# Patient Record
Sex: MALE | Race: WHITE | NOT HISPANIC OR LATINO | Employment: OTHER | URBAN - METROPOLITAN AREA
[De-identification: names, ages, dates, MRNs, and addresses within clinical notes are randomized per-mention and may not be internally consistent; named-entity substitution may affect disease eponyms.]

---

## 2017-02-10 ENCOUNTER — PATIENT OUTREACH (OUTPATIENT)
Dept: HEALTH INFORMATION MANAGEMENT | Facility: OTHER | Age: 82
End: 2017-02-10

## 2017-02-10 NOTE — PROGRESS NOTES
Outcome: Spoke to Baltazar, caregiver listed in patient's emergency contacts, who said that patient is currently living at [facility] for skilled nursing. Baltazar declined to verify pt's . Does not qualify at this time for Care Coordination Enrollment.    Attempt # 3rd -- Previous encounter was closed

## 2017-02-20 ENCOUNTER — APPOINTMENT (OUTPATIENT)
Dept: RADIOLOGY | Facility: MEDICAL CENTER | Age: 82
DRG: 871 | End: 2017-02-20
Attending: EMERGENCY MEDICINE
Payer: MEDICARE

## 2017-02-20 ENCOUNTER — HOSPITAL ENCOUNTER (INPATIENT)
Facility: MEDICAL CENTER | Age: 82
LOS: 32 days | DRG: 871 | End: 2017-03-24
Attending: EMERGENCY MEDICINE | Admitting: INTERNAL MEDICINE
Payer: MEDICARE

## 2017-02-20 ENCOUNTER — RESOLUTE PROFESSIONAL BILLING HOSPITAL PROF FEE (OUTPATIENT)
Dept: HOSPITALIST | Facility: MEDICAL CENTER | Age: 82
End: 2017-02-20
Payer: MEDICARE

## 2017-02-20 DIAGNOSIS — J44.9 CHRONIC OBSTRUCTIVE PULMONARY DISEASE, UNSPECIFIED COPD TYPE (HCC): ICD-10-CM

## 2017-02-20 DIAGNOSIS — J96.11 CHRONIC RESPIRATORY FAILURE WITH HYPOXIA (HCC): ICD-10-CM

## 2017-02-20 DIAGNOSIS — J18.9 HCAP (HEALTHCARE-ASSOCIATED PNEUMONIA): ICD-10-CM

## 2017-02-20 DIAGNOSIS — R29.6 MULTIPLE FALLS: ICD-10-CM

## 2017-02-20 DIAGNOSIS — R53.81 DEBILITY: ICD-10-CM

## 2017-02-20 DIAGNOSIS — E11.8 DM TYPE 2, CONTROLLED, WITH COMPLICATION (HCC): ICD-10-CM

## 2017-02-20 DIAGNOSIS — G40.909 SEIZURE DISORDER (HCC): ICD-10-CM

## 2017-02-20 DIAGNOSIS — I69.391 DYSPHAGIA STATUS POST CEREBROVASCULAR ACCIDENT: ICD-10-CM

## 2017-02-20 DIAGNOSIS — N30.00 ACUTE CYSTITIS WITHOUT HEMATURIA: ICD-10-CM

## 2017-02-20 DIAGNOSIS — G81.94 LEFT HEMIPARESIS (HCC): ICD-10-CM

## 2017-02-20 PROBLEM — A41.9 SEPSIS (HCC): Status: ACTIVE | Noted: 2017-02-20

## 2017-02-20 LAB
ALBUMIN SERPL BCP-MCNC: 3.3 G/DL (ref 3.2–4.9)
ALBUMIN/GLOB SERPL: 1.1 G/DL
ALP SERPL-CCNC: 77 U/L (ref 30–99)
ALT SERPL-CCNC: 19 U/L (ref 2–50)
ANION GAP SERPL CALC-SCNC: 4 MMOL/L (ref 0–11.9)
APPEARANCE UR: ABNORMAL
AST SERPL-CCNC: 24 U/L (ref 12–45)
BACTERIA #/AREA URNS HPF: ABNORMAL /HPF
BASE EXCESS BLDA CALC-SCNC: 8 MMOL/L (ref -4–3)
BASOPHILS # BLD AUTO: 0.3 % (ref 0–1.8)
BASOPHILS # BLD: 0.05 K/UL (ref 0–0.12)
BILIRUB SERPL-MCNC: 0.9 MG/DL (ref 0.1–1.5)
BILIRUB UR QL STRIP.AUTO: NEGATIVE
BNP SERPL-MCNC: 394 PG/ML (ref 0–100)
BODY TEMPERATURE: 36.9 CENTIGRADE
BUN SERPL-MCNC: 19 MG/DL (ref 8–22)
CALCIUM SERPL-MCNC: 8.9 MG/DL (ref 8.5–10.5)
CHLORIDE SERPL-SCNC: 99 MMOL/L (ref 96–112)
CO2 SERPL-SCNC: 38 MMOL/L (ref 20–33)
COLOR UR: YELLOW
CREAT SERPL-MCNC: 0.82 MG/DL (ref 0.5–1.4)
EOSINOPHIL # BLD AUTO: 0 K/UL (ref 0–0.51)
EOSINOPHIL NFR BLD: 0 % (ref 0–6.9)
EPI CELLS #/AREA URNS HPF: ABNORMAL /HPF
ERYTHROCYTE [DISTWIDTH] IN BLOOD BY AUTOMATED COUNT: 53 FL (ref 35.9–50)
GFR SERPL CREATININE-BSD FRML MDRD: >60 ML/MIN/1.73 M 2
GLOBULIN SER CALC-MCNC: 3.1 G/DL (ref 1.9–3.5)
GLUCOSE SERPL-MCNC: 134 MG/DL (ref 65–99)
GLUCOSE UR STRIP.AUTO-MCNC: NEGATIVE MG/DL
HCO3 BLDA-SCNC: 35 MMOL/L (ref 17–25)
HCT VFR BLD AUTO: 39.7 % (ref 42–52)
HGB BLD-MCNC: 12.2 G/DL (ref 14–18)
IMM GRANULOCYTES # BLD AUTO: 0.07 K/UL (ref 0–0.11)
IMM GRANULOCYTES NFR BLD AUTO: 0.4 % (ref 0–0.9)
INHALED O2 FLOW RATE: 6 L/MIN (ref 2–10)
KETONES UR STRIP.AUTO-MCNC: ABNORMAL MG/DL
LACTATE BLD-SCNC: 0.7 MMOL/L (ref 0.5–2)
LACTATE BLD-SCNC: 1 MMOL/L (ref 0.5–2)
LEUKOCYTE ESTERASE UR QL STRIP.AUTO: ABNORMAL
LYMPHOCYTES # BLD AUTO: 1.87 K/UL (ref 1–4.8)
LYMPHOCYTES NFR BLD: 10.4 % (ref 22–41)
MCH RBC QN AUTO: 31.8 PG (ref 27–33)
MCHC RBC AUTO-ENTMCNC: 30.7 G/DL (ref 33.7–35.3)
MCV RBC AUTO: 103.4 FL (ref 81.4–97.8)
MICRO URNS: ABNORMAL
MONOCYTES # BLD AUTO: 1.31 K/UL (ref 0–0.85)
MONOCYTES NFR BLD AUTO: 7.3 % (ref 0–13.4)
MUCOUS THREADS #/AREA URNS HPF: ABNORMAL /HPF
NEUTROPHILS # BLD AUTO: 14.61 K/UL (ref 1.82–7.42)
NEUTROPHILS NFR BLD: 81.6 % (ref 44–72)
NITRITE UR QL STRIP.AUTO: POSITIVE
NRBC # BLD AUTO: 0 K/UL
NRBC BLD AUTO-RTO: 0 /100 WBC
PCO2 BLDA: 63.6 MMHG (ref 26–37)
PCO2 TEMP ADJ BLDA: 63.3 MMHG (ref 26–37)
PH BLDA: 7.36 [PH] (ref 7.4–7.5)
PH TEMP ADJ BLDA: 7.36 [PH] (ref 7.4–7.5)
PH UR STRIP.AUTO: 5.5 [PH]
PLATELET # BLD AUTO: 149 K/UL (ref 164–446)
PMV BLD AUTO: 10.5 FL (ref 9–12.9)
PO2 BLDA: 94.9 MMHG (ref 64–87)
PO2 TEMP ADJ BLDA: 94.3 MMHG (ref 64–87)
POTASSIUM SERPL-SCNC: 3.8 MMOL/L (ref 3.6–5.5)
PROT SERPL-MCNC: 6.4 G/DL (ref 6–8.2)
PROT UR QL STRIP: 30 MG/DL
RBC # BLD AUTO: 3.84 M/UL (ref 4.7–6.1)
RBC # URNS HPF: ABNORMAL /HPF
RBC UR QL AUTO: ABNORMAL
SAO2 % BLDA: 96.3 % (ref 93–99)
SODIUM SERPL-SCNC: 141 MMOL/L (ref 135–145)
SP GR UR STRIP.AUTO: 1.02
TROPONIN I SERPL-MCNC: 0.06 NG/ML (ref 0–0.04)
WBC # BLD AUTO: 17.9 K/UL (ref 4.8–10.8)
WBC #/AREA URNS HPF: >150 /HPF

## 2017-02-20 PROCEDURE — 83880 ASSAY OF NATRIURETIC PEPTIDE: CPT

## 2017-02-20 PROCEDURE — A9270 NON-COVERED ITEM OR SERVICE: HCPCS | Performed by: INTERNAL MEDICINE

## 2017-02-20 PROCEDURE — 84484 ASSAY OF TROPONIN QUANT: CPT

## 2017-02-20 PROCEDURE — 87205 SMEAR GRAM STAIN: CPT

## 2017-02-20 PROCEDURE — 71010 DX-CHEST-PORTABLE (1 VIEW): CPT

## 2017-02-20 PROCEDURE — 96367 TX/PROPH/DG ADDL SEQ IV INF: CPT

## 2017-02-20 PROCEDURE — 99285 EMERGENCY DEPT VISIT HI MDM: CPT

## 2017-02-20 PROCEDURE — 700111 HCHG RX REV CODE 636 W/ 250 OVERRIDE (IP): Performed by: PHARMACIST

## 2017-02-20 PROCEDURE — 700101 HCHG RX REV CODE 250: Performed by: INTERNAL MEDICINE

## 2017-02-20 PROCEDURE — 96365 THER/PROPH/DIAG IV INF INIT: CPT

## 2017-02-20 PROCEDURE — 770020 HCHG ROOM/CARE - TELE (206)

## 2017-02-20 PROCEDURE — 36415 COLL VENOUS BLD VENIPUNCTURE: CPT

## 2017-02-20 PROCEDURE — 99223 1ST HOSP IP/OBS HIGH 75: CPT | Mod: AI | Performed by: INTERNAL MEDICINE

## 2017-02-20 PROCEDURE — 87040 BLOOD CULTURE FOR BACTERIA: CPT | Mod: 91

## 2017-02-20 PROCEDURE — 700111 HCHG RX REV CODE 636 W/ 250 OVERRIDE (IP): Performed by: INTERNAL MEDICINE

## 2017-02-20 PROCEDURE — 700102 HCHG RX REV CODE 250 W/ 637 OVERRIDE(OP): Performed by: INTERNAL MEDICINE

## 2017-02-20 PROCEDURE — 87070 CULTURE OTHR SPECIMN AEROBIC: CPT

## 2017-02-20 PROCEDURE — 80053 COMPREHEN METABOLIC PANEL: CPT

## 2017-02-20 PROCEDURE — 83605 ASSAY OF LACTIC ACID: CPT

## 2017-02-20 PROCEDURE — 87186 SC STD MICRODIL/AGAR DIL: CPT | Mod: 91

## 2017-02-20 PROCEDURE — 700105 HCHG RX REV CODE 258: Performed by: PHARMACIST

## 2017-02-20 PROCEDURE — 304561 HCHG STAT O2

## 2017-02-20 PROCEDURE — 87077 CULTURE AEROBIC IDENTIFY: CPT | Mod: 91

## 2017-02-20 PROCEDURE — 700105 HCHG RX REV CODE 258: Performed by: INTERNAL MEDICINE

## 2017-02-20 PROCEDURE — 700105 HCHG RX REV CODE 258: Performed by: EMERGENCY MEDICINE

## 2017-02-20 PROCEDURE — 87086 URINE CULTURE/COLONY COUNT: CPT

## 2017-02-20 PROCEDURE — 81001 URINALYSIS AUTO W/SCOPE: CPT

## 2017-02-20 PROCEDURE — 94640 AIRWAY INHALATION TREATMENT: CPT

## 2017-02-20 PROCEDURE — 94760 N-INVAS EAR/PLS OXIMETRY 1: CPT

## 2017-02-20 PROCEDURE — 82803 BLOOD GASES ANY COMBINATION: CPT

## 2017-02-20 PROCEDURE — 85025 COMPLETE CBC W/AUTO DIFF WBC: CPT

## 2017-02-20 PROCEDURE — 700111 HCHG RX REV CODE 636 W/ 250 OVERRIDE (IP): Performed by: EMERGENCY MEDICINE

## 2017-02-20 RX ORDER — SODIUM CHLORIDE 9 MG/ML
30 INJECTION, SOLUTION INTRAVENOUS
Status: DISCONTINUED | OUTPATIENT
Start: 2017-02-20 | End: 2017-03-03

## 2017-02-20 RX ORDER — TRAZODONE HYDROCHLORIDE 50 MG/1
50 TABLET ORAL
Status: ON HOLD | COMMUNITY
End: 2017-03-24

## 2017-02-20 RX ORDER — TAMSULOSIN HYDROCHLORIDE 0.4 MG/1
0.4 CAPSULE ORAL
Status: DISCONTINUED | OUTPATIENT
Start: 2017-02-21 | End: 2017-03-22

## 2017-02-20 RX ORDER — SODIUM CHLORIDE 9 MG/ML
INJECTION, SOLUTION INTRAVENOUS CONTINUOUS
Status: DISCONTINUED | OUTPATIENT
Start: 2017-02-20 | End: 2017-02-20

## 2017-02-20 RX ORDER — POTASSIUM CHLORIDE 750 MG/1
20 TABLET, EXTENDED RELEASE ORAL DAILY
Status: ON HOLD | COMMUNITY
End: 2017-03-24

## 2017-02-20 RX ORDER — AMOXICILLIN 250 MG
1 CAPSULE ORAL
Status: DISCONTINUED | OUTPATIENT
Start: 2017-02-20 | End: 2017-03-22

## 2017-02-20 RX ORDER — DOCUSATE SODIUM 100 MG/1
100 CAPSULE, LIQUID FILLED ORAL 2 TIMES DAILY
Status: DISCONTINUED | OUTPATIENT
Start: 2017-02-20 | End: 2017-02-28

## 2017-02-20 RX ORDER — FAMOTIDINE 20 MG/1
20 TABLET, FILM COATED ORAL 2 TIMES DAILY
Status: DISCONTINUED | OUTPATIENT
Start: 2017-02-20 | End: 2017-03-22

## 2017-02-20 RX ORDER — LEVETIRACETAM 500 MG/1
1500 TABLET ORAL 2 TIMES DAILY
Status: DISCONTINUED | OUTPATIENT
Start: 2017-02-20 | End: 2017-02-20

## 2017-02-20 RX ORDER — BISACODYL 10 MG
10 SUPPOSITORY, RECTAL RECTAL
Status: DISCONTINUED | OUTPATIENT
Start: 2017-02-20 | End: 2017-03-22

## 2017-02-20 RX ORDER — ENEMA 19; 7 G/133ML; G/133ML
1 ENEMA RECTAL
Status: DISCONTINUED | OUTPATIENT
Start: 2017-02-20 | End: 2017-03-22

## 2017-02-20 RX ORDER — TRAZODONE HYDROCHLORIDE 50 MG/1
50 TABLET ORAL
Status: DISCONTINUED | OUTPATIENT
Start: 2017-02-20 | End: 2017-03-22

## 2017-02-20 RX ORDER — AMOXICILLIN 250 MG
1 CAPSULE ORAL NIGHTLY
Status: DISCONTINUED | OUTPATIENT
Start: 2017-02-20 | End: 2017-03-22

## 2017-02-20 RX ORDER — SODIUM CHLORIDE 9 MG/ML
1000 INJECTION, SOLUTION INTRAVENOUS ONCE
Status: COMPLETED | OUTPATIENT
Start: 2017-02-20 | End: 2017-02-20

## 2017-02-20 RX ORDER — DOCUSATE SODIUM 100 MG/1
100 CAPSULE, LIQUID FILLED ORAL DAILY
COMMUNITY

## 2017-02-20 RX ORDER — SERTRALINE HYDROCHLORIDE 100 MG/1
100 TABLET, FILM COATED ORAL DAILY
Status: DISCONTINUED | OUTPATIENT
Start: 2017-02-21 | End: 2017-03-22

## 2017-02-20 RX ORDER — ATORVASTATIN CALCIUM 40 MG/1
80 TABLET, FILM COATED ORAL EVERY EVENING
Status: DISCONTINUED | OUTPATIENT
Start: 2017-02-20 | End: 2017-03-22

## 2017-02-20 RX ORDER — M-VIT,TX,IRON,MINS/CALC/FOLIC 27MG-0.4MG
1 TABLET ORAL DAILY
Status: ON HOLD | COMMUNITY
End: 2017-03-24

## 2017-02-20 RX ORDER — GABAPENTIN 300 MG/1
300 CAPSULE ORAL DAILY
Status: DISCONTINUED | OUTPATIENT
Start: 2017-02-21 | End: 2017-03-22

## 2017-02-20 RX ORDER — FINASTERIDE 5 MG/1
5 TABLET, FILM COATED ORAL DAILY
Status: DISCONTINUED | OUTPATIENT
Start: 2017-02-21 | End: 2017-03-22

## 2017-02-20 RX ORDER — LACTULOSE 20 G/30ML
30 SOLUTION ORAL
Status: DISCONTINUED | OUTPATIENT
Start: 2017-02-20 | End: 2017-03-22

## 2017-02-20 RX ORDER — IPRATROPIUM BROMIDE AND ALBUTEROL SULFATE 2.5; .5 MG/3ML; MG/3ML
3 SOLUTION RESPIRATORY (INHALATION) 4 TIMES DAILY
Status: DISCONTINUED | OUTPATIENT
Start: 2017-02-20 | End: 2017-02-21

## 2017-02-20 RX ORDER — BACLOFEN 10 MG/1
10 TABLET ORAL 2 TIMES DAILY
COMMUNITY

## 2017-02-20 RX ORDER — FERROUS SULFATE 325(65) MG
325 TABLET ORAL DAILY
Status: ON HOLD | COMMUNITY
End: 2017-03-24

## 2017-02-20 RX ORDER — BUDESONIDE AND FORMOTEROL FUMARATE DIHYDRATE 160; 4.5 UG/1; UG/1
2 AEROSOL RESPIRATORY (INHALATION) EVERY 12 HOURS
Status: DISCONTINUED | OUTPATIENT
Start: 2017-02-20 | End: 2017-02-22

## 2017-02-20 RX ORDER — TIOTROPIUM BROMIDE 18 UG/1
1 CAPSULE ORAL; RESPIRATORY (INHALATION) DAILY
Status: DISCONTINUED | OUTPATIENT
Start: 2017-02-21 | End: 2017-03-22

## 2017-02-20 RX ORDER — FERROUS SULFATE 325(65) MG
325 TABLET ORAL DAILY
Status: DISCONTINUED | OUTPATIENT
Start: 2017-02-21 | End: 2017-03-22

## 2017-02-20 RX ORDER — CHOLECALCIFEROL (VITAMIN D3) 125 MCG
500 CAPSULE ORAL DAILY
Status: ON HOLD | COMMUNITY
End: 2017-03-24

## 2017-02-20 RX ORDER — SODIUM CHLORIDE 9 MG/ML
500 INJECTION, SOLUTION INTRAVENOUS
Status: DISCONTINUED | OUTPATIENT
Start: 2017-02-20 | End: 2017-03-03

## 2017-02-20 RX ORDER — OXYBUTYNIN CHLORIDE 5 MG/1
5 TABLET, EXTENDED RELEASE ORAL DAILY
Status: DISCONTINUED | OUTPATIENT
Start: 2017-02-21 | End: 2017-03-22

## 2017-02-20 RX ORDER — HEPARIN SODIUM 5000 [USP'U]/ML
5000 INJECTION, SOLUTION INTRAVENOUS; SUBCUTANEOUS EVERY 8 HOURS
Status: DISCONTINUED | OUTPATIENT
Start: 2017-02-20 | End: 2017-03-22

## 2017-02-20 RX ADMIN — HEPARIN SODIUM 5000 UNITS: 5000 INJECTION, SOLUTION INTRAVENOUS; SUBCUTANEOUS at 23:02

## 2017-02-20 RX ADMIN — DEXTROSE MONOHYDRATE 1500 MG: 50 INJECTION, SOLUTION INTRAVENOUS at 22:43

## 2017-02-20 RX ADMIN — SODIUM CHLORIDE 1000 ML: 9 INJECTION, SOLUTION INTRAVENOUS at 15:17

## 2017-02-20 RX ADMIN — BUDESONIDE AND FORMOTEROL FUMARATE DIHYDRATE 2 PUFF: 160; 4.5 AEROSOL RESPIRATORY (INHALATION) at 22:58

## 2017-02-20 RX ADMIN — IPRATROPIUM BROMIDE AND ALBUTEROL SULFATE 3 ML: .5; 3 SOLUTION RESPIRATORY (INHALATION) at 17:00

## 2017-02-20 RX ADMIN — PIPERACILLIN AND TAZOBACTAM 4.5 G: 4; .5 INJECTION, POWDER, LYOPHILIZED, FOR SOLUTION INTRAVENOUS; PARENTERAL at 15:08

## 2017-02-20 RX ADMIN — VANCOMYCIN HYDROCHLORIDE 1600 MG: 10 INJECTION, POWDER, LYOPHILIZED, FOR SOLUTION INTRAVENOUS at 16:47

## 2017-02-20 RX ADMIN — SODIUM CHLORIDE: 9 INJECTION, SOLUTION INTRAVENOUS at 17:47

## 2017-02-20 RX ADMIN — PIPERACILLIN AND TAZOBACTAM 4.5 G: 4; .5 INJECTION, POWDER, LYOPHILIZED, FOR SOLUTION INTRAVENOUS; PARENTERAL at 23:23

## 2017-02-20 ASSESSMENT — COPD QUESTIONNAIRES
DURING THE PAST 4 WEEKS HOW MUCH DID YOU FEEL SHORT OF BREATH: MOST  OR ALL OF THE TIME
DO YOU EVER COUGH UP ANY MUCUS OR PHLEGM?: YES, A FEW DAYS A WEEK OR MONTH
HAVE YOU SMOKED AT LEAST 100 CIGARETTES IN YOUR ENTIRE LIFE: YES
COPD SCREENING SCORE: 8

## 2017-02-20 ASSESSMENT — LIFESTYLE VARIABLES: EVER_SMOKED: UNABLE TO EVALUATE AT THIS TIME - NEEDS ASSESSMENT PRIOR TO DISCHARGE

## 2017-02-20 ASSESSMENT — PAIN SCALES - GENERAL
PAINLEVEL_OUTOF10: 0
PAINLEVEL_OUTOF10: 0

## 2017-02-20 NOTE — ED NOTES
Med rec updated and complete  Allergies reviewed.  Called and dicussed current medications  With caregiver Clyde No medications  Given today except for inhalers.  Pt was not discharged home on antibiotics.

## 2017-02-20 NOTE — IP AVS SNAPSHOT
Appknox Access Code: [email or web address]  Expires: 4/19/2017 12:21 PM    Appknox  A secure, online tool to manage your health information     [employer]’s Appknox® is a secure, online tool that connects you to your personalized health information from the privacy of your home -- day or night - making it very easy for you to manage your healthcare. Once the activation process is completed, you can even access your medical information using the Appknox reyes, which is available for free in the Apple Reyes store or Google Play store.     Appknox provides the following levels of access (as shown below):   My Chart Features   [facility] Primary Care Doctor [facility]  Specialists [facility]  Urgent  Care Non-[facility]  Primary Care  Doctor   Email your healthcare team securely and privately 24/7 X X X X   Manage appointments: schedule your next appointment; view details of past/upcoming appointments X      Request prescription refills. X      View recent personal medical records, including lab and immunizations X X X X   View health record, including health history, allergies, medications X X X X   Read reports about your outpatient visits, procedures, consult and ER notes X X X X   See your discharge summary, which is a recap of your hospital and/or ER visit that includes your diagnosis, lab results, and care plan. X X       How to register for Appknox:  1. Go to  [email or web address].  2. Click on the Sign Up Now box, which takes you to the New Member Sign Up page. You will need to provide the following information:  a. Enter your Appknox Access Code exactly as it appears at the top of this page. (You will not need to use this code after you’ve completed the sign-up process. If you do not sign up before the expiration date, you must request a new code.)   b. Enter your date of birth.   c. Enter your home email address.   d. Click Submit, and follow the next screen’s instructions.  3. Create a Appknox ID. This will be your Appknox  login ID and cannot be changed, so think of one that is secure and easy to remember.  4. Create a [employer] password. You can change your password at any time.  5. Enter your Password Reset Question and Answer. This can be used at a later time if you forget your password.   6. Enter your e-mail address. This allows you to receive e-mail notifications when new information is available in [employer].  7. Click Sign Up. You can now view your health information.    For assistance activating your [employer] account, call [phone]

## 2017-02-20 NOTE — ED NOTES
BIBA with report of increased difficulty breathing for past 4 days.  Pt recently d/c'd from SNF.  Per EMS report, caregivers were given advise to provide thickened diet to pt and did not.  Per report, pt SAT was in mid 80's on 2lpm O@ at home, given NPPB treatment X 3 during transport here.  Pt SAT 86 to 88% on arrival, placed on oxymask at 7 lpm

## 2017-02-20 NOTE — IP AVS SNAPSHOT
" Home Care Instructions                                                                                                                  Name:Alverto Mancini  Medical Record Number:[id number]  CSN: [id number]    YOB: 1935   Age: 81 y.o.  Sex: male  HT:1.727 m (5' 8\") WT: 59.1 kg (130 lb 4.7 oz)          Admit Date: 2/20/2017     Discharge Date:   Today's Date: 3/24/2017  Attending Doctor:  Watson Renteria M.D.                  Allergies:  Review of patient's allergies indicates no known allergies.            Discharge Instructions       Discharge Instructions    Discharged to home by [facility] with escort. Discharged via wheelchair, hospital escort: Yes.  Special equipment needed: Oxygen    Be sure to schedule a follow-up appointment with your primary care doctor or any specialists as instructed.     Discharge Plan:   Influenza Vaccine Indication: Not indicated: Previously immunized this influenza season and > 8 years of age    I understand that a diet low in cholesterol, fat, and sodium is recommended for good health. Unless I have been given specific instructions below for another diet, I accept this instruction as my diet prescription.   Other diet: Nectar thick    Special Instructions: Sepsis, Adult  Sepsis is a serious infection of your blood or tissues that affects your whole body. The infection that causes sepsis may be bacterial, viral, fungal, or parasitic. Sepsis may be life threatening. Sepsis can cause your blood pressure to drop. This may result in shock. Shock causes your central nervous system and your organs to stop working correctly.   RISK FACTORS  Sepsis can happen in anyone, but it is more likely to happen in people who have weakened immune systems.  SIGNS AND SYMPTOMS   Symptoms of sepsis can include:  · Fever or low body temperature (hypothermia).  · Rapid breathing (hyperventilation).  · Chills.  · Rapid heartbeat (tachycardia).  · Confusion or light-headedness.  · Trouble " breathing.  · Urinating much less than usual.  · Cool, clammy skin or red, flushed skin.  · Other problems with the heart, kidneys, or brain.  DIAGNOSIS   Your health care provider will likely do tests to look for an infection, to see if the infection has spread to your blood, and to see how serious your condition is. Tests can include:  · Blood tests, including cultures of your blood.  · Cultures of other fluids from your body, such as:  · Urine.  · Pus from wounds.  · Mucus coughed up from your lungs.  · Urine tests other than cultures.  · X-ray exams or other imaging tests.  TREATMENT   Treatment will begin with elimination of the source of infection. If your sepsis is likely caused by a bacterial or fungal infection, you will be given antibiotic or antifungal medicines.  You may also receive:  · Oxygen.  · Fluids through an IV tube.  · Medicines to increase your blood pressure.  · A machine to clean your blood (dialysis) if your kidneys fail.  · A machine to help you breathe if your lungs fail.  SEEK IMMEDIATE MEDICAL CARE IF:  You get an infection or develop any of the signs and symptoms of sepsis after surgery or a hospitalization.     This information is not intended to replace advice given to you by your health care provider. Make sure you discuss any questions you have with your health care provider.     Document Released: 09/15/2004 Document Revised: 05/03/2016 Document Reviewed: 08/25/2014  [employer] Interactive Patient Education ©2016 [employer]      · Is patient discharged on Warfarin / Coumadin?   No     · Is patient Post Blood Transfusion?  No    Depression / Suicide Risk    As you are discharged from this [facility] facility, it is important to learn how to keep safe from harming yourself.    Recognize the warning signs:  · Abrupt changes in personality, positive or negative- including increase in energy   · Giving away possessions  · Change in eating patterns- significant weight changes-   positive or negative  · Change in sleeping patterns- unable to sleep or sleeping all the time   · Unwillingness or inability to communicate  · Depression  · Unusual sadness, discouragement and loneliness  · Talk of wanting to die  · Neglect of personal appearance   · Rebelliousness- reckless behavior  · Withdrawal from people/activities they love  · Confusion- inability to concentrate     If you or a loved one observes any of these behaviors or has concerns about self-harm, here's what you can do:  · Talk about it- your feelings and reasons for harming yourself  · Remove any means that you might use to hurt yourself (examples: pills, rope, extension cords, firearm)  · Get professional help from the community (Mental Health, Substance Abuse, psychological counseling)  · Do not be alone:Call your Safe Contact- someone whom you trust who will be there for you.  · Call your local CRISIS HOTLINE 987-9211 or 176-966-0632  · Call your local Children's Mobile Crisis Response Team [place] (801) 450-7438 or www.Flipswap  · Call the toll free National Suicide Prevention Hotlines   · National Suicide Prevention Lifeline 436-168-RNPM (7045)  · National Hope Line Network 800-SUICIDE (908-3662)    Thickening Liquids for Dysphagia Diet  If you are on the dysphagia diet, you may need to thicken drinks, soups, foods that melt at room temperature, and other liquids before you drink or eat them. Thickening liquids makes them easier to swallow. It also reduces the risk of liquid traveling to your lungs.  To make a thickened liquid you will need to add a commercial thickening product or a soft food to the liquid until it reaches the consistency it needs to be. Your health care provider or dietitian will explain to you the consistency you need to aim for. Liquid consistencies include:  · Thin. Thin liquids include most drinks (such as water, milk, tea, soda, juice, carbonated drinks), as well as ice cream, sherbet, sorbet,  ice pops, and broth-based soups.  · Nectar-like. Nectar-like liquids include maple syrup and creamy soup.  · Honey-like. Honey-like liquids are made to be runny but are thick like honey. They cannot be sipped through a straw.  · Spoon-thick. Spoon-thick liquids are thick, like pudding.  MY PLAN  I should thicken my liquids to a nectar consistency.  DIET GUIDELINES  · Thicken liquids to the consistency your health care provider recommends.  · Follow your dietitian's or health care provider's recommendation on how to thicken your liquids.  · See your dietitian or health care provider regularly for help with your dietary changes.  HOW CAN I THICKEN MY LIQUIDS?  Liquids can be thickened with a commercial food and beverage thickener or with a soft food.  Commercial Food and Beverage Thickeners  A food and beverage thickener is a powder or gel that makes a food or beverage thicker. Thickeners are sold at pharmacies, medical supply stores, some grocery stores, and online. They can be added to both hot and cold liquids and do not change the taste of the liquid. Ask your health care provider or dietitian for a complete list of commercial thickeners.  Each thickening product is different. Some need to be blended into a liquid with a  while others can be stirred into a liquid with a fork or spoon. Follow the instructions on the product label.  Soft Foods  Some foods such as soups, casseroles, and gravies can be thickened with soft foods. Soft foods include:  · Baby cereal.  · Gravy powder.  · Mashed potato.  · Pureed baby food.  · Instant potato flakes.  · Powdered sauce mixes (such as cheese mixes).  · Flour.  To use one of these soft food items, stir or mix them into the thin liquid until it reaches the desired thickness. Start with a small amount and adjust soft food and liquid as necessary.  Note: Flour works best with warm liquids, such as broth. To thicken a liquid with flour, make a paste out of flour and water.  Cook or warm your liquid and add the paste to it. Stir until the mixture thickens.  WHAT ARE SOME TIPS TO MAKE THICKENING LIQUIDS EASIER?  · Take thickeners with you when eating out or traveling.  · If a liquid gets too thick, add more of the thinner liquid until the desired consistency is reached.  · Consider purchasing pre-made thickened drinks.  · Consider using a thickening product to make your own frozen desserts.     This information is not intended to replace advice given to you by your health care provider. Make sure you discuss any questions you have with your health care provider.     Document Released: 06/18/2013 Document Revised: 12/23/2014 Document Reviewed: 12/01/2014  [employer] Interactive Patient Education ©2016 [employer].      Hospice  Hospice is a service that is designed to provide people who are terminally ill and their families with medical, spiritual, and psychological support. Its aim is to improve your quality of life by keeping you as alert and comfortable as possible. Hospice is performed by a team of health care professionals and volunteers who:  · Help keep you comfortable. Hospice can be provided in your home or in a homelike setting. The hospice staff works with your family and friends to help meet your needs. You will enjoy the support of loved ones by receiving much of your basic care from family and friends.  · Provide pain relief and manage your symptoms. The staff supply all necessary medicines and equipment.  · Provide companionship when you are alone.  · Allow you and your family to rest. They may do light housekeeping, prepare meals, and run errands.  · Provide counseling. They will make sure your emotional, spiritual, and social needs and those of your family are being met.  · Provide spiritual care. Spiritual care is individualized to meet your needs and your family's needs. It may involve helping you look at what death means to you, say goodbye, or perform a specific  Tenriism ceremony or ritual.  Hospice teams often include:  · A nurse.  · A doctor.  · Social workers.  · Amish leaders (such as a ).  · Trained volunteers.  WHEN SHOULD HOSPICE CARE BEGIN?  Most people who use hospice are believed to have fewer than 6 months to live. Your family and health care providers can help you decide when hospice services should begin. If your condition improves, you may discontinue the program.  WHAT SHOULD I CONSIDER BEFORE SELECTING A PROGRAM?  Most hospice programs are run by nonprofit, independent organizations. Some are affiliated with hospitals, nursing homes, or home health care agencies. Hospice programs can take place in the home or at a hospice center, hospital, or skilled nursing facility. When choosing a hospice program, ask the following questions:  · What services are available to me?  · What services are offered to my loved ones?  · How involved are my loved ones?  · How involved is my health care provider?  · Who makes up the hospice care team? How are they trained or screened?  · How will my pain and symptoms be managed?  · If my circumstances change, can the services be provided in a different setting, such as my home or in the hospital?  · Is the program reviewed and licensed by the state or certified in some other way?  WHERE CAN I LEARN MORE ABOUT HOSPICE?  You can learn about existing hospice programs in your area from your health care providers. You can also read more about hospice online. The websites of the following organizations contain helpful information:  · The National Hospice and Palliative Care Organization (NHPCO).  · The Hospice Association of [place] (HAA).  · The Hospice Education [place].  · The American Cancer Society (ACS).  · Hospice Net.     This information is not intended to replace advice given to you by your health care provider. Make sure you discuss any questions you have with your health care provider.     Document Released:  04/05/2005 Document Revised: 12/23/2014 Document Reviewed: 10/28/2014  [employer] Interactive Patient Education ©2016 [employer]    Pneumonia, Adult  Pneumonia is an infection of the lungs.   CAUSES  Pneumonia may be caused by bacteria or a virus. Usually, the infection is caused by breathing in droplets from an infected person's cough or sneeze.   SYMPTOMS   Symptoms of pneumonia include:  · Cough.  · Fever.  · Chest pain.  · Rapid breathing.  · Shortness of breath.  · Shaking chills.  · Mucus production.  DIAGNOSIS   If you have the common symptoms of pneumonia, often your health care provider will confirm the diagnosis with a chest X-ray. The X-ray will show an abnormality in the lung if you have pneumonia. Other tests may be done on your blood, urine, or mucus (sputum) to find the specific cause of your pneumonia. A blood gas test or pulse oximetry test may be needed to check how well your lungs are working.  TREATMENT   Your treatment will depend on whether your pneumonia is caused by bacteria or a virus.   · Bacterial pneumonia is treated with antibiotic medicine.  · Pneumonia that is caused by the influenza virus may be treated with an antiviral medicine.  · Pneumonia that is caused by a virus other than influenza will not respond to antibiotic medicine. This type of pneumonia will have to run its course.   HOME CARE INSTRUCTIONS   · Cough suppressants may be used if you are losing too much rest from coughing at night. However, you should try to avoid taking cough suppresants. This is because coughing helps to remove mucus from your lungs.  · Sleep in a semi-upright position at night. Try sleeping in a reclining chair, or place a few pillows under your head.  · Try using a cold steam vaporizer or humidifier in your home or bedroom. This may help loosen your mucus.  · If you were prescribed an antibiotic medicine, finish all of it even if you start to feel better.  · If you were prescribed an expectorant,  take it as directed by your health care provider. This medicine loosens the mucus so you can cough it up.  · Take medicines only as directed by your health care provider.  · Do not smoke. If you are a smoker and continue to smoke, your cough may last several weeks after your pneumonia has cleared.  · Get rest when you feel tired, or as needed.  PREVENTION  A pneumococcal shot (vaccine) is available to prevent a common bacterial cause of pneumonia. This is usually suggested for:  · People over 65 years old.  · People on chemotherapy.  · People with chronic lung problems, such as bronchitis or emphysema.  · People with immune system problems.  If you are over 65 years old or have a high risk condition, you may receive the pneumococcal vaccine if you have not received it before. In some countries, a routine influenza vaccine is also recommended. This vaccine can help prevent some cases of pneumonia. You may be offered the influenza vaccine as part of your care.  If you are a smoker, it is time to quit in order to prevent pneumonia in the future. You may receive instructions on how to stop smoking. Your health care provider can provide medicines and counseling to help you quit.  SEEK MEDICAL CARE IF:  · You have a fever.  · You cannot control your cough with suppressants at night, and you keep losing sleep.  SEEK IMMEDIATE MEDICAL CARE IF:   · You have worsening shortness of breath.  · You have increased chest pain.  · Your sickness becomes worse, especially if you are an older adult or have a weakened immune system.  · You cough up blood.  · You have pain that is getting worse or is not controlled with medicines.  · Your symptoms are getting worse rather than better.     This information is not intended to replace advice given to you by your health care provider. Make sure you discuss any questions you have with your health care provider.     Document Released: 12/18/2006 Document Revised: 01/08/2016 Document Reviewed:  04/13/2016  [employer] Interactive Patient Education ©2016 [employer]      Follow-up Information     1. Follow up with Hina Herrera M.D.. Schedule an appointment as soon as possible for a visit in 2 weeks.    Specialty:  Pediatrics    Why:  Follow Up    Contact information    [address] [place] [address] Dr NADIA LEE [id number] [phone]           Discharge Medication Instructions:    Below are the medications your physician expects you to take upon discharge:    Review all your home medications and newly ordered medications with your doctor and/or pharmacist. Follow medication instructions as directed by your doctor and/or pharmacist.    Please keep your medication list with you and share with your physician.               Medication List      CONTINUE taking these medications        Instructions    baclofen 10 MG Tabs   Commonly known as:  LIORESAL    Take 10 mg by mouth 2 times a day.   Dose:  10 mg       docusate sodium 100 MG Caps   Last time this was given:  100 mg on 3/24/2017  9:02 AM   Commonly known as:  COLACE    Take 100 mg by mouth every day.   Dose:  100 mg       finasteride 5 MG Tabs   Last time this was given:  5 mg on 3/22/2017  8:42 AM   Commonly known as:  PROSCAR    Take 1 Tab by mouth every day.   Dose:  5 mg       fluticasone-salmeterol 250-50 MCG/DOSE Aepb   Commonly known as:  ADVAIR    Inhale 1 Puff by mouth every 12 hours.   Dose:  1 Puff       gabapentin 300 MG Caps   Last time this was given:  300 mg on 3/22/2017  8:43 AM   Commonly known as:  NEURONTIN    Take 1 Cap by mouth every day.   Dose:  300 mg       ipratropium-albuterol 0.5-2.5 (3) MG/3ML nebulizer solution   Last time this was given:  3 mL on 2/25/2017 11:47 PM   Commonly known as:  DUONEB    3 mL by Nebulization route 4 times a day.   Dose:  3 mL       levetiracetam 750 MG tablet   Last time this was given:  1,500 mg on 3/22/2017  8:43 AM   Commonly known as:  KEPPRA    Take 2 Tabs by mouth 2 times a day.   Dose:  1500 mg          tamsulosin 0.4 MG capsule   Last time this was given:  0.4 mg on 3/22/2017  8:41 AM   Commonly known as:  FLOMAX    Take 1 Cap by mouth ONE-HALF HOUR AFTER BREAKFAST.   Dose:  0.4 mg       tiotropium 18 MCG Caps   Last time this was given:  1 Cap on 3/22/2017  8:46 AM   Commonly known as:  SPIRIVA HANDIHALER    Inhale 1 Cap by mouth every day.   Dose:  18 mcg         STOP taking these medications     aspirin EC 81 MG Tbec   Commonly known as:  ECOTRIN       atorvastatin 80 MG tablet   Commonly known as:  LIPITOR       calcium carbonate 500 MG Tabs   Commonly known as:  OS-YOANA 500       cyanocobalamin 500 MCG Tabs   Commonly known as:  VITAMIN B-12       famotidine 20 MG Tabs   Commonly known as:  PEPCID       ferrous sulfate 325 (65 FE) MG tablet       furosemide 40 MG Tabs   Commonly known as:  LASIX       metoprolol 25 MG Tabs   Commonly known as:  LOPRESSOR       oxybutynin SR 5 MG Tb24   Commonly known as:  DITROPAN-XL       potassium chloride SA 10 MEQ Tbcr   Commonly known as:  K-DUR       sertraline 100 MG Tabs   Commonly known as:  ZOLOFT       spironolactone 25 MG Tabs   Commonly known as:  ALDACTONE       therapeutic multivitamin-minerals Tabs       trazodone 50 MG Tabs   Commonly known as:  DESYREL       vitamin D 1000 UNIT Tabs   Commonly known as:  cholecalciferol               Instructions           Diet / Nutrition:    Follow any diet instructions given to you by your doctor or the dietician, including how much salt (sodium) you are allowed each day.    If you are overweight, talk to your doctor about a weight reduction plan.    Activity:    Remain physically active following your doctor's instructions about exercise and activity.    Rest often.     Any time you become even a little tired or short of breath, SIT DOWN and rest.    Worsening Symptoms:    Report any of the following signs and symptoms to the doctor's office immediately:    *Pain of jaw, arm, or neck  *Chest pain not relieved by  medication                               *Dizziness or loss of consciousness  *Difficulty breathing even when at rest   *More tired than usual                                       *Bleeding drainage or swelling of surgical site  *Swelling of feet, ankles, legs or stomach                 *Fever (>100ºF)  *Pink or blood tinged sputum  *Weight gain (3lbs/day or 5lbs /week)           *Shock from internal defibrillator (if applicable)  *Palpitations or irregular heartbeats                *Cool and/or numb extremities    Stroke Awareness    Common Risk Factors for Stroke include:    Age  Atrial Fibrillation  Carotid Artery Stenosis  Diabetes Mellitus  Excessive alcohol consumption  High blood pressure  Overweight   Physical inactivity  Smoking    Warning signs and symptoms of a stroke include:    *Sudden numbness or weakness of the face, arm or leg (especially on one side of the body).  *Sudden confusion, trouble speaking or understanding.  *Sudden trouble seeing in one or both eyes.  *Sudden trouble walking, dizziness, loss of balance or coordination.Sudden severe headache with no known cause.    It is very important to get treatment quickly when a stroke occurs. If you experience any of the above warning signs, call 911 immediately.                   Disclaimer         Quit Smoking / Tobacco Use:    I understand the use of any tobacco products increases my chance of suffering from future heart disease or stroke and could cause other illnesses which may shorten my life. Quitting the use of tobacco products is the single most important thing I can do to improve my health. For further information on smoking / tobacco cessation call a Toll Free Quit Line at 1-257.844.8321 (*National Cancer [place]) or 1-386.693.9593 (American Lung Association) or you can access the web based program at www.lungusa.org.    [place] Tobacco Users Help Line:  (727) 957-9055       Toll Free: 1-886.415.9358  Quit Tobacco Program [facility]  Management Services (409)335-0628    Crisis Hotline:    [place] Crisis Hotline:  4-856-ITZUKQZ or 1-424.767.9894    [place] Crisis Hotline:    1-842.665.7780 or 093-016-1651    Discharge Survey:   Thank you for choosing [facility]. We hope we did everything we could to make your hospital stay a pleasant one. You may be receiving a phone survey and we would appreciate your time and participation in answering the questions. Your input is very valuable to us in our efforts to improve our service to our patients and their families.        My signature on this form indicates that:    1. I have reviewed and understand the above information.  2. My questions regarding this information have been answered to my satisfaction.  3. I have formulated a plan with my discharge nurse to obtain my prescribed medications for home.                  Disclaimer         __________________________________                     __________       ________                       Patient Signature                                                 Date                    Time

## 2017-02-20 NOTE — IP AVS SNAPSHOT
3/24/2017          Alverto Mancini  [address]  [place] [address]    Dear Alverto:    [facility] wants to ensure your discharge home is safe and you or your loved ones have had all your questions answered regarding your care after you leave the hospital.    You may receive a telephone call within two days of your discharge.  This call is to make certain you understand your discharge instructions as well as ensure we provided you with the best care possible during your stay with us.     The call will only last approximately 3-5 minutes and will be done by a nurse.    Once again, we want to ensure your discharge home is safe and that you have a clear understanding of any next steps in your care.  If you have any questions or concerns, please do not hesitate to contact us, we are here for you.  Thank you for choosing [facility] for your healthcare needs.    Sincerely,    Dereje Jeff    [facility]

## 2017-02-20 NOTE — IP AVS SNAPSHOT
" <p align=\"LEFT\"><IMG SRC=\"//[email or web address]$/[email or web address]\" alt=\"Image\" WIDTH=\"50%\" HEIGHT=\"200\" BORDER=\"\"></p>                   Name:Alverto Mancini  Medical Record Number:[id number]  CSN: [id number]    YOB: 1935   Age: 81 y.o.  Sex: male  HT:1.727 m (5' 8\") WT: 59.1 kg (130 lb 4.7 oz)          Admit Date: 2/20/2017     Discharge Date:   Today's Date: 3/24/2017  Attending Doctor:  Watson Renteria M.D.                  Allergies:  Review of patient's allergies indicates no known allergies.          Follow-up Information     1. Follow up with Hina Herrera M.D.. Schedule an appointment as soon as possible for a visit in 2 weeks.    Specialty:  Pediatrics    Why:  Follow Up    Contact information    [address] [place] [address] [address] [place] [address] [phone]           Medication List      Take these Medications        Instructions    baclofen 10 MG Tabs   Commonly known as:  LIORESAL    Take 10 mg by mouth 2 times a day.   Dose:  10 mg       docusate sodium 100 MG Caps   Commonly known as:  COLACE    Take 100 mg by mouth every day.   Dose:  100 mg       finasteride 5 MG Tabs   Commonly known as:  PROSCAR    Take 1 Tab by mouth every day.   Dose:  5 mg       fluticasone-salmeterol 250-50 MCG/DOSE Aepb   Commonly known as:  ADVAIR    Inhale 1 Puff by mouth every 12 hours.   Dose:  1 Puff       gabapentin 300 MG Caps   Commonly known as:  NEURONTIN    Take 1 Cap by mouth every day.   Dose:  300 mg       ipratropium-albuterol 0.5-2.5 (3) MG/3ML nebulizer solution   Commonly known as:  DUONEB    3 mL by Nebulization route 4 times a day.   Dose:  3 mL       levetiracetam 750 MG tablet   Commonly known as:  KEPPRA    Take 2 Tabs by mouth 2 times a day.   Dose:  1500 mg       tamsulosin 0.4 MG capsule   Commonly known as:  FLOMAX    Take 1 Cap by mouth ONE-HALF HOUR AFTER BREAKFAST.   Dose:  0.4 mg       tiotropium 18 MCG Caps   Commonly known as:  SPIRIVA HANDIHALER    Inhale 1 Cap by mouth every day.   Dose:  " 18 mcg

## 2017-02-20 NOTE — ED PROVIDER NOTES
ED Provider Note    CHIEF COMPLAINT  Chief Complaint   Patient presents with   • Shortness of Breath     SAT's in 80's at home, given NPPB Tx X 3 with improvement.       HPI  Alverto Mancini is a 81 y.o. male who presents for evaluation of worsening cough shortness of breath congestion. The patient has extensive past medical history as listed below recent pneumonia as well. He was at a skilled nursing only after pelvic fracture and was able to go home for 3 days. Family reports increasing shortness of breath fever and cough. He denies chest pain hemoptysis or leg swelling no associated rash. No vomiting or diarrhea. Paramedics arrived noted that he was hypoxic on his baseline 2 L at 80% he was wheezy he was given 3 albuterol treatments and transported here    REVIEW OF SYSTEMS  See HPI for further details. Positive for cough shortness of breath All other systems are negative.     PAST MEDICAL HISTORY  Past Medical History   Diagnosis Date   • Left-sided muscle weakness 9/7/2011   • Dysphagia status post cerebrovascular accident 9/7/2011   • COPD (chronic obstructive pulmonary disease) (CMS-HCC) 9/7/2011   • Hypertension 9/7/2011   • DIABETES MELLITUS 9/7/2011   • GERD (gastroesophageal reflux disease) 9/7/2011   • Depression 9/7/2011   • Hyperlipidemia 9/7/2011   • Pneumonia    • Fall    • Arthritis    • Stroke (CMS-HCC) 2008   • ASTHMA 2006     5 years   • Seizure (CMS-HCC) November 2010   • Backpain    • Renal disorder    • CATARACT    • Bronchitis    • Unspecified urinary incontinence    • COPD (chronic obstructive pulmonary disease) (CMS-HCC)    • Pneumonia      hospitalized 10/11   • Diabetes mellitus type 2 in nonobese (CMS-HCC)    • Hypoglycemia associated with diabetes (CMS-HCC)    • Oxygen dependent 6/22/2012   • SIRS (systemic inflammatory response syndrome) (CMS-HCC) 7/24/2012   • Cough 1/2/2015       FAMILY HISTORY  No history of bleeding disorder    SOCIAL HISTORY  Social History     Social History   •  Marital Status: Single     Spouse Name: N/A   • Number of Children: N/A   • Years of Education: N/A     Social History Main Topics   • Smoking status: Former Smoker -- 2.00 packs/day for 60 years     Types: Cigarettes     Quit date: 09/25/2003   • Smokeless tobacco: Former User     Quit date: 09/07/2003      Comment: 2 packs a day 60 years    • Alcohol Use: No   • Drug Use: No   • Sexual Activity: Not Currently      Comment: living with son and daughter in law     Other Topics Concern   • None     Social History Narrative     Former smoker no IV drugs  SURGICAL HISTORY  Past Surgical History   Procedure Laterality Date   • Laminotomy     • Appendectomy     • Cholecystectomy     • Carotid endarterectomy     • Other orthopedic surgery     • Other neurological surg       spinal surgery 4 times   • Case cancelled  3/21/2014     Performed by Arthur Wilks D.O. at SURGERY [facility]   • Inguinal hernia repair  3/28/2014     Performed by Arthur Wilks D.O. at SURGERY [facility]   • Bronchoscopy-endo  4/22/2016     Procedure: BRONCHOSCOPY-ENDO;  Surgeon: Vinny Guan M.D.;  Location: ENDOSCOPY [facility];  Service:        CURRENT MEDICATIONS  Home Medications     Reviewed by Sakina Montanez R.N. (Registered Nurse) on 02/20/17 at 1356  Med List Status: Unable to Obtain    Medication Last Dose Status    acetaminophen 650 MG Tab  Active    albuterol (PROVENTIL) 2.5mg/3ml Nebu Soln solution for nebulization prn Active    atorvastatin (LIPITOR) 80 MG tablet  Active    baclofen (LIORESAL) 10 MG Tab  Active    enalapril (VASOTEC) 2.5 MG TABS  Active    famotidine (PEPCID) 20 MG Tab  Active    finasteride (PROSCAR) 5 MG Tab  Active    fluticasone-salmeterol (ADVAIR) 250-50 MCG/DOSE AEROSOL POWDER, BREATH ACTIVATED  Active    furosemide (LASIX) 20 MG Tab  Active    furosemide (LASIX) 40 MG Tab  Active    gabapentin (NEURONTIN) 300 MG Cap  Active    ipratropium-albuterol (DUONEB) 0.5-2.5 (3) MG/3ML  "nebulizer solution  Active    lansoprazole (PREVACID) 30 MG CPDR 4/20/2016 Active    levetiracetam (KEPPRA) 750 MG tablet  Active    metoprolol (LOPRESSOR) 25 MG Tab  Active    oxybutynin SR (DITROPAN-XL) 5 MG TABLET SR 24 HR  Active    oyster shell calcium (OS-YOANA 500) 500 MG TABS  Active    potassium chloride SA (K-DUR) 10 MEQ Tab CR  Active    sertraline (ZOLOFT) 100 MG Tab  Active    spironolactone (ALDACTONE) 25 MG Tab  Active    tamsulosin (FLOMAX) 0.4 MG capsule  Active    tiotropium (SPIRIVA HANDIHALER) 18 MCG CAPS  Active    trazodone (DESYREL) 50 MG Tab  Active    vitamin D, Ergocalciferol, (DRISDOL) 33639 UNITS CAPS capsule  Active                ALLERGIES  No Known Allergies    PHYSICAL EXAM  VITAL SIGNS: Pulse 80  Temp(Src) 37.5 °C (99.5 °F)  Resp 28  Ht 1.727 m (5' 8\")  Wt 65.7 kg (144 lb 13.5 oz)  BMI 22.03 kg/m2  SpO2 95%     SpO2 Room air O2 Patient BP Position BP Location O2 (LPM) O2 Delivery 5 [facility]             02/20/17 1401 -- -- 80 92  28 -- -- 95 % -- -- -- -- -- -- SLB     02/20/17 1352 37.5 °C (99.5 °F) Temporal 97 96 18 -- 104/59 mmHg 88                Constitutional: Cachectic ill-appearing.   HENT: Normocephalic, Atraumatic, Bilateral external ears normal, dry mucous membranes, No oral exudates, Nose normal.   Eyes: PERRLA, EOMI, Conjunctiva normal, No discharge.   Neck: Normal range of motion, No tenderness, Supple, No stridor.   Cardiovascular: Normal heart rate, Normal rhythm, No murmurs, No rubs, No gallops.   Thorax & Lungs: Bilateral rhonchi with left greater than right wheezing no rales, No chest tenderness.   Abdomen: Bowel sounds normal, Soft, No tenderness, No masses, No pulsatile masses.   Skin: Warm, Dry, No erythema, No rash.   Extremities: Intact distal pulses, No edema, No tenderness, No cyanosis, No clubbing.   Musculoskeletal: Good range of motion in all major joints. No tenderness to palpation or major deformities noted.   Neurologic: Alert & oriented x 3, Normal " motor function, Normal sensory function, No focal deficits noted.   Psychiatric: Affect normal, Judgment normal, Mood normal.     EKG  Interpretation by me rate 91 sinus rhythm. Right bundle branch block LVH is noted. No acute ST segment elevation or depression logical T-wave inversions frequent PACs    RADIOLOGY/PROCEDURES  DX-CHEST-PORTABLE (1 VIEW)   Final Result      1.  Extensive left-sided opacification likely due to combination of extensive airspace disease and left pleural effusion. This could be due to underlying pneumonia with or without associated unilateral edema.          COURSE & MEDICAL DECISION MAKING  Pertinent Labs & Imaging studies reviewed. (See chart for details)  Results for orders placed or performed during the hospital encounter of 02/20/17   LACTIC ACID   Result Value Ref Range    Lactic Acid 1.0 0.5 - 2.0 mmol/L   TROPONIN   Result Value Ref Range    Troponin I 0.06 (H) 0.00 - 0.04 ng/mL   BTYPE NATRIURETIC PEPTIDE   Result Value Ref Range    B Natriuretic Peptide 394 (H) 0 - 100 pg/mL   CBC WITH DIFFERENTIAL   Result Value Ref Range    WBC 17.9 (H) 4.8 - 10.8 K/uL    RBC 3.84 (L) 4.70 - 6.10 M/uL    Hemoglobin 12.2 (L) 14.0 - 18.0 g/dL    Hematocrit 39.7 (L) 42.0 - 52.0 %    .4 (H) 81.4 - 97.8 fL    MCH 31.8 27.0 - 33.0 pg    MCHC 30.7 (L) 33.7 - 35.3 g/dL    RDW 53.0 (H) 35.9 - 50.0 fL    Platelet Count 149 (L) 164 - 446 K/uL    MPV 10.5 9.0 - 12.9 fL    Neutrophils-Polys 81.60 (H) 44.00 - 72.00 %    Lymphocytes 10.40 (L) 22.00 - 41.00 %    Monocytes 7.30 0.00 - 13.40 %    Eosinophils 0.00 0.00 - 6.90 %    Basophils 0.30 0.00 - 1.80 %    Immature Granulocytes 0.40 0.00 - 0.90 %    Nucleated RBC 0.00 /100 WBC    Neutrophils (Absolute) 14.61 (H) 1.82 - 7.42 K/uL    Lymphs (Absolute) 1.87 1.00 - 4.80 K/uL    Monos (Absolute) 1.31 (H) 0.00 - 0.85 K/uL    Eos (Absolute) 0.00 0.00 - 0.51 K/uL    Baso (Absolute) 0.05 0.00 - 0.12 K/uL    Immature Granulocytes (abs) 0.07 0.00 - 0.11 K/uL     NRBC (Absolute) 0.00 K/uL   COMP METABOLIC PANEL   Result Value Ref Range    Sodium 141 135 - 145 mmol/L    Potassium 3.8 3.6 - 5.5 mmol/L    Chloride 99 96 - 112 mmol/L    Co2 38 (H) 20 - 33 mmol/L    Anion Gap 4.0 0.0 - 11.9    Glucose 134 (H) 65 - 99 mg/dL    Bun 19 8 - 22 mg/dL    Creatinine 0.82 0.50 - 1.40 mg/dL    Calcium 8.9 8.5 - 10.5 mg/dL    AST(SGOT) 24 12 - 45 U/L    ALT(SGPT) 19 2 - 50 U/L    Alkaline Phosphatase 77 30 - 99 U/L    Total Bilirubin 0.9 0.1 - 1.5 mg/dL    Albumin 3.3 3.2 - 4.9 g/dL    Total Protein 6.4 6.0 - 8.2 g/dL    Globulin 3.1 1.9 - 3.5 g/dL    A-G Ratio 1.1 g/dL   ESTIMATED GFR   Result Value Ref Range    GFR If African American >60 >60 mL/min/1.73 m 2    GFR If Non African American >60 >60 mL/min/1.73 m 2      Patient presents here and appears profoundly ill here. He has evidence of a significant left-sided pneumonia likely infected pleural effusion. His white blood cell count is elevated. Broad-spectrum IV antibiotics were administered after blood cultures were performed. Lactic acid is normal. He is given IV fluids. He is do not resuscitate and I feel that he is in grave condition at this time. I did discuss end-of-life wishes with the patient as well as his only listed relative Miss Elke Isabel. She indicated that he does have a do not resuscitate on file and the patient confirmed that he does not want to be intubated or have chest compressions performed should he deteriorate.  FINAL IMPRESSION  1. Hospital-acquired pneumonia with hypoxemia early sepsis         Electronically signed by: Jules Valle, 2/20/2017 2:25 PM

## 2017-02-21 ENCOUNTER — APPOINTMENT (OUTPATIENT)
Dept: RADIOLOGY | Facility: MEDICAL CENTER | Age: 82
DRG: 871 | End: 2017-02-21
Attending: INTERNAL MEDICINE
Payer: MEDICARE

## 2017-02-21 PROBLEM — Z22.322 MRSA CARRIER: Status: ACTIVE | Noted: 2017-02-21

## 2017-02-21 PROBLEM — R29.6 MULTIPLE FALLS: Status: ACTIVE | Noted: 2017-02-21

## 2017-02-21 LAB
ALBUMIN SERPL BCP-MCNC: 3.1 G/DL (ref 3.2–4.9)
ALBUMIN/GLOB SERPL: 1.2 G/DL
ALP SERPL-CCNC: 72 U/L (ref 30–99)
ALT SERPL-CCNC: 14 U/L (ref 2–50)
ANION GAP SERPL CALC-SCNC: 6 MMOL/L (ref 0–11.9)
AST SERPL-CCNC: 17 U/L (ref 12–45)
BASOPHILS # BLD AUTO: 0.2 % (ref 0–1.8)
BASOPHILS # BLD: 0.03 K/UL (ref 0–0.12)
BILIRUB SERPL-MCNC: 1 MG/DL (ref 0.1–1.5)
BUN SERPL-MCNC: 21 MG/DL (ref 8–22)
CALCIUM SERPL-MCNC: 8.7 MG/DL (ref 8.5–10.5)
CHLORIDE SERPL-SCNC: 103 MMOL/L (ref 96–112)
CO2 SERPL-SCNC: 37 MMOL/L (ref 20–33)
CREAT SERPL-MCNC: 0.85 MG/DL (ref 0.5–1.4)
EKG IMPRESSION: NORMAL
EOSINOPHIL # BLD AUTO: 0.01 K/UL (ref 0–0.51)
EOSINOPHIL NFR BLD: 0.1 % (ref 0–6.9)
ERYTHROCYTE [DISTWIDTH] IN BLOOD BY AUTOMATED COUNT: 53.3 FL (ref 35.9–50)
GFR SERPL CREATININE-BSD FRML MDRD: >60 ML/MIN/1.73 M 2
GLOBULIN SER CALC-MCNC: 2.6 G/DL (ref 1.9–3.5)
GLUCOSE BLD-MCNC: 102 MG/DL (ref 65–99)
GLUCOSE BLD-MCNC: 103 MG/DL (ref 65–99)
GLUCOSE BLD-MCNC: 114 MG/DL (ref 65–99)
GLUCOSE BLD-MCNC: 135 MG/DL (ref 65–99)
GLUCOSE SERPL-MCNC: 123 MG/DL (ref 65–99)
GRAM STN SPEC: NORMAL
HCT VFR BLD AUTO: 38.3 % (ref 42–52)
HGB BLD-MCNC: 11.6 G/DL (ref 14–18)
IMM GRANULOCYTES # BLD AUTO: 0.06 K/UL (ref 0–0.11)
IMM GRANULOCYTES NFR BLD AUTO: 0.4 % (ref 0–0.9)
LYMPHOCYTES # BLD AUTO: 1.28 K/UL (ref 1–4.8)
LYMPHOCYTES NFR BLD: 9.4 % (ref 22–41)
MCH RBC QN AUTO: 31.4 PG (ref 27–33)
MCHC RBC AUTO-ENTMCNC: 30.3 G/DL (ref 33.7–35.3)
MCV RBC AUTO: 103.8 FL (ref 81.4–97.8)
MONOCYTES # BLD AUTO: 1.19 K/UL (ref 0–0.85)
MONOCYTES NFR BLD AUTO: 8.8 % (ref 0–13.4)
MRSA DNA SPEC QL NAA+PROBE: ABNORMAL
NEUTROPHILS # BLD AUTO: 11.01 K/UL (ref 1.82–7.42)
NEUTROPHILS NFR BLD: 81.1 % (ref 44–72)
NRBC # BLD AUTO: 0 K/UL
NRBC BLD AUTO-RTO: 0 /100 WBC
PLATELET # BLD AUTO: 141 K/UL (ref 164–446)
PMV BLD AUTO: 11.4 FL (ref 9–12.9)
POTASSIUM SERPL-SCNC: 4 MMOL/L (ref 3.6–5.5)
PROT SERPL-MCNC: 5.7 G/DL (ref 6–8.2)
RBC # BLD AUTO: 3.69 M/UL (ref 4.7–6.1)
SIGNIFICANT IND 70042: ABNORMAL
SIGNIFICANT IND 70042: NORMAL
SITE SITE: ABNORMAL
SITE SITE: NORMAL
SODIUM SERPL-SCNC: 146 MMOL/L (ref 135–145)
SOURCE SOURCE: ABNORMAL
SOURCE SOURCE: NORMAL
TROPONIN I SERPL-MCNC: 0.03 NG/ML (ref 0–0.04)
TROPONIN I SERPL-MCNC: 0.04 NG/ML (ref 0–0.04)
TROPONIN I SERPL-MCNC: 0.04 NG/ML (ref 0–0.04)
TROPONIN I SERPL-MCNC: 0.05 NG/ML (ref 0–0.04)
WBC # BLD AUTO: 13.6 K/UL (ref 4.8–10.8)

## 2017-02-21 PROCEDURE — 93005 ELECTROCARDIOGRAM TRACING: CPT | Performed by: INTERNAL MEDICINE

## 2017-02-21 PROCEDURE — 93010 ELECTROCARDIOGRAM REPORT: CPT | Performed by: INTERNAL MEDICINE

## 2017-02-21 PROCEDURE — 84484 ASSAY OF TROPONIN QUANT: CPT

## 2017-02-21 PROCEDURE — 82962 GLUCOSE BLOOD TEST: CPT

## 2017-02-21 PROCEDURE — 700105 HCHG RX REV CODE 258: Performed by: PHARMACIST

## 2017-02-21 PROCEDURE — 87641 MR-STAPH DNA AMP PROBE: CPT

## 2017-02-21 PROCEDURE — 76604 US EXAM CHEST: CPT

## 2017-02-21 PROCEDURE — 700111 HCHG RX REV CODE 636 W/ 250 OVERRIDE (IP): Performed by: PHARMACIST

## 2017-02-21 PROCEDURE — 85025 COMPLETE CBC W/AUTO DIFF WBC: CPT

## 2017-02-21 PROCEDURE — 700102 HCHG RX REV CODE 250 W/ 637 OVERRIDE(OP): Performed by: INTERNAL MEDICINE

## 2017-02-21 PROCEDURE — 700101 HCHG RX REV CODE 250: Performed by: HOSPITALIST

## 2017-02-21 PROCEDURE — 700105 HCHG RX REV CODE 258: Performed by: INTERNAL MEDICINE

## 2017-02-21 PROCEDURE — 700101 HCHG RX REV CODE 250: Performed by: INTERNAL MEDICINE

## 2017-02-21 PROCEDURE — 36415 COLL VENOUS BLD VENIPUNCTURE: CPT

## 2017-02-21 PROCEDURE — 700117 HCHG RX CONTRAST REV CODE 255: Performed by: HOSPITALIST

## 2017-02-21 PROCEDURE — 92610 EVALUATE SWALLOWING FUNCTION: CPT

## 2017-02-21 PROCEDURE — 94668 MNPJ CHEST WALL SBSQ: CPT

## 2017-02-21 PROCEDURE — G8996 SWALLOW CURRENT STATUS: HCPCS | Mod: CK

## 2017-02-21 PROCEDURE — 94640 AIRWAY INHALATION TREATMENT: CPT

## 2017-02-21 PROCEDURE — 94669 MECHANICAL CHEST WALL OSCILL: CPT

## 2017-02-21 PROCEDURE — 700111 HCHG RX REV CODE 636 W/ 250 OVERRIDE (IP): Performed by: INTERNAL MEDICINE

## 2017-02-21 PROCEDURE — 700102 HCHG RX REV CODE 250 W/ 637 OVERRIDE(OP): Performed by: HOSPITALIST

## 2017-02-21 PROCEDURE — 99233 SBSQ HOSP IP/OBS HIGH 50: CPT | Performed by: HOSPITALIST

## 2017-02-21 PROCEDURE — G8997 SWALLOW GOAL STATUS: HCPCS | Mod: CJ

## 2017-02-21 PROCEDURE — A9270 NON-COVERED ITEM OR SERVICE: HCPCS | Performed by: INTERNAL MEDICINE

## 2017-02-21 PROCEDURE — 700112 HCHG RX REV CODE 229: Performed by: HOSPITALIST

## 2017-02-21 PROCEDURE — A9270 NON-COVERED ITEM OR SERVICE: HCPCS | Performed by: HOSPITALIST

## 2017-02-21 PROCEDURE — 94667 MNPJ CHEST WALL 1ST: CPT

## 2017-02-21 PROCEDURE — 80053 COMPREHEN METABOLIC PANEL: CPT

## 2017-02-21 PROCEDURE — 71260 CT THORAX DX C+: CPT

## 2017-02-21 PROCEDURE — 770020 HCHG ROOM/CARE - TELE (206)

## 2017-02-21 RX ORDER — ACETYLCYSTEINE 200 MG/ML
3 SOLUTION ORAL; RESPIRATORY (INHALATION)
Status: DISCONTINUED | OUTPATIENT
Start: 2017-02-21 | End: 2017-02-22

## 2017-02-21 RX ORDER — DOCUSATE SODIUM 100 MG/1
100 CAPSULE, LIQUID FILLED ORAL 2 TIMES DAILY
Status: DISCONTINUED | OUTPATIENT
Start: 2017-02-21 | End: 2017-03-22

## 2017-02-21 RX ORDER — DEXTROSE MONOHYDRATE 25 G/50ML
25 INJECTION, SOLUTION INTRAVENOUS
Status: DISCONTINUED | OUTPATIENT
Start: 2017-02-21 | End: 2017-03-22

## 2017-02-21 RX ORDER — IPRATROPIUM BROMIDE AND ALBUTEROL SULFATE 2.5; .5 MG/3ML; MG/3ML
3 SOLUTION RESPIRATORY (INHALATION)
Status: DISCONTINUED | OUTPATIENT
Start: 2017-02-21 | End: 2017-02-21

## 2017-02-21 RX ORDER — IPRATROPIUM BROMIDE AND ALBUTEROL SULFATE 2.5; .5 MG/3ML; MG/3ML
3 SOLUTION RESPIRATORY (INHALATION)
Status: DISCONTINUED | OUTPATIENT
Start: 2017-02-21 | End: 2017-02-22

## 2017-02-21 RX ADMIN — ACETYLCYSTEINE 3 ML: 200 SOLUTION ORAL; RESPIRATORY (INHALATION) at 21:14

## 2017-02-21 RX ADMIN — IOHEXOL 100 ML: 350 INJECTION, SOLUTION INTRAVENOUS at 13:23

## 2017-02-21 RX ADMIN — PIPERACILLIN AND TAZOBACTAM 4.5 G: 4; .5 INJECTION, POWDER, LYOPHILIZED, FOR SOLUTION INTRAVENOUS; PARENTERAL at 06:18

## 2017-02-21 RX ADMIN — ASPIRIN 81 MG: 81 TABLET ORAL at 11:33

## 2017-02-21 RX ADMIN — IPRATROPIUM BROMIDE AND ALBUTEROL SULFATE 3 ML: .5; 3 SOLUTION RESPIRATORY (INHALATION) at 08:15

## 2017-02-21 RX ADMIN — TAMSULOSIN HYDROCHLORIDE 0.4 MG: 0.4 CAPSULE ORAL at 11:34

## 2017-02-21 RX ADMIN — GABAPENTIN 300 MG: 300 CAPSULE ORAL at 11:34

## 2017-02-21 RX ADMIN — DEXTROSE MONOHYDRATE 1500 MG: 50 INJECTION, SOLUTION INTRAVENOUS at 23:05

## 2017-02-21 RX ADMIN — TIOTROPIUM BROMIDE 1 CAPSULE: 18 CAPSULE ORAL; RESPIRATORY (INHALATION) at 08:18

## 2017-02-21 RX ADMIN — Medication 325 MG: at 11:33

## 2017-02-21 RX ADMIN — DEXTROSE MONOHYDRATE 1500 MG: 50 INJECTION, SOLUTION INTRAVENOUS at 11:17

## 2017-02-21 RX ADMIN — METOPROLOL TARTRATE 25 MG: 25 TABLET, FILM COATED ORAL at 22:54

## 2017-02-21 RX ADMIN — IPRATROPIUM BROMIDE AND ALBUTEROL SULFATE 3 ML: .5; 3 SOLUTION RESPIRATORY (INHALATION) at 13:39

## 2017-02-21 RX ADMIN — FAMOTIDINE 20 MG: 20 TABLET, FILM COATED ORAL at 22:54

## 2017-02-21 RX ADMIN — VANCOMYCIN HYDROCHLORIDE 1000 MG: 10 INJECTION, POWDER, LYOPHILIZED, FOR SOLUTION INTRAVENOUS at 03:41

## 2017-02-21 RX ADMIN — PIPERACILLIN AND TAZOBACTAM 4.5 G: 4; .5 INJECTION, POWDER, LYOPHILIZED, FOR SOLUTION INTRAVENOUS; PARENTERAL at 12:00

## 2017-02-21 RX ADMIN — PIPERACILLIN AND TAZOBACTAM 4.5 G: 4; .5 INJECTION, POWDER, LYOPHILIZED, FOR SOLUTION INTRAVENOUS; PARENTERAL at 23:12

## 2017-02-21 RX ADMIN — BUDESONIDE AND FORMOTEROL FUMARATE DIHYDRATE 2 PUFF: 160; 4.5 AEROSOL RESPIRATORY (INHALATION) at 23:11

## 2017-02-21 RX ADMIN — OXYBUTYNIN CHLORIDE 5 MG: 5 TABLET, FILM COATED, EXTENDED RELEASE ORAL at 11:34

## 2017-02-21 RX ADMIN — DOCUSATE SODIUM 100 MG: 100 CAPSULE ORAL at 22:54

## 2017-02-21 RX ADMIN — DOCUSATE SODIUM 100 MG: 100 CAPSULE ORAL at 15:07

## 2017-02-21 RX ADMIN — HEPARIN SODIUM 5000 UNITS: 5000 INJECTION, SOLUTION INTRAVENOUS; SUBCUTANEOUS at 22:54

## 2017-02-21 RX ADMIN — ATORVASTATIN CALCIUM 80 MG: 80 TABLET, FILM COATED ORAL at 22:54

## 2017-02-21 RX ADMIN — BUDESONIDE AND FORMOTEROL FUMARATE DIHYDRATE 2 PUFF: 160; 4.5 AEROSOL RESPIRATORY (INHALATION) at 08:18

## 2017-02-21 RX ADMIN — HEPARIN SODIUM 5000 UNITS: 5000 INJECTION, SOLUTION INTRAVENOUS; SUBCUTANEOUS at 15:07

## 2017-02-21 RX ADMIN — IPRATROPIUM BROMIDE AND ALBUTEROL SULFATE 3 ML: .5; 3 SOLUTION RESPIRATORY (INHALATION) at 21:14

## 2017-02-21 RX ADMIN — METOPROLOL TARTRATE 25 MG: 25 TABLET, FILM COATED ORAL at 11:34

## 2017-02-21 RX ADMIN — SERTRALINE 100 MG: 100 TABLET, FILM COATED ORAL at 11:34

## 2017-02-21 RX ADMIN — HEPARIN SODIUM 5000 UNITS: 5000 INJECTION, SOLUTION INTRAVENOUS; SUBCUTANEOUS at 06:20

## 2017-02-21 RX ADMIN — FAMOTIDINE 20 MG: 20 TABLET, FILM COATED ORAL at 11:33

## 2017-02-21 RX ADMIN — PIPERACILLIN AND TAZOBACTAM 4.5 G: 4; .5 INJECTION, POWDER, LYOPHILIZED, FOR SOLUTION INTRAVENOUS; PARENTERAL at 17:43

## 2017-02-21 RX ADMIN — FINASTERIDE 5 MG: 5 TABLET, FILM COATED ORAL at 11:34

## 2017-02-21 RX ADMIN — IPRATROPIUM BROMIDE AND ALBUTEROL SULFATE 3 ML: .5; 3 SOLUTION RESPIRATORY (INHALATION) at 10:31

## 2017-02-21 RX ADMIN — VANCOMYCIN HYDROCHLORIDE 1000 MG: 10 INJECTION, POWDER, LYOPHILIZED, FOR SOLUTION INTRAVENOUS at 15:11

## 2017-02-21 ASSESSMENT — ENCOUNTER SYMPTOMS
NAUSEA: 0
HEMOPTYSIS: 0
WHEEZING: 0
LOSS OF CONSCIOUSNESS: 0
SHORTNESS OF BREATH: 1
NECK PAIN: 0
CONSTIPATION: 0
SENSORY CHANGE: 0
WEAKNESS: 1
DIARRHEA: 0
HEADACHES: 0
COUGH: 1
BACK PAIN: 0
BRUISES/BLEEDS EASILY: 0
DIZZINESS: 0
CHILLS: 0
SORE THROAT: 0
PALPITATIONS: 0
SPEECH CHANGE: 0
SPUTUM PRODUCTION: 0
EYE DISCHARGE: 0
EYE PAIN: 0
FEVER: 0
ABDOMINAL PAIN: 0
DIAPHORESIS: 0
VOMITING: 0
FOCAL WEAKNESS: 0
MYALGIAS: 0
DEPRESSION: 0
CLAUDICATION: 0

## 2017-02-21 ASSESSMENT — PAIN SCALES - GENERAL
PAINLEVEL_OUTOF10: 0

## 2017-02-21 ASSESSMENT — COPD QUESTIONNAIRES
HAVE YOU SMOKED AT LEAST 100 CIGARETTES IN YOUR ENTIRE LIFE: YES
COPD SCREENING SCORE: 8
DO YOU EVER COUGH UP ANY MUCUS OR PHLEGM?: YES, A FEW DAYS A WEEK OR MONTH
DURING THE PAST 4 WEEKS HOW MUCH DID YOU FEEL SHORT OF BREATH: MOST  OR ALL OF THE TIME

## 2017-02-21 ASSESSMENT — LIFESTYLE VARIABLES: SUBSTANCE_ABUSE: 0

## 2017-02-21 NOTE — PROGRESS NOTES
RRT RN notified to have her come take a look at pt's condition of increased lethargy and decreasing response to stimuli. RRT RN at bedside and aware of pt's most recent lab values. An official Rapid response was initiated. The RRT was at the bedside along with MD Leigh. MD was aware of the pt's lab values. At this time the patient responded to a sternal rub and began answering questions. Pt was aware of where he was and who the most recent  was. The patients O2 was turned down from 6l to 3l oxy mask and the pt was sating in the 90's. Per MD, the RN is to try and keep the pt at 3L and to notify the hospitalist if there is a change in his O2 demand or in BP. New orders have been placed and the RN is to continue assessing pt and notify MD of any changes.

## 2017-02-21 NOTE — PROGRESS NOTES
Two RN skin check completed with CAMRON Luo.     Pt has multiple scabs, scratches and bruising over the entire body. Large scratch over the left hip area. Left hand and left 3rd metacarpal with skin tears. Heels are red blanching. Floated heels on pillows. Sacral area (with both buttocks involved) is very red, purple and is what looks like a pressure ulcer, non blanching in areas. Pt is incontinent, unable to apply Mepilex, but barrier cream at bedside. Q2 turns in place. Large inguinal hernia on the left side.     Wound and nutrition consult ordered. Photos are not taken due to the mulfunctionning cameras on the floor. This is reported to the PM RN.

## 2017-02-21 NOTE — THERAPY
"  Speech Language Therapy Clinical Swallow Evaluation completed.  Functional Status: Pt awake but drowsy. Cooperative and following commands. Weak congested cough at baseline. No overt s/sx of aspiration noted on trials of ntl and purees. At this point pt began to fall asleep and further trials of advanced textures were not given though is doubtful he would have safely tolerated them as his baseline diet from chart review appears to be a dysphagia 1 or 2 and NTL diet. Recommend dys1/ntl at this time with 1:1 supervision. Hold with any difficulty or s/sx aspiration.   Recommendations - Diet: Dysphagia I, Nectar Thick Liquid                          Strategies: Strict 1:1 feeding , Direct supervision during meals and Head of Bed at 90 Degrees                          Medication Administration:Crush all Medications in Puree  Plan of Care: Will benefit from Speech Therapy 3 times per week    See \"Rehab Therapy-Acute\" Patient Summary Report for complete documentation.   "

## 2017-02-21 NOTE — CARE PLAN
Problem: Oxygenation:  Goal: Maintain adequate oxygenation dependent on patient condition  Outcome: PROGRESSING AS EXPECTED  Intervention: Manage oxygen therapy by monitoring pulse oximetry and/or ABG values  98% on 3LPM oxymask; pt home O2 reg is 4LPM; on 3LPM per MD      Problem: Bronchoconstriction:  Goal: Improve in air movement and diminished wheezing  Outcome: PROGRESSING AS EXPECTED  Intervention: Implement inhaled treatments  DUO QID      Problem: Hyperinflation:  Goal: Prevent or improve atelectasis  Outcome: PROGRESSING AS EXPECTED  Intervention: Instruct incentive spirometry usage  60% of predicted is 1650; best IS value today was 750  Intervention: Perform hyperinflation therapy as indicated by assessment  PEP Q4

## 2017-02-21 NOTE — PROGRESS NOTES
Pending wound photo upload. Currently have no device available on the floor to take the photo's. Attempting to get a camera from another floor.

## 2017-02-21 NOTE — DIETARY
NUTRITION SERVICES - Consult received for newly identified wound. Wound team consult pending, will await wound staging to make recommendations. RD will monitor per dept policy.

## 2017-02-21 NOTE — PROGRESS NOTES
"Pharmacy Kinetics 81 y.o. male on vancomycin day # 2 2017    Indication for Treatment: HCAP    Pertinent history per medical record: Admitted on 2017 for shortness of breath. Patient recently discharged from SNF. With concern for pneumonia, empiric antibiotic therapy initiated for HCAP.    Other antibiotics: Piperacillin/tazobactam 4.5 g IV q6h    Allergies: Review of patient's allergies indicates no known allergies.     List concerns for renal function: Elevated BUN/SCr ratio, age    Pertinent cultures to date:     None    Recent Labs      17   1400   WBC  17.9*   NEUTSPOLYS  81.60*     Recent Labs      17   1400   BUN  19   CREATININE  0.82   ALBUMIN  3.3     Intake/Output Summary (Last 24 hours) at 17 0022  Last data filed at 17 2300   Gross per 24 hour   Intake    200 ml   Output    100 ml   Net    100 ml      Blood pressure 99/41, pulse 82, temperature 36.8 °C (98.3 °F), resp. rate 20, height 1.727 m (5' 8\"), weight 69.4 kg (153 lb), SpO2 94 %. Temp (24hrs), Av.3 °C (99.2 °F), Min:36.8 °C (98.3 °F), Max:37.8 °C (100.1 °F)      A/P   1. Vancomycin dose change: Give 1600 mg IV loading dose followed by 1000 mg IV q12h  2. Next vancomycin level: Trough tomorrow (not ordered)  3. Goal trough: 16-20 mcg/mL  4. Comments: Therapy with vancomycin initiated empirically for possible HCAP. Patient at risk of accumulation primarily due to age. Will provide loading dose and start conservative scheduled dosing thereafter as outlined above. Will plan to check trough level when patient closer to steady state in order to ensure appropriate clearance and drug levels. Recommend de-escalation if MRSA can be ruled out.  Pharmacy will continue to follow.     Chi GallardoD, BCPS      "

## 2017-02-21 NOTE — H&P
CHIEF COMPLAINT:  Shortness of breath.    HISTORY OF PRESENT ILLNESS:  The patient is an 81-year-old male with history   of stroke, hypertension, asthma, and COPD who actually was brought today for   evaluation of cough, shortness of breath, and congestion.  The patient was   actually recently discharged from a skilled-nursing facility where he was   after he had a pelvic fracture and has been able to go home like three days   ago.  As per caregiver who brought him here they were saying that in the last   couple of days, he was feeling more increasingly short of breath and also was   having fevers.  Here, the patient was found to be hypoxic in the ER, requiring   to be placed on a 4 L OxyMask.  The patient is complaining of coughing up   some yellowish-greenish sputum.  He denies any chest pain.  He is positive for   some chills.  No vomiting or diarrhea.  No headache or lightheadedness.  Here   in the ER, the patient was found to be septic.  Also his chest x-ray showed   pretty much an extensive left sided opacification of left lung.    REVIEW OF SYSTEMS:  All negative except as per HPI.    PAST MEDICAL HISTORY:  History of left-sided muscle weakness, dysphagia after   cerebrovascular accident, COPD, hypertension, diabetes, GERD, depression,   hyperlipidemia, pneumonia, fall from arthritis, stroke, asthma, seizures, and   kidney disorder.    PAST SURGICAL HISTORY:  Laminectomy, appendectomy, cholecystectomy, carotid   endarterectomy, and ventral hernia repair.    FAMILY HISTORY:  Mother with history of gastric ulcer and father with history   of stroke.    SOCIAL HISTORY:  He smoked two packs a day for 60 years, quit in 2003.  No   alcohol or drug use.    PHYSICAL EXAMINATION:  VITAL SIGNS:  Blood pressure 104/59, respirations 18, heart rate 96, and   temperature 99.5.  GENERAL:  Nontoxic appearance.  HEENT:  Normocephalic and atraumatic.  Pupils are equally round and reactive   to light.  NECK:  Supple.  No  adenopathy.  CARDIOVASCULAR:  Regular rate and rhythm.  No murmurs or gallops appreciated.  LUNGS:  There are diminished breath sounds on the left.  No wheezes.  ABDOMEN:  Soft and nontender.  Positive bowel sounds.  EXTREMITIES:  No edema, cyanosis, or clubbing.  NEUROLOGICAL:  No focal deficits.    LABORATORY WORK:  WBC is 17.9, hemoglobin 12.2, hematocrit 39.7, and platelets   149,000.  Sodium 141, potassium 3.8, chloride 99, bicarb 38, glucose 134, BUN   19, and creatinine 0.82.  Troponin 0.06.  .    DIAGNOSTIC IMAGING:  Chest x-ray showing extensive left-sided opacification,   left pleural effusion and also underlying pneumonia.    ASSESSMENT AND PLAN:  1.  Sepsis due to pneumonia, stating the patient on broad-spectrum   antibiotics, he was recently in a skilled nursing facility.  Blood culture and   sputum culture, already pending.  Sepsis protocol started and we will monitor   the patient on telemetry.  2.  Total opacification of the left lung.  The patient was actually here in   April of last year for the same problem and at that time required some   bronchoscopy _____ Dr. Ellington has already been consulted for another   possibility of bronchoscopy at this time.  Also, there is, from x-ray possible   pleural effusion, will be careful with hydration.  We will order an   interventional radiology-thoracentesis at this time.  3.  History of chronic obstructive pulmonary disease, we will continue with   respiratory protocol and inhalers at this time.  4.  History of seizure, we will continue his Keppra.  5.  Benign prostatic hypertrophy, continue tamsulosin.  6.  Prophylactic deep venous thrombosis, we will start the patient on heparin   subcutaneously.  7.  Code status, the patient is a do not resuscitate at this time.       ____________________________________     MD NESSA SPENCER / JAMES    DD:  02/20/2017 19:30:47  DT:  02/20/2017 20:48:32    D#:  [id number]  Job#:  [id number]

## 2017-02-21 NOTE — RESPIRATORY CARE
Respiratory Rapid Response Note    Symptoms: PT ALOC     #SVN Performed: Yes (02/20/17 1813)  Breath Sounds  RUL Breath Sounds: Diminished;Rhonchi (02/20/17 1809)  RML Breath Sounds: Diminished;Rhonchi (02/20/17 1809)  RLL Breath Sounds: Diminished;Rhonchi (02/20/17 1813)  DEBI Breath Sounds: Diminished;Rhonchi (02/20/17 1813)  LLL Breath Sounds: Diminished;Rhonchi (02/20/17 1813)              ABG Results: see labs for results       O2 (LPM): 4 (02/20/17 1813)  O2 Daily Delivery Respiratory : OxyMask (02/20/17 1813)    Events/Summary/Plan: SVN given (02/20/17 1813)  Transferred to ICU: No    Dr. RUSSELL attended the rapid response. PT blood gas Compensated, pt saturating well on 3L Oxymask. No increase work of breathing.

## 2017-02-21 NOTE — PROGRESS NOTES
Lab called with critical result of respiratory culture positive for MRSA at 1433. Critical lab result read back to lab.   Dr. Ashley notified of critical lab result at 1433.  Critical lab result read back by Dr. Ashley. Orders for droplet/contact isolation.

## 2017-02-21 NOTE — PROGRESS NOTES
Pt has large productive cough. Pt has difficulty clearing his airway. NPO at this time. Suction set up at bedside.

## 2017-02-21 NOTE — PROGRESS NOTES
"Pharmacy Kinetics 81 y.o. male on vancomycin day # 2 2017    Currently on vancomycin 1600 mg IV loading dose followed by 1000 mg IV q12h    Indication for Treatment: HCAP    Pertinent history per medical record: Admitted on 2017 for shortness of breath. Patient recently discharged from SNF. With concern for pneumonia, empiric antibiotic therapy initiated for HCAP.    Other antibiotics: Piperacillin/tazobactam 4.5 g IV q6h    Allergies: Review of patient's allergies indicates no known allergies.     List concerns for renal function: Elevated BUN/SCr ratio, age    Pertinent cultures to date:    17 - sputum - lactose fermenting GNR  27 MRSA PCR - in process    Imagin17 very poor chest xray   17 CT chest - lung groundglass opacities, pleural effusion    Recent Labs      17   1400  17   0129   WBC  17.9*  13.6*   NEUTSPOLYS  81.60*  81.10*     Recent Labs      17   1400  17   0129   BUN  19  21   CREATININE  0.82  0.85   ALBUMIN  3.3  3.1*     No results for input(s): VANCOTROUGH, VANCOPEAK, VANCORANDOM in the last 72 hours.  Intake/Output Summary (Last 24 hours) at 17 1419  Last data filed at 17 0600   Gross per 24 hour   Intake    400 ml   Output    500 ml   Net   -100 ml      Blood pressure 106/54, pulse 71, temperature 36.9 °C (98.4 °F), resp. rate 16, height 1.727 m (5' 8\"), weight 69.4 kg (153 lb), SpO2 98 %. Temp (24hrs), Av.3 °C (99.1 °F), Min:36.8 °C (98.3 °F), Max:37.8 °C (100.1 °F)      A/P   1. Vancomycin dose change: n/a  2. Next vancomycin level: Tomorrow prior to the 4th dose @0400  3. Goal trough: 16-20 mcg/ml  4. Comments: Therapy with vancomycin initiated empirically for possible HCAP. Patient at risk of accumulation primarily due to age. Renal function appears baseline. Pt stable, afebrile, leukocytosis improved. Check trough level tomorrow. Sputum culture with GNR. Recommend de-escalation if MRSA can be ruled out. Narrow " therapy as able. Pharmacy will continue to follow.    Evaristo GallardoD, BCPS       Addendum:  MRSA nares positive, recommend continue vancomycin for now. PNA still could be due to GNR in sputum.

## 2017-02-21 NOTE — PROGRESS NOTES
Bedside report received from day RN. Assumed pt care. Pt very lethargic and able to open eyes to sternal rub but does not answer any questions. Day RN said this is a decline from her assessment of him. Pt was sating in the 80's on 4l oxy mask and was moved to 6l where he began to sat in the 90's again.  in place. Bed alarm on. Bed locked and in lowest position. Call light within reach. Will continue to assess and provide care.     0740: MD Leigh paged with concern for pt's lethargy and need for increased 02. Also had concern for pt's NPO status and need to change medications from PO to IV.   0747: MD Leigh paged back with new orders placed regarding labs and NPO status. RN was notified at this time of patients code status from DNR to full code. Also changes made to medication route.

## 2017-02-21 NOTE — PROGRESS NOTES
Rakel from lab called with critical value of PCO2 of 63.6. Temperature at time of ABG was 36.9 so the corrected PCO2 is 63.3. Lab value read back to Rakel. MD Gu paged about critical results at 0820. MD Leigh saw lab results and read them back to RN at beside at 0835. No new orders in place at this time.

## 2017-02-21 NOTE — CARE PLAN
Problem: Communication  Goal: The ability to communicate needs accurately and effectively will improve  Outcome: PROGRESSING SLOWER THAN EXPECTED  Pt lethargic and confused. Educated on importance of communicating needs. Pt verbalizes understanding but does not show proper demonstration. Hourly rounding in progress.     Problem: Skin Integrity  Goal: Risk for impaired skin integrity will decrease  Outcome: PROGRESSING SLOWER THAN EXPECTED  Pt's skin thoroughly assess and shows signs of breakdown from PTA. Pt q2 turned, wound consult placed and skin assessed as needed.

## 2017-02-21 NOTE — PROGRESS NOTES
Pt arrived to the unit via gurney escorted by ER RN on ZOLL. VSS. Assessment complete. A&O x 3, unsure of date. Pt is able to answer questions but very lethargic. No signs of distress noted at this time. Tele monitor in place, monitor room notified. Pt denies pain. Pt is oriented to the unit, call light, phone system. Fall precautions in place and appropriate signs in place. Call light within reach. Bed is locked and in the lowest position. Bed alarm in place. Pt is educated regarding fall precautions and importance of calling the stuff for assistance. Pt denies any additional needs at this time. Will continue to monitor.

## 2017-02-22 LAB
ANION GAP SERPL CALC-SCNC: 3 MMOL/L (ref 0–11.9)
BACTERIA SPEC RESP CULT: ABNORMAL
BACTERIA SPEC RESP CULT: ABNORMAL
BASOPHILS # BLD AUTO: 0.2 % (ref 0–1.8)
BASOPHILS # BLD: 0.02 K/UL (ref 0–0.12)
BUN SERPL-MCNC: 18 MG/DL (ref 8–22)
CALCIUM SERPL-MCNC: 9.1 MG/DL (ref 8.5–10.5)
CHLORIDE SERPL-SCNC: 103 MMOL/L (ref 96–112)
CO2 SERPL-SCNC: 38 MMOL/L (ref 20–33)
CREAT SERPL-MCNC: 0.74 MG/DL (ref 0.5–1.4)
EOSINOPHIL # BLD AUTO: 0.06 K/UL (ref 0–0.51)
EOSINOPHIL NFR BLD: 0.7 % (ref 0–6.9)
ERYTHROCYTE [DISTWIDTH] IN BLOOD BY AUTOMATED COUNT: 54.7 FL (ref 35.9–50)
FOLATE SERPL-MCNC: 20.5 NG/ML
GFR SERPL CREATININE-BSD FRML MDRD: >60 ML/MIN/1.73 M 2
GLUCOSE BLD-MCNC: 103 MG/DL (ref 65–99)
GLUCOSE BLD-MCNC: 121 MG/DL (ref 65–99)
GLUCOSE BLD-MCNC: 124 MG/DL (ref 65–99)
GLUCOSE BLD-MCNC: 150 MG/DL (ref 65–99)
GLUCOSE SERPL-MCNC: 119 MG/DL (ref 65–99)
GRAM STN SPEC: ABNORMAL
HCT VFR BLD AUTO: 38.6 % (ref 42–52)
HGB BLD-MCNC: 11.6 G/DL (ref 14–18)
IMM GRANULOCYTES # BLD AUTO: 0.03 K/UL (ref 0–0.11)
IMM GRANULOCYTES NFR BLD AUTO: 0.3 % (ref 0–0.9)
IRON SATN MFR SERPL: 13 % (ref 15–55)
IRON SERPL-MCNC: 31 UG/DL (ref 50–180)
LYMPHOCYTES # BLD AUTO: 1.04 K/UL (ref 1–4.8)
LYMPHOCYTES NFR BLD: 11.3 % (ref 22–41)
MCH RBC QN AUTO: 31.7 PG (ref 27–33)
MCHC RBC AUTO-ENTMCNC: 30.1 G/DL (ref 33.7–35.3)
MCV RBC AUTO: 105.5 FL (ref 81.4–97.8)
MONOCYTES # BLD AUTO: 0.9 K/UL (ref 0–0.85)
MONOCYTES NFR BLD AUTO: 9.8 % (ref 0–13.4)
NEUTROPHILS # BLD AUTO: 7.12 K/UL (ref 1.82–7.42)
NEUTROPHILS NFR BLD: 77.7 % (ref 44–72)
NRBC # BLD AUTO: 0 K/UL
NRBC BLD AUTO-RTO: 0 /100 WBC
PLATELET # BLD AUTO: 135 K/UL (ref 164–446)
PMV BLD AUTO: 11.5 FL (ref 9–12.9)
POTASSIUM SERPL-SCNC: 4.3 MMOL/L (ref 3.6–5.5)
RBC # BLD AUTO: 3.66 M/UL (ref 4.7–6.1)
SIGNIFICANT IND 70042: ABNORMAL
SITE SITE: ABNORMAL
SODIUM SERPL-SCNC: 144 MMOL/L (ref 135–145)
SOURCE SOURCE: ABNORMAL
TIBC SERPL-MCNC: 248 UG/DL (ref 250–450)
TROPONIN I SERPL-MCNC: 0.03 NG/ML (ref 0–0.04)
VANCOMYCIN TROUGH SERPL-MCNC: 15.3 UG/ML (ref 10–20)
VIT B12 SERPL-MCNC: 794 PG/ML (ref 211–911)
WBC # BLD AUTO: 9.2 K/UL (ref 4.8–10.8)

## 2017-02-22 PROCEDURE — 700111 HCHG RX REV CODE 636 W/ 250 OVERRIDE (IP): Performed by: PHARMACIST

## 2017-02-22 PROCEDURE — 700105 HCHG RX REV CODE 258: Performed by: HOSPITALIST

## 2017-02-22 PROCEDURE — 97162 PT EVAL MOD COMPLEX 30 MIN: CPT

## 2017-02-22 PROCEDURE — 99233 SBSQ HOSP IP/OBS HIGH 50: CPT | Performed by: HOSPITALIST

## 2017-02-22 PROCEDURE — 83540 ASSAY OF IRON: CPT

## 2017-02-22 PROCEDURE — 700105 HCHG RX REV CODE 258: Performed by: INTERNAL MEDICINE

## 2017-02-22 PROCEDURE — 700101 HCHG RX REV CODE 250: Performed by: INTERNAL MEDICINE

## 2017-02-22 PROCEDURE — 83550 IRON BINDING TEST: CPT

## 2017-02-22 PROCEDURE — 84484 ASSAY OF TROPONIN QUANT: CPT | Mod: 91

## 2017-02-22 PROCEDURE — G8978 MOBILITY CURRENT STATUS: HCPCS | Mod: CK

## 2017-02-22 PROCEDURE — 94669 MECHANICAL CHEST WALL OSCILL: CPT

## 2017-02-22 PROCEDURE — 82607 VITAMIN B-12: CPT

## 2017-02-22 PROCEDURE — 700102 HCHG RX REV CODE 250 W/ 637 OVERRIDE(OP): Performed by: HOSPITALIST

## 2017-02-22 PROCEDURE — 36415 COLL VENOUS BLD VENIPUNCTURE: CPT

## 2017-02-22 PROCEDURE — A9270 NON-COVERED ITEM OR SERVICE: HCPCS | Performed by: INTERNAL MEDICINE

## 2017-02-22 PROCEDURE — 700102 HCHG RX REV CODE 250 W/ 637 OVERRIDE(OP): Performed by: INTERNAL MEDICINE

## 2017-02-22 PROCEDURE — 94760 N-INVAS EAR/PLS OXIMETRY 1: CPT

## 2017-02-22 PROCEDURE — 700112 HCHG RX REV CODE 229: Performed by: HOSPITALIST

## 2017-02-22 PROCEDURE — A9270 NON-COVERED ITEM OR SERVICE: HCPCS | Performed by: HOSPITALIST

## 2017-02-22 PROCEDURE — 85025 COMPLETE CBC W/AUTO DIFF WBC: CPT

## 2017-02-22 PROCEDURE — 80048 BASIC METABOLIC PNL TOTAL CA: CPT

## 2017-02-22 PROCEDURE — G8979 MOBILITY GOAL STATUS: HCPCS | Mod: CI

## 2017-02-22 PROCEDURE — 770020 HCHG ROOM/CARE - TELE (206)

## 2017-02-22 PROCEDURE — G8987 SELF CARE CURRENT STATUS: HCPCS | Mod: CL

## 2017-02-22 PROCEDURE — 700101 HCHG RX REV CODE 250: Performed by: HOSPITALIST

## 2017-02-22 PROCEDURE — 97166 OT EVAL MOD COMPLEX 45 MIN: CPT

## 2017-02-22 PROCEDURE — 700112 HCHG RX REV CODE 229: Performed by: INTERNAL MEDICINE

## 2017-02-22 PROCEDURE — 80202 ASSAY OF VANCOMYCIN: CPT

## 2017-02-22 PROCEDURE — G8988 SELF CARE GOAL STATUS: HCPCS | Mod: CJ

## 2017-02-22 PROCEDURE — 700105 HCHG RX REV CODE 258: Performed by: PHARMACIST

## 2017-02-22 PROCEDURE — 700111 HCHG RX REV CODE 636 W/ 250 OVERRIDE (IP): Performed by: HOSPITALIST

## 2017-02-22 PROCEDURE — 700111 HCHG RX REV CODE 636 W/ 250 OVERRIDE (IP): Performed by: INTERNAL MEDICINE

## 2017-02-22 PROCEDURE — 82746 ASSAY OF FOLIC ACID SERUM: CPT

## 2017-02-22 PROCEDURE — 82962 GLUCOSE BLOOD TEST: CPT | Mod: 91

## 2017-02-22 PROCEDURE — 94640 AIRWAY INHALATION TREATMENT: CPT

## 2017-02-22 RX ORDER — ACETYLCYSTEINE 200 MG/ML
3 SOLUTION ORAL; RESPIRATORY (INHALATION)
Status: DISCONTINUED | OUTPATIENT
Start: 2017-02-23 | End: 2017-03-03

## 2017-02-22 RX ORDER — BUDESONIDE AND FORMOTEROL FUMARATE DIHYDRATE 160; 4.5 UG/1; UG/1
2 AEROSOL RESPIRATORY (INHALATION) 2 TIMES DAILY
Status: DISCONTINUED | OUTPATIENT
Start: 2017-02-23 | End: 2017-03-22

## 2017-02-22 RX ORDER — ASCORBIC ACID 500 MG
500 TABLET ORAL DAILY
Status: DISCONTINUED | OUTPATIENT
Start: 2017-02-22 | End: 2017-03-22

## 2017-02-22 RX ADMIN — FINASTERIDE 5 MG: 5 TABLET, FILM COATED ORAL at 09:27

## 2017-02-22 RX ADMIN — OXYCODONE HYDROCHLORIDE AND ACETAMINOPHEN 500 MG: 500 TABLET ORAL at 18:17

## 2017-02-22 RX ADMIN — HEPARIN SODIUM 5000 UNITS: 5000 INJECTION, SOLUTION INTRAVENOUS; SUBCUTANEOUS at 21:41

## 2017-02-22 RX ADMIN — HEPARIN SODIUM 5000 UNITS: 5000 INJECTION, SOLUTION INTRAVENOUS; SUBCUTANEOUS at 14:51

## 2017-02-22 RX ADMIN — ALBUTEROL SULFATE 2.5 MG: 2.5 SOLUTION RESPIRATORY (INHALATION) at 08:00

## 2017-02-22 RX ADMIN — HEPARIN SODIUM 5000 UNITS: 5000 INJECTION, SOLUTION INTRAVENOUS; SUBCUTANEOUS at 06:00

## 2017-02-22 RX ADMIN — SODIUM CHLORIDE 100 MG: 9 INJECTION, SOLUTION INTRAVENOUS at 16:30

## 2017-02-22 RX ADMIN — ALBUTEROL SULFATE 2.5 MG: 2.5 SOLUTION RESPIRATORY (INHALATION) at 02:45

## 2017-02-22 RX ADMIN — BUDESONIDE AND FORMOTEROL FUMARATE DIHYDRATE 2 PUFF: 160; 4.5 AEROSOL RESPIRATORY (INHALATION) at 19:15

## 2017-02-22 RX ADMIN — ACETYLCYSTEINE 3 ML: 200 SOLUTION ORAL; RESPIRATORY (INHALATION) at 11:21

## 2017-02-22 RX ADMIN — FAMOTIDINE 20 MG: 20 TABLET, FILM COATED ORAL at 09:27

## 2017-02-22 RX ADMIN — ASPIRIN 81 MG: 81 TABLET ORAL at 09:25

## 2017-02-22 RX ADMIN — ALBUTEROL SULFATE 2.5 MG: 2.5 SOLUTION RESPIRATORY (INHALATION) at 14:38

## 2017-02-22 RX ADMIN — ATORVASTATIN CALCIUM 80 MG: 80 TABLET, FILM COATED ORAL at 21:42

## 2017-02-22 RX ADMIN — DOCUSATE SODIUM 100 MG: 100 CAPSULE ORAL at 09:30

## 2017-02-22 RX ADMIN — BUDESONIDE AND FORMOTEROL FUMARATE DIHYDRATE 2 PUFF: 160; 4.5 AEROSOL RESPIRATORY (INHALATION) at 08:00

## 2017-02-22 RX ADMIN — Medication 325 MG: at 09:27

## 2017-02-22 RX ADMIN — GABAPENTIN 300 MG: 300 CAPSULE ORAL at 09:28

## 2017-02-22 RX ADMIN — OXYBUTYNIN CHLORIDE 5 MG: 5 TABLET, FILM COATED, EXTENDED RELEASE ORAL at 09:28

## 2017-02-22 RX ADMIN — PIPERACILLIN AND TAZOBACTAM 4.5 G: 4; .5 INJECTION, POWDER, LYOPHILIZED, FOR SOLUTION INTRAVENOUS; PARENTERAL at 09:28

## 2017-02-22 RX ADMIN — DEXTROSE MONOHYDRATE 1500 MG: 50 INJECTION, SOLUTION INTRAVENOUS at 21:41

## 2017-02-22 RX ADMIN — STANDARDIZED SENNA CONCENTRATE AND DOCUSATE SODIUM 1 TABLET: 8.6; 5 TABLET, FILM COATED ORAL at 21:42

## 2017-02-22 RX ADMIN — DEXTROSE MONOHYDRATE 1500 MG: 50 INJECTION, SOLUTION INTRAVENOUS at 11:31

## 2017-02-22 RX ADMIN — TAMSULOSIN HYDROCHLORIDE 0.4 MG: 0.4 CAPSULE ORAL at 09:27

## 2017-02-22 RX ADMIN — ACETYLCYSTEINE 3 ML: 200 SOLUTION ORAL; RESPIRATORY (INHALATION) at 14:40

## 2017-02-22 RX ADMIN — ALBUTEROL SULFATE 2.5 MG: 2.5 SOLUTION RESPIRATORY (INHALATION) at 19:15

## 2017-02-22 RX ADMIN — ACETYLCYSTEINE 3 ML: 200 SOLUTION ORAL; RESPIRATORY (INHALATION) at 08:01

## 2017-02-22 RX ADMIN — DOCUSATE SODIUM 100 MG: 100 CAPSULE ORAL at 09:26

## 2017-02-22 RX ADMIN — SERTRALINE 100 MG: 100 TABLET, FILM COATED ORAL at 09:27

## 2017-02-22 RX ADMIN — TIOTROPIUM BROMIDE 1 CAPSULE: 18 CAPSULE ORAL; RESPIRATORY (INHALATION) at 08:00

## 2017-02-22 RX ADMIN — ALBUTEROL SULFATE 2.5 MG: 2.5 SOLUTION RESPIRATORY (INHALATION) at 11:21

## 2017-02-22 RX ADMIN — ACETYLCYSTEINE 3 ML: 200 SOLUTION ORAL; RESPIRATORY (INHALATION) at 02:44

## 2017-02-22 RX ADMIN — VANCOMYCIN HYDROCHLORIDE 1000 MG: 10 INJECTION, POWDER, LYOPHILIZED, FOR SOLUTION INTRAVENOUS at 06:37

## 2017-02-22 RX ADMIN — PIPERACILLIN AND TAZOBACTAM 4.5 G: 4; .5 INJECTION, POWDER, LYOPHILIZED, FOR SOLUTION INTRAVENOUS; PARENTERAL at 12:26

## 2017-02-22 RX ADMIN — ACETYLCYSTEINE 3 ML: 200 SOLUTION ORAL; RESPIRATORY (INHALATION) at 19:15

## 2017-02-22 RX ADMIN — MEROPENEM 500 MG: 500 INJECTION, POWDER, FOR SOLUTION INTRAVENOUS at 14:56

## 2017-02-22 RX ADMIN — METOPROLOL TARTRATE 25 MG: 25 TABLET, FILM COATED ORAL at 21:42

## 2017-02-22 RX ADMIN — METOPROLOL TARTRATE 25 MG: 25 TABLET, FILM COATED ORAL at 09:28

## 2017-02-22 RX ADMIN — FAMOTIDINE 20 MG: 20 TABLET, FILM COATED ORAL at 21:42

## 2017-02-22 ASSESSMENT — ENCOUNTER SYMPTOMS
SORE THROAT: 0
VOMITING: 0
NAUSEA: 0
SPUTUM PRODUCTION: 0
FEVER: 0
SHORTNESS OF BREATH: 0
ABDOMINAL PAIN: 0
PALPITATIONS: 0
CONSTIPATION: 0
WEAKNESS: 1
FOCAL WEAKNESS: 0
LOSS OF CONSCIOUSNESS: 0
NECK PAIN: 0
DIAPHORESIS: 0
EYE PAIN: 0
EYE DISCHARGE: 0
BACK PAIN: 0
HEADACHES: 0
COUGH: 1
WHEEZING: 0
CLAUDICATION: 0
DEPRESSION: 0
CHILLS: 0
DIARRHEA: 0
SPEECH CHANGE: 0
MYALGIAS: 0
HEMOPTYSIS: 0
BRUISES/BLEEDS EASILY: 0
DIZZINESS: 0
SENSORY CHANGE: 0

## 2017-02-22 ASSESSMENT — GAIT ASSESSMENTS
ASSISTIVE DEVICE: FRONT WHEEL WALKER
DEVIATION: BRADYKINETIC
DISTANCE (FEET): 40
GAIT LEVEL OF ASSIST: MINIMAL ASSIST

## 2017-02-22 ASSESSMENT — LIFESTYLE VARIABLES: SUBSTANCE_ABUSE: 0

## 2017-02-22 ASSESSMENT — PAIN SCALES - GENERAL
PAINLEVEL_OUTOF10: 0

## 2017-02-22 ASSESSMENT — ACTIVITIES OF DAILY LIVING (ADL): TOILETING: UNABLE TO DETERMINE AT THIS TIME

## 2017-02-22 NOTE — THERAPY
"Occupational Therapy Evaluation completed.   Functional Status:  Up EOB upon entry, able to self feed w/set up using RUE, LUE impaired from residual CVA, has gross ROM limited and impaired fine motor torey dexterity and gross grasp, min A sit>stand, min A walking w/fww in room and hallway [place] to maintain LUE grasp on fww poor use of fww d/t impaired strength and coordination of LUE, c/o R hip pain, per notes hx of pelvic fx however unable to locate information on WB restrtictions or further injury detail. Min A txf to chair in room alarm on call light w/in reach RN aware   Plan of Care: Will benefit from Occupational Therapy 3 times per week  Discharge Recommendations:  Equipment: Will Continue to Assess for Equipment Needs. Post-acute therapy recommended before discharged home.    See \"Rehab Therapy-Acute\" Patient Summary Report for complete documentation.    "

## 2017-02-22 NOTE — PROGRESS NOTES
Received bed side report from PM nurse. Patient is A&Ox3. Patient denies pain. Patient is resting in bed. Bed alarm is on and call light within reach. Will continue to monitor.

## 2017-02-22 NOTE — RESPIRATORY CARE
COPD EDUCATION by COPD CLINICAL EDUCATOR  2/22/2017 at 6:38 AM by Andressa Davis     Patient reviewed by COPD education team. Patient does not qualify for COPD program.

## 2017-02-22 NOTE — WOUND TEAM
Wound Team assessed the sacrococcygeal area and bilateral heels. Sacrococcygeal area is red but blanching. Heels are boggy but intact. There is a pale purple spot on left posterior heel that nursing is monitoring. No interventions needed from Wound Team.

## 2017-02-22 NOTE — CARE PLAN
Problem: Safety  Goal: Will remain free from falls  Intervention: Assess risk factors for falls  Pt is on bed alarm. Pt has personal belongings, wastebasket, call light with in reach. Bed in lowest position. Pulse ox is on. Suction at bedside.      Problem: Knowledge Deficit  Goal: Knowledge of disease process/condition, treatment plan, diagnostic tests, and medications will improve  Intervention: Assess knowledge level of disease process/condition, treatment plan, diagnostic tests, and medications  Educated pt on medications given and future MRI.

## 2017-02-22 NOTE — CARE PLAN
Problem: Venous Thromboembolism (VTW)/Deep Vein Thrombosis (DVT) Prevention:  Goal: Patient will participate in Venous Thrombosis (VTE)/Deep Vein Thrombosis (DVT)Prevention Measures  Outcome: PROGRESSING AS EXPECTED  Heparin given for DVT prevention.    02/21/17 0800   OTHER   Risk Assessment Score 5   VTE RISK Very High   Mechanical Prophylaxis SCDs, Sequentials (Intermittent Pneumatic Compression Devices)   Pharmacologic Prophylaxis Used Unfractionated Heparin         Problem: Knowledge Deficit  Goal: Knowledge of disease process/condition, treatment plan, diagnostic tests, and medications will improve  Outcome: PROGRESSING AS EXPECTED  Pt and family educated on POC, all questions answered in regards to disease process, treatment and diet. Pt and family verbalize understanding and voice no further questions at this time.

## 2017-02-22 NOTE — THERAPY
"Physical Therapy Evaluation completed.   Bed Mobility:  Supine to Sit:  (NT)  Transfers: Sit to Stand: Minimal Assist (x2)  Gait: Level Of Assist: Minimal Assist with Front-Wheel Walker       Plan of Care: Will benefit from Physical Therapy 3 times per week  Discharge Recommendations: Equipment: Will Continue to Assess for Equipment Needs. Post-acute therapy recommended before discharged home.    See \"Rehab Therapy-Acute\" Patient Summary Report for complete documentation.     Pt demonstrates B LE strength, balance and activity tolerance. Pt reports having pelvis fx on L side that he used to amb much better before the fx. Pt was recently D/C from SNF and reports no prec with hip fx and no surgical interventions. Unable to locate any information about SNF admission. Pt reports living with a roommate and her son but was unable to report if needed assistance with any activities at home. With amb pt needed assistance for guiding FWW d/t weakness of L UE. Pt would occasionally buckle L knee but would be able to self recover to standing with minor cues to stand up straight with good posture. Pt will continue to need skilled acute PT at this time and more information is needed about PLOF and home support to determine future PT needs.   "

## 2017-02-22 NOTE — CONSULTS
DATE OF SERVICE:  02/21/2017    REQUESTING PHYSICIAN:  Heath Leigh MD.    CONSULTING PHYSICIAN:  Abel Brunner MD.    REASON FOR CONSULTATION:  Evaluation and management of pneumonia.    HISTORY OF PRESENT ILLNESS:  The patient is an 81-year-old man.  The patient   is well known to us from a hospitalization in April of 2016 when he was   hospitalized for treatment of left lung pneumonia.  We had seen him even   before that in 2014 when he was hospitalized for acute on chronic respiratory   failure and at that time, he had a possible right-sided pneumonia in addition   to hypercarbic, hypoxemic respiratory failure secondary to COPD.  Other   problems included hypertension, pulmonary hypertension, seizure disorder,   gastroesophageal reflux disease, prior stroke with left-sided weakness,   depression, dyslipidemia, osteoarthritis, and chronic pain, and 2 pack a day   smoker for 60 years, quit in 2003.  The patient ended up undergoing a   bronchoscopy at that time with bronchoalveolar lavage.  He was found to have   copious thick purulent secretions, most notably in the left lower lobe, but no   endobronchial lesion or obstruction was identified and there is a   consideration that the patient might be chronically aspirating.  His pathology   from this examination revealed no evidence of malignancy and his   bronchoalveolar lavage fluid was negative culture wise.    The patient had recently been discharged from the skilled nursing facility   after a pelvic fracture.  His caregiver brought him into the ER with shortness   of breath and fevers.  He was found to be hypoxic and placed on OxyMask.    Workup revealed new left lung pneumonia.    REVIEW OF SYSTEMS:  Very difficult to get much in the way of history from the   patient related to lethargy.    PAST MEDICAL HISTORY:  As mentioned, significant for stroke, left-sided   weakness, dysphagia, COPD, hypertension, diabetes, GERD, depression,   dyslipidemia, pneumonia, fall  from arthritis, prior stroke, asthma, seizures   and kidney disorder.    PAST SURGICAL HISTORY:  Have included laminectomy, appendectomy,   cholecystectomy, carotid endarterectomy and ventral hernia repair.    FAMILY HISTORY:  Mother had gastric ulcer and father had a stroke.    SOCIAL HISTORY:  As mentioned, he is a former smoker, 2 packs a day, quit in   2003.  Alcohol negative.  Drug use negative.    PHYSICAL EXAMINATION:  GENERAL:  The patient was fairly sleepy, but was arousable and able to   converse briefly before falling back asleep.  VITAL SIGNS:  His temperature was 36.9 degrees, heart rate was 71, respiratory   rate 16, saturation 98% on 3 liters _____, blood pressure 106/54, is   receiving oxygen by mask.  HEENT:  Head normocephalic and atraumatic.  Mouth, missing teeth.  NECK:  Adenopathy not appreciated.  LUNGS:  Revealed diminished breath sounds and crackles.  CARDIAC:  Regular rhythm.  ABDOMEN:  Soft, nontender.  EXTREMITIES:  Without cyanosis, clubbing or edema.  NEUROLOGIC:  No focal neurologic findings that I can see.    LABORATORY DATA:  Reveal white count 13,600, hemoglobin 11.6, hematocrit 38.3,   .8.  Chemistries reveal sodium of 146, albumin 3.1, total protein 5.7,   troponin I was 0.05.  No coags done.  His blood gas showed a pO2 of 95, pCO2   64, pH 7.36.  He had a CT performed today of the chest, abdomen and pelvis.    His admitting x-ray showed the diffuse left infiltrates.  The CT scan showed   patchy left lung ground-glass opacities and dense nodular consolidation,   appearing less extensive _____ compared to the prior study performed in April of 2016, small left pleural effusion, small pericardial effusion, mediastinal   adenopathy, stable, left-sided inguinal hernia containing a loop of bowel and   trace free fluid, and trace ascites of unknown etiology and significance.    DIAGNOSES:  1.  Healthcare-facility associated pneumonia.  2.  Respiratory failure, hypercarbic and  hypoxemic.  3.  Chronic obstructive pulmonary disease.  4.  Prior history of pneumonia, query aspiration.  5.  History of left-sided weakness.  6.  Hypertension.  7.  Diabetes.  8.  Gastroesophageal reflux.  9.  Depression.  10.  Dyslipidemia.  11.  History of seizure disorder.  12.  History of multiple surgeries as noted above.    PLAN:  I see no urgent need for bronchoscopy.  The patient's vancomycin and   Zosyn should be continued and the patient will benefit from O2 and RT by   protocols as well as methylprednisolone, bronchodilators, and home medications   were appropriate.  His images should be followed carefully and once his acute   process has resolved, then bronchoscopy can be considered again.  The patient   is too weak at this time to safely provide conscious sedation for the   bronchoscopy.  I would recommend that the patient be evaluated for dysphagia   and aspiration.  In addition, I would recommend given his significant   comorbidities that DNR status be strongly considered.       ____________________________________     MD EDIE KEN / JAMES    DD:  02/21/2017 16:07:22  DT:  02/21/2017 20:47:07    D#:  [id number]  Job#:  [id number]

## 2017-02-22 NOTE — PROGRESS NOTES
Report received at bedside. Pt is resting in bed. A&O x 3. Pt denies pain. No other concerns, complains or distress. Tele box on. Chart reviewed. Bed in lowest position, treaded slipper sock on, and call light within reach.

## 2017-02-22 NOTE — PROGRESS NOTES
Alverto from Lab called with critical result of sputum from 2/20 grew ESBL E.Coli. Critical lab result read back to Alverto.  MD notified of critical lab result at 0850.  Critical lab result read back by MD.

## 2017-02-22 NOTE — PROGRESS NOTES
Received call from IR, they would like to wait to perform patients lung biopsy until patient is medically stable.  Gaurang Ashley.

## 2017-02-22 NOTE — PROGRESS NOTES
Hospital Medicine Progress Note, Adult, Complex               Author: Daya Ashley Date & Time created: 2/21/2017  4:18 PM     Interval History:  CC:  SOB  2/20:   Admitted 81yoM with h/o left sided hemiparesis 2/2 CVA, HTN, COPD on 3 LPM NC, asthma and pelvic fracture at [facility], multiple falls presents with Left sided HCAP on vanco and zosyn.  H/o aspiration, on dysphagia 1 diet per SLP.    2/21:  Had rapid last night, opens eyes to name only.  Remains full code per family.  CTA showing LLL pneumonia/opacification with hiatal hernia seen.  U/s chest did not show enough pleural effusion for IR to drain.  Nasal swab + MRSA, confused.    Review of Systems:  Review of Systems   Constitutional: Positive for malaise/fatigue. Negative for fever, chills and diaphoresis.   HENT: Negative for congestion and sore throat.    Eyes: Negative for pain and discharge.   Respiratory: Positive for cough and shortness of breath. Negative for hemoptysis, sputum production and wheezing.    Cardiovascular: Negative for chest pain, palpitations, claudication and leg swelling.   Gastrointestinal: Negative for nausea, vomiting, abdominal pain, diarrhea, constipation and melena.   Genitourinary: Negative for dysuria, urgency and frequency.   Musculoskeletal: Negative for myalgias, back pain, joint pain and neck pain.   Skin: Negative for itching and rash.   Neurological: Positive for weakness. Negative for dizziness, sensory change, speech change, focal weakness, loss of consciousness and headaches.   Endo/Heme/Allergies: Does not bruise/bleed easily.   Psychiatric/Behavioral: Negative for depression, suicidal ideas and substance abuse.       Physical Exam:  Physical Exam   Constitutional: He is oriented to person, place, and time. He appears well-developed and well-nourished. No distress.   HENT:   Head: Normocephalic and atraumatic.   Mouth/Throat: Oropharynx is clear and moist. No oropharyngeal exudate.   Eyes: Conjunctivae and  EOM are normal. Pupils are equal, round, and reactive to light. Right eye exhibits no discharge. Left eye exhibits no discharge. No scleral icterus.   Neck: Normal range of motion. Neck supple. No JVD present. No tracheal deviation present. No thyromegaly present.   Cardiovascular: Normal rate, regular rhythm and normal heart sounds.  Exam reveals no gallop and no friction rub.    No murmur heard.  Pulmonary/Chest: Effort normal and breath sounds normal. No respiratory distress. He has no wheezes. He has no rales. He exhibits no tenderness.   On 4 lpm NC, desats to 60s off O2.   Abdominal: Soft. Bowel sounds are normal. He exhibits no distension and no mass. There is no tenderness. There is no rebound and no guarding.   Musculoskeletal: Normal range of motion. He exhibits no edema or tenderness.   Lymphadenopathy:     He has no cervical adenopathy.   Neurological: He is alert and oriented to person, place, and time. No cranial nerve deficit. He exhibits normal muscle tone.   Skin: Skin is warm and dry. No rash noted. He is not diaphoretic. No erythema.   Psychiatric: He has a normal mood and affect. His behavior is normal. Judgment and thought content normal.   Nursing note and vitals reviewed.      Labs:  Recent Labs      02/20/17 2000   [id number]  7.36*   [id number]  63.6*   [id number]  94.9*   [id number]  96.3   ARTHCO3  35*   ARTBE  8*     Recent Labs      02/20/17   1400  02/21/17   0129  02/21/17   0745  02/21/17   1251   TROPONINI  0.06*  0.05*  0.04  0.03   BNPBTYPENAT  394*   --    --    --      Recent Labs      02/20/17   1400  02/21/17   0129   SODIUM  141  146*   POTASSIUM  3.8  4.0   CHLORIDE  99  103   CO2  38*  37*   BUN  19  21   CREATININE  0.82  0.85   CALCIUM  8.9  8.7     Recent Labs      02/20/17   1400  02/21/17   0129   ALTSGPT  19  14   ASTSGOT  24  17   ALKPHOSPHAT  77  72   TBILIRUBIN  0.9  1.0   GLUCOSE  134*  123*     Recent Labs      02/20/17   1400  02/21/17   0129   RBC  3.84*  3.69*    HEMOGLOBIN  12.2*  11.6*   HEMATOCRIT  39.7*  38.3*   PLATELETCT  149*  141*     Recent Labs      17   1400  17   0129   WBC  17.9*  13.6*   NEUTSPOLYS  81.60*  81.10*   LYMPHOCYTES  10.40*  9.40*   MONOCYTES  7.30  8.80   EOSINOPHILS  0.00  0.10   BASOPHILS  0.30  0.20   ASTSGOT  24  17   ALTSGPT  19  14   ALKPHOSPHAT  77  72   TBILIRUBIN  0.9  1.0           Hemodynamics:  Temp (24hrs), Av.3 °C (99.1 °F), Min:36.8 °C (98.3 °F), Max:37.8 °C (100.1 °F)  Temperature: 36.9 °C (98.4 °F)  Pulse  Av  Min: 47  Max: 97Heart Rate (Monitored): 81  Blood Pressure : 106/54 mmHg, NIBP: 117/52 mmHg     Respiratory:    Respiration: 16, Pulse Oximetry: 98 %, O2 Daily Delivery Respiratory : OxyMask     Given By:: Mask, #MDI/DPI Given: MDI/DPI x 2, Work Of Breathing / Effort: Mild  RUL Breath Sounds: Coarse Crackles, RML Breath Sounds: Coarse Crackles, RLL Breath Sounds: Coarse Crackles, DEBI Breath Sounds: Coarse Crackles, LLL Breath Sounds: Coarse Crackles  Fluids:    Intake/Output Summary (Last 24 hours) at 17 1618  Last data filed at 17 0600   Gross per 24 hour   Intake    400 ml   Output    500 ml   Net   -100 ml     Weight: 69.4 kg (153 lb)  GI/Nutrition:  Orders Placed This Encounter   Procedures   • DIET ORDER     Standing Status: Standing      Number of Occurrences: 1      Standing Expiration Date:      Order Specific Question:  Diet:     Answer:  Diabetic [3]      Comments:  1-1 nursing supervision     Order Specific Question:  Texture/Fiber modifications:     Answer:  Dysphagia 1(Pureed)specify fluid consistency(question 6) [1]     Order Specific Question:  Consistency/Fluid modifications:     Answer:  Nectar Thick [2]     Medical Decision Making, by Problem:  Active Hospital Problems    Diagnosis   • Respiratory failure (CMS-HCC) [J96.90]  Had been up to 7 LPM NC last night rapid called  Now at 4 LPM NC  2/2 LLL pneumonia/opacification.     • Debility [R53.81]   • Multiple falls  [R29.6]  Reported from niece  H/o pelvic fracture, recently at [facility] for rehab     • MRSA carrier [Z22.322]  Placed in contact and droplet isolation  MRSA + nasal swab     • Sepsis (CMS-HCC) [A41.9]  2/2 LLL pneumonia HCAP     • HCAP (healthcare-associated pneumonia) [J18.9]  Sputum culture LFGNR ss pending  MRSA nasal swab  Continue vancomycin IV  Zosyn IV  Consolidation seen on CTA chest  mucomist nebs ordered   Ordered PEP therapy to open up airway  Review of last admission:  4/2016 s/p bronchoscopy with Dr. Phillips with no mass, thick mucus, cultures negative.     • Chronic respiratory failure with hypoxia (CMS-HCC) [J96.11]  Mild improvement in O2 requirement back to 4 LPM Nc  Baseline 3 LPM NC     • Oxygen dependent [Z99.81]  Baseline 3 LPM NC 24/7.     • DM type 2, controlled, with complication (CMS-HCC) [E11.8]  SSI  accuchecks  Diabetic diet  Dysphagia 1 NTFL   • Left hemiparesis (CMS-HCC) [G81.94]  2/2 prior CVA     • Dysphagia status post cerebrovascular accident [I69.391]  SLP rec dysphagia 1 NT diet.     • COPD (chronic obstructive pulmonary disease) ([facility]) [J44.9]  Nebs  RT protocol     • GERD (gastroesophageal reflux disease) [K21.9]  Continue home pepcid  Especially with aspiration risk.    Abnormal UA:  Wbc 100-150 wbc  Ordered UC on original sample  On zosyn, vanco IV.     FC per niece:  Reviewed with admitting physician.  PT/OT/SNF orders placed.  Family does not want to go back to [facility].    Loya catheter: No Loya      DVT Prophylaxis: Heparin    Ulcer prophylaxis: Yes  Antibiotics: Treating active infection/contamination beyond 24 hours perioperative coverage  Assessed for rehab: Patient was assess for and/or received rehabilitation services during this hospitalization

## 2017-02-22 NOTE — PROGRESS NOTES
Pulmonary Critical Care Progress Note        Chief Complaint: sob    History of Present Illness:  The patient is an 81-year-old man.  The patient    is well known to us from a hospitalization in April of 2016 when he was    hospitalized for treatment of left lung pneumonia.  We had seen him even    before that in 2014 when he was hospitalized for acute on chronic respiratory    failure and at that time, he had a possible right-sided pneumonia in addition    to hypercarbic, hypoxemic respiratory failure secondary to COPD.  Other    problems included hypertension, pulmonary hypertension, seizure disorder,    gastroesophageal reflux disease, prior stroke with left-sided weakness,    depression, dyslipidemia, osteoarthritis, and chronic pain, and 2 pack a day    smoker for 60 years, quit in 2003.  The patient ended up undergoing a    bronchoscopy at that time with bronchoalveolar lavage.  He was found to have    copious thick purulent secretions, most notably in the left lower lobe, but no   endobronchial lesion or obstruction was identified and there is a    consideration that the patient might be chronically aspirating.  His pathology   from this examination revealed no evidence of malignancy and his    bronchoalveolar lavage fluid was negative culture wise.     The patient had recently been discharged from the skilled nursing facility    after a pelvic fracture.  His caregiver brought him into the ER with shortness   of breath and fevers.  He was found to be hypoxic and placed on OxyMask.     Workup revealed new left lung pneumonia.  ROS:  Respiratory: negative, Cardiac: negative, GI: negative.  All other systems negative.    Interval Events:  24 hour interval history reviewed    PFSH:  No change.    Respiratory:     Pulse Oximetry: 92 %  Chest Tube Drains:          Exam: unlabored respirations, no intercostal retractions or accessory muscle use  ImagingAvailable data reviewed   Recent Labs      02/20/17 2000    [id number]  7.36*   [id number]  63.6*   [id number]  94.9*   BQTK3FMG  96.3   ARTHCO3  35*   ARTBE  8*       HemoDynamics:  Pulse: 80  Blood Pressure : 118/73 mmHg       Exam: regular rate and rhythm  Imaging: Available data reviewed  Recent Labs      02/20/17   1400   02/21/17   1841  02/22/17   0131  02/22/17   0656   TROPONINI  0.06*   < >  0.04  0.03  0.03   BNPBTYPENAT  394*   --    --    --    --     < > = values in this interval not displayed.       Neuro:  GCS         Exam: no focal deficits noted  Imaging: Available data reviewed    Fluids:  Intake/Output       02/20/17 0700 - 02/21/17 0659 02/21/17 0700 - 02/22/17 0659 02/22/17 0700 - 02/23/17 0659      [id number] 0950-2382 Total 6844-3551 6011-5327 Total 6891-3474 3885-4626 Total       Intake    P.O.  --  -- --  60  -- 60  --  -- --    P.O. -- -- -- 60 -- 60 -- -- --    I.V.  --  400 400  550  -- 550  --  -- --    IV Piggyback Volume (Keppra) -- 100 100 100 -- 100 -- -- --    IV Piggyback Volume (Zosyn) -- 200 200 200 -- 200 -- -- --    IV Piggyback Volume (Vanco) -- 100 100 250 -- 250 -- -- --    Total Intake -- 400 400 610 -- 610 -- -- --       Output    Urine  --  500 500  600  600 1200  --  -- --    Condom Catheter -- -- --  -- -- --    Void (ml) -- 500 500 -- -- -- -- -- --    Total Output -- 500 500  -- -- --       Net I/O     -- -100 -100 10 -600 -590 -- -- --        Weight: 67.5 kg (148 lb 13 oz)  Recent Labs      02/20/17   1400  02/21/17   0129  02/22/17   0339   SODIUM  141  146*  144   POTASSIUM  3.8  4.0  4.3   CHLORIDE  99  103  103   CO2  38*  37*  38*   BUN  19  21  18   CREATININE  0.82  0.85  0.74   CALCIUM  8.9  8.7  9.1       GI/Nutrition:  Exam: abdomen is soft and non-tender  Imaging: Available data reviewed  taking PO  Liver Function  Recent Labs      02/20/17   1400  02/21/17   0129  02/22/17   0339   ALTSGPT  19  14   --    ASTSGOT  24  17   --    ALKPHOSPHAT  77  72   --    TBILIRUBIN  0.9  1.0   --     GLUCOSE  134*  123*  119*       Heme:  Recent Labs      17   1400  17   0129  17   0131   RBC  3.84*  3.69*  3.66*   HEMOGLOBIN  12.2*  11.6*  11.6*   HEMATOCRIT  39.7*  38.3*  38.6*   PLATELETCT  149*  141*  135*   IRON   --    --   31*   TOTIRONBC   --    --   248*       Infectious Disease:  Temp  Av.2 °C (99 °F)  Min: 36.8 °C (98.3 °F)  Max: 37.8 °C (100.1 °F)  Micro: reviewed  Recent Labs      17   1400  17   0129  17   013   WBC  17.9*  13.6*  9.2   NEUTSPOLYS  81.60*  81.10*  77.70*   LYMPHOCYTES  10.40*  9.40*  11.30*   MONOCYTES  7.30  8.80  9.80   EOSINOPHILS  0.00  0.10  0.70   BASOPHILS  0.30  0.20  0.20   ASTSGOT  24  17   --    ALTSGPT  19  14   --    ALKPHOSPHAT  77  72   --    TBILIRUBIN  0.9  1.0   --      Current Facility-Administered Medications   Medication Dose Frequency Provider Last Rate Last Dose   • albuterol (PROVENTIL) 2.5mg/0.5ml nebulizer solution 2.5 mg  2.5 mg Q4HRS (RT) Vinny Guan M.D.   2.5 mg at 17 0800   • vancomycin 1,000 mg in  mL IVPB  15 mg/kg Q12HR Long Chance PHARMD 50 mL/hr at 17 0637 1,000 mg at 17 0637   • insulin lispro (HUMALOG) injection 1-6 Units  1-6 Units 4X/DAY ACHS Daya Ashley M.D.   Stopped at 17 1700   • glucose 4 g chewable tablet 16 g  16 g Q15 MIN PRN Daya Ashley M.D.        And   • dextrose 50% (D50W) injection 25 mL  25 mL Q15 MIN PRN Daya Ashley M.D.       • docusate sodium (COLACE) capsule 100 mg  100 mg BID Daya Ashley M.D.   100 mg at 174   • acetylcysteine (MUCOMYST) 20 % solution 3 mL  3 mL Q4HRS (RT) Daya Ashley M.D.   3 mL at 17 0801   • aspirin EC (ECOTRIN) tablet 81 mg  81 mg DAILY Heath Leigh M.D.   81 mg at 17 1133   • atorvastatin (LIPITOR) tablet 80 mg  80 mg Q EVENING Heath Leigh M.D.   80 mg at 17   • famotidine (PEPCID) tablet 20 mg  20 mg BID Heath Leigh M.D.   20 mg at 17   • ferrous  sulfate tablet 325 mg  325 mg DAILY Heath Leigh M.D.   325 mg at 02/21/17 1133   • finasteride (PROSCAR) tablet 5 mg  5 mg DAILY Heath Leigh M.D.   5 mg at 02/21/17 1134   • budesonide-formoterol (SYMBICORT) 160-4.5 MCG/ACT inhaler 2 Puff  2 Puff Q12HRS Heath Leigh M.D.   2 Puff at 02/22/17 0800   • gabapentin (NEURONTIN) capsule 300 mg  300 mg DAILY Heath Leigh M.D.   300 mg at 02/21/17 1134   • metoprolol (LOPRESSOR) tablet 25 mg  25 mg BID Heath eLigh M.D.   25 mg at 02/21/17 2254   • oxybutynin SR (DITROPAN-XL) tablet 5 mg  5 mg DAILY Heath Leigh M.D.   5 mg at 02/21/17 1134   • sertraline (ZOLOFT) tablet 100 mg  100 mg DAILY Heath Leigh M.D.   100 mg at 02/21/17 1134   • tamsulosin (FLOMAX) capsule 0.4 mg  0.4 mg AFTER BREAKFAST Heath Leigh M.D.   0.4 mg at 02/21/17 1134   • tiotropium (SPIRIVA) 18 MCG inhalation capsule 1 Cap  1 Cap DAILY Heath Leigh M.D.   1 Cap at 02/22/17 0800   • trazodone (DESYREL) tablet 50 mg  50 mg QHS PRN Heath Leigh M.D.       • NS infusion 1,971 mL  30 mL/kg Once PRN Heath Leigh M.D.       • docusate sodium (COLACE) capsule 100 mg  100 mg BID Heath Leigh M.D.   Stopped at 02/20/17 2100   • senna-docusate (PERICOLACE or SENOKOT S) 8.6-50 MG per tablet 1 Tab  1 Tab Nightly Heath Leigh M.D.   Stopped at 02/20/17 2100   • senna-docusate (PERICOLACE or SENOKOT S) 8.6-50 MG per tablet 1 Tab  1 Tab Q24HRS PRN Heath Leigh M.D.       • lactulose 20 GM/30ML solution 30 mL  30 mL Q24HRS PRN Heath Leigh M.D.       • bisacodyl (DULCOLAX) suppository 10 mg  10 mg Q24HRS PRN Heath Leigh M.D.       • fleet enema 133 mL  1 Each Once PRN Heath Leigh M.D.       • Respiratory Care per Protocol   Continuous RT Heath Leigh M.D.       • NS (BOLUS) infusion 500 mL  500 mL Once PRN Heath Leigh M.D.       • heparin injection 5,000 Units  5,000 Units Q8HRS Heath Leigh M.D.   5,000 Units at 02/22/17 0600   • piperacillin-tazobactam (ZOSYN) 4.5 g in  mL IVPB  4.5 g Q6HR Heath Leigh M.D.   Stopped at 02/22/17 0012   • MD  ALERT... vancomycin per pharmacy protocol 1 Each  1 Each PRN Heath Leigh M.D.       • levetiracetam (KEPPRA) 1,500 mg in D5W 100 mL IVPB  1,500 mg BID Heath Leigh M.D.   Stopped at 02/21/17 2320     Last reviewed on 2/20/2017  3:33 PM by Evelin Saavedra    Quality  Measures:  Radiology images reviewed, Medications reviewed, Labs reviewed and EKG reviewed                      Problems:    1.  Healthcare-facility associated pneumonia.  2.  Respiratory failure, hypercarbic and hypoxemic.  3.  Chronic obstructive pulmonary disease.  4.  Prior history of pneumonia, query aspiration.  5.  History of left-sided weakness.  6.  Hypertension.  7.  Diabetes.  8.  Gastroesophageal reflux.  9.  Depression.  10.  Dyslipidemia.  11.  History of seizure disorder.  12.  History of multiple surgeries as noted above.  13. Query aspiration  14. Anemia, macrocytic      Plan:  see no urgent need for bronchoscopy.  The patient's vancomycin and    Zosyn should be continued and the patient will benefit from O2 and RT by    protocols as well as methylprednisolone, bronchodilators, and home medications   were appropriate.  His images should be followed carefully and once his acute   process has resolved, then bronchoscopy can be considered again.  The patient   is too weak at this time to safely provide conscious sedation. for the    bronchoscopy.  I would recommend that the patient be evaluated for dysphagia    and aspiration.  In addition, I would recommend given his significant    comorbidities that DNR status be strongly considered.

## 2017-02-23 ENCOUNTER — APPOINTMENT (OUTPATIENT)
Dept: RADIOLOGY | Facility: MEDICAL CENTER | Age: 82
DRG: 871 | End: 2017-02-23
Attending: HOSPITALIST
Payer: MEDICARE

## 2017-02-23 LAB
ANION GAP SERPL CALC-SCNC: 6 MMOL/L (ref 0–11.9)
BACTERIA UR CULT: ABNORMAL
BACTERIA UR CULT: ABNORMAL
BASOPHILS # BLD AUTO: 0.3 % (ref 0–1.8)
BASOPHILS # BLD: 0.03 K/UL (ref 0–0.12)
BUN SERPL-MCNC: 21 MG/DL (ref 8–22)
CALCIUM SERPL-MCNC: 9.2 MG/DL (ref 8.5–10.5)
CHLORIDE SERPL-SCNC: 101 MMOL/L (ref 96–112)
CO2 SERPL-SCNC: 33 MMOL/L (ref 20–33)
CREAT SERPL-MCNC: 0.64 MG/DL (ref 0.5–1.4)
EOSINOPHIL # BLD AUTO: 0.1 K/UL (ref 0–0.51)
EOSINOPHIL NFR BLD: 1.1 % (ref 0–6.9)
ERYTHROCYTE [DISTWIDTH] IN BLOOD BY AUTOMATED COUNT: 52 FL (ref 35.9–50)
GFR SERPL CREATININE-BSD FRML MDRD: >60 ML/MIN/1.73 M 2
GLUCOSE BLD-MCNC: 110 MG/DL (ref 65–99)
GLUCOSE BLD-MCNC: 111 MG/DL (ref 65–99)
GLUCOSE BLD-MCNC: 112 MG/DL (ref 65–99)
GLUCOSE BLD-MCNC: 150 MG/DL (ref 65–99)
GLUCOSE SERPL-MCNC: 171 MG/DL (ref 65–99)
HCT VFR BLD AUTO: 43.3 % (ref 42–52)
HGB BLD-MCNC: 13.1 G/DL (ref 14–18)
IMM GRANULOCYTES # BLD AUTO: 0.04 K/UL (ref 0–0.11)
IMM GRANULOCYTES NFR BLD AUTO: 0.4 % (ref 0–0.9)
INR PPP: 1 (ref 0.87–1.13)
LYMPHOCYTES # BLD AUTO: 1.14 K/UL (ref 1–4.8)
LYMPHOCYTES NFR BLD: 12.4 % (ref 22–41)
MCH RBC QN AUTO: 31.7 PG (ref 27–33)
MCHC RBC AUTO-ENTMCNC: 30.3 G/DL (ref 33.7–35.3)
MCV RBC AUTO: 104.8 FL (ref 81.4–97.8)
MONOCYTES # BLD AUTO: 0.63 K/UL (ref 0–0.85)
MONOCYTES NFR BLD AUTO: 6.8 % (ref 0–13.4)
NEUTROPHILS # BLD AUTO: 7.28 K/UL (ref 1.82–7.42)
NEUTROPHILS NFR BLD: 79 % (ref 44–72)
NRBC # BLD AUTO: 0 K/UL
NRBC BLD AUTO-RTO: 0 /100 WBC
PLATELET # BLD AUTO: 142 K/UL (ref 164–446)
PMV BLD AUTO: 11.1 FL (ref 9–12.9)
POTASSIUM SERPL-SCNC: 4.5 MMOL/L (ref 3.6–5.5)
PROTHROMBIN TIME: 13.5 SEC (ref 12–14.6)
RBC # BLD AUTO: 4.13 M/UL (ref 4.7–6.1)
SIGNIFICANT IND 70042: ABNORMAL
SITE SITE: ABNORMAL
SODIUM SERPL-SCNC: 140 MMOL/L (ref 135–145)
SOURCE SOURCE: ABNORMAL
TROPONIN I SERPL-MCNC: 0.03 NG/ML (ref 0–0.04)
WBC # BLD AUTO: 9.2 K/UL (ref 4.8–10.8)

## 2017-02-23 PROCEDURE — 99233 SBSQ HOSP IP/OBS HIGH 50: CPT | Performed by: HOSPITALIST

## 2017-02-23 PROCEDURE — 94640 AIRWAY INHALATION TREATMENT: CPT

## 2017-02-23 PROCEDURE — 84484 ASSAY OF TROPONIN QUANT: CPT | Mod: 91

## 2017-02-23 PROCEDURE — 94669 MECHANICAL CHEST WALL OSCILL: CPT

## 2017-02-23 PROCEDURE — 700102 HCHG RX REV CODE 250 W/ 637 OVERRIDE(OP): Performed by: INTERNAL MEDICINE

## 2017-02-23 PROCEDURE — 700101 HCHG RX REV CODE 250: Performed by: INTERNAL MEDICINE

## 2017-02-23 PROCEDURE — 700105 HCHG RX REV CODE 258: Performed by: INTERNAL MEDICINE

## 2017-02-23 PROCEDURE — 700102 HCHG RX REV CODE 250 W/ 637 OVERRIDE(OP): Performed by: HOSPITALIST

## 2017-02-23 PROCEDURE — 85610 PROTHROMBIN TIME: CPT

## 2017-02-23 PROCEDURE — 82962 GLUCOSE BLOOD TEST: CPT

## 2017-02-23 PROCEDURE — 770020 HCHG ROOM/CARE - TELE (206)

## 2017-02-23 PROCEDURE — A9270 NON-COVERED ITEM OR SERVICE: HCPCS | Performed by: HOSPITALIST

## 2017-02-23 PROCEDURE — 71010 DX-CHEST-PORTABLE (1 VIEW): CPT

## 2017-02-23 PROCEDURE — 700111 HCHG RX REV CODE 636 W/ 250 OVERRIDE (IP): Performed by: INTERNAL MEDICINE

## 2017-02-23 PROCEDURE — 85025 COMPLETE CBC W/AUTO DIFF WBC: CPT

## 2017-02-23 PROCEDURE — 94760 N-INVAS EAR/PLS OXIMETRY 1: CPT

## 2017-02-23 PROCEDURE — 80048 BASIC METABOLIC PNL TOTAL CA: CPT

## 2017-02-23 PROCEDURE — A9270 NON-COVERED ITEM OR SERVICE: HCPCS | Performed by: INTERNAL MEDICINE

## 2017-02-23 PROCEDURE — 700112 HCHG RX REV CODE 229: Performed by: HOSPITALIST

## 2017-02-23 PROCEDURE — 36415 COLL VENOUS BLD VENIPUNCTURE: CPT

## 2017-02-23 RX ORDER — ALBUTEROL SULFATE 2.5 MG/3ML
2.5 SOLUTION RESPIRATORY (INHALATION)
Status: DISCONTINUED | OUTPATIENT
Start: 2017-02-23 | End: 2017-02-27

## 2017-02-23 RX ADMIN — ASPIRIN 81 MG: 81 TABLET ORAL at 09:19

## 2017-02-23 RX ADMIN — FAMOTIDINE 20 MG: 20 TABLET, FILM COATED ORAL at 09:20

## 2017-02-23 RX ADMIN — SERTRALINE 100 MG: 100 TABLET, FILM COATED ORAL at 09:18

## 2017-02-23 RX ADMIN — HEPARIN SODIUM 5000 UNITS: 5000 INJECTION, SOLUTION INTRAVENOUS; SUBCUTANEOUS at 15:28

## 2017-02-23 RX ADMIN — TIOTROPIUM BROMIDE 1 CAPSULE: 18 CAPSULE ORAL; RESPIRATORY (INHALATION) at 07:07

## 2017-02-23 RX ADMIN — ACETYLCYSTEINE 3 ML: 200 SOLUTION ORAL; RESPIRATORY (INHALATION) at 11:39

## 2017-02-23 RX ADMIN — ACETYLCYSTEINE 3 ML: 200 SOLUTION ORAL; RESPIRATORY (INHALATION) at 14:02

## 2017-02-23 RX ADMIN — ACETYLCYSTEINE 3 ML: 200 SOLUTION ORAL; RESPIRATORY (INHALATION) at 19:29

## 2017-02-23 RX ADMIN — SODIUM CHLORIDE 50 MG: 9 INJECTION, SOLUTION INTRAVENOUS at 21:23

## 2017-02-23 RX ADMIN — METOPROLOL TARTRATE 25 MG: 25 TABLET, FILM COATED ORAL at 09:20

## 2017-02-23 RX ADMIN — DEXTROSE MONOHYDRATE 1500 MG: 50 INJECTION, SOLUTION INTRAVENOUS at 21:03

## 2017-02-23 RX ADMIN — LACTULOSE 30 ML: 10 SOLUTION ORAL at 09:33

## 2017-02-23 RX ADMIN — ACETYLCYSTEINE 3 ML: 200 SOLUTION ORAL; RESPIRATORY (INHALATION) at 07:07

## 2017-02-23 RX ADMIN — Medication 325 MG: at 09:16

## 2017-02-23 RX ADMIN — DEXTROSE MONOHYDRATE 1500 MG: 50 INJECTION, SOLUTION INTRAVENOUS at 09:01

## 2017-02-23 RX ADMIN — METOPROLOL TARTRATE 25 MG: 25 TABLET, FILM COATED ORAL at 21:16

## 2017-02-23 RX ADMIN — BUDESONIDE AND FORMOTEROL FUMARATE DIHYDRATE 2 PUFF: 160; 4.5 AEROSOL RESPIRATORY (INHALATION) at 21:02

## 2017-02-23 RX ADMIN — SODIUM CHLORIDE 50 MG: 9 INJECTION, SOLUTION INTRAVENOUS at 01:56

## 2017-02-23 RX ADMIN — DOCUSATE SODIUM 100 MG: 100 CAPSULE ORAL at 21:15

## 2017-02-23 RX ADMIN — FAMOTIDINE 20 MG: 20 TABLET, FILM COATED ORAL at 21:16

## 2017-02-23 RX ADMIN — OXYCODONE HYDROCHLORIDE AND ACETAMINOPHEN 500 MG: 500 TABLET ORAL at 09:22

## 2017-02-23 RX ADMIN — ALBUTEROL SULFATE 2.5 MG: 2.5 SOLUTION RESPIRATORY (INHALATION) at 11:39

## 2017-02-23 RX ADMIN — BUDESONIDE AND FORMOTEROL FUMARATE DIHYDRATE 2 PUFF: 160; 4.5 AEROSOL RESPIRATORY (INHALATION) at 07:07

## 2017-02-23 RX ADMIN — ALBUTEROL SULFATE 2.5 MG: 2.5 SOLUTION RESPIRATORY (INHALATION) at 07:06

## 2017-02-23 RX ADMIN — OXYBUTYNIN CHLORIDE 5 MG: 5 TABLET, FILM COATED, EXTENDED RELEASE ORAL at 09:20

## 2017-02-23 RX ADMIN — TAMSULOSIN HYDROCHLORIDE 0.4 MG: 0.4 CAPSULE ORAL at 09:19

## 2017-02-23 RX ADMIN — ATORVASTATIN CALCIUM 80 MG: 80 TABLET, FILM COATED ORAL at 21:15

## 2017-02-23 RX ADMIN — GABAPENTIN 300 MG: 300 CAPSULE ORAL at 09:16

## 2017-02-23 RX ADMIN — FINASTERIDE 5 MG: 5 TABLET, FILM COATED ORAL at 09:22

## 2017-02-23 RX ADMIN — STANDARDIZED SENNA CONCENTRATE AND DOCUSATE SODIUM 1 TABLET: 8.6; 5 TABLET, FILM COATED ORAL at 21:16

## 2017-02-23 RX ADMIN — DOCUSATE SODIUM 100 MG: 100 CAPSULE ORAL at 09:15

## 2017-02-23 RX ADMIN — ALBUTEROL SULFATE 2.5 MG: 2.5 SOLUTION RESPIRATORY (INHALATION) at 14:01

## 2017-02-23 RX ADMIN — TRAZODONE HYDROCHLORIDE 50 MG: 50 TABLET ORAL at 23:03

## 2017-02-23 RX ADMIN — HEPARIN SODIUM 5000 UNITS: 5000 INJECTION, SOLUTION INTRAVENOUS; SUBCUTANEOUS at 21:16

## 2017-02-23 RX ADMIN — SODIUM CHLORIDE 50 MG: 9 INJECTION, SOLUTION INTRAVENOUS at 09:23

## 2017-02-23 RX ADMIN — HEPARIN SODIUM 5000 UNITS: 5000 INJECTION, SOLUTION INTRAVENOUS; SUBCUTANEOUS at 05:47

## 2017-02-23 RX ADMIN — ALBUTEROL SULFATE 2.5 MG: 2.5 SOLUTION RESPIRATORY (INHALATION) at 19:22

## 2017-02-23 ASSESSMENT — ENCOUNTER SYMPTOMS
EYE PAIN: 0
WEAKNESS: 1
DIZZINESS: 0
MYALGIAS: 0
NAUSEA: 0
WHEEZING: 0
DEPRESSION: 0
ABDOMINAL PAIN: 0
SPEECH CHANGE: 0
COUGH: 1
LOSS OF CONSCIOUSNESS: 0
DIAPHORESIS: 0
HEADACHES: 0
DIARRHEA: 0
SHORTNESS OF BREATH: 0
CONSTIPATION: 0
BACK PAIN: 0
VOMITING: 0
CHILLS: 0
HEMOPTYSIS: 0
CLAUDICATION: 0
BRUISES/BLEEDS EASILY: 0
SENSORY CHANGE: 0
FEVER: 0
SORE THROAT: 0
PALPITATIONS: 0
EYE DISCHARGE: 0
SPUTUM PRODUCTION: 0
SHORTNESS OF BREATH: 1
NECK PAIN: 0
FOCAL WEAKNESS: 0
NAUSEA: 1

## 2017-02-23 ASSESSMENT — COPD QUESTIONNAIRES
COPD SCREENING SCORE: 8
DURING THE PAST 4 WEEKS HOW MUCH DID YOU FEEL SHORT OF BREATH: MOST  OR ALL OF THE TIME
DO YOU EVER COUGH UP ANY MUCUS OR PHLEGM?: YES, A FEW DAYS A WEEK OR MONTH
HAVE YOU SMOKED AT LEAST 100 CIGARETTES IN YOUR ENTIRE LIFE: YES

## 2017-02-23 ASSESSMENT — PAIN SCALES - GENERAL
PAINLEVEL_OUTOF10: 0

## 2017-02-23 ASSESSMENT — LIFESTYLE VARIABLES
DO YOU DRINK ALCOHOL: NO
EVER_SMOKED: YES
ALCOHOL_USE: NO
SUBSTANCE_ABUSE: 0

## 2017-02-23 NOTE — DISCHARGE PLANNING
Transitional Care Update:    Met with pt to discuss transitional care. Pt prefers not to go to SNF. TCN discussed risks vs benefits. Pt states that he prefers to d/c home. Pt states that he's not sure about discharging home with his niece. States that he doesn't get along with her son very well. Pt states that there have been verbal arguments between him and the son.  TCN brought up group home options. Pt is open to group home options. Pt states that his monthly income is about $2500/month.   Unit SW aware.

## 2017-02-23 NOTE — DISCHARGE PLANNING
Transitional Care Navigator:    Per PT/OT recommendation, pt will need SNF placement. TCN will speak with pt about SNF placement. Pt has been to [facility] in the past.   [facility], pt only has 25 Medicare co-pay SNF days left.This could be a barrier for placement. Pt has Medicare as primary insurance and [employer] as secondary insurance.

## 2017-02-23 NOTE — PROGRESS NOTES
Pulmonary Critical Care Progress Note        Chief Complaint: sob    History of Present Illness:  The patient is an 81-year-old man.  The patient    is well known to us from a hospitalization in April of 2016 when he was    hospitalized for treatment of left lung pneumonia.  We had seen him even    before that in 2014 when he was hospitalized for acute on chronic respiratory    failure and at that time, he had a possible right-sided pneumonia in addition    to hypercarbic, hypoxemic respiratory failure secondary to COPD.  Other    problems included hypertension, pulmonary hypertension, seizure disorder,    gastroesophageal reflux disease, prior stroke with left-sided weakness,    depression, dyslipidemia, osteoarthritis, and chronic pain, and 2 pack a day    smoker for 60 years, quit in 2003.  The patient ended up undergoing a    bronchoscopy at that time with bronchoalveolar lavage.  He was found to have    copious thick purulent secretions, most notably in the left lower lobe, but no   endobronchial lesion or obstruction was identified and there is a    consideration that the patient might be chronically aspirating.  His pathology   from this examination revealed no evidence of malignancy and his    bronchoalveolar lavage fluid was negative culture wise.     The patient had recently been discharged from the skilled nursing facility    after a pelvic fracture.  His caregiver brought him into the ER with shortness   of breath and fevers.  He was found to be hypoxic and placed on OxyMask.     Workup revealed new left lung pneumonia.  ROS:  Respiratory: negative, Cardiac: negative, GI: negative.  All other systems negative.    Interval Events:  24 hour interval history reviewed    2/23 - on 4 lpm, better and more conversant today. ESBL + sputum, Tygacil initiated by ID    PFSH:  No change.    Respiratory:     Pulse Oximetry: 93 %  Chest Tube Drains:          Exam: unlabored respirations, no intercostal retractions or  accessory muscle use  ImagingAvailable data reviewed   Recent Labs      02/20/17 2000   [id number]  7.36*   [id number]  63.6*   [id number]  94.9*   [id number]  96.3   ARTHCO3  35*   ARTBE  8*       HemoDynamics:  Pulse: 80  Blood Pressure : 146/83 mmHg       Exam: regular rate and rhythm  Imaging: Available data reviewed  Recent Labs      02/20/17   1400   02/22/17   1928  02/23/17   0147  02/23/17   0650   TROPONINI  0.06*   < >  0.03  0.03  0.03   BNPBTYPENAT  394*   --    --    --    --     < > = values in this interval not displayed.       Neuro:  GCS         Exam: no focal deficits noted  Imaging: Available data reviewed    Fluids:  Intake/Output       02/21/17 0700 - 02/22/17 0659 02/22/17 0700 - 02/23/17 0659 02/23/17 0700 - 02/24/17 0659      [id number] 2254-8384 Total 8588-6982 3289-9299 Total 1496-9448 4708-9326 Total       Intake    P.O.  60  200 260  1320  -- 1320  --  -- --    P.O. 60  -- 1320 -- -- --    I.V.  550  420 970  450  -- 450  --  -- --    IV Volume (Meropenum) -- -- -- 100 -- 100 -- -- --    IV Piggyback Volume (Keppra) 100 100 200 350 -- 350 -- -- --    IV Piggyback Volume (Zosyn) 200 200 400 -- -- -- -- -- --    IV Piggyback Volume (Vanco) 250 120 370 -- -- -- -- -- --    Total Intake  1770 -- 1770 -- -- --       Output    Urine  600  600 1200  1400  400 1800  --  -- --    Condom Catheter  6999 343 6203 -- -- --    Total Output  5646 884 3486 -- -- --       Net I/O     10 20 30 370 -400 -30 -- -- --        Weight: 69.4 kg (153 lb)  Recent Labs      02/20/17   1400  02/21/17   0129  02/22/17   0339   SODIUM  141  146*  144   POTASSIUM  3.8  4.0  4.3   CHLORIDE  99  103  103   CO2  38*  37*  38*   BUN  19  21  18   CREATININE  0.82  0.85  0.74   CALCIUM  8.9  8.7  9.1       GI/Nutrition:  Exam: abdomen is soft and non-tender  Imaging: Available data reviewed  taking PO  Liver Function  Recent Labs      02/20/17   1400  02/21/17   0129  02/22/17    0339   ALTSGPT  19  14   --    ASTSGOT  24  17   --    ALKPHOSPHAT  77  72   --    TBILIRUBIN  0.9  1.0   --    GLUCOSE  134*  123*  119*       Heme:  Recent Labs      17   1400  17   0129  17   013   RBC  3.84*  3.69*  3.66*   HEMOGLOBIN  12.2*  11.6*  11.6*   HEMATOCRIT  39.7*  38.3*  38.6*   PLATELETCT  149*  141*  135*   IRON   --    --   31*   TOTIRONBC   --    --   248*       Infectious Disease:  Temp  Av.6 °C (97.9 °F)  Min: 36.2 °C (97.2 °F)  Max: 36.9 °C (98.5 °F)  Micro: reviewed  Recent Labs      17   1400  17   01217   013   WBC  17.9*  13.6*  9.2   NEUTSPOLYS  81.60*  81.10*  77.70*   LYMPHOCYTES  10.40*  9.40*  11.30*   MONOCYTES  7.30  8.80  9.80   EOSINOPHILS  0.00  0.10  0.70   BASOPHILS  0.30  0.20  0.20   ASTSGOT  24  17   --    ALTSGPT  19  14   --    ALKPHOSPHAT  77  72   --    TBILIRUBIN  0.9  1.0   --      Current Facility-Administered Medications   Medication Dose Frequency Provider Last Rate Last Dose   • tigecycline (TYGACIL) 50 mg in NS 50 mL IVPB  50 mg Q12HRS Jessie Varghese M.D.   Stopped at 17   • ascorbic acid (ascorbic acid) tablet 500 mg  500 mg DAILY Daya Ashley M.D.   500 mg at 17   • albuterol (PROVENTIL) 2.5mg/0.5ml nebulizer solution 2.5 mg  2.5 mg 4X/DAY (RT) Vinny Guan M.D.   2.5 mg at 17 0706   • budesonide-formoterol (SYMBICORT) 160-4.5 MCG/ACT inhaler 2 Puff  2 Puff BID Vinny Guan M.D.   2 Puff at 17 0707   • acetylcysteine (MUCOMYST) 20 % solution 3 mL  3 mL 4X/DAY (RT) Vinny Guan M.D.   3 mL at 17 0707   • insulin lispro (HUMALOG) injection 1-6 Units  1-6 Units 4X/DAY GIDEON Ashley M.D.   Stopped at 17 1700   • glucose 4 g chewable tablet 16 g  16 g Q15 MIN PRN Daya Ashley M.D.        And   • dextrose 50% (D50W) injection 25 mL  25 mL Q15 MIN PRN Daya Ashley M.D.       • docusate sodium (COLACE) capsule 100 mg  100 mg BID  Daya Ashley M.D.   100 mg at 02/22/17 0926   • aspirin EC (ECOTRIN) tablet 81 mg  81 mg DAILY Heath Leigh M.D.   81 mg at 02/22/17 0925   • atorvastatin (LIPITOR) tablet 80 mg  80 mg Q EVENING Heath Leigh M.D.   80 mg at 02/22/17 2142   • famotidine (PEPCID) tablet 20 mg  20 mg BID Heath Leigh M.D.   20 mg at 02/22/17 2142   • ferrous sulfate tablet 325 mg  325 mg DAILY Heath Leigh M.D.   325 mg at 02/22/17 0927   • finasteride (PROSCAR) tablet 5 mg  5 mg DAILY Heath Leigh M.D.   5 mg at 02/22/17 0927   • gabapentin (NEURONTIN) capsule 300 mg  300 mg DAILY Heath Leigh M.D.   300 mg at 02/22/17 0928   • metoprolol (LOPRESSOR) tablet 25 mg  25 mg BID Heath Leigh M.D.   25 mg at 02/22/17 2142   • oxybutynin SR (DITROPAN-XL) tablet 5 mg  5 mg DAILY Heath Leigh M.D.   5 mg at 02/22/17 0928   • sertraline (ZOLOFT) tablet 100 mg  100 mg DAILY Heath Leigh M.D.   100 mg at 02/22/17 0927   • tamsulosin (FLOMAX) capsule 0.4 mg  0.4 mg AFTER BREAKFAST Heath Leigh M.D.   0.4 mg at 02/22/17 0927   • tiotropium (SPIRIVA) 18 MCG inhalation capsule 1 Cap  1 Cap DAILY Heath Leigh M.D.   1 Cap at 02/23/17 0707   • trazodone (DESYREL) tablet 50 mg  50 mg QHS PRN Heath Leigh M.D.       • NS infusion 1,971 mL  30 mL/kg Once PRN Heath Leigh M.D.       • docusate sodium (COLACE) capsule 100 mg  100 mg BID Heath Leigh M.D.   100 mg at 02/22/17 0930   • senna-docusate (PERICOLACE or SENOKOT S) 8.6-50 MG per tablet 1 Tab  1 Tab Nightly Heath Leigh M.D.   1 Tab at 02/22/17 2142   • senna-docusate (PERICOLACE or SENOKOT S) 8.6-50 MG per tablet 1 Tab  1 Tab Q24HRS PRN Heath Leigh M.D.       • lactulose 20 GM/30ML solution 30 mL  30 mL Q24HRS PRN Heath Leigh M.D.       • bisacodyl (DULCOLAX) suppository 10 mg  10 mg Q24HRS PRN Heath Leigh M.D.       • fleet enema 133 mL  1 Each Once PRN Heath Leigh M.D.       • Respiratory Care per Protocol   Continuous RT Heath Leigh M.D.       • NS (BOLUS) infusion 500 mL  500 mL Once PRN Heath Leigh M.D.       • heparin  injection 5,000 Units  5,000 Units Q8HRS Heath Leigh M.D.   5,000 Units at 02/23/17 0547   • levetiracetam (KEPPRA) 1,500 mg in D5W 100 mL IVPB  1,500 mg BID Heath Leigh M.D.   Stopped at 02/22/17 2156     Last reviewed on 2/20/2017  3:33 PM by Mercedes [facility]rona [facility]    Quality  Measures:  Radiology images reviewed, Medications reviewed, Labs reviewed and EKG reviewed                      Problems:    1.  Healthcare-facility associated pneumonia.  2.  Respiratory failure, hypercarbic and hypoxemic.  3.  Chronic obstructive pulmonary disease.  4.  Prior history of pneumonia, query aspiration.  5.  History of left-sided weakness.  6.  Hypertension.  7.  Diabetes.  8.  Gastroesophageal reflux.  9.  Depression.  10.  Dyslipidemia.  11.  History of seizure disorder.  12.  History of multiple surgeries as noted above.  13. Query aspiration  14. Anemia, macrocytic      Plan:  No urgent need for bronchoscopy. Has a recurrent extensive left-sided pna but no significant mucus plugging or atx that would be bronch-responsive.    Continue O2 and RT protocols and tygacil for ESBL, follow images, hemogram. Bronchoscopy when stronger to rule endobronchial disease.

## 2017-02-23 NOTE — CARE PLAN
Problem: Oxygenation:  Goal: Maintain adequate oxygenation dependent on patient condition  Outcome: PROGRESSING AS EXPECTED  Intervention: Manage oxygen therapy by monitoring pulse oximetry and/or ABG values  98% on 4LPM nasal cannula (home O2 reg)      Problem: Bronchoconstriction:  Goal: Improve in air movement and diminished wheezing  Outcome: PROGRESSING AS EXPECTED  Intervention: Implement inhaled treatments  ALBUTEROL Q4      Problem: Hyperinflation:  Goal: Prevent or improve atelectasis  Outcome: PROGRESSING AS EXPECTED  Intervention: Instruct incentive spirometry usage  60% of predicted is 1650; best IS value today was 500  Intervention: Perform hyperinflation therapy as indicated by assessment  IPV Q4      Problem: Bronchopulmonary Hygiene:  Goal: Increase mobilization of retained secretions  Outcome: PROGRESSING AS EXPECTED  Intervention: Perform bronchopulmonary therapy as indicated by assessment  MUCOMYST Q4  Intervention: Perform airway suctioning  Encouraging strong cough and suction Q4

## 2017-02-23 NOTE — CARE PLAN
Problem: Skin Integrity  Goal: Risk for impaired skin integrity will decrease  Intervention: Implement precautions to protect skin integrity in collaboration with the interdisciplinary team  Pt's skin thoroughly assessed and shows signs of breakdown. Mepilex in place and barrier cream moisturizer applied. Pillows in use to support heels. Pt ambulated, up to chair, edge of bed, PT and OT involved. Pt q2 turned, wound consult involved.

## 2017-02-23 NOTE — PROGRESS NOTES
Microbiology called with critical result of ESBL E. Coli in patient's urine 0945. Critical lab result read back to microbiology.  MD notified.  Patient already on droplet/contact isolation.

## 2017-02-23 NOTE — PROGRESS NOTES
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  H/o aspiration, on dysphagia 1 diet per SLP.    2/21:  Had rapid last night, opens eyes to name only.  Remains full code per family.  CTA showing LLL pneumonia/opacification with hiatal hernia seen.  U/s chest did not show enough pleural effusion for IR to drain.  Nasal swab + MRSA, confused.  2/22:  Improved mentation today, sitting up, conversing appropriately after 1 dose of meropenem IV.  ID consulted for sputum + Ecoli ESBL, left lung white out.    Review of Systems:  Review of Systems   Constitutional: Positive for malaise/fatigue. Negative for fever, chills and diaphoresis.   HENT: Negative for congestion and sore throat.    Eyes: Negative for pain and discharge.   Respiratory: Positive for cough. Negative for hemoptysis, sputum production, shortness of breath and wheezing.    Cardiovascular: Negative for chest pain, palpitations, claudication and leg swelling.   Gastrointestinal: Negative for nausea, vomiting, abdominal pain, diarrhea, constipation and melena.   Genitourinary: Negative for dysuria, urgency and frequency.   Musculoskeletal: Negative for myalgias, back pain, joint pain and neck pain.   Skin: Negative for itching and rash.   Neurological: Positive for weakness. Negative for dizziness, sensory change, speech change, focal weakness, loss of consciousness and headaches.   Endo/Heme/Allergies: Does not bruise/bleed easily.   Psychiatric/Behavioral: Negative for depression, suicidal ideas and substance abuse.       Physical Exam:  Physical Exam   Constitutional: He is oriented to person, place, and time. He appears well-developed and well-nourished.  No distress.   HENT:   Head: Normocephalic and atraumatic.   Mouth/Throat: Oropharynx is clear and moist. No oropharyngeal exudate.   Eyes: Conjunctivae and EOM are normal. Pupils are equal, round, and reactive to light. Right eye exhibits no discharge. Left eye exhibits no discharge. No scleral icterus.   Neck: Normal range of motion. Neck supple. No JVD present. No tracheal deviation present. No thyromegaly present.   Cardiovascular: Normal rate, regular rhythm and normal heart sounds.  Exam reveals no gallop and no friction rub.    No murmur heard.  Pulmonary/Chest: Effort normal and breath sounds normal. No respiratory distress. He has no wheezes. He has no rales. He exhibits no tenderness.   On 4 lpm NC, desats to 60s off O2.   Abdominal: Soft. Bowel sounds are normal. He exhibits no distension and no mass. There is no tenderness. There is no rebound and no guarding.   Musculoskeletal: Normal range of motion. He exhibits no edema or tenderness.   Lymphadenopathy:     He has no cervical adenopathy.   Neurological: He is alert and oriented to person, place, and time. No cranial nerve deficit. He exhibits normal muscle tone.   Skin: Skin is warm and dry. No rash noted. He is not diaphoretic. No erythema.   Psychiatric: He has a normal mood and affect. His behavior is normal. Judgment and thought content normal.   Nursing note and vitals reviewed.      Labs:  Recent Labs      02/20/17 2000   [id number]  7.36*   [id number]  63.6*   [id number]  94.9*   [id number]  96.3   ARTHCO3  35*   ARTBE  8*     Recent Labs      02/20/17   1400   02/22/17   0656  02/22/17   1312  02/22/17   1928   TROPONINI  0.06*   < >  0.03  0.03  0.03   BNPBTYPENAT  394*   --    --    --    --     < > = values in this interval not displayed.     Recent Labs      02/20/17   1400  02/21/17   0129  02/22/17   0339   SODIUM  141  146*  144   POTASSIUM  3.8  4.0  4.3   CHLORIDE  99  103  103   CO2  38*  37*  38*   BUN  19  21  18   CREATININE  0.82   0.85  0.74   CALCIUM  8.9  8.7  9.1     Recent Labs      17   1400  17   0129  17   0339   ALTSGPT  19  14   --    ASTSGOT  24  17   --    ALKPHOSPHAT  77  72   --    TBILIRUBIN  0.9  1.0   --    GLUCOSE  134*  123*  119*     Recent Labs      17   1400  17   0129  17   0131   RBC  3.84*  3.69*  3.66*   HEMOGLOBIN  12.2*  11.6*  11.6*   HEMATOCRIT  39.7*  38.3*  38.6*   PLATELETCT  149*  141*  135*   IRON   --    --   31*   TOTIRONBC   --    --   248*     Recent Labs      17   1400  17   0129  17   0131   WBC  17.9*  13.6*  9.2   NEUTSPOLYS  81.60*  81.10*  77.70*   LYMPHOCYTES  10.40*  9.40*  11.30*   MONOCYTES  7.30  8.80  9.80   EOSINOPHILS  0.00  0.10  0.70   BASOPHILS  0.30  0.20  0.20   ASTSGOT  24  17   --    ALTSGPT  19  14   --    ALKPHOSPHAT  77  72   --    TBILIRUBIN  0.9  1.0   --            Hemodynamics:  Temp (24hrs), Av.9 °C (98.5 °F), Min:36.3 °C (97.4 °F), Max:37.8 °C (100.1 °F)  Temperature: 36.3 °C (97.4 °F)  Pulse  Av.7  Min: 47  Max: 97   Blood Pressure : 130/74 mmHg     Respiratory:    Respiration: 18, Pulse Oximetry: 99 %, O2 Daily Delivery Respiratory : Silicone Nasal Cannula     Given By:: Mask, #MDI/DPI Given: MDI/DPI x 1, Given By:: Mouthpiece, Work Of Breathing / Effort: Mild  RUL Breath Sounds: Diminished, RML Breath Sounds: Diminished, RLL Breath Sounds: Diminished, DEBI Breath Sounds: Diminished, LLL Breath Sounds: Diminished  Fluids:    Intake/Output Summary (Last 24 hours) at 173  Last data filed at 17 1700   Gross per 24 hour   Intake   2390 ml   Output   2000 ml   Net    390 ml        GI/Nutrition:  Orders Placed This Encounter   Procedures   • DIET ORDER     Standing Status: Standing      Number of Occurrences: 1      Standing Expiration Date:      Order Specific Question:  Diet:     Answer:  Diabetic [3]      Comments:  1-1 nursing supervision     Order Specific Question:  Texture/Fiber modifications:      Answer:  Dysphagia 1(Pureed)specify fluid consistency(question 6) [1]     Order Specific Question:  Consistency/Fluid modifications:     Answer:  Nectar Thick [2]     Medical Decision Making, by Problem:  Active Hospital Problems    Diagnosis   • Respiratory failure (CMS-HCC) [J96.90]  Had been up to 7 LPM NC last night rapid called  Now at 4 LPM NC  2/2 LLL pneumonia/opacification.     • Debility [R53.81]   • Multiple falls [R29.6]  Reported from niece  H/o pelvic fracture, recently at [facility] for rehab     • MRSA carrier [Z22.322]  MRSA + nasal swab     • Sepsis (CMS-HCC) [A41.9]  2/2 LLL pneumonia HCAP     • HCAP (healthcare-associated pneumonia) [J18.9]  Sputum culture ESBL Ecoli  ID consult appreciated  MRSA nasal swab  dc'd vancomycin IV  Changed Zosyn IV to tygacil IV per ID  Consolidation seen on CTA chest  mucomist nebs ordered   Ordered PEP therapy to open up airway  Review of last admission:  4/2016 s/p bronchoscopy with Dr. Phillips with no mass, thick mucus, cultures negative.     • Chronic respiratory failure with hypoxia (CMS-HCC) [J96.11]  Mild improvement in O2 requirement back to 4 LPM Nc  Baseline 3 LPM NC     • Oxygen dependent [Z99.81]  Baseline 3 LPM NC 24/7.     • DM type 2, controlled, with complication (CMS-HCC) [E11.8]  SSI  accuchecks  Diabetic diet  Dysphagia 1 NTFL   • Left hemiparesis (CMS-HCC) [G81.94]  2/2 prior CVA     • Dysphagia status post cerebrovascular accident [I69.391]  SLP rec dysphagia 1 NT diet.     • COPD (chronic obstructive pulmonary disease) ([facility]) [J44.9]  Nebs  RT protocol     • GERD (gastroesophageal reflux disease) [K21.9]  Continue home pepcid  Especially with aspiration risk.    Abnormal UA:  Wbc 100-150 wbc  Ordered UC on original sample  Still pending.    Iron deficiency anemia:  Replaced iron daily with vit C.     FC per niece:  Reviewed with admitting physician.  PT/OT/SNF orders placed.  Family does not want to go back to [facility].    Loya catheter: No  Loya      DVT Prophylaxis: Heparin    Ulcer prophylaxis: Yes  Antibiotics: Treating active infection/contamination beyond 24 hours perioperative coverage  Assessed for rehab: Patient was assess for and/or received rehabilitation services during this hospitalization

## 2017-02-23 NOTE — FLOWSHEET NOTE
02/23/17 1144   Events/Summary/Plan   Events/Summary/Plan SVNx2, and IPV   Non-Invasive Resp Device Site Inspection Completed Red   Location (bl ears)   Interdisciplinary Plan of Care-Goals (Indications)   Obstructive Ventilatory Defect or Pulmonary Disease without Obvious Obstruction History / Diagnosis   Bronchopulmonary Hygiene Indications Difficulty with Secretion Clearance   Hyperinflation Protocol Indications Restrictive Lung Disorder / Consolidation   Interdisciplinary Plan of Care-Outcomes    Bronchodilator Outcome Patient at Stable Baseline   Bronchopulmonary Hygiene Outcome Improvement in Vital Signs and Measures of Gas Exchange   Hyperinflation Protocol Goals/Outcome Stable Vital Capacity x24 hrs and Patient Understands / uses I.S.   Education   Education Yes - Pt. / Family has been Instructed in use of Respiratory Equipment;Yes - Pt. / Family has been Instructed in use of Respiratory Medications and Adverse Reactions   RT Assessment of Delivered Medications   Evaluation of Medication Delivery Daily Yes-- Pt /Family has been Instructed in use of Respiratory Medications and Adverse Reactions   SVN Group   #SVN Performed Yes   Given By: Mask   Date SVN Last Changed 02/21/17   Date SVN Next Change Due (Q 7 Days) 02/28/17   IPV Group   #IPV Performed Yes   Operational Pressure 35-40 psig (Adult)   Given By: Mouthpiece   All Arrows Up or 12 O'Clock to Start Yes   Frequency 3 O'Clock Position;9 O'Clock Position;12 O'Clock Position;Other interval (see comment)  (pt can only last about 5 minutes )   Inspiratory Flow Adjusted to achieve chest wiggle   Incentive Spirometry Group   Incentive Spirometry Instruction Yes   Breathing Exercises Yes   Incentive Spirometer Volume 500 mL   Incentive Spirometer Date Last Changed 02/20/17   Incentive Spirometer Next Change Date (Q 30 Days) 03/22/17   Respiratory WDL   Respiratory (WDL) X   Chest Exam   Work Of Breathing / Effort Mild   Respiration 18   Pulse 82   Breath  Sounds   Pre/Post Intervention Pre Intervention Assessment   RUL Breath Sounds Diminished   RML Breath Sounds Diminished   RLL Breath Sounds Diminished   DEBI Breath Sounds Diminished   LLL Breath Sounds Diminished   Oximetry   #Pulse Oximetry (Single Determination) Yes   Oxygen   Pulse Oximetry 93 %   O2 (LPM) 4   O2 Daily Delivery Respiratory  Silicone Nasal Cannula

## 2017-02-23 NOTE — PROGRESS NOTES
Report received at bedside. Pt is resting in bed. A&O x 4. Pt denies pain. No signs or symptoms of SOB or distress. Tele box on. Bed in lowest position, treaded sock on, and call light within reach.

## 2017-02-23 NOTE — PROGRESS NOTES
Infectious Disease Progress Note    Author: Jessie Varghese M.D.    Date & Time created: 2017  9:06 AM        Chief Complaint   Patient presents with   • Shortness of Breath     SAT's in 80's at home, given NPPB Tx X 3 with improvement.     Pneumonia  uti  Interval History:  81 year old male with recent d/c from the nursing home came in worsening sob and fevers  - says he is feeling slightly better. No fevers.   Labs Reviewed, Medications Reviewed and Radiology Reviewed.    Review of Systems:  Review of Systems   Constitutional: Positive for malaise/fatigue. Negative for fever.   Respiratory: Positive for cough and shortness of breath.         Slightly better   Cardiovascular: Negative for chest pain and leg swelling.   Gastrointestinal: Positive for nausea. Negative for vomiting and abdominal pain.   Genitourinary: Positive for dysuria.   Musculoskeletal: Negative for myalgias.   Skin: Negative for rash.   Neurological: Negative for speech change and headaches.       Hemodynamics:  Temp (24hrs), Av.6 °C (97.9 °F), Min:36.2 °C (97.2 °F), Max:36.9 °C (98.5 °F)  Temperature: 36.5 °C (97.7 °F)  Pulse  Av.2  Min: 47  Max: 97   Blood Pressure : 146/83 mmHg       Physical Exam:  Physical Exam   Constitutional: He is oriented to person, place, and time. No distress.   Elderly male    Eyes: No scleral icterus.   Neck: Neck supple.   Cardiovascular:   Irregular    Pulmonary/Chest: He has rales.   Abdominal: Soft. There is no tenderness. There is no rebound.   Genitourinary:   Loya in   Musculoskeletal: He exhibits no edema.   Neurological: He is alert and oriented to person, place, and time.   Skin: No erythema.   Vitals reviewed.      Labs:  Recent Labs      17   0129  17   0131  17   0826   WBC  13.6*  9.2  9.2   RBC  3.69*  3.66*  4.13*   HEMOGLOBIN  11.6*  11.6*  13.1*   HEMATOCRIT  38.3*  38.6*  43.3   MCV  103.8*  105.5*  104.8*   MCH  31.4  31.7  31.7   RDW  53.3*  54.7*  52.0*    PLATELETCT  141*  135*  142*   MPV  11.4  11.5  11.1   NEUTSPOLYS  81.10*  77.70*  79.00*   LYMPHOCYTES  9.40*  11.30*  12.40*   MONOCYTES  8.80  9.80  6.80   EOSINOPHILS  0.10  0.70  1.10   BASOPHILS  0.20  0.20  0.30     Recent Labs      02/21/17   0129  02/22/17   0339  02/23/17   0825   SODIUM  146*  144  140   POTASSIUM  4.0  4.3  4.5   CHLORIDE  103  103  101   CO2  37*  38*  33   GLUCOSE  123*  119*  171*   BUN  21  18  21     Recent Labs      02/20/17   1400  02/21/17   0129  02/22/17   0339  02/23/17   0825   ALBUMIN  3.3  3.1*   --    --    TBILIRUBIN  0.9  1.0   --    --    ALKPHOSPHAT  77  72   --    --    TOTPROTEIN  6.4  5.7*   --    --    ALTSGPT  19  14   --    --    ASTSGOT  24  17   --    --    CREATININE  0.82  0.85  0.74  0.64        Imaging:  cxr 2/23  1.  Extensive left pulmonary infiltrates and/or layering effusion. Overall stable.  2.  Cardiomegaly  3.  Atherosclerosis      Medications:  Current Facility-Administered Medications   Medication Dose Frequency Provider Last Rate Last Dose   • tigecycline (TYGACIL) 50 mg in NS 50 mL IVPB  50 mg Q12HRS Jessie Varghese M.D.   Stopped at 02/23/17 0226   • ascorbic acid (ascorbic acid) tablet 500 mg  500 mg DAILY Daya Ashley M.D.   500 mg at 02/22/17 1817   • albuterol (PROVENTIL) 2.5mg/0.5ml nebulizer solution 2.5 mg  2.5 mg 4X/DAY (RT) Vinny Guan M.D.   2.5 mg at 02/23/17 0706   • budesonide-formoterol (SYMBICORT) 160-4.5 MCG/ACT inhaler 2 Puff  2 Puff BID Vinny Guan M.D.   2 Puff at 02/23/17 0707   • acetylcysteine (MUCOMYST) 20 % solution 3 mL  3 mL 4X/DAY (RT) Vinny Guan M.D.   3 mL at 02/23/17 0707   • insulin lispro (HUMALOG) injection 1-6 Units  1-6 Units 4X/DAY GIDEON Ashley M.D.   Stopped at 02/21/17 1700   • glucose 4 g chewable tablet 16 g  16 g Q15 MIN PRN Daya Ashley M.D.        And   • dextrose 50% (D50W) injection 25 mL  25 mL Q15 MIN PRN Daya Ashley M.D.       • docusate sodium  (COLACE) capsule 100 mg  100 mg BID Daya Ashley M.D.   100 mg at 02/22/17 0926   • aspirin EC (ECOTRIN) tablet 81 mg  81 mg DAILY Heath Leigh M.D.   81 mg at 02/22/17 0925   • atorvastatin (LIPITOR) tablet 80 mg  80 mg Q EVENING Heath Leigh M.D.   80 mg at 02/22/17 2142   • famotidine (PEPCID) tablet 20 mg  20 mg BID Heath Leigh M.D.   20 mg at 02/22/17 2142   • ferrous sulfate tablet 325 mg  325 mg DAILY Heath Leigh M.D.   325 mg at 02/22/17 0927   • finasteride (PROSCAR) tablet 5 mg  5 mg DAILY Heath Leigh M.D.   5 mg at 02/22/17 0927   • gabapentin (NEURONTIN) capsule 300 mg  300 mg DAILY Heath Leigh M.D.   300 mg at 02/22/17 0928   • metoprolol (LOPRESSOR) tablet 25 mg  25 mg BID Heath Leigh M.D.   25 mg at 02/22/17 2142   • oxybutynin SR (DITROPAN-XL) tablet 5 mg  5 mg DAILY Heath Leigh M.D.   5 mg at 02/22/17 0928   • sertraline (ZOLOFT) tablet 100 mg  100 mg DAILY Heath Leigh M.D.   100 mg at 02/22/17 0927   • tamsulosin (FLOMAX) capsule 0.4 mg  0.4 mg AFTER BREAKFAST Heath Leigh M.D.   0.4 mg at 02/22/17 0927   • tiotropium (SPIRIVA) 18 MCG inhalation capsule 1 Cap  1 Cap DAILY Heath Leigh M.D.   1 Cap at 02/23/17 0707   • trazodone (DESYREL) tablet 50 mg  50 mg QHS PRN Heath Leigh M.D.       • NS infusion 1,971 mL  30 mL/kg Once PRN Heath Leigh M.D.       • docusate sodium (COLACE) capsule 100 mg  100 mg BID Heath Leigh M.D.   100 mg at 02/22/17 0930   • senna-docusate (PERICOLACE or SENOKOT S) 8.6-50 MG per tablet 1 Tab  1 Tab Nightly Heath Leigh M.D.   1 Tab at 02/22/17 2142   • senna-docusate (PERICOLACE or SENOKOT S) 8.6-50 MG per tablet 1 Tab  1 Tab Q24HRS PRN Heath Leigh M.D.       • lactulose 20 GM/30ML solution 30 mL  30 mL Q24HRS PRN Heath Leigh M.D.       • bisacodyl (DULCOLAX) suppository 10 mg  10 mg Q24HRS PRN Heath Leigh M.D.       • fleet enema 133 mL  1 Each Once PRN Heath Leigh M.D.       • Respiratory Care per Protocol   Continuous RT Heath Leigh M.D.       • NS (BOLUS) infusion 500 mL  500 mL Once PRN  Heath Leigh M.D.       • heparin injection 5,000 Units  5,000 Units Q8HRS Heath Leigh M.D.   5,000 Units at 02/23/17 0547   • levetiracetam (KEPPRA) 1,500 mg in D5W 100 mL IVPB  1,500 mg BID Heath Leigh M.D.   Stopped at 02/22/17 2156     Last reviewed on 2/20/2017  3:33 PM by Evelin Saavedra    Micro:  Results     Procedure Component Value Units Date/Time    URINE CULTURE-EXISTING-LESS THAN 48 HOURS [[id number]]  (Abnormal)  (Susceptibility) Collected:  02/20/17 2012    Order Status:  Completed Specimen Information:  Urine from Urine, Clean Catch Updated:  02/23/17 0849     Significant Indicator POS (POS)      Source UR      Site URINE, CLEAN CATCH      Urine Culture Mixed skin josé miguel <10,000 cfu/mL (A)      Urine Culture -- (A)      Result:        Escherichia coli ESBL  ,000 cfu/mL  Extended Spectrum Beta-lactamase (ESBL) isolated.  ESBL's may be clinically resistant to therapy with  Penicillins,Cephalosporins or Aztreonam despite  apparent in vitro susceptibility to some of these agents.  The patient requires contact isolation.  Please contact pharmacy or an Infectious Disease Specialist  if you have any questions about appropriate therapy.      Narrative:      CALL  Garza  171 tel. [phone],  Droplet,Contact  Indication for culture:->Emergency Room Patient  ** had + UA in ER 2/20, please culture.    Culture & Susceptibility     ESCHERICHIA COLI ESBL     Antibiotic Sensitivity Microscan Unit Status    Ampicillin Resistant >16 mcg/mL Final    Cefepime Resistant >16 mcg/mL Final    Cefotaxime Extended Spectrum Beta Lactamase >32 mcg/mL Final    Cefotetan Sensitive <=16 mcg/mL Final    Ceftazidime Extended Spectrum Beta Lactamase 4 mcg/mL Final    Ceftriaxone Extended Spectrum Beta Lactamase >32 mcg/mL Final    Cefuroxime Resistant >16 mcg/mL Final    Cephalothin Resistant >16 mcg/mL Final    Ciprofloxacin Resistant >2 mcg/mL Final    Gentamicin Resistant >8 mcg/mL Final    Levofloxacin Intermediate 4 mcg/mL  Final    Nitrofurantoin Intermediate 64 mcg/mL Final    Pip/Tazobactam Sensitive <=16 mcg/mL Final    Piperacillin Resistant >64 mcg/mL Final    Tigecycline Sensitive <=2 mcg/mL Final    Tobramycin Resistant >8 mcg/mL Final    Trimeth/Sulfa Resistant >2/38 mcg/mL Final                       Culture Respiratory W/ GRM STN [[id number]]  (Abnormal)  (Susceptibility) Collected:  02/20/17 1736    Order Status:  Completed Specimen Information:  Respirate from Sputum Updated:  02/22/17 0850     Gram Stain Result --      Result:        Moderate WBCs.  Rare epithelial cells.  Rare mixed bacteria, no predominant organism seen.  Specimen Quality Score: 3+       Significant Indicator POS (POS)      Source RESP      Site SPUTUM      Respiratory Culture        Result:        Light growth usual upper respiratory josé miguel (A)     Respiratory Culture -- (A)      Result:        Escherichia coli ESBL  Light growth  Extended Spectrum Beta-lactamase (ESBL) isolated.  ESBL's may be clinically resistant to therapy with  Penicillins,Cephalosporins or Aztreonam despite  apparent in vitro susceptibility to some of these agents.  The patient requires contact isolation.  Please contact pharmacy or an Infectious Disease Specialist  if you have any questions about appropriate therapy.      Narrative:      CALL  Garza  171 tel. [phone],  CALLED  171 tel. [phone] 02/22/2017, 08:49, RB PERF. RESULTS CALLED  TO:[id number] Rn, Inf C  If not done within the last 24 hours  Sputum cultures, induced if needed. If not done within the  last 24 hours  Send central line distal (brown) port venous blood gas    Culture & Susceptibility     ESCHERICHIA COLI ESBL     Antibiotic Sensitivity Microscan Unit Status    Ampicillin Resistant >16 mcg/mL Final    Cefepime Resistant >16 mcg/mL Final    Cefotaxime Extended Spectrum Beta Lactamase >32 mcg/mL Final    Cefotetan Sensitive <=16 mcg/mL Final    Ceftazidime Extended Spectrum Beta Lactamase >16 mcg/mL Final     Ceftriaxone Extended Spectrum Beta Lactamase >32 mcg/mL Final    Cefuroxime Resistant >16 mcg/mL Final    Ciprofloxacin Resistant >2 mcg/mL Final    Ertapenem Sensitive <=1 mcg/mL Final    Gentamicin Resistant >8 mcg/mL Final    Pip/Tazobactam Sensitive <=16 mcg/mL Final    Tigecycline Sensitive <=2 mcg/mL Final    Tobramycin Resistant >8 mcg/mL Final    Trimeth/Sulfa Resistant >2/38 mcg/mL Final                       MRSA BY PCR (AMP) [[id number]]  (Abnormal) Collected:  02/21/17 0122    Order Status:  Completed Specimen Information:  Respirate from Nares Updated:  02/21/17 1424     Significant Indicator POS (POS)      Source RESP      Site NARES      MRSA PCR POSITIVE for MRSA by PCR. (A)     Narrative:      CALL  Garza  171 tel. [phone],  CALLED  171 tel. [phone] 02/21/2017, 14:24, RB PERF. RESULTS CALLED TO:  [id number]  Collected By:[id number] BEVERLY LOMBARDO    GRAM STAIN [[id number]] Collected:  02/20/17 1736    Order Status:  Completed Specimen Information:  Respirate Updated:  02/21/17 1010     Significant Indicator .      Source RESP      Site SPUTUM      Gram Stain Result --      Result:        Moderate WBCs.  Rare epithelial cells.  Rare mixed bacteria, no predominant organism seen.  Specimen Quality Score: 3+      Narrative:      If not done within the last 24 hours  Sputum cultures, induced if needed. If not done within the  last 24 hours  Send central line distal (brown) port venous blood gas    BLOOD CULTURE x2 [[id number]] Collected:  02/20/17 1400    Order Status:  Completed Specimen Information:  Blood from Peripheral Updated:  02/21/17 0759     Significant Indicator NEG      Source BLD      Site PERIPHERAL      Blood Culture --      Result:        No Growth    Note: Blood cultures are incubated for 5 days and  are monitored continuously.Positive blood cultures  are called to the RN and reported as soon as  they are identified.      Narrative:      Per Hospital Policy: Only change Specimen Src: to  "\"Line\" if  specified by physician order.    BLOOD CULTURE x2 [[id number]] Collected:  02/20/17 1400    Order Status:  Completed Specimen Information:  Blood from Peripheral Updated:  02/21/17 0759     Significant Indicator NEG      Source BLD      Site PERIPHERAL      Blood Culture --      Result:        No Growth    Note: Blood cultures are incubated for 5 days and  are monitored continuously.Positive blood cultures  are called to the RN and reported as soon as  they are identified.      Narrative:      Per Hospital Policy: Only change Specimen Src: to \"Line\" if  specified by physician order.    URINALYSIS [[id number]]  (Abnormal) Collected:  02/20/17 2012    Order Status:  Completed Specimen Information:  Urine from Urine, Clean Catch Updated:  02/20/17 2050     Micro Urine Req Microscopic      Color Yellow      Character Sl Cloudy (A)      Specific Gravity 1.024      Ph 5.5      Glucose Negative mg/dL      Ketones Trace (A) mg/dL      Protein 30 (A) mg/dL      Bilirubin Negative      Nitrite Positive (A)      Leukocyte Esterase Large (A)      Occult Blood Trace (A)     Narrative:      Collected By:[id number] BEVERLY LOMBARDO    FLUID CULTURE W/GRAM STAIN [[id number]]     Order Status:  Sent Specimen Information:  Body Fluid from Thoracentesis Fluid     Urinalysis [[id number]]     Order Status:  Sent Specimen Information:  Urine     Blood Culture [[id number]] Collected:  02/20/17 0000    Order Status:  Canceled Specimen Information:  Other from Peripheral     Narrative:      ORDER WAS CANCELLED 02/20/2017 17:58, Duplicate . 02/20/2017  17:59.  From different peripheral sites, if not done within the last  24 hours (Per Hospital Policy: Only change specimen source to  \"Line\" if specified by physician order)    Blood Culture [[id number]] Collected:  02/20/17 0000    Order Status:  Canceled Specimen Information:  Other from Peripheral     Narrative:      ORDER WAS CANCELLED 02/20/2017 17:59, Duplicate . " "02/20/2017  17:59.  From different peripheral sites, if not done within the last  24 hours (Per Hospital Policy: Only change specimen source to  \"Line\" if specified by physician order)          Assessment:  Active Hospital Problems    Diagnosis   • Respiratory failure (CMS-HCC) [J96.90]   • Debility [R53.81]   • Multiple falls [R29.6]   • MRSA carrier [Z22.322]   • Sepsis (CMS-HCC) [A41.9]   • HCAP (healthcare-associated pneumonia) [J18.9]   • Chronic respiratory failure with hypoxia (CMS-HCC) [J96.11]   • Oxygen dependent [Z99.81]   • DM type 2, controlled, with complication (CMS-HCC) [E11.8]   • Left hemiparesis (CMS-HCC) [G81.94]   • Dysphagia status post cerebrovascular accident [I69.391]   • COPD (chronic obstructive pulmonary disease) (CMS-HCC) [J44.9]   • GERD (gastroesophageal reflux disease) [K21.9]       Plan:  81 year old male with copd admitted with sob and fevers    HCAP   Patient recently d/sandy from SNF  C/s are esbl e coli  Continue tygacil day 2/7    UTI  Urine c/s is esbl e coli as well  tygacil will cover- since cystitis will be able to be treated with tygacil even though urine excretion    Aspiration ?  Consider swallow eval    Prognosis    poor     Discussed with internal medicine  "

## 2017-02-23 NOTE — CONSULTS
DATE OF SERVICE:  02/22/2017    REFERRING PHYSICIAN:  Daya Ashley MD.    REASON FOR CONSULTATION:  Pneumonia.    HISTORY OF PRESENT ILLNESS:  Patient is an 81-year-old white male who has   history of COPD, diabetes mellitus who presented to the emergency room on   02/20/2017 with worsening cough and shortness of breath.  He apparently says   he was sick for 2 weeks, was getting increasingly short of breath and was   having fevers and cough.  He is on 2 liters of oxygen at home when the _____   arrived, he was 80% on his 2 liters and he was brought into the emergency   room.  Of note, is that he was recently at a skilled nursing facility after a   pelvic fracture and had only gone home 3 days prior to coming back in to the   hospital.  Since he has been here in the ER, his WBC count was 18,000 and he   also is grossly abnormal UA.  A CT of the chest, abdomen and pelvis was done,   which showed left-sided opacities.  His cultures of the sputum are showing   ESBL E. coli.  In view of that, infectious disease consult has been called.    REVIEW OF SYSTEMS:  Patient complains of cough and bringing up greenish   phlegm.  He says he has been sick for 2 weeks.  Complains of some nausea.    Complains of some slight abdominal pain.  No seizures at this time, no rash.    Other review of systems is negative per AMA and CMS criteria.    ALLERGIES:  No known drug allergies.    PAST MEDICAL HISTORY:  History of cerebrovascular accident with some   left-sided weakness, COPD on 2 liters of oxygen at home, diabetes mellitus,   GERD, depression, seizure disorder, previous pneumonia, hypertension.    PAST SURGICAL HISTORY:  Laminectomy, appendectomy, cholecystectomy, carotid   endarterectomy and  ventral hernia repair.    SOCIAL HISTORY:  Heavy smoker, quit in 2003.  Denies any alcohol or drug use.    FAMILY HISTORY:  Father had a stroke.    MEDICATIONS:  Currently, he is on Mucomyst, Proventil, Lipitor, Symbicort,   Colace, Proscar,  Neurontin, Keppra, Merrem, Toprol.  Zosyn was stopped.    Zoloft, Desyrel, Spiriva, Flomax.    LABORATORY DATA:  His WBC count has come down to 9.2, platelets of 135.  His   creatinine is 0.74.  His blood cultures are negative.  Sputum cultures are as   above.  It is sensitive to Peptazol as well.    PHYSICAL EXAMINATION:  GENERAL:  Elderly male.  VITAL SIGNS:  T-max is 100.1, blood pressure is 114/62, pulse is 77.  HEAD AND ENT:  Mucosa is dry.  No oral thrush.  NECK:  Supple.  PULMONARY:  Coarse breath sounds.  CARDIOVASCULAR:  Irregular.  No murmurs heard.  ABDOMEN:  Soft, nontender.  EXTREMITIES:  No edema.  CENTRAL NERVOUS SYSTEM:  Alert and oriented with some left-sided weakness.    ASSESSMENT:   1.  Healthcare associated pneumonia.  2.  Possible aspiration as well.  3.  Underlying chronic obstructive pulmonary disease.  4.  Diabetes mellitus.  5.  Urinary tract infection.  6.  History of seizure disorder.    RECOMMENDATIONS:  I would recommend to change him to tigecycline because of   the risk of seizures with meropenem.  Continue with the supportive measures.    He will probably end up needing a bronchoscopy and he may also benefit from a   speech and swallow evaluation.  Prognosis is guarded.  I have reviewed all the   records.  Plan was discussed with internal medicine.       ____________________________________     TIM ALBERT MD JD / JAMES    DD:  02/22/2017 13:44:36  DT:  02/22/2017 19:10:42    D#:  [id number]  Job#:  [id number]

## 2017-02-23 NOTE — CARE PLAN
Problem: Safety  Goal: Will remain free from injury  Intervention: Educate patient and significant other/support system about adaptive mobility strategies and safe transfers  Bed alarm in place, bed in lowest position, pt educated to call for help

## 2017-02-23 NOTE — FLOWSHEET NOTE
02/23/17 1404   Events/Summary/Plan   Events/Summary/Plan SVNx2, IPV   Interdisciplinary Plan of Care-Goals (Indications)   Obstructive Ventilatory Defect or Pulmonary Disease without Obvious Obstruction History / Diagnosis   Bronchopulmonary Hygiene Indications Difficulty with Secretion Clearance   Hyperinflation Protocol Indications Restrictive Lung Disorder / Consolidation   Interdisciplinary Plan of Care-Outcomes    Bronchodilator Outcome Patient at Stable Baseline   Bronchopulmonary Hygiene Outcome Improvement in Vital Signs and Measures of Gas Exchange   Hyperinflation Protocol Goals/Outcome Stable Vital Capacity x24 hrs and Patient Understands / uses I.S.   Education   Education Yes - Pt. / Family has been Instructed in use of Respiratory Equipment;Yes - Pt. / Family has been Instructed in use of Respiratory Medications and Adverse Reactions   Therapy Not Performed   Type of Therapy Not Performed IPV   Reason Therapy Not Performed Patient Nauseated   RT Assessment of Delivered Medications   Evaluation of Medication Delivery Daily Yes-- Pt /Family has been Instructed in use of Respiratory Medications and Adverse Reactions   SVN Group   #SVN Performed Yes   Given By: Mask   IPV Group   Inspiratory Flow (pt is not able to perform 15 mins, goes 7-10 mins)   Incentive Spirometry Group   Incentive Spirometry Instruction Yes   Breathing Exercises Yes   Incentive Spirometer Volume 500 mL   Respiratory WDL   Respiratory (WDL) X   Chest Exam   Work Of Breathing / Effort Mild   Respiration 16   Pulse 78   Breath Sounds   Pre/Post Intervention Pre Intervention Assessment   RUL Breath Sounds Coarse Crackles   RML Breath Sounds Diminished   RLL Breath Sounds Diminished   DEBI Breath Sounds Coarse Crackles   LLL Breath Sounds Diminished   Oximetry   #Pulse Oximetry (Single Determination) Yes   Oxygen   Pulse Oximetry 95 %   O2 (LPM) 4   O2 Daily Delivery Respiratory  Silicone Nasal Cannula

## 2017-02-24 LAB
ANION GAP SERPL CALC-SCNC: 4 MMOL/L (ref 0–11.9)
BASOPHILS # BLD AUTO: 0.4 % (ref 0–1.8)
BASOPHILS # BLD: 0.03 K/UL (ref 0–0.12)
BUN SERPL-MCNC: 20 MG/DL (ref 8–22)
CALCIUM SERPL-MCNC: 9.2 MG/DL (ref 8.5–10.5)
CHLORIDE SERPL-SCNC: 101 MMOL/L (ref 96–112)
CO2 SERPL-SCNC: 38 MMOL/L (ref 20–33)
CREAT SERPL-MCNC: 0.58 MG/DL (ref 0.5–1.4)
EOSINOPHIL # BLD AUTO: 0.08 K/UL (ref 0–0.51)
EOSINOPHIL NFR BLD: 1 % (ref 0–6.9)
ERYTHROCYTE [DISTWIDTH] IN BLOOD BY AUTOMATED COUNT: 50.7 FL (ref 35.9–50)
GFR SERPL CREATININE-BSD FRML MDRD: >60 ML/MIN/1.73 M 2
GLUCOSE BLD-MCNC: 108 MG/DL (ref 65–99)
GLUCOSE BLD-MCNC: 143 MG/DL (ref 65–99)
GLUCOSE BLD-MCNC: 156 MG/DL (ref 65–99)
GLUCOSE BLD-MCNC: 159 MG/DL (ref 65–99)
GLUCOSE SERPL-MCNC: 118 MG/DL (ref 65–99)
HCT VFR BLD AUTO: 38 % (ref 42–52)
HGB BLD-MCNC: 11.8 G/DL (ref 14–18)
IMM GRANULOCYTES # BLD AUTO: 0.02 K/UL (ref 0–0.11)
IMM GRANULOCYTES NFR BLD AUTO: 0.3 % (ref 0–0.9)
LYMPHOCYTES # BLD AUTO: 1.22 K/UL (ref 1–4.8)
LYMPHOCYTES NFR BLD: 16 % (ref 22–41)
MCH RBC QN AUTO: 32.2 PG (ref 27–33)
MCHC RBC AUTO-ENTMCNC: 31.1 G/DL (ref 33.7–35.3)
MCV RBC AUTO: 103.5 FL (ref 81.4–97.8)
MONOCYTES # BLD AUTO: 0.66 K/UL (ref 0–0.85)
MONOCYTES NFR BLD AUTO: 8.7 % (ref 0–13.4)
NEUTROPHILS # BLD AUTO: 5.61 K/UL (ref 1.82–7.42)
NEUTROPHILS NFR BLD: 73.6 % (ref 44–72)
NRBC # BLD AUTO: 0 K/UL
NRBC BLD AUTO-RTO: 0 /100 WBC
PLATELET # BLD AUTO: 144 K/UL (ref 164–446)
PMV BLD AUTO: 10.5 FL (ref 9–12.9)
POTASSIUM SERPL-SCNC: 4 MMOL/L (ref 3.6–5.5)
RBC # BLD AUTO: 3.67 M/UL (ref 4.7–6.1)
SODIUM SERPL-SCNC: 143 MMOL/L (ref 135–145)
TROPONIN I SERPL-MCNC: 0.03 NG/ML (ref 0–0.04)
TROPONIN I SERPL-MCNC: 0.04 NG/ML (ref 0–0.04)
WBC # BLD AUTO: 7.6 K/UL (ref 4.8–10.8)

## 2017-02-24 PROCEDURE — A9270 NON-COVERED ITEM OR SERVICE: HCPCS | Performed by: INTERNAL MEDICINE

## 2017-02-24 PROCEDURE — 94640 AIRWAY INHALATION TREATMENT: CPT

## 2017-02-24 PROCEDURE — 94669 MECHANICAL CHEST WALL OSCILL: CPT

## 2017-02-24 PROCEDURE — 97530 THERAPEUTIC ACTIVITIES: CPT

## 2017-02-24 PROCEDURE — 700111 HCHG RX REV CODE 636 W/ 250 OVERRIDE (IP): Performed by: HOSPITALIST

## 2017-02-24 PROCEDURE — 700102 HCHG RX REV CODE 250 W/ 637 OVERRIDE(OP): Performed by: HOSPITALIST

## 2017-02-24 PROCEDURE — 700112 HCHG RX REV CODE 229: Performed by: INTERNAL MEDICINE

## 2017-02-24 PROCEDURE — 700102 HCHG RX REV CODE 250 W/ 637 OVERRIDE(OP): Performed by: INTERNAL MEDICINE

## 2017-02-24 PROCEDURE — A9270 NON-COVERED ITEM OR SERVICE: HCPCS | Performed by: HOSPITALIST

## 2017-02-24 PROCEDURE — 80048 BASIC METABOLIC PNL TOTAL CA: CPT

## 2017-02-24 PROCEDURE — 770020 HCHG ROOM/CARE - TELE (206)

## 2017-02-24 PROCEDURE — 84484 ASSAY OF TROPONIN QUANT: CPT

## 2017-02-24 PROCEDURE — 700105 HCHG RX REV CODE 258

## 2017-02-24 PROCEDURE — 700105 HCHG RX REV CODE 258: Performed by: INTERNAL MEDICINE

## 2017-02-24 PROCEDURE — 700111 HCHG RX REV CODE 636 W/ 250 OVERRIDE (IP): Performed by: INTERNAL MEDICINE

## 2017-02-24 PROCEDURE — 85025 COMPLETE CBC W/AUTO DIFF WBC: CPT

## 2017-02-24 PROCEDURE — 94760 N-INVAS EAR/PLS OXIMETRY 1: CPT

## 2017-02-24 PROCEDURE — 82962 GLUCOSE BLOOD TEST: CPT

## 2017-02-24 PROCEDURE — 36415 COLL VENOUS BLD VENIPUNCTURE: CPT

## 2017-02-24 PROCEDURE — 700101 HCHG RX REV CODE 250: Performed by: INTERNAL MEDICINE

## 2017-02-24 PROCEDURE — 99233 SBSQ HOSP IP/OBS HIGH 50: CPT | Performed by: HOSPITALIST

## 2017-02-24 PROCEDURE — 700112 HCHG RX REV CODE 229: Performed by: HOSPITALIST

## 2017-02-24 RX ORDER — ONDANSETRON 4 MG/1
4 TABLET, ORALLY DISINTEGRATING ORAL EVERY 4 HOURS PRN
Status: DISCONTINUED | OUTPATIENT
Start: 2017-02-24 | End: 2017-03-24 | Stop reason: HOSPADM

## 2017-02-24 RX ORDER — SODIUM CHLORIDE 9 MG/ML
INJECTION, SOLUTION INTRAVENOUS
Status: COMPLETED
Start: 2017-02-24 | End: 2017-02-24

## 2017-02-24 RX ORDER — L. ACIDOPHILUS/L.BULGARICUS 100MM CELL
1 GRANULES IN PACKET (EA) ORAL
Status: DISCONTINUED | OUTPATIENT
Start: 2017-02-24 | End: 2017-03-22

## 2017-02-24 RX ORDER — ONDANSETRON 2 MG/ML
4 INJECTION INTRAMUSCULAR; INTRAVENOUS EVERY 4 HOURS PRN
Status: DISCONTINUED | OUTPATIENT
Start: 2017-02-24 | End: 2017-03-22

## 2017-02-24 RX ADMIN — OXYBUTYNIN CHLORIDE 5 MG: 5 TABLET, FILM COATED, EXTENDED RELEASE ORAL at 09:41

## 2017-02-24 RX ADMIN — INSULIN LISPRO 1 UNITS: 100 INJECTION, SOLUTION INTRAVENOUS; SUBCUTANEOUS at 11:59

## 2017-02-24 RX ADMIN — DOCUSATE SODIUM 100 MG: 100 CAPSULE ORAL at 08:50

## 2017-02-24 RX ADMIN — OXYCODONE HYDROCHLORIDE AND ACETAMINOPHEN 500 MG: 500 TABLET ORAL at 08:42

## 2017-02-24 RX ADMIN — HEPARIN SODIUM 5000 UNITS: 5000 INJECTION, SOLUTION INTRAVENOUS; SUBCUTANEOUS at 05:35

## 2017-02-24 RX ADMIN — SODIUM CHLORIDE 50 MG: 9 INJECTION, SOLUTION INTRAVENOUS at 10:06

## 2017-02-24 RX ADMIN — ALBUTEROL SULFATE 2.5 MG: 2.5 SOLUTION RESPIRATORY (INHALATION) at 07:39

## 2017-02-24 RX ADMIN — ASPIRIN 81 MG: 81 TABLET ORAL at 08:42

## 2017-02-24 RX ADMIN — ALBUTEROL SULFATE 2.5 MG: 2.5 SOLUTION RESPIRATORY (INHALATION) at 15:19

## 2017-02-24 RX ADMIN — TIOTROPIUM BROMIDE 1 CAPSULE: 18 CAPSULE ORAL; RESPIRATORY (INHALATION) at 07:39

## 2017-02-24 RX ADMIN — INSULIN LISPRO 1 UNITS: 100 INJECTION, SOLUTION INTRAVENOUS; SUBCUTANEOUS at 17:38

## 2017-02-24 RX ADMIN — ALBUTEROL SULFATE 2.5 MG: 2.5 SOLUTION RESPIRATORY (INHALATION) at 11:26

## 2017-02-24 RX ADMIN — SERTRALINE 100 MG: 100 TABLET, FILM COATED ORAL at 08:41

## 2017-02-24 RX ADMIN — ATORVASTATIN CALCIUM 80 MG: 80 TABLET, FILM COATED ORAL at 20:42

## 2017-02-24 RX ADMIN — ACETYLCYSTEINE 3 ML: 200 SOLUTION ORAL; RESPIRATORY (INHALATION) at 15:19

## 2017-02-24 RX ADMIN — DOCUSATE SODIUM 100 MG: 100 CAPSULE ORAL at 08:42

## 2017-02-24 RX ADMIN — GABAPENTIN 300 MG: 300 CAPSULE ORAL at 08:42

## 2017-02-24 RX ADMIN — ACETYLCYSTEINE 3 ML: 200 SOLUTION ORAL; RESPIRATORY (INHALATION) at 11:26

## 2017-02-24 RX ADMIN — ACETYLCYSTEINE 3 ML: 200 SOLUTION ORAL; RESPIRATORY (INHALATION) at 07:39

## 2017-02-24 RX ADMIN — FINASTERIDE 5 MG: 5 TABLET, FILM COATED ORAL at 08:41

## 2017-02-24 RX ADMIN — METOPROLOL TARTRATE 25 MG: 25 TABLET, FILM COATED ORAL at 08:41

## 2017-02-24 RX ADMIN — Medication 325 MG: at 08:42

## 2017-02-24 RX ADMIN — DEXTROSE MONOHYDRATE 1500 MG: 50 INJECTION, SOLUTION INTRAVENOUS at 09:41

## 2017-02-24 RX ADMIN — BUDESONIDE AND FORMOTEROL FUMARATE DIHYDRATE 2 PUFF: 160; 4.5 AEROSOL RESPIRATORY (INHALATION) at 07:39

## 2017-02-24 RX ADMIN — BUDESONIDE AND FORMOTEROL FUMARATE DIHYDRATE 2 PUFF: 160; 4.5 AEROSOL RESPIRATORY (INHALATION) at 20:43

## 2017-02-24 RX ADMIN — SODIUM CHLORIDE: 9 INJECTION, SOLUTION INTRAVENOUS at 21:00

## 2017-02-24 RX ADMIN — METOPROLOL TARTRATE 25 MG: 25 TABLET, FILM COATED ORAL at 20:42

## 2017-02-24 RX ADMIN — ONDANSETRON 4 MG: 2 INJECTION, SOLUTION INTRAMUSCULAR; INTRAVENOUS at 12:37

## 2017-02-24 RX ADMIN — SODIUM CHLORIDE 50 MG: 9 INJECTION, SOLUTION INTRAVENOUS at 21:50

## 2017-02-24 RX ADMIN — DEXTROSE MONOHYDRATE 1500 MG: 50 INJECTION, SOLUTION INTRAVENOUS at 20:52

## 2017-02-24 RX ADMIN — HEPARIN SODIUM 5000 UNITS: 5000 INJECTION, SOLUTION INTRAVENOUS; SUBCUTANEOUS at 13:57

## 2017-02-24 RX ADMIN — ACETYLCYSTEINE 3 ML: 200 SOLUTION ORAL; RESPIRATORY (INHALATION) at 18:15

## 2017-02-24 RX ADMIN — LACTOBACILLUS ACIDOPHILUS / LACTOBACILLUS BULGARICUS 1 PACKET: 100 MILLION CFU STRENGTH GRANULES at 17:36

## 2017-02-24 RX ADMIN — DOCUSATE SODIUM 100 MG: 100 CAPSULE ORAL at 20:43

## 2017-02-24 RX ADMIN — TAMSULOSIN HYDROCHLORIDE 0.4 MG: 0.4 CAPSULE ORAL at 08:41

## 2017-02-24 RX ADMIN — FAMOTIDINE 20 MG: 20 TABLET, FILM COATED ORAL at 20:42

## 2017-02-24 RX ADMIN — HEPARIN SODIUM 5000 UNITS: 5000 INJECTION, SOLUTION INTRAVENOUS; SUBCUTANEOUS at 20:43

## 2017-02-24 RX ADMIN — ALBUTEROL SULFATE 2.5 MG: 2.5 SOLUTION RESPIRATORY (INHALATION) at 18:15

## 2017-02-24 RX ADMIN — STANDARDIZED SENNA CONCENTRATE AND DOCUSATE SODIUM 1 TABLET: 8.6; 5 TABLET, FILM COATED ORAL at 20:42

## 2017-02-24 RX ADMIN — FAMOTIDINE 20 MG: 20 TABLET, FILM COATED ORAL at 08:42

## 2017-02-24 ASSESSMENT — ENCOUNTER SYMPTOMS
NECK PAIN: 0
SHORTNESS OF BREATH: 0
COUGH: 1
EYE DISCHARGE: 0
DIZZINESS: 0
BRUISES/BLEEDS EASILY: 0
DIARRHEA: 0
LOSS OF CONSCIOUSNESS: 0
SORE THROAT: 0
SHORTNESS OF BREATH: 1
FOCAL WEAKNESS: 0
SPUTUM PRODUCTION: 0
EYE PAIN: 0
PALPITATIONS: 0
SPEECH CHANGE: 0
MYALGIAS: 0
ABDOMINAL PAIN: 0
CHILLS: 0
CLAUDICATION: 0
VOMITING: 0
HEMOPTYSIS: 0
DIAPHORESIS: 0
BACK PAIN: 0
NAUSEA: 0
SENSORY CHANGE: 0
FEVER: 0
NAUSEA: 1
HEADACHES: 0
WEAKNESS: 1
WHEEZING: 0
DEPRESSION: 0
CONSTIPATION: 0

## 2017-02-24 ASSESSMENT — PAIN SCALES - GENERAL
PAINLEVEL_OUTOF10: 0
PAINLEVEL_OUTOF10: 0

## 2017-02-24 ASSESSMENT — COPD QUESTIONNAIRES
DO YOU EVER COUGH UP ANY MUCUS OR PHLEGM?: YES, A FEW DAYS A WEEK OR MONTH
COPD SCREENING SCORE: 8
DURING THE PAST 4 WEEKS HOW MUCH DID YOU FEEL SHORT OF BREATH: MOST  OR ALL OF THE TIME
HAVE YOU SMOKED AT LEAST 100 CIGARETTES IN YOUR ENTIRE LIFE: YES

## 2017-02-24 ASSESSMENT — GAIT ASSESSMENTS: GAIT LEVEL OF ASSIST: UNABLE TO PARTICIPATE

## 2017-02-24 ASSESSMENT — LIFESTYLE VARIABLES
DO YOU DRINK ALCOHOL: NO
SUBSTANCE_ABUSE: 0

## 2017-02-24 NOTE — CARE PLAN
Problem: Respiratory:  Goal: Respiratory status will improve  Intervention: Administer and titrate oxygen therapy  Pt educated to keep nasal canula on at all times.

## 2017-02-24 NOTE — PROGRESS NOTES
Report received at bedside, assumed care. Pt is resting in bed. A&O x 3. Pt denies pain. No other concerns, complains or distress. Tele box on. Chart reviewed. Bed in lowest position, treaded slipper sock on, and   call light within reach.

## 2017-02-24 NOTE — CARE PLAN
Problem: Communication  Goal: The ability to communicate needs accurately and effectively will improve  Intervention: [facility] patient and significant other/support system to call light to alert staff of needs  Patient communicates effectively.  All needs met. Will continue to monitor.       Problem: Safety  Goal: Will remain free from injury  Outcome: PROGRESSING AS EXPECTED  Bed in lowest, locked position.  Treaded slipper socks on, appropriate signs in place, and call light in reach.

## 2017-02-24 NOTE — PROGRESS NOTES
Assumed care of pt, bedside report received.  Pt denies chest pain or SOB.  VSS.  Pt resting comfortably.  All needs met.  Bed low and locked, call light in reach.  Discussed POC.

## 2017-02-24 NOTE — PROGRESS NOTES
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  H/o aspiration, on dysphagia 1 diet per SLP.    2/21:  Had rapid last night, opens eyes to name only.  Remains full code per family.  CTA showing LLL pneumonia/opacification with hiatal hernia seen.  U/s chest did not show enough pleural effusion for IR to drain.  Nasal swab + MRSA, confused.  2/22:  Improved mentation today, sitting up, conversing appropriately after 1 dose of meropenem IV.  ID consulted for sputum + Ecoli ESBL, left lung white out.  2/23:  Continues to improve on Abx.  Mentation clear, sitting up in chair, appropriate, states does not remember first few days.    Review of Systems:  Review of Systems   Constitutional: Positive for malaise/fatigue. Negative for fever, chills and diaphoresis.   HENT: Negative for congestion and sore throat.    Eyes: Negative for pain and discharge.   Respiratory: Positive for cough. Negative for hemoptysis, sputum production, shortness of breath and wheezing.    Cardiovascular: Negative for chest pain, palpitations, claudication and leg swelling.   Gastrointestinal: Negative for nausea, vomiting, abdominal pain, diarrhea, constipation and melena.   Genitourinary: Negative for dysuria, urgency and frequency.   Musculoskeletal: Negative for myalgias, back pain, joint pain and neck pain.   Skin: Negative for itching and rash.   Neurological: Positive for weakness. Negative for dizziness, sensory change, speech change, focal weakness, loss of consciousness and headaches.   Endo/Heme/Allergies: Does not bruise/bleed easily.   Psychiatric/Behavioral: Negative for depression, suicidal ideas and substance abuse.        Physical Exam:  Physical Exam   Constitutional: He is oriented to person, place, and time. He appears well-developed and well-nourished. No distress.   HENT:   Head: Normocephalic and atraumatic.   Mouth/Throat: Oropharynx is clear and moist. No oropharyngeal exudate.   Eyes: Conjunctivae and EOM are normal. Pupils are equal, round, and reactive to light. Right eye exhibits no discharge. Left eye exhibits no discharge. No scleral icterus.   Neck: Normal range of motion. Neck supple. No JVD present. No tracheal deviation present. No thyromegaly present.   Cardiovascular: Normal rate, regular rhythm and normal heart sounds.  Exam reveals no gallop and no friction rub.    No murmur heard.  Pulmonary/Chest: Effort normal and breath sounds normal. No respiratory distress. He has no wheezes. He has no rales. He exhibits no tenderness.   On 4 lpm NC, desats to 60s off O2.   Abdominal: Soft. Bowel sounds are normal. He exhibits no distension and no mass. There is no tenderness. There is no rebound and no guarding.   Musculoskeletal: Normal range of motion. He exhibits no edema or tenderness.   Lymphadenopathy:     He has no cervical adenopathy.   Neurological: He is alert and oriented to person, place, and time. No cranial nerve deficit. He exhibits normal muscle tone.   Skin: Skin is warm and dry. No rash noted. He is not diaphoretic. No erythema.   Psychiatric: He has a normal mood and affect. His behavior is normal. Judgment and thought content normal.   Nursing note and vitals reviewed.      Labs:  Recent Labs      02/20/17 2000   GLCTA37H  7.36*   EAEQLY293L  63.6*   [id number]  94.9*   JYMZ8EUA  96.3   ARTHCO3  35*   ARTBE  8*     Recent Labs      02/23/17   0147  02/23/17   0650  02/23/17   1252   TROPONINI  0.03  0.03  0.03     Recent Labs      02/21/17   0129  02/22/17   0339  02/23/17   0825   SODIUM  146*  144  140   POTASSIUM  4.0  4.3  4.5   CHLORIDE  103  103  101   CO2  37*  38*  33   BUN  21  18  21    CREATININE  0.85  0.74  0.64   CALCIUM  8.7  9.1  9.2     Recent Labs      17   01217   0339  17   0825   ALTSGPT  14   --    --    ASTSGOT  17   --    --    ALKPHOSPHAT  72   --    --    TBILIRUBIN  1.0   --    --    GLUCOSE  123*  119*  171*     Recent Labs      17   01217   0826   RBC  3.69*  3.66*  4.13*   HEMOGLOBIN  11.6*  11.6*  13.1*   HEMATOCRIT  38.3*  38.6*  43.3   PLATELETCT  141*  135*  142*   PROTHROMBTM   --    --   13.5   INR   --    --   1.00   IRON   --   31*   --    TOTIRONBC   --   248*   --      Recent Labs      17   01217   01317   0826   WBC  13.6*  9.2  9.2   NEUTSPOLYS  81.10*  77.70*  79.00*   LYMPHOCYTES  9.40*  11.30*  12.40*   MONOCYTES  8.80  9.80  6.80   EOSINOPHILS  0.10  0.70  1.10   BASOPHILS  0.20  0.20  0.30   ASTSGOT  17   --    --    ALTSGPT  14   --    --    ALKPHOSPHAT  72   --    --    TBILIRUBIN  1.0   --    --            Hemodynamics:  Temp (24hrs), Av.8 °C (98.2 °F), Min:36.2 °C (97.2 °F), Max:37.3 °C (99.1 °F)  Temperature: 37.3 °C (99.1 °F)  Pulse  Av.2  Min: 47  Max: 97   Blood Pressure : 148/74 mmHg     Respiratory:    Respiration: 18, Pulse Oximetry: 92 %, O2 Daily Delivery Respiratory : Silicone Nasal Cannula     Given By:: Mask, #MDI/DPI Given: MDI/DPI x 2, Given By:: Mouthpiece, Work Of Breathing / Effort: Mild  RUL Breath Sounds: Coarse Crackles, RML Breath Sounds: Diminished, RLL Breath Sounds: Diminished, DEBI Breath Sounds: Coarse Crackles, LLL Breath Sounds: Diminished  Fluids:    Intake/Output Summary (Last 24 hours) at 17 1643  Last data filed at 17 0400   Gross per 24 hour   Intake    590 ml   Output   1000 ml   Net   -410 ml     Weight: 69.4 kg (153 lb)  GI/Nutrition:  Orders Placed This Encounter   Procedures   • DIET ORDER     Standing Status: Standing      Number of Occurrences: 1      Standing Expiration Date:      Order Specific Question:  Diet:      Answer:  Diabetic [3]      Comments:  1-1 nursing supervision     Order Specific Question:  Texture/Fiber modifications:     Answer:  Dysphagia 1(Pureed)specify fluid consistency(question 6) [1]     Order Specific Question:  Consistency/Fluid modifications:     Answer:  Nectar Thick [2]     Medical Decision Making, by Problem:  Active Hospital Problems    Diagnosis   • Respiratory failure (CMS-HCC) [J96.90]  Had been up to 7 LPM NC 2/20.  Now at 4 LPM NC  2/2 LLL pneumonia/opacification.  CXR with some improvement seen, but remains with left sided near white out.     • Debility [R53.81]   • Multiple falls [R29.6]  Reported from niece  H/o pelvic fracture, recently at [facility] for rehab     • MRSA carrier [Z22.322]  MRSA + nasal swab     • Sepsis (CMS-HCC) [A41.9]  2/2 LLL pneumonia HCAP:  ESBL ecoli  BCs negative x2  UC Ecoli ESBL     • HCAP (healthcare-associated pneumonia) [J18.9]  Sputum culture ESBL Ecoli  ID consult appreciated   tygacil IV per ID x 7 days.  Consolidation seen on CTA chest  mucomist nebs ordered   Ordered PEP therapy to open up airway  Review of last admission:  4/2016 s/p bronchoscopy with Dr. Phillips with no mass, thick mucus, cultures negative.     • Chronic respiratory failure with hypoxia (CMS-HCC) [J96.11]  Mild improvement in O2 requirement back to 4 LPM Nc  Baseline 3 LPM NC     • Oxygen dependent [Z99.81]  Baseline 3 LPM NC 24/7.     • DM type 2, controlled, with complication (CMS-HCC) [E11.8]  SSI  accuchecks  Diabetic diet  Dysphagia 1 NTFL   • Left hemiparesis (CMS-HCC) [G81.94]  2/2 prior CVA     • Dysphagia status post cerebrovascular accident [I69.391]  SLP rec dysphagia 1 NT diet.     • COPD (chronic obstructive pulmonary disease) ([facility]) [J44.9]  Nebs  RT protocol     • GERD (gastroesophageal reflux disease) [K21.9]  Continue home pepcid  Especially with aspiration risk.    Abnormal UA:  Wbc 100-150 wbc  UC with ESBL Ecoli  On tygacil x7 days.    Iron deficiency  anemia:  Replaced iron daily with vit C.     FC per niece:  Code reviewed by admitting physician.  PT/OT/SNF orders placed.  Family does not want to go back to [facility].    Loya catheter: No Loya      DVT Prophylaxis: Heparin    Ulcer prophylaxis: Yes  Antibiotics: Treating active infection/contamination beyond 24 hours perioperative coverage  Assessed for rehab: Patient was assess for and/or received rehabilitation services during this hospitalization

## 2017-02-24 NOTE — PROGRESS NOTES
Infectious Disease Progress Note    Author: Jessie Varghese M.D.    Date of service & Time created: 2017  9:23 AM        Chief Complaint   Patient presents with   • Shortness of Breath     SAT's in 80's at home, given NPPB Tx X 3 with improvement.     Pneumonia  uti  Interval History:  81 year old male with recent d/c from the nursing home came in worsening sob and fevers  - says he is feeling slightly better. No fevers.   - no fevers. Feels better. On dysphagia 1 diet.   Labs Reviewed, Medications Reviewed and Radiology Reviewed.    Review of Systems:  Review of Systems   Constitutional: Positive for malaise/fatigue. Negative for fever.   Respiratory: Positive for cough and shortness of breath.         Slightly better   Cardiovascular: Negative for chest pain and leg swelling.   Gastrointestinal: Positive for nausea. Negative for vomiting and abdominal pain.   Genitourinary: Positive for dysuria.   Musculoskeletal: Negative for myalgias.   Skin: Negative for rash.   Neurological: Negative for speech change and headaches.       Hemodynamics:  Temp (24hrs), Av.9 °C (98.4 °F), Min:36.6 °C (97.8 °F), Max:37.3 °C (99.1 °F)  Temperature: 36.6 °C (97.8 °F)  Pulse  Av  Min: 47  Max: 97   Blood Pressure : (!) 178/98 mmHg (RN notified)       Physical Exam:  Physical Exam   Constitutional: He is oriented to person, place, and time. No distress.   Elderly male    Eyes: No scleral icterus.   Neck: Neck supple.   Cardiovascular:   Irregular    Pulmonary/Chest: He has rales.   Abdominal: Soft. There is no tenderness. There is no rebound.   Genitourinary:   Loya in   Musculoskeletal: He exhibits no edema.   Neurological: He is alert and oriented to person, place, and time.   Skin: No erythema.   Vitals reviewed.      Labs:  Recent Labs      17   0131  17   0826  17   0102   WBC  9.2  9.2  7.6   RBC  3.66*  4.13*  3.67*   HEMOGLOBIN  11.6*  13.1*  11.8*   HEMATOCRIT  38.6*  43.3  38.0*    MCV  105.5*  104.8*  103.5*   MCH  31.7  31.7  32.2   RDW  54.7*  52.0*  50.7*   PLATELETCT  135*  142*  144*   MPV  11.5  11.1  10.5   NEUTSPOLYS  77.70*  79.00*  73.60*   LYMPHOCYTES  11.30*  12.40*  16.00*   MONOCYTES  9.80  6.80  8.70   EOSINOPHILS  0.70  1.10  1.00   BASOPHILS  0.20  0.30  0.40     Recent Labs      02/22/17   0339  02/23/17   0825  02/24/17   0102   SODIUM  144  140  143   POTASSIUM  4.3  4.5  4.0   CHLORIDE  103  101  101   CO2  38*  33  38*   GLUCOSE  119*  171*  118*   BUN  18  21  20     Recent Labs      02/22/17   0339 02/23/17   0825  02/24/17   0102   CREATININE  0.74  0.64  0.58        Imaging:  cxr 2/23  1.  Extensive left pulmonary infiltrates and/or layering effusion. Overall stable.  2.  Cardiomegaly  3.  Atherosclerosis      Medications:  Current Facility-Administered Medications   Medication Dose Frequency Provider Last Rate Last Dose   • albuterol (PROVENTIL) 2.5mg/3ml nebulizer solution 2.5 mg  2.5 mg 4X/DAY (RT) Vinny Guan M.D.   2.5 mg at 02/24/17 0739   • tigecycline (TYGACIL) 50 mg in NS 50 mL IVPB  50 mg Q12HRS Jessie Varghese M.D.   Stopped at 02/23/17 2153   • ascorbic acid (ascorbic acid) tablet 500 mg  500 mg DAILY Daya Ashley M.D.   500 mg at 02/24/17 0842   • budesonide-formoterol (SYMBICORT) 160-4.5 MCG/ACT inhaler 2 Puff  2 Puff BID Vinny Guan M.D.   2 Puff at 02/24/17 0739   • acetylcysteine (MUCOMYST) 20 % solution 3 mL  3 mL 4X/DAY (RT) Vinny Guan M.D.   3 mL at 02/24/17 0739   • insulin lispro (HUMALOG) injection 1-6 Units  1-6 Units 4X/DAY ACHS Daya Ashley M.D.   Stopped at 02/21/17 1700   • glucose 4 g chewable tablet 16 g  16 g Q15 MIN PRN Daya Ashley M.D.        And   • dextrose 50% (D50W) injection 25 mL  25 mL Q15 MIN PRN Daya Ashley M.D.       • docusate sodium (COLACE) capsule 100 mg  100 mg BID Daya Ashley M.D.   100 mg at 02/24/17 0842   • aspirin EC (ECOTRIN) tablet 81 mg  81 mg DAILY Heath  TATYANA Leigh   81 mg at 02/24/17 0842   • atorvastatin (LIPITOR) tablet 80 mg  80 mg Q EVENING Heath Leigh M.D.   80 mg at 02/23/17 2115   • famotidine (PEPCID) tablet 20 mg  20 mg BID JASON HitchcockDNisreen   20 mg at 02/24/17 0842   • ferrous sulfate tablet 325 mg  325 mg DAILY JASON HitchcockDNisreen   325 mg at 02/24/17 0842   • finasteride (PROSCAR) tablet 5 mg  5 mg DAILY JASON HitchcockDNisreen   5 mg at 02/24/17 0841   • gabapentin (NEURONTIN) capsule 300 mg  300 mg DAILY Heath Leigh M.D.   300 mg at 02/24/17 0842   • metoprolol (LOPRESSOR) tablet 25 mg  25 mg BID Heath Leigh M.D.   25 mg at 02/24/17 0841   • oxybutynin SR (DITROPAN-XL) tablet 5 mg  5 mg DAILY Heath Leigh M.D.   5 mg at 02/23/17 0920   • sertraline (ZOLOFT) tablet 100 mg  100 mg DAILY JASON HitchcockDNisreen   100 mg at 02/24/17 0841   • tamsulosin (FLOMAX) capsule 0.4 mg  0.4 mg AFTER BREAKFAST Heath Leigh M.D.   0.4 mg at 02/24/17 0841   • tiotropium (SPIRIVA) 18 MCG inhalation capsule 1 Cap  1 Cap DAILY Heath Leigh M.D.   1 Cap at 02/24/17 0739   • trazodone (DESYREL) tablet 50 mg  50 mg QHS PRN Heath Leigh M.D.   50 mg at 02/23/17 2303   • NS infusion 1,971 mL  30 mL/kg Once PRN Heath Leigh M.D.       • docusate sodium (COLACE) capsule 100 mg  100 mg BID Heath Leigh M.D.   100 mg at 02/24/17 0850   • senna-docusate (PERICOLACE or SENOKOT S) 8.6-50 MG per tablet 1 Tab  1 Tab Nightly Heath Leigh M.D.   1 Tab at 02/23/17 2116   • senna-docusate (PERICOLACE or SENOKOT S) 8.6-50 MG per tablet 1 Tab  1 Tab Q24HRS PRN Heath Leigh M.D.       • lactulose 20 GM/30ML solution 30 mL  30 mL Q24HRS PRN Heath Leigh M.D.   30 mL at 02/23/17 0933   • bisacodyl (DULCOLAX) suppository 10 mg  10 mg Q24HRS PRN Heath Leigh M.D.       • fleet enema 133 mL  1 Each Once PRN Heath Leigh M.D.       • Respiratory Care per Protocol   Continuous RT Heath Leigh M.D.       • NS (BOLUS) infusion 500 mL  500 mL Once PRN Heath Leigh M.D.       • heparin injection 5,000 Units  5,000 Units Q8HRS Heath Leigh M.D.   5,000  Units at 02/24/17 0535   • levetiracetam (KEPPRA) 1,500 mg in D5W 100 mL IVPB  1,500 mg BID Heath Leigh M.D.   Stopped at 02/23/17 2118     Last reviewed on 2/20/2017  3:33 PM by Evelin Saavedra    Micro:  Results     Procedure Component Value Units Date/Time    URINE CULTURE-EXISTING-LESS THAN 48 HOURS [[id number]]  (Abnormal)  (Susceptibility) Collected:  02/20/17 2012    Order Status:  Completed Specimen Information:  Urine from Urine, Clean Catch Updated:  02/23/17 0849     Significant Indicator POS (POS)      Source UR      Site URINE, CLEAN CATCH      Urine Culture Mixed skin josé miguel <10,000 cfu/mL (A)      Urine Culture -- (A)      Result:        Escherichia coli ESBL  ,000 cfu/mL  Extended Spectrum Beta-lactamase (ESBL) isolated.  ESBL's may be clinically resistant to therapy with  Penicillins,Cephalosporins or Aztreonam despite  apparent in vitro susceptibility to some of these agents.  The patient requires contact isolation.  Please contact pharmacy or an Infectious Disease Specialist  if you have any questions about appropriate therapy.      Narrative:      CALL  Garza  171 tel. [phone],  Droplet,Contact  Indication for culture:->Emergency Room Patient  ** had + UA in ER 2/20, please culture.    Culture & Susceptibility     ESCHERICHIA COLI ESBL     Antibiotic Sensitivity Microscan Unit Status    Ampicillin Resistant >16 mcg/mL Final    Cefepime Resistant >16 mcg/mL Final    Cefotaxime Extended Spectrum Beta Lactamase >32 mcg/mL Final    Cefotetan Sensitive <=16 mcg/mL Final    Ceftazidime Extended Spectrum Beta Lactamase 4 mcg/mL Final    Ceftriaxone Extended Spectrum Beta Lactamase >32 mcg/mL Final    Cefuroxime Resistant >16 mcg/mL Final    Cephalothin Resistant >16 mcg/mL Final    Ciprofloxacin Resistant >2 mcg/mL Final    Gentamicin Resistant >8 mcg/mL Final    Levofloxacin Intermediate 4 mcg/mL Final    Nitrofurantoin Intermediate 64 mcg/mL Final    Pip/Tazobactam Sensitive <=16 mcg/mL Final     Piperacillin Resistant >64 mcg/mL Final    Tigecycline Sensitive <=2 mcg/mL Final    Tobramycin Resistant >8 mcg/mL Final    Trimeth/Sulfa Resistant >2/38 mcg/mL Final                       Culture Respiratory W/ GRM STN [[id number]]  (Abnormal)  (Susceptibility) Collected:  02/20/17 1736    Order Status:  Completed Specimen Information:  Respirate from Sputum Updated:  02/22/17 0850     Gram Stain Result --      Result:        Moderate WBCs.  Rare epithelial cells.  Rare mixed bacteria, no predominant organism seen.  Specimen Quality Score: 3+       Significant Indicator POS (POS)      Source RESP      Site SPUTUM      Respiratory Culture        Result:        Light growth usual upper respiratory josé miguel (A)     Respiratory Culture -- (A)      Result:        Escherichia coli ESBL  Light growth  Extended Spectrum Beta-lactamase (ESBL) isolated.  ESBL's may be clinically resistant to therapy with  Penicillins,Cephalosporins or Aztreonam despite  apparent in vitro susceptibility to some of these agents.  The patient requires contact isolation.  Please contact pharmacy or an Infectious Disease Specialist  if you have any questions about appropriate therapy.      Narrative:      CALL  Garza  171 tel. [phone],  CALLED  171 tel. [phone] 02/22/2017, 08:49, RB PERF. RESULTS CALLED  TO:[id number] Rn, Inf C  If not done within the last 24 hours  Sputum cultures, induced if needed. If not done within the  last 24 hours  Send central line distal (brown) port venous blood gas    Culture & Susceptibility     ESCHERICHIA COLI ESBL     Antibiotic Sensitivity Microscan Unit Status    Ampicillin Resistant >16 mcg/mL Final    Cefepime Resistant >16 mcg/mL Final    Cefotaxime Extended Spectrum Beta Lactamase >32 mcg/mL Final    Cefotetan Sensitive <=16 mcg/mL Final    Ceftazidime Extended Spectrum Beta Lactamase >16 mcg/mL Final    Ceftriaxone Extended Spectrum Beta Lactamase >32 mcg/mL Final    Cefuroxime Resistant >16 mcg/mL Final     "Ciprofloxacin Resistant >2 mcg/mL Final    Ertapenem Sensitive <=1 mcg/mL Final    Gentamicin Resistant >8 mcg/mL Final    Pip/Tazobactam Sensitive <=16 mcg/mL Final    Tigecycline Sensitive <=2 mcg/mL Final    Tobramycin Resistant >8 mcg/mL Final    Trimeth/Sulfa Resistant >2/38 mcg/mL Final                       MRSA BY PCR (AMP) [[id number]]  (Abnormal) Collected:  02/21/17 0122    Order Status:  Completed Specimen Information:  Respirate from Nares Updated:  02/21/17 1424     Significant Indicator POS (POS)      Source RESP      Site NARES      MRSA PCR POSITIVE for MRSA by PCR. (A)     Narrative:      CALL  Garza  171 tel. [phone],  CALLED  171 tel. [phone] 02/21/2017, 14:24, RB PERF. RESULTS CALLED TO:  [id number]  Collected By:[id number] BEVERLY LOMBARDO    GRAM STAIN [[id number]] Collected:  02/20/17 1736    Order Status:  Completed Specimen Information:  Respirate Updated:  02/21/17 1010     Significant Indicator .      Source RESP      Site SPUTUM      Gram Stain Result --      Result:        Moderate WBCs.  Rare epithelial cells.  Rare mixed bacteria, no predominant organism seen.  Specimen Quality Score: 3+      Narrative:      If not done within the last 24 hours  Sputum cultures, induced if needed. If not done within the  last 24 hours  Send central line distal (brown) port venous blood gas    BLOOD CULTURE x2 [[id number]] Collected:  02/20/17 1400    Order Status:  Completed Specimen Information:  Blood from Peripheral Updated:  02/21/17 0759     Significant Indicator NEG      Source BLD      Site PERIPHERAL      Blood Culture --      Result:        No Growth    Note: Blood cultures are incubated for 5 days and  are monitored continuously.Positive blood cultures  are called to the RN and reported as soon as  they are identified.      Narrative:      Per Hospital Policy: Only change Specimen Src: to \"Line\" if  specified by physician order.    BLOOD CULTURE x2 [[id number]] Collected:  02/20/17 1400    Order " "Status:  Completed Specimen Information:  Blood from Peripheral Updated:  02/21/17 0759     Significant Indicator NEG      Source BLD      Site PERIPHERAL      Blood Culture --      Result:        No Growth    Note: Blood cultures are incubated for 5 days and  are monitored continuously.Positive blood cultures  are called to the RN and reported as soon as  they are identified.      Narrative:      Per Hospital Policy: Only change Specimen Src: to \"Line\" if  specified by physician order.    URINALYSIS [[id number]]  (Abnormal) Collected:  02/20/17 2012    Order Status:  Completed Specimen Information:  Urine from Urine, Clean Catch Updated:  02/20/17 2050     Micro Urine Req Microscopic      Color Yellow      Character Sl Cloudy (A)      Specific Gravity 1.024      Ph 5.5      Glucose Negative mg/dL      Ketones Trace (A) mg/dL      Protein 30 (A) mg/dL      Bilirubin Negative      Nitrite Positive (A)      Leukocyte Esterase Large (A)      Occult Blood Trace (A)     Narrative:      Collected By:[id number] BEVERLY LOMBARDO    Blood Culture [[id number]] Collected:  02/20/17 0000    Order Status:  Canceled Specimen Information:  Other from Peripheral     Narrative:      ORDER WAS CANCELLED 02/20/2017 17:58, Duplicate . 02/20/2017  17:59.  From different peripheral sites, if not done within the last  24 hours (Per Hospital Policy: Only change specimen source to  \"Line\" if specified by physician order)    Blood Culture [[id number]] Collected:  02/20/17 0000    Order Status:  Canceled Specimen Information:  Other from Peripheral     Narrative:      ORDER WAS CANCELLED 02/20/2017 17:59, Duplicate . 02/20/2017  17:59.  From different peripheral sites, if not done within the last  24 hours (Per Hospital Policy: Only change specimen source to  \"Line\" if specified by physician order)    Urinalysis [[id number]] Collected:  02/20/17 0000    Order Status:  Canceled Specimen Information:  Urine     Narrative:      171 " tel. [phone] 02/22/2017, 08:49, RB PERF. RESULTS CALLED TO:[id number] Rn,  Inf C  TEST Urinalysis WAS CANCELLED, 02/24/17 01:05 Test autocancelled, not  collected or received  If not done within the last 24 hours  Sputum cultures, induced if needed. If not done within the  last 24 hours  Send central line distal (brown) port venous blood gas    FLUID CULTURE W/GRAM STAIN [[id number]] Collected:  02/20/17 0000    Order Status:  Canceled Specimen Information:  Other from Thoracentesis Fluid     Narrative:      TEST Fluid Culture w/Gram Stain WAS CANCELLED, 02/24/17 01:05 Test  autocancelled, not collected or received          Assessment:  Active Hospital Problems    Diagnosis   • Respiratory failure (CMS-HCC) [J96.90]   • Debility [R53.81]   • Multiple falls [R29.6]   • MRSA carrier [Z22.322]   • Sepsis (CMS-HCC) [A41.9]   • HCAP (healthcare-associated pneumonia) [J18.9]   • Chronic respiratory failure with hypoxia (CMS-HCC) [J96.11]   • Oxygen dependent [Z99.81]   • DM type 2, controlled, with complication (CMS-HCC) [E11.8]   • Left hemiparesis (CMS-HCC) [G81.94]   • Dysphagia status post cerebrovascular accident [I69.391]   • COPD (chronic obstructive pulmonary disease) (CMS-HCC) [J44.9]   • GERD (gastroesophageal reflux disease) [K21.9]       Plan:  81 year old male with copd admitted with sob and fevers    HCAP   Patient recently d/[facility] from SNF  C/s are esbl e coli  Continue tygacil day 3/7    UTI  Urine c/s is esbl e coli as well  tygacil will cover- since cystitis will be able to be treated with tygacil even though urine excretion    Aspiration ?  No overt s/s of aspiration noted on swallow eval  On dysphagia 1 diet      Prognosis    poor     Discussed with internal medicine and pulmonary

## 2017-02-24 NOTE — THERAPY
"Physical Therapy Treatment completed.   Bed Mobility:  Supine to Sit: Minimal Assist  Transfers: Sit to Stand: Unable to Participate (due to reports of nausea)  Gait: Level Of Assist: Unable to Participate (due to reports of nausea)   Plan of Care: Will benefit from Physical Therapy 3 times per week  Discharge Recommendations: Equipment: Will Continue to Assess for Equipment Needs.     Pt demonstrates improved bed mobility this session. Unable to participate in sit <> stand and ambulation due to c/o nausea. Anticipate slow and steady progress. Acute PT will continue to follow. Recommend placement prior to d/c home as patient lives alone, though patient reporting that he would like to go to [place] to stay with nephews. Will continue to asses for appropriate d/c location.     See \"Rehab Therapy-Acute\" Patient Summary Report for complete documentation.       "

## 2017-02-24 NOTE — PROGRESS NOTES
Pulmonary Critical Care Progress Note        Chief Complaint: sob    History of Present Illness:  The patient is an 81-year-old man.  The patient    is well known to us from a hospitalization in April of 2016 when he was    hospitalized for treatment of left lung pneumonia.  We had seen him even    before that in 2014 when he was hospitalized for acute on chronic respiratory    failure and at that time, he had a possible right-sided pneumonia in addition    to hypercarbic, hypoxemic respiratory failure secondary to COPD.  Other    problems included hypertension, pulmonary hypertension, seizure disorder,    gastroesophageal reflux disease, prior stroke with left-sided weakness,    depression, dyslipidemia, osteoarthritis, and chronic pain, and 2 pack a day    smoker for 60 years, quit in 2003.  The patient ended up undergoing a    bronchoscopy at that time with bronchoalveolar lavage.  He was found to have    copious thick purulent secretions, most notably in the left lower lobe, but no   endobronchial lesion or obstruction was identified and there is a    consideration that the patient might be chronically aspirating.  His pathology   from this examination revealed no evidence of malignancy and his    bronchoalveolar lavage fluid was negative culture wise.     The patient had recently been discharged from the skilled nursing facility    after a pelvic fracture.  His caregiver brought him into the ER with shortness   of breath and fevers.  He was found to be hypoxic and placed on OxyMask.     Workup revealed new left lung pneumonia.  ROS:  Respiratory: negative, Cardiac: negative, GI: negative.  All other systems negative.    Interval Events:  24 hour interval history reviewed    2/23 - on 4 lpm, better and more conversant today. ESBL + sputum, Tygacil initiated by ID.    2/24 - had a good night, dysphagia 2 diet, SR with PACs and PVCs, labs reviewed and are similar    PFSH:  No change.    Respiratory:     Pulse  Oximetry: 96 %  Chest Tube Drains:          Exam: unlabored respirations, no intercostal retractions or accessory muscle use  ImagingAvailable data reviewed         Invalid input(s): [id number]    HemoDynamics:  Pulse: 75  Blood Pressure : 158/76 mmHg (rn notified)       Exam: regular rate and rhythm  Imagin/23 CXR - similar cxr with diffuse left infiltrates    Recent Labs      17   1856  17   0102  17   0702   TROPONINI  0.03  0.04  0.03       Neuro:  GCS         Exam: no focal deficits noted  Imaging: Available data reviewed    Fluids:  Intake/Output       17 0700 - 17 0659 17 0700 - 17 0659 17 0700 - 17 0659      4654-2217 2316-4632 Total 0287-3352 3852-9752 Total 2433-9244 6854-2188 Total       Intake    P.O.  1320  -- 1320  460  100 560  --  -- --    P.O. 1320 -- 1320 460 100 560 -- -- --    I.V.  450  -- 450  450  -- 450  --  -- --    IV Volume (Meropenum) 100 -- 100 100 -- 100 -- -- --    IV Volume (Tigecycline) -- -- -- 100 -- 100 -- -- --    IV Piggyback Volume (Keppra) 350 -- 350 250 -- 250 -- -- --    Total Intake 1770 -- 1770  -- -- --       Output    Urine  1400  400 1800  425  300 725  --  -- --    Condom Catheter 7256 016 1035 425 -- 425 -- -- --    Number of Times Voided -- -- -- 1 x -- 1 x -- -- --    Void (ml) -- -- -- -- 300 300 -- -- --    Total Output 2592 229 6591 425 300 725 -- -- --       Net I/O     370 -400 -30 485 -200 285 -- -- --        Weight: 59.8 kg (131 lb 13.4 oz)  Recent Labs      17   0339  17   0825  17   010   SODIUM  144  140  143   POTASSIUM  4.3  4.5  4.0   CHLORIDE  103  101  101   CO2  38*  33  38*   BUN  18  21  20   CREATININE  0.74  0.64  0.58   CALCIUM  9.1  9.2  9.2       GI/Nutrition:  Exam: abdomen is soft and non-tender  Imaging: Available data reviewed  taking PO  Liver Function  Recent Labs      17   0339  17   0825  17   0102   GLUCOSE  119*  171*  118*        Heme:  Recent Labs      17   RBC  3.66*  4.13*  3.67*   HEMOGLOBIN  11.6*  13.1*  11.8*   HEMATOCRIT  38.6*  43.3  38.0*   PLATELETCT  135*  142*  144*   PROTHROMBTM   --   13.5   --    INR   --   1.00   --    IRON  31*   --    --    TOTIRONBC  248*   --    --        Infectious Disease:  Temp  Av.9 °C (98.5 °F)  Min: 36.7 °C (98 °F)  Max: 37.3 °C (99.1 °F)  Micro: reviewed    -  ESBL + urine amd sputum   - MRSA nares  Recent Labs      17   WBC  9.2  9.2  7.6   NEUTSPOLYS  77.70*  79.00*  73.60*   LYMPHOCYTES  11.30*  12.40*  16.00*   MONOCYTES  9.80  6.80  8.70   EOSINOPHILS  0.70  1.10  1.00   BASOPHILS  0.20  0.30  0.40     Current Facility-Administered Medications   Medication Dose Frequency Provider Last Rate Last Dose   • albuterol (PROVENTIL) 2.5mg/3ml nebulizer solution 2.5 mg  2.5 mg 4X/DAY (RT) Vinny Guan M.D.   2.5 mg at 17 0739   • tigecycline (TYGACIL) 50 mg in NS 50 mL IVPB  50 mg Q12HRS Jessie Varghese M.D.   Stopped at 17   • ascorbic acid (ascorbic acid) tablet 500 mg  500 mg DAILY Daya Ashley M.D.   500 mg at 17 0922   • budesonide-formoterol (SYMBICORT) 160-4.5 MCG/ACT inhaler 2 Puff  2 Puff BID Vinny Guan M.D.   2 Puff at 17 0739   • acetylcysteine (MUCOMYST) 20 % solution 3 mL  3 mL 4X/DAY (RT) Vinny Guan M.D.   3 mL at 17 0739   • insulin lispro (HUMALOG) injection 1-6 Units  1-6 Units 4X/DAY GIDEON Ashley M.D.   Stopped at 17 1700   • glucose 4 g chewable tablet 16 g  16 g Q15 MIN PRN Daya Ashley M.D.        And   • dextrose 50% (D50W) injection 25 mL  25 mL Q15 MIN PRN Daya Ashley M.D.       • docusate sodium (COLACE) capsule 100 mg  100 mg BID Daya Ashley M.D.   100 mg at 17 2115   • aspirin EC (ECOTRIN) tablet 81 mg  81 mg DAILY Heath Leigh M.D.   81 mg at 17 0919   •  atorvastatin (LIPITOR) tablet 80 mg  80 mg Q EVENING Heath Leigh M.D.   80 mg at 02/23/17 2115   • famotidine (PEPCID) tablet 20 mg  20 mg BID Heath Leigh M.D.   20 mg at 02/23/17 2116   • ferrous sulfate tablet 325 mg  325 mg DAILY JASON HitchcockDNisreen   325 mg at 02/23/17 0916   • finasteride (PROSCAR) tablet 5 mg  5 mg DAILY Heath Leigh M.D.   5 mg at 02/23/17 0922   • gabapentin (NEURONTIN) capsule 300 mg  300 mg DAILY JASON HitchcockDNisreen   300 mg at 02/23/17 0916   • metoprolol (LOPRESSOR) tablet 25 mg  25 mg BID Heath Leigh M.D.   25 mg at 02/23/17 2116   • oxybutynin SR (DITROPAN-XL) tablet 5 mg  5 mg DAILY Heath Leigh M.D.   5 mg at 02/23/17 0920   • sertraline (ZOLOFT) tablet 100 mg  100 mg DAILY Heath Leigh M.D.   100 mg at 02/23/17 0918   • tamsulosin (FLOMAX) capsule 0.4 mg  0.4 mg AFTER BREAKFAST Heath Leigh M.D.   0.4 mg at 02/23/17 0919   • tiotropium (SPIRIVA) 18 MCG inhalation capsule 1 Cap  1 Cap DAILY Heath Leigh M.D.   1 Cap at 02/24/17 0739   • trazodone (DESYREL) tablet 50 mg  50 mg QHS PRN Heath Leigh M.D.   50 mg at 02/23/17 2303   • NS infusion 1,971 mL  30 mL/kg Once PRN Heath Leigh M.D.       • docusate sodium (COLACE) capsule 100 mg  100 mg BID Heath Leigh M.D.   Stopped at 02/23/17 2100   • senna-docusate (PERICOLACE or SENOKOT S) 8.6-50 MG per tablet 1 Tab  1 Tab Nightly Heath Leigh M.D.   1 Tab at 02/23/17 2116   • senna-docusate (PERICOLACE or SENOKOT S) 8.6-50 MG per tablet 1 Tab  1 Tab Q24HRS PRN Heath Leigh M.D.       • lactulose 20 GM/30ML solution 30 mL  30 mL Q24HRS PRN Heath Leigh M.D.   30 mL at 02/23/17 0933   • bisacodyl (DULCOLAX) suppository 10 mg  10 mg Q24HRS PRN Heath Leigh M.D.       • fleet enema 133 mL  1 Each Once PRN Heath Leigh M.D.       • Respiratory Care per Protocol   Continuous RT Heath Leigh M.D.       • NS (BOLUS) infusion 500 mL  500 mL Once PRN Heath Leigh M.D.       • heparin injection 5,000 Units  5,000 Units Q8HRS Heath Leigh M.D.   5,000 Units at 02/24/17 0535   • levetiracetam  (KEPPRA) 1,500 mg in D5W 100 mL IVPB  1,500 mg BID Heath Leigh M.D.   Stopped at 02/23/17 2118     Last reviewed on 2/20/2017  3:33 PM by Eevlin Saavedra    Quality  Measures:  Radiology images reviewed, Medications reviewed, Labs reviewed and EKG reviewed                      Problems:    1.  Healthcare-facility associated pneumonia.  2.  Respiratory failure, hypercarbic and hypoxemic.  3.  Chronic obstructive pulmonary disease.  4.  Prior history of pneumonia, query aspiration.  5.  History of left-sided weakness.  6.  Hypertension.  7.  Diabetes.  8.  Gastroesophageal reflux.  9.  Depression.  10.  Dyslipidemia.  11.  History of seizure disorder.  12.  History of multiple surgeries as noted above.  13. Likely aspiration  14. Anemia, macrocytic  15. + ESBL sputum and urine  16. MRSA + nares      Plan:  Continue O2, RT, PEP, IS  Antiinfectives per ID, Tygacil  Dysphagia 2 diet

## 2017-02-24 NOTE — PROGRESS NOTES
Report received. Pt is resting in bed. A&O x 4. Pt denies pain. No signs or symptoms of SOB or distress. Tele box on. Bed in lowest position, treaded sock on, and call light within reach.

## 2017-02-24 NOTE — CARE PLAN
Problem: Safety  Goal: Will remain free from injury  Intervention: Educate patient and significant other/support system about adaptive mobility strategies and safe transfers  Bed in lowest position, bed alarm in place, personal belongings within reach, treaded socks on. Pt educated to always call for help.

## 2017-02-25 ENCOUNTER — APPOINTMENT (OUTPATIENT)
Dept: RADIOLOGY | Facility: MEDICAL CENTER | Age: 82
DRG: 871 | End: 2017-02-25
Attending: HOSPITALIST
Payer: MEDICARE

## 2017-02-25 LAB
ANION GAP SERPL CALC-SCNC: 3 MMOL/L (ref 0–11.9)
BACTERIA BLD CULT: NORMAL
BACTERIA BLD CULT: NORMAL
BASOPHILS # BLD AUTO: 0.4 % (ref 0–1.8)
BASOPHILS # BLD: 0.03 K/UL (ref 0–0.12)
BUN SERPL-MCNC: 23 MG/DL (ref 8–22)
CALCIUM SERPL-MCNC: 8.9 MG/DL (ref 8.5–10.5)
CHLORIDE SERPL-SCNC: 101 MMOL/L (ref 96–112)
CO2 SERPL-SCNC: 40 MMOL/L (ref 20–33)
CREAT SERPL-MCNC: 0.64 MG/DL (ref 0.5–1.4)
EOSINOPHIL # BLD AUTO: 0.07 K/UL (ref 0–0.51)
EOSINOPHIL NFR BLD: 0.9 % (ref 0–6.9)
ERYTHROCYTE [DISTWIDTH] IN BLOOD BY AUTOMATED COUNT: 51.2 FL (ref 35.9–50)
GFR SERPL CREATININE-BSD FRML MDRD: >60 ML/MIN/1.73 M 2
GLUCOSE BLD-MCNC: 103 MG/DL (ref 65–99)
GLUCOSE BLD-MCNC: 120 MG/DL (ref 65–99)
GLUCOSE BLD-MCNC: 124 MG/DL (ref 65–99)
GLUCOSE BLD-MCNC: 134 MG/DL (ref 65–99)
GLUCOSE SERPL-MCNC: 109 MG/DL (ref 65–99)
HCT VFR BLD AUTO: 41.4 % (ref 42–52)
HGB BLD-MCNC: 12.5 G/DL (ref 14–18)
IMM GRANULOCYTES # BLD AUTO: 0.03 K/UL (ref 0–0.11)
IMM GRANULOCYTES NFR BLD AUTO: 0.4 % (ref 0–0.9)
LYMPHOCYTES # BLD AUTO: 1.21 K/UL (ref 1–4.8)
LYMPHOCYTES NFR BLD: 16.2 % (ref 22–41)
MCH RBC QN AUTO: 31.6 PG (ref 27–33)
MCHC RBC AUTO-ENTMCNC: 30.2 G/DL (ref 33.7–35.3)
MCV RBC AUTO: 104.8 FL (ref 81.4–97.8)
MONOCYTES # BLD AUTO: 0.7 K/UL (ref 0–0.85)
MONOCYTES NFR BLD AUTO: 9.4 % (ref 0–13.4)
NEUTROPHILS # BLD AUTO: 5.41 K/UL (ref 1.82–7.42)
NEUTROPHILS NFR BLD: 72.7 % (ref 44–72)
NRBC # BLD AUTO: 0 K/UL
NRBC BLD AUTO-RTO: 0 /100 WBC
PLATELET # BLD AUTO: 162 K/UL (ref 164–446)
PMV BLD AUTO: 11.5 FL (ref 9–12.9)
POTASSIUM SERPL-SCNC: 4.3 MMOL/L (ref 3.6–5.5)
RBC # BLD AUTO: 3.95 M/UL (ref 4.7–6.1)
SIGNIFICANT IND 70042: NORMAL
SIGNIFICANT IND 70042: NORMAL
SITE SITE: NORMAL
SITE SITE: NORMAL
SODIUM SERPL-SCNC: 144 MMOL/L (ref 135–145)
SOURCE SOURCE: NORMAL
SOURCE SOURCE: NORMAL
WBC # BLD AUTO: 7.5 K/UL (ref 4.8–10.8)

## 2017-02-25 PROCEDURE — 93306 TTE W/DOPPLER COMPLETE: CPT

## 2017-02-25 PROCEDURE — 700105 HCHG RX REV CODE 258: Performed by: INTERNAL MEDICINE

## 2017-02-25 PROCEDURE — 99233 SBSQ HOSP IP/OBS HIGH 50: CPT | Performed by: HOSPITALIST

## 2017-02-25 PROCEDURE — 700102 HCHG RX REV CODE 250 W/ 637 OVERRIDE(OP): Performed by: INTERNAL MEDICINE

## 2017-02-25 PROCEDURE — 700111 HCHG RX REV CODE 636 W/ 250 OVERRIDE (IP): Performed by: INTERNAL MEDICINE

## 2017-02-25 PROCEDURE — A9270 NON-COVERED ITEM OR SERVICE: HCPCS | Performed by: HOSPITALIST

## 2017-02-25 PROCEDURE — A9270 NON-COVERED ITEM OR SERVICE: HCPCS | Performed by: INTERNAL MEDICINE

## 2017-02-25 PROCEDURE — 700101 HCHG RX REV CODE 250: Performed by: INTERNAL MEDICINE

## 2017-02-25 PROCEDURE — 36415 COLL VENOUS BLD VENIPUNCTURE: CPT

## 2017-02-25 PROCEDURE — 82962 GLUCOSE BLOOD TEST: CPT

## 2017-02-25 PROCEDURE — 71010 DX-CHEST-PORTABLE (1 VIEW): CPT

## 2017-02-25 PROCEDURE — 93306 TTE W/DOPPLER COMPLETE: CPT | Mod: 26 | Performed by: INTERNAL MEDICINE

## 2017-02-25 PROCEDURE — 85025 COMPLETE CBC W/AUTO DIFF WBC: CPT

## 2017-02-25 PROCEDURE — 94669 MECHANICAL CHEST WALL OSCILL: CPT

## 2017-02-25 PROCEDURE — 700111 HCHG RX REV CODE 636 W/ 250 OVERRIDE (IP): Performed by: HOSPITALIST

## 2017-02-25 PROCEDURE — 80048 BASIC METABOLIC PNL TOTAL CA: CPT

## 2017-02-25 PROCEDURE — 94640 AIRWAY INHALATION TREATMENT: CPT

## 2017-02-25 PROCEDURE — 700112 HCHG RX REV CODE 229: Performed by: HOSPITALIST

## 2017-02-25 PROCEDURE — 700102 HCHG RX REV CODE 250 W/ 637 OVERRIDE(OP): Performed by: HOSPITALIST

## 2017-02-25 PROCEDURE — 770020 HCHG ROOM/CARE - TELE (206)

## 2017-02-25 RX ORDER — FUROSEMIDE 10 MG/ML
40 INJECTION INTRAMUSCULAR; INTRAVENOUS
Status: DISCONTINUED | OUTPATIENT
Start: 2017-02-25 | End: 2017-02-28

## 2017-02-25 RX ORDER — IPRATROPIUM BROMIDE AND ALBUTEROL SULFATE 2.5; .5 MG/3ML; MG/3ML
3 SOLUTION RESPIRATORY (INHALATION)
Status: DISCONTINUED | OUTPATIENT
Start: 2017-02-25 | End: 2017-03-22

## 2017-02-25 RX ORDER — LEVETIRACETAM 500 MG/1
1500 TABLET ORAL 2 TIMES DAILY
Status: DISCONTINUED | OUTPATIENT
Start: 2017-02-25 | End: 2017-02-25

## 2017-02-25 RX ORDER — FUROSEMIDE 40 MG/1
40 TABLET ORAL 2 TIMES DAILY
Status: DISCONTINUED | OUTPATIENT
Start: 2017-02-25 | End: 2017-02-25

## 2017-02-25 RX ORDER — LEVETIRACETAM 500 MG/1
1500 TABLET ORAL 2 TIMES DAILY
Status: DISCONTINUED | OUTPATIENT
Start: 2017-02-25 | End: 2017-03-14

## 2017-02-25 RX ORDER — SPIRONOLACTONE 25 MG/1
25 TABLET ORAL DAILY
Status: DISCONTINUED | OUTPATIENT
Start: 2017-02-25 | End: 2017-03-11

## 2017-02-25 RX ADMIN — DEXTROSE MONOHYDRATE 1500 MG: 50 INJECTION, SOLUTION INTRAVENOUS at 09:45

## 2017-02-25 RX ADMIN — SODIUM CHLORIDE 50 MG: 9 INJECTION, SOLUTION INTRAVENOUS at 09:45

## 2017-02-25 RX ADMIN — Medication 325 MG: at 08:44

## 2017-02-25 RX ADMIN — DOCUSATE SODIUM 100 MG: 100 CAPSULE ORAL at 08:43

## 2017-02-25 RX ADMIN — METOPROLOL TARTRATE 25 MG: 25 TABLET, FILM COATED ORAL at 19:56

## 2017-02-25 RX ADMIN — ATORVASTATIN CALCIUM 80 MG: 80 TABLET, FILM COATED ORAL at 19:47

## 2017-02-25 RX ADMIN — FUROSEMIDE 40 MG: 10 INJECTION, SOLUTION INTRAVENOUS at 17:16

## 2017-02-25 RX ADMIN — ACETYLCYSTEINE 3 ML: 200 SOLUTION ORAL; RESPIRATORY (INHALATION) at 21:11

## 2017-02-25 RX ADMIN — HEPARIN SODIUM 5000 UNITS: 5000 INJECTION, SOLUTION INTRAVENOUS; SUBCUTANEOUS at 05:18

## 2017-02-25 RX ADMIN — DOCUSATE SODIUM 100 MG: 100 CAPSULE ORAL at 19:46

## 2017-02-25 RX ADMIN — ASPIRIN 81 MG: 81 TABLET ORAL at 08:43

## 2017-02-25 RX ADMIN — GABAPENTIN 300 MG: 300 CAPSULE ORAL at 08:43

## 2017-02-25 RX ADMIN — TAMSULOSIN HYDROCHLORIDE 0.4 MG: 0.4 CAPSULE ORAL at 08:43

## 2017-02-25 RX ADMIN — LACTOBACILLUS ACIDOPHILUS / LACTOBACILLUS BULGARICUS 1 PACKET: 100 MILLION CFU STRENGTH GRANULES at 11:19

## 2017-02-25 RX ADMIN — ALBUTEROL SULFATE 2.5 MG: 2.5 SOLUTION RESPIRATORY (INHALATION) at 16:24

## 2017-02-25 RX ADMIN — SERTRALINE 100 MG: 100 TABLET, FILM COATED ORAL at 08:43

## 2017-02-25 RX ADMIN — BUDESONIDE AND FORMOTEROL FUMARATE DIHYDRATE 2 PUFF: 160; 4.5 AEROSOL RESPIRATORY (INHALATION) at 19:36

## 2017-02-25 RX ADMIN — SPIRONOLACTONE 25 MG: 25 TABLET, FILM COATED ORAL at 17:16

## 2017-02-25 RX ADMIN — LACTOBACILLUS ACIDOPHILUS / LACTOBACILLUS BULGARICUS 1 PACKET: 100 MILLION CFU STRENGTH GRANULES at 17:16

## 2017-02-25 RX ADMIN — FINASTERIDE 5 MG: 5 TABLET, FILM COATED ORAL at 08:43

## 2017-02-25 RX ADMIN — ACETYLCYSTEINE 3 ML: 200 SOLUTION ORAL; RESPIRATORY (INHALATION) at 16:24

## 2017-02-25 RX ADMIN — ACETYLCYSTEINE 3 ML: 200 SOLUTION ORAL; RESPIRATORY (INHALATION) at 11:18

## 2017-02-25 RX ADMIN — OXYCODONE HYDROCHLORIDE AND ACETAMINOPHEN 500 MG: 500 TABLET ORAL at 08:44

## 2017-02-25 RX ADMIN — TIOTROPIUM BROMIDE 1 CAPSULE: 18 CAPSULE ORAL; RESPIRATORY (INHALATION) at 07:39

## 2017-02-25 RX ADMIN — ACETYLCYSTEINE 3 ML: 200 SOLUTION ORAL; RESPIRATORY (INHALATION) at 07:39

## 2017-02-25 RX ADMIN — SODIUM CHLORIDE 50 MG: 9 INJECTION, SOLUTION INTRAVENOUS at 19:39

## 2017-02-25 RX ADMIN — IPRATROPIUM BROMIDE AND ALBUTEROL SULFATE 3 ML: .5; 3 SOLUTION RESPIRATORY (INHALATION) at 23:47

## 2017-02-25 RX ADMIN — FAMOTIDINE 20 MG: 20 TABLET, FILM COATED ORAL at 19:47

## 2017-02-25 RX ADMIN — LEVETIRACETAM 1500 MG: 500 TABLET, FILM COATED ORAL at 19:47

## 2017-02-25 RX ADMIN — OXYBUTYNIN CHLORIDE 5 MG: 5 TABLET, FILM COATED, EXTENDED RELEASE ORAL at 08:44

## 2017-02-25 RX ADMIN — HEPARIN SODIUM 5000 UNITS: 5000 INJECTION, SOLUTION INTRAVENOUS; SUBCUTANEOUS at 20:00

## 2017-02-25 RX ADMIN — ALBUTEROL SULFATE 2.5 MG: 2.5 SOLUTION RESPIRATORY (INHALATION) at 11:18

## 2017-02-25 RX ADMIN — ALBUTEROL SULFATE 2.5 MG: 2.5 SOLUTION RESPIRATORY (INHALATION) at 07:39

## 2017-02-25 RX ADMIN — STANDARDIZED SENNA CONCENTRATE AND DOCUSATE SODIUM 1 TABLET: 8.6; 5 TABLET, FILM COATED ORAL at 19:47

## 2017-02-25 RX ADMIN — METOPROLOL TARTRATE 25 MG: 25 TABLET, FILM COATED ORAL at 08:43

## 2017-02-25 RX ADMIN — ALBUTEROL SULFATE 2.5 MG: 2.5 SOLUTION RESPIRATORY (INHALATION) at 21:11

## 2017-02-25 RX ADMIN — LACTOBACILLUS ACIDOPHILUS / LACTOBACILLUS BULGARICUS 1 PACKET: 100 MILLION CFU STRENGTH GRANULES at 08:43

## 2017-02-25 RX ADMIN — HEPARIN SODIUM 5000 UNITS: 5000 INJECTION, SOLUTION INTRAVENOUS; SUBCUTANEOUS at 14:53

## 2017-02-25 RX ADMIN — BUDESONIDE AND FORMOTEROL FUMARATE DIHYDRATE 2 PUFF: 160; 4.5 AEROSOL RESPIRATORY (INHALATION) at 07:39

## 2017-02-25 RX ADMIN — FAMOTIDINE 20 MG: 20 TABLET, FILM COATED ORAL at 08:44

## 2017-02-25 ASSESSMENT — ENCOUNTER SYMPTOMS
SENSORY CHANGE: 0
CLAUDICATION: 0
SORE THROAT: 0
HEMOPTYSIS: 0
VOMITING: 0
BRUISES/BLEEDS EASILY: 0
DIZZINESS: 0
NAUSEA: 0
SPEECH CHANGE: 0
SHORTNESS OF BREATH: 0
MYALGIAS: 0
SHORTNESS OF BREATH: 1
LOSS OF CONSCIOUSNESS: 0
FOCAL WEAKNESS: 0
FEVER: 0
SPUTUM PRODUCTION: 0
ABDOMINAL PAIN: 0
DIAPHORESIS: 0
EYE PAIN: 0
EYE DISCHARGE: 0
DIARRHEA: 0
COUGH: 1
DEPRESSION: 0
CONSTIPATION: 0
PALPITATIONS: 0
BACK PAIN: 0
WHEEZING: 0
HEADACHES: 0
WEAKNESS: 1
NECK PAIN: 0
CHILLS: 0

## 2017-02-25 ASSESSMENT — LIFESTYLE VARIABLES: SUBSTANCE_ABUSE: 0

## 2017-02-25 ASSESSMENT — PAIN SCALES - GENERAL
PAINLEVEL_OUTOF10: 0

## 2017-02-25 NOTE — PROGRESS NOTES
Hospital Medicine Progress Note, Adult, Complex               Author: Daya MARY KATE Gabi Date & Time created: 2/24/2017  5:48 PM     Interval History:  CC:  SOB  2/20:   Admitted 81yoM with h/o left sided hemiparesis 2/2 CVA, HTN, COPD on 3 LPM NC, asthma and pelvic fracture at [facility], multiple falls presents with Left sided HCAP on vanco and zosyn.  H/o aspiration, on dysphagia 1 diet per SLP.    2/21:  Had rapid last night, opens eyes to name only.  Remains full code per family.  CTA showing LLL pneumonia/opacification with hiatal hernia seen.  U/s chest did not show enough pleural effusion for IR to drain.  Nasal swab + MRSA, confused.  2/22:  Improved mentation today, sitting up, conversing appropriately after 1 dose of meropenem IV.  ID consulted for sputum + Ecoli ESBL, left lung white out.  2/23:  Continues to improve on Abx.  Mentation clear, sitting up in chair, appropriate, states does not remember first few days.  2/24:  Lungs improving with increased breath sounds on left.  Mentation remains clear.  States he could not control his urine.  Explained had UTI same as in lung.    Review of Systems:  Review of Systems   Constitutional: Positive for malaise/fatigue. Negative for fever, chills and diaphoresis.   HENT: Negative for congestion and sore throat.    Eyes: Negative for pain and discharge.   Respiratory: Positive for cough. Negative for hemoptysis, sputum production, shortness of breath and wheezing.    Cardiovascular: Negative for chest pain, palpitations, claudication and leg swelling.   Gastrointestinal: Negative for nausea, vomiting, abdominal pain, diarrhea, constipation and melena.   Genitourinary: Negative for dysuria, urgency and frequency.   Musculoskeletal: Negative for myalgias, back pain, joint pain and neck pain.   Skin: Negative for itching and rash.   Neurological: Positive for weakness. Negative for dizziness, sensory change, speech change, focal weakness, loss of consciousness and  headaches.   Endo/Heme/Allergies: Does not bruise/bleed easily.   Psychiatric/Behavioral: Negative for depression, suicidal ideas and substance abuse.       Physical Exam:  Physical Exam   Constitutional: He is oriented to person, place, and time. He appears well-developed and well-nourished. No distress.   HENT:   Head: Normocephalic and atraumatic.   Mouth/Throat: Oropharynx is clear and moist. No oropharyngeal exudate.   Eyes: Conjunctivae and EOM are normal. Pupils are equal, round, and reactive to light. Right eye exhibits no discharge. Left eye exhibits no discharge. No scleral icterus.   Neck: Normal range of motion. Neck supple. No JVD present. No tracheal deviation present. No thyromegaly present.   Cardiovascular: Normal rate, regular rhythm and normal heart sounds.  Exam reveals no gallop and no friction rub.    No murmur heard.  Pulmonary/Chest: Effort normal and breath sounds normal. No respiratory distress. He has no wheezes. He has no rales. He exhibits no tenderness.   On 4 lpm NC, desats to 60s off O2.   Abdominal: Soft. Bowel sounds are normal. He exhibits no distension and no mass. There is no tenderness. There is no rebound and no guarding.   Musculoskeletal: Normal range of motion. He exhibits no edema or tenderness.   Lymphadenopathy:     He has no cervical adenopathy.   Neurological: He is alert and oriented to person, place, and time. No cranial nerve deficit. He exhibits normal muscle tone.   Skin: Skin is warm and dry. No rash noted. He is not diaphoretic. No erythema.   Psychiatric: He has a normal mood and affect. His behavior is normal. Judgment and thought content normal.   Nursing note and vitals reviewed.      Labs:        Invalid input(s): [id number]  Recent Labs      02/23/17   1856  02/24/17   0102  02/24/17   0702   TROPONINI  0.03  0.04  0.03     Recent Labs      02/22/17   0339  02/23/17   0825  02/24/17   0102   SODIUM  144  140  143   POTASSIUM  4.3  4.5  4.0   CHLORIDE   103  101  101   CO2  38*  33  38*   BUN  18  21  20   CREATININE  0.74  0.64  0.58   CALCIUM  9.1  9.2  9.2     Recent Labs      17   0339  17   0817   0102   GLUCOSE  119*  171*  118*     Recent Labs      17   01317   0817   0102   RBC  3.66*  4.13*  3.67*   HEMOGLOBIN  11.6*  13.1*  11.8*   HEMATOCRIT  38.6*  43.3  38.0*   PLATELETCT  135*  142*  144*   PROTHROMBTM   --   13.5   --    INR   --   1.00   --    IRON  31*   --    --    TOTIRONBC  248*   --    --      Recent Labs      17   010   WBC  9.2  9.2  7.6   NEUTSPOLYS  77.70*  79.00*  73.60*   LYMPHOCYTES  11.30*  12.40*  16.00*   MONOCYTES  9.80  6.80  8.70   EOSINOPHILS  0.70  1.10  1.00   BASOPHILS  0.20  0.30  0.40           Hemodynamics:  Temp (24hrs), Av.7 °C (98.1 °F), Min:36.4 °C (97.6 °F), Max:37.1 °C (98.8 °F)  Temperature: 36.6 °C (97.8 °F)  Pulse  Av.4  Min: 47  Max: 97   Blood Pressure : 143/70 mmHg     Respiratory:    Respiration: 18, Pulse Oximetry: 92 %, O2 Daily Delivery Respiratory : Silicone Nasal Cannula     Given By:: Mask, #MDI/DPI Given: MDI/DPI x 2, Given By:: Mouthpiece, Work Of Breathing / Effort: Mild  RUL Breath Sounds: Diminished, RML Breath Sounds: Diminished, RLL Breath Sounds: Diminished, DEBI Breath Sounds: Diminished, LLL Breath Sounds: Diminished  Fluids:    Intake/Output Summary (Last 24 hours) at 17 5778  Last data filed at 17 1600   Gross per 24 hour   Intake    920 ml   Output   1275 ml   Net   -355 ml     Weight: 59.8 kg (131 lb 13.4 oz)  GI/Nutrition:  Orders Placed This Encounter   Procedures   • DIET ORDER     Standing Status: Standing      Number of Occurrences: 1      Standing Expiration Date:      Order Specific Question:  Diet:     Answer:  Diabetic [3]      Comments:  1-1 nursing supervision     Order Specific Question:  Texture/Fiber modifications:     Answer:  Dysphagia 1(Pureed)specify fluid  consistency(question 6) [1]     Order Specific Question:  Consistency/Fluid modifications:     Answer:  Nectar Thick [2]     Medical Decision Making, by Problem:  Active Hospital Problems    Diagnosis   • Respiratory failure (CMS-HCC) [J96.90]  Had been up to 7 LPM NC 2/20.  Now at 4 LPM NC  2/2 LLL pneumonia/opacification.  CXR with some improvement seen, but remains with left sided near white out.     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  Consolidation seen on CTA chest  mucomist nebs ordered   Ordered PEP therapy to open up airway  Review of last admission:  4/2016 s/p bronchoscopy with Dr. Phillips with no mass, thick mucus, cultures negative.     • Chronic respiratory failure with hypoxia (CMS-HCC) [J96.11]  Mild improvement in O2 requirement back to 4 LPM Nc  Baseline 3 LPM NC     • Oxygen dependent [Z99.81]  Baseline 3 LPM NC 24/7.     • DM type 2, controlled, with complication (CMS-HCC) [E11.8]  SSI  accuchecks  Diabetic diet  Dysphagia 1 NTFL   • Left hemiparesis (CMS-HCC) [G81.94]  2/2 prior CVA     • Dysphagia status post cerebrovascular accident [I69.391]  SLP rec dysphagia 1 NT diet.     • COPD (chronic obstructive pulmonary disease) ([facility]) [J44.9]  Nebs  RT protocol     • GERD (gastroesophageal reflux disease) [K21.9]  Continue home pepcid  Especially with aspiration risk.    Abnormal UA:  Wbc 100-150 wbc  UC with ESBL Ecoli  On tygacil x7 days.    Iron deficiency anemia:  Replaced iron daily with vit C.    Nausea:  1 time episode  Added zofran prn  Lactobacillus tid.     FC per niece:  Code reviewed by admitting physician.  PT/OT/SNF orders  placed.  Family does not want to go back to [facility].    Loya catheter: No Loya      DVT Prophylaxis: Heparin    Ulcer prophylaxis: Yes  Antibiotics: Treating active infection/contamination beyond 24 hours perioperative coverage  Assessed for rehab: Patient was assess for and/or received rehabilitation services during this hospitalization

## 2017-02-25 NOTE — CARE PLAN
Problem: Respiratory:  Goal: Respiratory status will improve  Outcome: PROGRESSING SLOWER THAN EXPECTED    Problem: Skin Integrity  Goal: Risk for impaired skin integrity will decrease  Outcome: PROGRESSING AS EXPECTED

## 2017-02-25 NOTE — PROGRESS NOTES
Pt resting in bed at this time, even visible chest rise, no apparent signs of distress, suction and continuous pulse ox at bedside, bed alarm on, call light in place, Q2 turns, bed in low and locked position.

## 2017-02-25 NOTE — PROGRESS NOTES
Bed side report given. Assumed pt care. Isolation precautions in place.  Pt does not appear to be in distress. Bed in low position and call light with in reach. Will continue to monitor.

## 2017-02-25 NOTE — PROGRESS NOTES
Pt resting in bed at this time, even visible chest rise, no apparent signs of distress, bed alarm on, call light in place, Q2 turns, bed in low and locked position.

## 2017-02-25 NOTE — PROGRESS NOTES
Pulmonary Critical Care Progress Note        Chief Complaint: sob    History of Present Illness:  The patient is an 81-year-old man.  The patient    is well known to us from a hospitalization in April of 2016 when he was    hospitalized for treatment of left lung pneumonia.  We had seen him even    before that in 2014 when he was hospitalized for acute on chronic respiratory    failure and at that time, he had a possible right-sided pneumonia in addition    to hypercarbic, hypoxemic respiratory failure secondary to COPD.  Other    problems included hypertension, pulmonary hypertension, seizure disorder,    gastroesophageal reflux disease, prior stroke with left-sided weakness,    depression, dyslipidemia, osteoarthritis, and chronic pain, and 2 pack a day    smoker for 60 years, quit in 2003.  The patient ended up undergoing a    bronchoscopy at that time with bronchoalveolar lavage.  He was found to have    copious thick purulent secretions, most notably in the left lower lobe, but no   endobronchial lesion or obstruction was identified and there is a    consideration that the patient might be chronically aspirating.  His pathology   from this examination revealed no evidence of malignancy and his    bronchoalveolar lavage fluid was negative culture wise.     The patient had recently been discharged from the skilled nursing facility    after a pelvic fracture.  His caregiver brought him into the ER with shortness   of breath and fevers.  He was found to be hypoxic and placed on OxyMask.     Workup revealed new left lung pneumonia.  ROS:  Respiratory: negative, Cardiac: negative, GI: negative.  All other systems negative.    Interval Events:  24 hour interval history reviewed    2/23 - on 4 lpm, better and more conversant today. ESBL + sputum, Tygacil initiated by ID.    2/24 - had a good night, dysphagia 2 diet, SR with PACs and PVCs, labs reviewed and are similar    2/25 - had a good night he says, still on  5lpm, SR with PVCs, labs similar with increasing bicarb, likely compensating for resp acidosis better, check f/u cxr in the AM    PFSH:  No change.    Respiratory:     Pulse Oximetry: 94 %  Chest Tube Drains:          Exam: unlabored respirations, no intercostal retractions or accessory muscle use  ImagingAvailable data reviewed         Invalid input(s): [id number]    HemoDynamics:  Pulse: 77  Blood Pressure : 146/69 mmHg       Exam: regular rate and rhythm  Imagin/23 CXR - similar cxr with diffuse left infiltrates    Recent Labs      17   1856  17   0102  17   0702   TROPONINI  0.03  0.04  0.03       Neuro:  GCS         Exam: no focal deficits noted  Imaging: Available data reviewed    Fluids:  Intake/Output       17 0700 - 17 0659 17 0700 - 17 0659 17 0700 - 17 0659      [id number] Total [id number] 0477-3697 Total 8191-0346 6489-3730 Total       Intake    P.O.  460  100 560  360  120 480  --  -- --    P.O. 460 100 560 360 120 480 -- -- --    I.V.  450  -- 450  --  200 200  --  -- --    IV Volume (Meropenum) 100 -- 100 -- -- -- -- -- --    IV Volume (Tigecycline) 100 -- 100 -- 100 100 -- -- --    IV Piggyback Volume (Keppra) 250 -- 250 -- 100 100 -- -- --    Total Intake  360 320 680 -- -- --       Output    Urine  425  300 725  550  -- 550  --  -- --    Condom Catheter 425 -- 425 -- -- -- -- -- --    Number of Times Voided 1 x -- 1 x 1 x -- 1 x -- -- --    Number of Times Incontinent of Urine -- -- -- -- 2 x 2 x -- -- --    Void (ml) -- 300 300 550 -- 550 -- -- --    Stool  --  -- --  --  -- --  --  -- --    Number of Times Stooled -- -- -- 2 x -- 2 x -- -- --    Total Output 425 300 611 550 -- 550 -- -- --       Net I/O     485 -200 285 -190 320 130 -- -- --        Weight: 65.7 kg (144 lb 13.5 oz)  Recent Labs      17   0825  17   0102  17   0313   SODIUM  140  143  144   POTASSIUM  4.5  4.0  4.3   CHLORIDE  101   101  101   CO2  33  38*  40*   BUN    23*   CREATININE  0.64  0.58  0.64   CALCIUM  9.2  9.2  8.9       GI/Nutrition:  Exam: abdomen is soft and non-tender  Imaging: Available data reviewed  taking PO  Liver Function  Recent Labs      17   0817   GLUCOSE  171*  118*  109*       Heme:  Recent Labs      17   0817   RBC  4.13*  3.67*  3.95*   HEMOGLOBIN  13.1*  11.8*  12.5*   HEMATOCRIT  43.3  38.0*  41.4*   PLATELETCT  142*  144*  162*   PROTHROMBTM  13.5   --    --    INR  1.00   --    --        Infectious Disease:  Temp  Av.7 °C (98 °F)  Min: 36.3 °C (97.3 °F)  Max: 37.1 °C (98.7 °F)  Micro: reviewed    -  ESBL + urine amd sputum   - MRSA nares  Recent Labs      17   WBC  9.2  7.6  7.5   NEUTSPOLYS  79.00*  73.60*  72.70*   LYMPHOCYTES  12.40*  16.00*  16.20*   MONOCYTES  6.80  8.70  9.40   EOSINOPHILS  1.10  1.00  0.90   BASOPHILS  0.30  0.40  0.40     Current Facility-Administered Medications   Medication Dose Frequency Provider Last Rate Last Dose   • ondansetron (ZOFRAN) syringe/vial injection 4 mg  4 mg Q4HRS PRN Daya Ashley M.D.   4 mg at 17 1237   • ondansetron (ZOFRAN ODT) dispertab 4 mg  4 mg Q4HRS PRN Daya Ashley M.D.       • lactobacillus granules (LACTINEX/FLORANEX) packet 1 Packet  1 Packet TID WITH MEALS Daya Ashley M.D.   1 Packet at 17 3232   • albuterol (PROVENTIL) 2.5mg/3ml nebulizer solution 2.5 mg  2.5 mg 4X/DAY (RT) Vinny Guan M.D.   2.5 mg at 17 0739   • tigecycline (TYGACIL) 50 mg in NS 50 mL IVPB  50 mg Q12HRS Jessie Varghese M.D.   Stopped at 17 2220   • ascorbic acid (ascorbic acid) tablet 500 mg  500 mg DAILY Daya Ashley M.D.   500 mg at 17 0842   • budesonide-formoterol (SYMBICORT) 160-4.5 MCG/ACT inhaler 2 Puff  2 Puff BID Vinny Guan M.D.   2 Puff at 17 0739   •  acetylcysteine (MUCOMYST) 20 % solution 3 mL  3 mL 4X/DAY (RT) Vinny Guan M.D.   3 mL at 02/25/17 0739   • insulin lispro (HUMALOG) injection 1-6 Units  1-6 Units 4X/DAY GIDEON Ashley M.D.   Stopped at 02/24/17 2100   • glucose 4 g chewable tablet 16 g  16 g Q15 MIN PRN Daya Ashley M.D.        And   • dextrose 50% (D50W) injection 25 mL  25 mL Q15 MIN PRN Daya Ashley M.D.       • docusate sodium (COLACE) capsule 100 mg  100 mg BID Daya Ashley M.D.   100 mg at 02/24/17 0842   • aspirin EC (ECOTRIN) tablet 81 mg  81 mg DAILY Heath Leigh M.D.   81 mg at 02/24/17 0842   • atorvastatin (LIPITOR) tablet 80 mg  80 mg Q EVENING Heath Leigh M.D.   80 mg at 02/24/17 2042   • famotidine (PEPCID) tablet 20 mg  20 mg BID Heath Leigh M.D.   20 mg at 02/24/17 2042   • ferrous sulfate tablet 325 mg  325 mg DAILY Heath Leigh M.D.   325 mg at 02/24/17 0842   • finasteride (PROSCAR) tablet 5 mg  5 mg DAILY Heath Leigh M.D.   5 mg at 02/24/17 0841   • gabapentin (NEURONTIN) capsule 300 mg  300 mg DAILY Heath Leigh M.D.   300 mg at 02/24/17 0842   • metoprolol (LOPRESSOR) tablet 25 mg  25 mg BID Heath Leigh M.D.   25 mg at 02/24/17 2042   • oxybutynin SR (DITROPAN-XL) tablet 5 mg  5 mg DAILY Heath Leigh M.D.   5 mg at 02/24/17 0941   • sertraline (ZOLOFT) tablet 100 mg  100 mg DAILY Heath Leigh M.D.   100 mg at 02/24/17 0841   • tamsulosin (FLOMAX) capsule 0.4 mg  0.4 mg AFTER BREAKFAST Heath Leigh M.D.   0.4 mg at 02/24/17 0841   • tiotropium (SPIRIVA) 18 MCG inhalation capsule 1 Cap  1 Cap DAILY Heath Leigh M.D.   1 Cap at 02/25/17 0739   • trazodone (DESYREL) tablet 50 mg  50 mg QHS PRN Heath Leigh M.D.   50 mg at 02/23/17 2303   • NS infusion 1,971 mL  30 mL/kg Once PRN Heath Leigh M.D.       • docusate sodium (COLACE) capsule 100 mg  100 mg BID Heath Leigh M.D.   100 mg at 02/24/17 2043   • senna-docusate (PERICOLACE or SENOKOT S) 8.6-50 MG per tablet 1 Tab  1 Tab Nightly Heath Leigh M.D.   1 Tab at 02/24/17 2042    • senna-docusate (PERICOLACE or SENOKOT S) 8.6-50 MG per tablet 1 Tab  1 Tab Q24HRS PRN Heath Leigh M.D.       • lactulose 20 GM/30ML solution 30 mL  30 mL Q24HRS PRN Heath Leigh M.D.   30 mL at 02/23/17 0933   • bisacodyl (DULCOLAX) suppository 10 mg  10 mg Q24HRS PRN Heath Leigh M.D.       • fleet enema 133 mL  1 Each Once PRN Heath Leigh M.D.       • Respiratory Care per Protocol   Continuous RT Heath Leigh M.D.       • NS (BOLUS) infusion 500 mL  500 mL Once PRN Heath Leigh M.D.       • heparin injection 5,000 Units  5,000 Units Q8HRS Heath Leigh M.D.   5,000 Units at 02/25/17 0518   • levetiracetam (KEPPRA) 1,500 mg in D5W 100 mL IVPB  1,500 mg BID Heath Leigh M.D.   Stopped at 02/24/17 2107     Last reviewed on 2/20/2017  3:33 PM by Mercedes Orona ANNA    Quality  Measures:  Radiology images reviewed, Medications reviewed, Labs reviewed and EKG reviewed                      Problems:    1.  Healthcare-facility associated pneumonia.  2.  Respiratory failure, hypercarbic and hypoxemic.  3.  Chronic obstructive pulmonary disease.  4.  Prior history of pneumonia, query aspiration.  5.  History of left-sided weakness.  6.  Hypertension.  7.  Diabetes.  8.  Gastroesophageal reflux.  9.  Depression.  10.  Dyslipidemia.  11.  History of seizure disorder.  12.  History of multiple surgeries as noted above.  13. Likely aspiration  14. Anemia, macrocytic  15. + ESBL sputum and urine  16. MRSA + nares      Plan:  Continue O2, RT, PEP, IS  Antiinfectives per ID, Tygacil  Dysphagia 2 diet

## 2017-02-25 NOTE — CARE PLAN
Problem: Urinary Elimination:  Goal: Ability to reestablish a normal urinary elimination pattern will improve  Outcome: PROGRESSING AS EXPECTED  Urinary incontinence medications administered. Pt offered voiding schedule q2h. Assistance of urinal offered on schedule and PRN.    Intervention: Encourage scheduled voiding  Pt offered urinal q2h.

## 2017-02-25 NOTE — PROGRESS NOTES
Infectious Disease Progress Note    Author: Jessie Varghese M.D.    Date of service & Time created: 2017  1:00 PM        Chief Complaint   Patient presents with   • Shortness of Breath     SAT's in 80's at home, given NPPB Tx X 3 with improvement.     Pneumonia  uti  Interval History:  81 year old male with recent d/c from the nursing home came in worsening sob and fevers  - says he is feeling slightly better. No fevers.   - no fevers. Feels better. On dysphagia 1 diet.   - no fevers. Much more lethargic  Labs Reviewed, Medications Reviewed and Radiology Reviewed.    Review of Systems:  Review of Systems   Unable to perform ROS: mental status change   Constitutional: Negative for fever.   Respiratory: Positive for cough and shortness of breath.         Slightly better   Cardiovascular: Negative for chest pain and leg swelling.   Skin: Negative for rash.   Neurological: Negative for headaches.       Hemodynamics:  Temp (24hrs), Av.7 °C (98.1 °F), Min:36.3 °C (97.3 °F), Max:37.1 °C (98.7 °F)  Temperature: 36.9 °C (98.4 °F)  Pulse  Av.5  Min: 47  Max: 97   Blood Pressure : 156/77 mmHg       Physical Exam:  Physical Exam   Constitutional: No distress.   Elderly male   More lethargic   Eyes: No scleral icterus.   Neck: Neck supple.   Cardiovascular:   Irregular    Pulmonary/Chest: He has rales.   Abdominal: Soft. There is no tenderness. There is no rebound.   Genitourinary:   Loya in   Musculoskeletal: He exhibits no edema.   Skin: No erythema.   Vitals reviewed.      Labs:  Recent Labs      17   0826  17   0102  17   0313   WBC  9.2  7.6  7.5   RBC  4.13*  3.67*  3.95*   HEMOGLOBIN  13.1*  11.8*  12.5*   HEMATOCRIT  43.3  38.0*  41.4*   MCV  104.8*  103.5*  104.8*   MCH  31.7  32.2  31.6   RDW  52.0*  50.7*  51.2*   PLATELETCT  142*  144*  162*   MPV  11.1  10.5  11.5   NEUTSPOLYS  79.00*  73.60*  72.70*   LYMPHOCYTES  12.40*  16.00*  16.20*   MONOCYTES  6.80  8.70  9.40    EOSINOPHILS  1.10  1.00  0.90   BASOPHILS  0.30  0.40  0.40     Recent Labs      02/23/17   0825  02/24/17   0102  02/25/17   0313   SODIUM  140  143  144   POTASSIUM  4.5  4.0  4.3   CHLORIDE  101  101  101   CO2  33  38*  40*   GLUCOSE  171*  118*  109*   BUN  21  20  23*     Recent Labs      02/23/17   0825  02/24/17   0102  02/25/17   0313   CREATININE  0.64  0.58  0.64        Imaging:  cxr 2/23  1.  Extensive left pulmonary infiltrates and/or layering effusion. Overall stable.  2.  Cardiomegaly  3.  Atherosclerosis      Medications:  Current Facility-Administered Medications   Medication Dose Frequency Provider Last Rate Last Dose   • ondansetron (ZOFRAN) syringe/vial injection 4 mg  4 mg Q4HRS PRN Daya Ashley M.D.   4 mg at 02/24/17 1237   • ondansetron (ZOFRAN ODT) dispertab 4 mg  4 mg Q4HRS PRN Daya Ashley M.D.       • lactobacillus granules (LACTINEX/FLORANEX) packet 1 Packet  1 Packet TID WITH MEALS Daya Ashley M.D.   1 Packet at 02/25/17 1119   • albuterol (PROVENTIL) 2.5mg/3ml nebulizer solution 2.5 mg  2.5 mg 4X/DAY (RT) Vinny Guan M.D.   2.5 mg at 02/25/17 1118   • tigecycline (TYGACIL) 50 mg in NS 50 mL IVPB  50 mg Q12HRS Jessie Varghese M.D.   Stopped at 02/25/17 1015   • ascorbic acid (ascorbic acid) tablet 500 mg  500 mg DAILY Daya Ashley M.D.   500 mg at 02/25/17 0844   • budesonide-formoterol (SYMBICORT) 160-4.5 MCG/ACT inhaler 2 Puff  2 Puff BID Vinny Guan M.D.   2 Puff at 02/25/17 0739   • acetylcysteine (MUCOMYST) 20 % solution 3 mL  3 mL 4X/DAY (RT) Vinny Guan M.D.   3 mL at 02/25/17 1118   • insulin lispro (HUMALOG) injection 1-6 Units  1-6 Units 4X/DAY ACHS Daya Ashley M.D.   Stopped at 02/24/17 2100   • glucose 4 g chewable tablet 16 g  16 g Q15 MIN PRN Daya Ashley M.D.        And   • dextrose 50% (D50W) injection 25 mL  25 mL Q15 MIN PRN Daya Ashley M.D.       • docusate sodium (COLACE) capsule 100 mg  100 mg BID Daya HARTMANN  TATYANA Ashley   100 mg at 02/25/17 0843   • aspirin EC (ECOTRIN) tablet 81 mg  81 mg DAILY Heath Leigh M.D.   81 mg at 02/25/17 0843   • atorvastatin (LIPITOR) tablet 80 mg  80 mg Q EVENING Heath Leigh M.D.   80 mg at 02/24/17 2042   • famotidine (PEPCID) tablet 20 mg  20 mg BID Heath Leigh M.D.   20 mg at 02/25/17 0844   • ferrous sulfate tablet 325 mg  325 mg DAILY Heath Leigh M.D.   325 mg at 02/25/17 0844   • finasteride (PROSCAR) tablet 5 mg  5 mg DAILY Heath Leigh M.D.   5 mg at 02/25/17 0843   • gabapentin (NEURONTIN) capsule 300 mg  300 mg DAILY Heath Leigh M.D.   300 mg at 02/25/17 0843   • metoprolol (LOPRESSOR) tablet 25 mg  25 mg BID Heath Leigh M.D.   25 mg at 02/25/17 0843   • oxybutynin SR (DITROPAN-XL) tablet 5 mg  5 mg DAILY Heath Leigh M.D.   5 mg at 02/25/17 0844   • sertraline (ZOLOFT) tablet 100 mg  100 mg DAILY Heath Leigh M.D.   100 mg at 02/25/17 0843   • tamsulosin (FLOMAX) capsule 0.4 mg  0.4 mg AFTER BREAKFAST Heath Leigh M.D.   0.4 mg at 02/25/17 0843   • tiotropium (SPIRIVA) 18 MCG inhalation capsule 1 Cap  1 Cap DAILY Heath Leigh M.D.   1 Cap at 02/25/17 0739   • trazodone (DESYREL) tablet 50 mg  50 mg QHS PRN Heath Leigh M.D.   50 mg at 02/23/17 2303   • NS infusion 1,971 mL  30 mL/kg Once PRN Heath Leigh M.D.       • docusate sodium (COLACE) capsule 100 mg  100 mg BID Heath Leigh M.D.   Stopped at 02/25/17 0900   • senna-docusate (PERICOLACE or SENOKOT S) 8.6-50 MG per tablet 1 Tab  1 Tab Nightly Heath Leigh M.D.   1 Tab at 02/24/17 2042   • senna-docusate (PERICOLACE or SENOKOT S) 8.6-50 MG per tablet 1 Tab  1 Tab Q24HRS PRN Heath Leigh M.D.       • lactulose 20 GM/30ML solution 30 mL  30 mL Q24HRS PRN Heath Leigh M.D.   30 mL at 02/23/17 0933   • bisacodyl (DULCOLAX) suppository 10 mg  10 mg Q24HRS PRN Heath Leigh M.D.       • fleet enema 133 mL  1 Each Once PRN Heath Leigh M.D.       • Respiratory Care per Protocol   Continuous RT Heath Leigh M.D.       • NS (BOLUS) infusion 500 mL  500 mL Once PRN  Heath Leigh M.D.       • heparin injection 5,000 Units  5,000 Units Q8HRS Heath Leigh M.D.   5,000 Units at 02/25/17 0518   • levetiracetam (KEPPRA) 1,500 mg in D5W 100 mL IVPB  1,500 mg BID Heath Leigh M.D.   Stopped at 02/25/17 1000     Last reviewed on 2/20/2017  3:33 PM by Evelin Saavedra    Micro:  Results     Procedure Component Value Units Date/Time    URINE CULTURE-EXISTING-LESS THAN 48 HOURS [[id number]]  (Abnormal)  (Susceptibility) Collected:  02/20/17 2012    Order Status:  Completed Specimen Information:  Urine from Urine, Clean Catch Updated:  02/23/17 0849     Significant Indicator POS (POS)      Source UR      Site URINE, CLEAN CATCH      Urine Culture Mixed skin josé miguel <10,000 cfu/mL (A)      Urine Culture -- (A)      Result:        Escherichia coli ESBL  ,000 cfu/mL  Extended Spectrum Beta-lactamase (ESBL) isolated.  ESBL's may be clinically resistant to therapy with  Penicillins,Cephalosporins or Aztreonam despite  apparent in vitro susceptibility to some of these agents.  The patient requires contact isolation.  Please contact pharmacy or an Infectious Disease Specialist  if you have any questions about appropriate therapy.      Narrative:      CALL  Garza  171 tel. [phone],  Droplet,Contact  Indication for culture:->Emergency Room Patient  ** had + UA in ER 2/20, please culture.    Culture & Susceptibility     ESCHERICHIA COLI ESBL     Antibiotic Sensitivity Microscan Unit Status    Ampicillin Resistant >16 mcg/mL Final    Cefepime Resistant >16 mcg/mL Final    Cefotaxime Extended Spectrum Beta Lactamase >32 mcg/mL Final    Cefotetan Sensitive <=16 mcg/mL Final    Ceftazidime Extended Spectrum Beta Lactamase 4 mcg/mL Final    Ceftriaxone Extended Spectrum Beta Lactamase >32 mcg/mL Final    Cefuroxime Resistant >16 mcg/mL Final    Cephalothin Resistant >16 mcg/mL Final    Ciprofloxacin Resistant >2 mcg/mL Final    Gentamicin Resistant >8 mcg/mL Final    Levofloxacin Intermediate 4 mcg/mL  Final    Nitrofurantoin Intermediate 64 mcg/mL Final    Pip/Tazobactam Sensitive <=16 mcg/mL Final    Piperacillin Resistant >64 mcg/mL Final    Tigecycline Sensitive <=2 mcg/mL Final    Tobramycin Resistant >8 mcg/mL Final    Trimeth/Sulfa Resistant >2/38 mcg/mL Final                       Culture Respiratory W/ GRM STN [[id number]]  (Abnormal)  (Susceptibility) Collected:  02/20/17 1736    Order Status:  Completed Specimen Information:  Respirate from Sputum Updated:  02/22/17 0850     Gram Stain Result --      Result:        Moderate WBCs.  Rare epithelial cells.  Rare mixed bacteria, no predominant organism seen.  Specimen Quality Score: 3+       Significant Indicator POS (POS)      Source RESP      Site SPUTUM      Respiratory Culture        Result:        Light growth usual upper respiratory josé miguel (A)     Respiratory Culture -- (A)      Result:        Escherichia coli ESBL  Light growth  Extended Spectrum Beta-lactamase (ESBL) isolated.  ESBL's may be clinically resistant to therapy with  Penicillins,Cephalosporins or Aztreonam despite  apparent in vitro susceptibility to some of these agents.  The patient requires contact isolation.  Please contact pharmacy or an Infectious Disease Specialist  if you have any questions about appropriate therapy.      Narrative:      CALL  Garza  171 tel. [phone],  CALLED  171 tel. [phone] 02/22/2017, 08:49, RB PERF. RESULTS CALLED  TO:[id number] Rn, Inf C  If not done within the last 24 hours  Sputum cultures, induced if needed. If not done within the  last 24 hours  Send central line distal (brown) port venous blood gas    Culture & Susceptibility     ESCHERICHIA COLI ESBL     Antibiotic Sensitivity Microscan Unit Status    Ampicillin Resistant >16 mcg/mL Final    Cefepime Resistant >16 mcg/mL Final    Cefotaxime Extended Spectrum Beta Lactamase >32 mcg/mL Final    Cefotetan Sensitive <=16 mcg/mL Final    Ceftazidime Extended Spectrum Beta Lactamase >16 mcg/mL Final     Ceftriaxone Extended Spectrum Beta Lactamase >32 mcg/mL Final    Cefuroxime Resistant >16 mcg/mL Final    Ciprofloxacin Resistant >2 mcg/mL Final    Ertapenem Sensitive <=1 mcg/mL Final    Gentamicin Resistant >8 mcg/mL Final    Pip/Tazobactam Sensitive <=16 mcg/mL Final    Tigecycline Sensitive <=2 mcg/mL Final    Tobramycin Resistant >8 mcg/mL Final    Trimeth/Sulfa Resistant >2/38 mcg/mL Final                       MRSA BY PCR (AMP) [[id number]]  (Abnormal) Collected:  02/21/17 0122    Order Status:  Completed Specimen Information:  Respirate from Nares Updated:  02/21/17 1424     Significant Indicator POS (POS)      Source RESP      Site NARES      MRSA PCR POSITIVE for MRSA by PCR. (A)     Narrative:      CALL  Garza  171 tel. [phone],  CALLED  171 tel. [phone] 02/21/2017, 14:24, RB PERF. RESULTS CALLED TO:  [id number]  Collected By:[id number] BEVERLY LOMBARDO    GRAM STAIN [[id number]] Collected:  02/20/17 1736    Order Status:  Completed Specimen Information:  Respirate Updated:  02/21/17 1010     Significant Indicator .      Source RESP      Site SPUTUM      Gram Stain Result --      Result:        Moderate WBCs.  Rare epithelial cells.  Rare mixed bacteria, no predominant organism seen.  Specimen Quality Score: 3+      Narrative:      If not done within the last 24 hours  Sputum cultures, induced if needed. If not done within the  last 24 hours  Send central line distal (brown) port venous blood gas    BLOOD CULTURE x2 [[id number]] Collected:  02/20/17 1400    Order Status:  Completed Specimen Information:  Blood from Peripheral Updated:  02/21/17 0759     Significant Indicator NEG      Source BLD      Site PERIPHERAL      Blood Culture --      Result:        No Growth    Note: Blood cultures are incubated for 5 days and  are monitored continuously.Positive blood cultures  are called to the RN and reported as soon as  they are identified.      Narrative:      Per Hospital Policy: Only change Specimen Src: to  "\"Line\" if  specified by physician order.    BLOOD CULTURE x2 [[id number]] Collected:  02/20/17 1400    Order Status:  Completed Specimen Information:  Blood from Peripheral Updated:  02/21/17 0759     Significant Indicator NEG      Source BLD      Site PERIPHERAL      Blood Culture --      Result:        No Growth    Note: Blood cultures are incubated for 5 days and  are monitored continuously.Positive blood cultures  are called to the RN and reported as soon as  they are identified.      Narrative:      Per Hospital Policy: Only change Specimen Src: to \"Line\" if  specified by physician order.    URINALYSIS [[id number]]  (Abnormal) Collected:  02/20/17 2012    Order Status:  Completed Specimen Information:  Urine from Urine, Clean Catch Updated:  02/20/17 2050     Micro Urine Req Microscopic      Color Yellow      Character Sl Cloudy (A)      Specific Gravity 1.024      Ph 5.5      Glucose Negative mg/dL      Ketones Trace (A) mg/dL      Protein 30 (A) mg/dL      Bilirubin Negative      Nitrite Positive (A)      Leukocyte Esterase Large (A)      Occult Blood Trace (A)     Narrative:      Collected By:[id number] BEVERLY LOMBARDO    Blood Culture [[id number]] Collected:  02/20/17 0000    Order Status:  Canceled Specimen Information:  Other from Peripheral     Narrative:      ORDER WAS CANCELLED 02/20/2017 17:58, Duplicate . 02/20/2017  17:59.  From different peripheral sites, if not done within the last  24 hours (Per Hospital Policy: Only change specimen source to  \"Line\" if specified by physician order)    Blood Culture [[id number]] Collected:  02/20/17 0000    Order Status:  Canceled Specimen Information:  Other from Peripheral     Narrative:      ORDER WAS CANCELLED 02/20/2017 17:59, Duplicate . 02/20/2017  17:59.  From different peripheral sites, if not done within the last  24 hours (Per Hospital Policy: Only change specimen source to  \"Line\" if specified by physician order)    Urinalysis " [[id number]] Collected:  02/20/17 0000    Order Status:  Canceled Specimen Information:  Urine     Narrative:      171 tel. [phone] 02/22/2017, 08:49, RB PERF. RESULTS CALLED TO:[id number] Rn,  Inf C  TEST Urinalysis WAS CANCELLED, 02/24/17 01:05 Test autocancelled, not  collected or received  If not done within the last 24 hours  Sputum cultures, induced if needed. If not done within the  last 24 hours  Send central line distal (brown) port venous blood gas    FLUID CULTURE W/GRAM STAIN [[id number]] Collected:  02/20/17 0000    Order Status:  Canceled Specimen Information:  Other from Thoracentesis Fluid     Narrative:      TEST Fluid Culture w/Gram Stain WAS CANCELLED, 02/24/17 01:05 Test  autocancelled, not collected or received          Assessment:  Active Hospital Problems    Diagnosis   • Respiratory failure (CMS-HCC) [J96.90]   • Debility [R53.81]   • Multiple falls [R29.6]   • MRSA carrier [Z22.322]   • Sepsis (CMS-HCC) [A41.9]   • HCAP (healthcare-associated pneumonia) [J18.9]   • Chronic respiratory failure with hypoxia (CMS-HCC) [J96.11]   • Oxygen dependent [Z99.81]   • DM type 2, controlled, with 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  No overt s/s of aspiration noted on swallow eval  On dysphagia 1 diet      Prognosis    poor     Discussed with internal medicine and pulmonary

## 2017-02-25 NOTE — PROGRESS NOTES
Bedside report completed. Assumed pt care. Pt A&O 4, resting in bed comfortably with no signs of labored breathing on 4L O2 N.[place]. Pt tele monitor in place, cardiac rhythm being monitored. Pt call light within reach, bed in low position, upper bed rails up, non skid socks in place. Pt doesn't appear to be in any pain or distress at this time.  All fall precautions in place. Will continue to monitor. Pt currently on contact and droplet isolationf or ESPL and Ecoli in sputum and urine.

## 2017-02-26 ENCOUNTER — APPOINTMENT (OUTPATIENT)
Dept: RADIOLOGY | Facility: MEDICAL CENTER | Age: 82
DRG: 871 | End: 2017-02-26
Attending: INTERNAL MEDICINE
Payer: MEDICARE

## 2017-02-26 LAB
ANION GAP SERPL CALC-SCNC: 3 MMOL/L (ref 0–11.9)
BASOPHILS # BLD AUTO: 0.5 % (ref 0–1.8)
BASOPHILS # BLD: 0.05 K/UL (ref 0–0.12)
BUN SERPL-MCNC: 24 MG/DL (ref 8–22)
CALCIUM SERPL-MCNC: 9.3 MG/DL (ref 8.5–10.5)
CHLORIDE SERPL-SCNC: 98 MMOL/L (ref 96–112)
CO2 SERPL-SCNC: 42 MMOL/L (ref 20–33)
CREAT SERPL-MCNC: 0.68 MG/DL (ref 0.5–1.4)
EOSINOPHIL # BLD AUTO: 0.08 K/UL (ref 0–0.51)
EOSINOPHIL NFR BLD: 0.9 % (ref 0–6.9)
ERYTHROCYTE [DISTWIDTH] IN BLOOD BY AUTOMATED COUNT: 49.4 FL (ref 35.9–50)
GFR SERPL CREATININE-BSD FRML MDRD: >60 ML/MIN/1.73 M 2
GLUCOSE BLD-MCNC: 127 MG/DL (ref 65–99)
GLUCOSE BLD-MCNC: 136 MG/DL (ref 65–99)
GLUCOSE BLD-MCNC: 138 MG/DL (ref 65–99)
GLUCOSE BLD-MCNC: 159 MG/DL (ref 65–99)
GLUCOSE SERPL-MCNC: 113 MG/DL (ref 65–99)
HCT VFR BLD AUTO: 41.8 % (ref 42–52)
HGB BLD-MCNC: 12.7 G/DL (ref 14–18)
IMM GRANULOCYTES # BLD AUTO: 0.03 K/UL (ref 0–0.11)
IMM GRANULOCYTES NFR BLD AUTO: 0.3 % (ref 0–0.9)
LV EJECT FRACT  99904: 60
LV EJECT FRACT MOD 2C 99903: 44.42
LV EJECT FRACT MOD 4C 99902: 60.81
LV EJECT FRACT MOD BP 99901: 58.87
LYMPHOCYTES # BLD AUTO: 1.35 K/UL (ref 1–4.8)
LYMPHOCYTES NFR BLD: 14.7 % (ref 22–41)
MCH RBC QN AUTO: 31.1 PG (ref 27–33)
MCHC RBC AUTO-ENTMCNC: 30.4 G/DL (ref 33.7–35.3)
MCV RBC AUTO: 102.5 FL (ref 81.4–97.8)
MONOCYTES # BLD AUTO: 0.74 K/UL (ref 0–0.85)
MONOCYTES NFR BLD AUTO: 8.1 % (ref 0–13.4)
NEUTROPHILS # BLD AUTO: 6.92 K/UL (ref 1.82–7.42)
NEUTROPHILS NFR BLD: 75.5 % (ref 44–72)
NRBC # BLD AUTO: 0 K/UL
NRBC BLD AUTO-RTO: 0 /100 WBC
PLATELET # BLD AUTO: 161 K/UL (ref 164–446)
PMV BLD AUTO: 10.8 FL (ref 9–12.9)
POTASSIUM SERPL-SCNC: 4.4 MMOL/L (ref 3.6–5.5)
RBC # BLD AUTO: 4.08 M/UL (ref 4.7–6.1)
SODIUM SERPL-SCNC: 143 MMOL/L (ref 135–145)
WBC # BLD AUTO: 9.2 K/UL (ref 4.8–10.8)

## 2017-02-26 PROCEDURE — A9270 NON-COVERED ITEM OR SERVICE: HCPCS | Performed by: INTERNAL MEDICINE

## 2017-02-26 PROCEDURE — 700111 HCHG RX REV CODE 636 W/ 250 OVERRIDE (IP): Performed by: INTERNAL MEDICINE

## 2017-02-26 PROCEDURE — 700112 HCHG RX REV CODE 229: Performed by: HOSPITALIST

## 2017-02-26 PROCEDURE — 99233 SBSQ HOSP IP/OBS HIGH 50: CPT | Performed by: HOSPITALIST

## 2017-02-26 PROCEDURE — 80048 BASIC METABOLIC PNL TOTAL CA: CPT

## 2017-02-26 PROCEDURE — 85025 COMPLETE CBC W/AUTO DIFF WBC: CPT

## 2017-02-26 PROCEDURE — 700105 HCHG RX REV CODE 258: Performed by: INTERNAL MEDICINE

## 2017-02-26 PROCEDURE — 82962 GLUCOSE BLOOD TEST: CPT

## 2017-02-26 PROCEDURE — 700101 HCHG RX REV CODE 250: Performed by: INTERNAL MEDICINE

## 2017-02-26 PROCEDURE — 700111 HCHG RX REV CODE 636 W/ 250 OVERRIDE (IP): Performed by: HOSPITALIST

## 2017-02-26 PROCEDURE — 94640 AIRWAY INHALATION TREATMENT: CPT

## 2017-02-26 PROCEDURE — A9270 NON-COVERED ITEM OR SERVICE: HCPCS | Performed by: HOSPITALIST

## 2017-02-26 PROCEDURE — 700102 HCHG RX REV CODE 250 W/ 637 OVERRIDE(OP): Performed by: HOSPITALIST

## 2017-02-26 PROCEDURE — 94669 MECHANICAL CHEST WALL OSCILL: CPT

## 2017-02-26 PROCEDURE — 71010 DX-CHEST-PORTABLE (1 VIEW): CPT

## 2017-02-26 PROCEDURE — 94668 MNPJ CHEST WALL SBSQ: CPT

## 2017-02-26 PROCEDURE — 770020 HCHG ROOM/CARE - TELE (206)

## 2017-02-26 PROCEDURE — 700102 HCHG RX REV CODE 250 W/ 637 OVERRIDE(OP): Performed by: INTERNAL MEDICINE

## 2017-02-26 PROCEDURE — 36415 COLL VENOUS BLD VENIPUNCTURE: CPT

## 2017-02-26 RX ADMIN — HEPARIN SODIUM 5000 UNITS: 5000 INJECTION, SOLUTION INTRAVENOUS; SUBCUTANEOUS at 04:49

## 2017-02-26 RX ADMIN — ALBUTEROL SULFATE 2.5 MG: 2.5 SOLUTION RESPIRATORY (INHALATION) at 10:48

## 2017-02-26 RX ADMIN — METOPROLOL TARTRATE 25 MG: 25 TABLET, FILM COATED ORAL at 08:35

## 2017-02-26 RX ADMIN — ALBUTEROL SULFATE 2.5 MG: 2.5 SOLUTION RESPIRATORY (INHALATION) at 15:44

## 2017-02-26 RX ADMIN — ASPIRIN 81 MG: 81 TABLET ORAL at 08:33

## 2017-02-26 RX ADMIN — OXYBUTYNIN CHLORIDE 5 MG: 5 TABLET, FILM COATED, EXTENDED RELEASE ORAL at 08:35

## 2017-02-26 RX ADMIN — FINASTERIDE 5 MG: 5 TABLET, FILM COATED ORAL at 08:35

## 2017-02-26 RX ADMIN — FAMOTIDINE 20 MG: 20 TABLET, FILM COATED ORAL at 20:38

## 2017-02-26 RX ADMIN — LEVETIRACETAM 1500 MG: 500 TABLET, FILM COATED ORAL at 08:34

## 2017-02-26 RX ADMIN — SERTRALINE 100 MG: 100 TABLET, FILM COATED ORAL at 08:34

## 2017-02-26 RX ADMIN — ACETYLCYSTEINE 3 ML: 200 SOLUTION ORAL; RESPIRATORY (INHALATION) at 15:44

## 2017-02-26 RX ADMIN — LACTOBACILLUS ACIDOPHILUS / LACTOBACILLUS BULGARICUS 1 PACKET: 100 MILLION CFU STRENGTH GRANULES at 08:34

## 2017-02-26 RX ADMIN — ALBUTEROL SULFATE 2.5 MG: 2.5 SOLUTION RESPIRATORY (INHALATION) at 19:24

## 2017-02-26 RX ADMIN — FAMOTIDINE 20 MG: 20 TABLET, FILM COATED ORAL at 08:34

## 2017-02-26 RX ADMIN — DOCUSATE SODIUM 100 MG: 100 CAPSULE ORAL at 08:34

## 2017-02-26 RX ADMIN — ATORVASTATIN CALCIUM 80 MG: 80 TABLET, FILM COATED ORAL at 20:39

## 2017-02-26 RX ADMIN — STANDARDIZED SENNA CONCENTRATE AND DOCUSATE SODIUM 1 TABLET: 8.6; 5 TABLET, FILM COATED ORAL at 20:39

## 2017-02-26 RX ADMIN — BUDESONIDE AND FORMOTEROL FUMARATE DIHYDRATE 2 PUFF: 160; 4.5 AEROSOL RESPIRATORY (INHALATION) at 19:24

## 2017-02-26 RX ADMIN — BUDESONIDE AND FORMOTEROL FUMARATE DIHYDRATE 2 PUFF: 160; 4.5 AEROSOL RESPIRATORY (INHALATION) at 07:15

## 2017-02-26 RX ADMIN — Medication 325 MG: at 08:35

## 2017-02-26 RX ADMIN — ACETYLCYSTEINE 3 ML: 200 SOLUTION ORAL; RESPIRATORY (INHALATION) at 10:48

## 2017-02-26 RX ADMIN — ALBUTEROL SULFATE 2.5 MG: 2.5 SOLUTION RESPIRATORY (INHALATION) at 07:14

## 2017-02-26 RX ADMIN — GABAPENTIN 300 MG: 300 CAPSULE ORAL at 08:35

## 2017-02-26 RX ADMIN — HEPARIN SODIUM 5000 UNITS: 5000 INJECTION, SOLUTION INTRAVENOUS; SUBCUTANEOUS at 14:43

## 2017-02-26 RX ADMIN — FUROSEMIDE 40 MG: 10 INJECTION, SOLUTION INTRAVENOUS at 04:49

## 2017-02-26 RX ADMIN — TIOTROPIUM BROMIDE 1 CAPSULE: 18 CAPSULE ORAL; RESPIRATORY (INHALATION) at 07:15

## 2017-02-26 RX ADMIN — ACETYLCYSTEINE 3 ML: 200 SOLUTION ORAL; RESPIRATORY (INHALATION) at 19:24

## 2017-02-26 RX ADMIN — SODIUM CHLORIDE 50 MG: 9 INJECTION, SOLUTION INTRAVENOUS at 09:16

## 2017-02-26 RX ADMIN — LACTOBACILLUS ACIDOPHILUS / LACTOBACILLUS BULGARICUS 1 PACKET: 100 MILLION CFU STRENGTH GRANULES at 17:04

## 2017-02-26 RX ADMIN — ACETYLCYSTEINE 3 ML: 200 SOLUTION ORAL; RESPIRATORY (INHALATION) at 07:15

## 2017-02-26 RX ADMIN — METOPROLOL TARTRATE 25 MG: 25 TABLET, FILM COATED ORAL at 20:38

## 2017-02-26 RX ADMIN — DOCUSATE SODIUM 100 MG: 100 CAPSULE ORAL at 20:39

## 2017-02-26 RX ADMIN — ONDANSETRON 4 MG: 4 TABLET, ORALLY DISINTEGRATING ORAL at 09:16

## 2017-02-26 RX ADMIN — OXYCODONE HYDROCHLORIDE AND ACETAMINOPHEN 500 MG: 500 TABLET ORAL at 08:34

## 2017-02-26 RX ADMIN — SPIRONOLACTONE 25 MG: 25 TABLET, FILM COATED ORAL at 08:35

## 2017-02-26 RX ADMIN — FUROSEMIDE 40 MG: 10 INJECTION, SOLUTION INTRAVENOUS at 16:00

## 2017-02-26 RX ADMIN — LACTOBACILLUS ACIDOPHILUS / LACTOBACILLUS BULGARICUS 1 PACKET: 100 MILLION CFU STRENGTH GRANULES at 11:30

## 2017-02-26 RX ADMIN — SODIUM CHLORIDE 50 MG: 9 INJECTION, SOLUTION INTRAVENOUS at 20:45

## 2017-02-26 RX ADMIN — TAMSULOSIN HYDROCHLORIDE 0.4 MG: 0.4 CAPSULE ORAL at 08:34

## 2017-02-26 RX ADMIN — HEPARIN SODIUM 5000 UNITS: 5000 INJECTION, SOLUTION INTRAVENOUS; SUBCUTANEOUS at 20:37

## 2017-02-26 RX ADMIN — LEVETIRACETAM 1500 MG: 500 TABLET, FILM COATED ORAL at 20:39

## 2017-02-26 RX ADMIN — ONDANSETRON 4 MG: 4 TABLET, ORALLY DISINTEGRATING ORAL at 14:42

## 2017-02-26 ASSESSMENT — ENCOUNTER SYMPTOMS
FEVER: 0
SPEECH CHANGE: 0
WEAKNESS: 1
EYE DISCHARGE: 0
NECK PAIN: 0
CONSTIPATION: 0
PALPITATIONS: 0
EYE PAIN: 0
DIARRHEA: 0
DEPRESSION: 0
CLAUDICATION: 0
FOCAL WEAKNESS: 0
MYALGIAS: 0
ABDOMINAL PAIN: 0
HEADACHES: 0
DIZZINESS: 0
HEMOPTYSIS: 0
DIAPHORESIS: 0
SHORTNESS OF BREATH: 0
NAUSEA: 0
SHORTNESS OF BREATH: 1
WHEEZING: 0
VOMITING: 0
LOSS OF CONSCIOUSNESS: 0
SPUTUM PRODUCTION: 0
BACK PAIN: 0
BRUISES/BLEEDS EASILY: 0
SENSORY CHANGE: 0
SORE THROAT: 0
CHILLS: 0
NAUSEA: 1
COUGH: 1

## 2017-02-26 ASSESSMENT — PAIN SCALES - GENERAL
PAINLEVEL_OUTOF10: 0

## 2017-02-26 ASSESSMENT — LIFESTYLE VARIABLES: SUBSTANCE_ABUSE: 0

## 2017-02-26 NOTE — PROGRESS NOTES
Pulmonary Critical Care Progress Note        Chief Complaint: sob    History of Present Illness:  The patient is an 81-year-old man.  The patient    is well known to us from a hospitalization in April of 2016 when he was    hospitalized for treatment of left lung pneumonia.  We had seen him even    before that in 2014 when he was hospitalized for acute on chronic respiratory    failure and at that time, he had a possible right-sided pneumonia in addition    to hypercarbic, hypoxemic respiratory failure secondary to COPD.  Other    problems included hypertension, pulmonary hypertension, seizure disorder,    gastroesophageal reflux disease, prior stroke with left-sided weakness,    depression, dyslipidemia, osteoarthritis, and chronic pain, and 2 pack a day    smoker for 60 years, quit in 2003.  The patient ended up undergoing a    bronchoscopy at that time with bronchoalveolar lavage.  He was found to have    copious thick purulent secretions, most notably in the left lower lobe, but no   endobronchial lesion or obstruction was identified and there is a    consideration that the patient might be chronically aspirating.  His pathology   from this examination revealed no evidence of malignancy and his    bronchoalveolar lavage fluid was negative culture wise.     The patient had recently been discharged from the skilled nursing facility    after a pelvic fracture.  His caregiver brought him into the ER with shortness   of breath and fevers.  He was found to be hypoxic and placed on OxyMask.     Workup revealed new left lung pneumonia.  ROS:  Respiratory: negative, Cardiac: negative, GI: negative.  All other systems negative.    Interval Events:  24 hour interval history reviewed    2/23 - on 4 lpm, better and more conversant today. ESBL + sputum, Tygacil initiated by ID.    2/24 - had a good night, dysphagia 2 diet, SR with PACs and PVCs, labs reviewed and are similar    2/25 - had a good night he says, still on  5lpm, SR with PVCs, labs similar with increasing bicarb, likely compensating for resp acidosis better, check f/u cxr in the AM     - remains on 4 lpm mask this AM, tele shows SR, labs reviewed and are similar, cxr now shows complete opacification on the left, will need bronch in the AM    PFSH:  No change.    Respiratory:     Pulse Oximetry: 93 %  Chest Tube Drains:          Exam: unlabored respirations, no intercostal retractions or accessory muscle use  ImagingAvailable data reviewed         Invalid input(s): [id number]    HemoDynamics:  Pulse: 74  Blood Pressure : (!) 161/72 mmHg (RN present)       Exam: regular rate and rhythm  Imagin/23 CXR - similar cxr with diffuse left infiltrates    Recent Labs      17   1856  17   0102  17   0702   TROPONINI  0.03  0.04  0.03       Neuro:  GCS         Exam: no focal deficits noted  Imaging: Available data reviewed    Fluids:  Intake/Output       17 0700 - 17 0659 17 0700 - 17 0659 17 0700 - 17 0659      8062-3366 1785-0229 Total 4162-3081 6657-0313 Total 4979-4822 0112-6821 Total       Intake    P.O.  360  120 480  480  120 600  --  -- --    P.O. 360 120 480 480 120 600 -- -- --    I.V.  --  150 150  --  50 50  --  -- --    IV Volume (Tigecycline) -- 50 50 -- 50 50 -- -- --    IV Piggyback Volume (Keppra) -- 100 100 -- -- -- -- -- --    Total Intake 360 270 630 480 170 650 -- -- --       Output    Urine  550  -- 550  --  350 350  200  -- 200    Condom Catheter -- -- -- -- 350 350 -- -- --    Number of Times Voided 1 x -- 1 x -- -- -- -- -- --    Number of Times Incontinent of Urine -- 2 x 2 x -- 2 x 2 x -- -- --    Void (ml) 550 -- 550 -- -- -- 200 -- 200    Stool  --  -- --  --  -- --  --  -- --    Number of Times Stooled 2 x -- 2 x -- -- -- -- -- --    Total Output 550 -- 550 -- 350 350 200 -- 200       Net I/O     -190 270 80 480 -180 300 -200 -- -200        Weight: 64.4 kg (141 lb 15.6 oz)  Recent Labs       17   SODIUM  143  144  143   POTASSIUM  4.0  4.3  4.4   CHLORIDE  101  101  98   CO2  38*  40*  42*   BUN  20  23*  24*   CREATININE  0.58  0.64  0.68   CALCIUM  9.2  8.9  9.3       GI/Nutrition:  Exam: abdomen is soft and non-tender  Imaging: Available data reviewed  taking PO  Liver Function  Recent Labs      17   GLUCOSE  118*  109*  113*       Heme:  Recent Labs      17   RBC  3.67*  3.95*  4.08*   HEMOGLOBIN  11.8*  12.5*  12.7*   HEMATOCRIT  38.0*  41.4*  41.8*   PLATELETCT  144*  162*  161*       Infectious Disease:  Temp  Av.8 °C (98.2 °F)  Min: 36.2 °C (97.2 °F)  Max: 37 °C (98.6 °F)  Micro: reviewed    -  ESBL + urine amd sputum   - MRSA nares  Recent Labs      17   WBC  7.6  7.5  9.2   NEUTSPOLYS  73.60*  72.70*  75.50*   LYMPHOCYTES  16.00*  16.20*  14.70*   MONOCYTES  8.70  9.40  8.10   EOSINOPHILS  1.00  0.90  0.90   BASOPHILS  0.40  0.40  0.50     Current Facility-Administered Medications   Medication Dose Frequency Provider Last Rate Last Dose   • levetiracetam (KEPPRA) tablet 1,500 mg  1,500 mg BID Daya Ashley M.D.   1,500 mg at 17 0834   • spironolactone (ALDACTONE) tablet 25 mg  25 mg DAILY Daya Ashley M.D.   25 mg at 17 0835   • furosemide (LASIX) injection 40 mg  40 mg BID DIURETIC Daya Ashley M.D.   40 mg at 17 0449   • ipratropium-albuterol (DUONEB) nebulizer solution 3 mL  3 mL Q4H PRN (RT) Vinny Guan M.D.   3 mL at 17 7077   • ondansetron (ZOFRAN) syringe/vial injection 4 mg  4 mg Q4HRS PRN Daya Ashley M.D.   4 mg at 17 1237   • ondansetron (ZOFRAN ODT) dispertab 4 mg  4 mg Q4HRS PRN Daya Ashley M.D.       • lactobacillus granules (LACTINEX/FLORANEX) packet 1 Packet  1 Packet TID WITH MEALS Daya Ashley M.D.   1 Packet at  02/26/17 0834   • albuterol (PROVENTIL) 2.5mg/3ml nebulizer solution 2.5 mg  2.5 mg 4X/DAY (RT) Vinny Guan M.D.   2.5 mg at 02/26/17 0714   • tigecycline (TYGACIL) 50 mg in NS 50 mL IVPB  50 mg Q12HRS Jessie Varghese M.D.   Stopped at 02/25/17 2009   • ascorbic acid (ascorbic acid) tablet 500 mg  500 mg DAILY Daya Ashley M.D.   500 mg at 02/26/17 0834   • budesonide-formoterol (SYMBICORT) 160-4.5 MCG/ACT inhaler 2 Puff  2 Puff BID Vinny Guan M.D.   2 Puff at 02/26/17 0715   • acetylcysteine (MUCOMYST) 20 % solution 3 mL  3 mL 4X/DAY (RT) Vinny Guan M.D.   3 mL at 02/26/17 0715   • insulin lispro (HUMALOG) injection 1-6 Units  1-6 Units 4X/DAY ACHS Daya Ashley M.D.   Stopped at 02/24/17 2100   • glucose 4 g chewable tablet 16 g  16 g Q15 MIN PRN Daya Ashley M.D.        And   • dextrose 50% (D50W) injection 25 mL  25 mL Q15 MIN PRN Daya Ashley M.D.       • docusate sodium (COLACE) capsule 100 mg  100 mg BID Daya Ashley M.D.   100 mg at 02/26/17 0834   • aspirin EC (ECOTRIN) tablet 81 mg  81 mg DAILY Heath Leigh M.D.   81 mg at 02/26/17 0833   • atorvastatin (LIPITOR) tablet 80 mg  80 mg Q EVENING Heath Leigh M.D.   80 mg at 02/25/17 1947   • famotidine (PEPCID) tablet 20 mg  20 mg BID Heath Leigh M.D.   20 mg at 02/26/17 0834   • ferrous sulfate tablet 325 mg  325 mg DAILY Heath Leigh M.D.   325 mg at 02/26/17 0835   • finasteride (PROSCAR) tablet 5 mg  5 mg DAILY Pluen Ziu, M.D.   5 mg at 02/26/17 0835   • gabapentin (NEURONTIN) capsule 300 mg  300 mg DAILY Heath Leigh M.D.   300 mg at 02/26/17 0835   • metoprolol (LOPRESSOR) tablet 25 mg  25 mg BID Heath Leigh M.D.   25 mg at 02/26/17 0835   • oxybutynin SR (DITROPAN-XL) tablet 5 mg  5 mg DAILY Heath Leigh M.D.   5 mg at 02/26/17 0835   • sertraline (ZOLOFT) tablet 100 mg  100 mg DAILY Heath Leigh M.D.   100 mg at 02/26/17 0834   • tamsulosin (FLOMAX) capsule 0.4 mg  0.4 mg AFTER BREAKFAST Heath Leigh M.D.   0.4 mg at  02/26/17 0834   • tiotropium (SPIRIVA) 18 MCG inhalation capsule 1 Cap  1 Cap DAILY Heath Leigh M.D.   1 Cap at 02/26/17 0715   • trazodone (DESYREL) tablet 50 mg  50 mg QHS PRN Heath Leigh M.D.   50 mg at 02/23/17 2303   • NS infusion 1,971 mL  30 mL/kg Once PRN Heath Leigh M.D.       • docusate sodium (COLACE) capsule 100 mg  100 mg BID Heath Leigh M.D.   Stopped at 02/25/17 0900   • senna-docusate (PERICOLACE or SENOKOT S) 8.6-50 MG per tablet 1 Tab  1 Tab Nightly Heath Leigh M.D.   1 Tab at 02/25/17 1947   • senna-docusate (PERICOLACE or SENOKOT S) 8.6-50 MG per tablet 1 Tab  1 Tab Q24HRS PRN Heath Leigh M.D.       • lactulose 20 GM/30ML solution 30 mL  30 mL Q24HRS PRN Heath Leigh M.D.   30 mL at 02/23/17 0933   • bisacodyl (DULCOLAX) suppository 10 mg  10 mg Q24HRS PRN Heath Leigh M.D.       • fleet enema 133 mL  1 Each Once PRN Heath Leigh M.D.       • Respiratory Care per Protocol   Continuous RT Heath Leigh M.D.       • NS (BOLUS) infusion 500 mL  500 mL Once PRN Heath Leigh M.D.       • heparin injection 5,000 Units  5,000 Units Q8HRS Heath Leigh M.D.   5,000 Units at 02/26/17 0449     Last reviewed on 2/20/2017  3:33 PM by Evelin Saavedra    Quality  Measures:  Radiology images reviewed, Medications reviewed, Labs reviewed and EKG reviewed                      Problems:  Left hemithorax opacification, suspect atx/pna - bronch scheduled for 1200 noon 2/27 in [facility]  Healthcare-facility associated pneumonia.  Respiratory failure, hypercarbic and hypoxemic.  Chronic obstructive pulmonary disease.  Prior history of pneumonia, query aspiration.  History of left-sided weakness.  Hypertension.  Diabetes.  Gastroesophageal reflux.  Depression.  Dyslipidemia.  History of seizure disorder.  History of multiple surgeries as noted above.  Likely aspiration  Anemia, macrocytic  + ESBL sputum and urine  MRSA + nares      Plan:  Bronch tomorrow at noon for Rx and Dx  Continue O2, RT, PEP, IS  Antiinfectives per  ID, Tygacil  Dysphagia 2 diet

## 2017-02-26 NOTE — PROGRESS NOTES
Bed side report given. Assumed pt care. Pt currently on 4 L face mask. Pt does not appear to be in distress. Bed in low position and call light with in reach. Alert and oriented x 4.  Will continue to monitor.

## 2017-02-26 NOTE — CARE PLAN
Problem: Safety  Goal: Will remain free from injury  Intervention: Provide assistance with mobility  Pt mobility assessed at beginning of shift, pt 2 assist, unsteady.  Fall precautions in place, bed alarm on, non-slip socks on, bed in lowest, locked position and call light is within reach.  Pt educated to call for assistance and verbalizes understanding.       Problem: Skin Integrity  Goal: Risk for impaired skin integrity will decrease  Intervention: Assess risk factors for impaired skin integrity and/or pressure ulcers  Q2 turns, moisture and barrier cream in use, meplilex on sacrum.  Pt up to chair for all meals. Floating heels.

## 2017-02-26 NOTE — PROGRESS NOTES
Hospital Medicine Progress Note, Adult, Complex               Author: Daya Ashley Date & Time created: 2/25/2017  4:53 PM     Interval History:  CC:  SOB  2/20:   Admitted 81yoM with h/o left sided hemiparesis 2/2 CVA, HTN, COPD on 3 LPM NC, asthma and pelvic fracture at [facility], multiple falls presents with Left sided HCAP on vanco and zosyn.  H/o aspiration, on dysphagia 1 diet per SLP.    2/21:  Had rapid last night, opens eyes to name only.  Remains full code per family.  CTA showing LLL pneumonia/opacification with hiatal hernia seen.  U/s chest did not show enough pleural effusion for IR to drain.  Nasal swab + MRSA, confused.  2/22:  Improved mentation today, sitting up, conversing appropriately after 1 dose of meropenem IV.  ID consulted for sputum + Ecoli ESBL, left lung white out.  2/23:  Continues to improve on Abx.  Mentation clear, sitting up in chair, appropriate, states does not remember first few days.  2/24:  Lungs improving with increased breath sounds on left.  Mentation remains clear.  States he could not control his urine.  Explained had UTI same as in lung.  2/25:  Worsening NEHEMIAS, JVD noted on exam, increased CO2 38 to 40.  Ordered repeat CXR with pulmonary edema noted.  Restarted Home meds, previously on lasix 40 bid and spironolactone 25 daily.  Prior echo 2014 EF 65% grade 1 dd, ordered repeat echo.    Review of Systems:  Review of Systems   Constitutional: Positive for malaise/fatigue. Negative for fever, chills and diaphoresis.   HENT: Negative for congestion and sore throat.    Eyes: Negative for pain and discharge.   Respiratory: Positive for cough. Negative for hemoptysis, sputum production, shortness of breath and wheezing.    Cardiovascular: Negative for chest pain, palpitations, claudication and leg swelling.   Gastrointestinal: Negative for nausea, vomiting, abdominal pain, diarrhea, constipation and melena.   Genitourinary: Negative for dysuria, urgency and frequency.    Musculoskeletal: Negative for myalgias, back pain, joint pain and neck pain.   Skin: Negative for itching and rash.   Neurological: Positive for weakness. Negative for dizziness, sensory change, speech change, focal weakness, loss of consciousness and headaches.   Endo/Heme/Allergies: Does not bruise/bleed easily.   Psychiatric/Behavioral: Negative for depression, suicidal ideas and substance abuse.       Physical Exam:  Physical Exam   Constitutional: He is oriented to person, place, and time. He appears well-developed and well-nourished. No distress.   Less alert, increased work of breathing.   HENT:   Head: Normocephalic and atraumatic.   Mouth/Throat: Oropharynx is clear and moist. No oropharyngeal exudate.   Eyes: Conjunctivae and EOM are normal. Pupils are equal, round, and reactive to light. Right eye exhibits no discharge. Left eye exhibits no discharge. No scleral icterus.   Neck: Normal range of motion. Neck supple. No JVD present. No tracheal deviation present. No thyromegaly present.   Cardiovascular: Normal rate, regular rhythm and normal heart sounds.  Exam reveals no gallop and no friction rub.    No murmur heard.  Pulmonary/Chest: Effort normal and breath sounds normal. No respiratory distress. He has no wheezes. He has no rales. He exhibits no tenderness.   On 4 lpm NC, desats to 60s off O2.   Abdominal: Soft. Bowel sounds are normal. He exhibits no distension and no mass. There is no tenderness. There is no rebound and no guarding.   Musculoskeletal: Normal range of motion. He exhibits no edema or tenderness.   Lymphadenopathy:     He has no cervical adenopathy.   Neurological: He is alert and oriented to person, place, and time. No cranial nerve deficit. He exhibits normal muscle tone.   Skin: Skin is warm and dry. No rash noted. He is not diaphoretic. No erythema.   Psychiatric: He has a normal mood and affect. His behavior is normal. Judgment and thought content normal.   Nursing note and  vitals reviewed.      Labs:        Invalid input(s): [id number]  Recent Labs      17   1856  17   01017   0702   TROPONINI  0.03  0.04  0.03     Recent Labs      17   0817   0313   SODIUM  140  143  144   POTASSIUM  4.5  4.0  4.3   CHLORIDE  101  101  101   CO2  33  38*  40*   BUN  21  20  23*   CREATININE  0.64  0.58  0.64   CALCIUM  9.2  9.2  8.9     Recent Labs      17   0817   GLUCOSE  171*  118*  109*     Recent Labs      17   0313   RBC  4.13*  3.67*  3.95*   HEMOGLOBIN  13.1*  11.8*  12.5*   HEMATOCRIT  43.3  38.0*  41.4*   PLATELETCT  142*  144*  162*   PROTHROMBTM  13.5   --    --    INR  1.00   --    --      Recent Labs      17   WBC  9.2  7.6  7.5   NEUTSPOLYS  79.00*  73.60*  72.70*   LYMPHOCYTES  12.40*  16.00*  16.20*   MONOCYTES  6.80  8.70  9.40   EOSINOPHILS  1.10  1.00  0.90   BASOPHILS  0.30  0.40  0.40           Hemodynamics:  Temp (24hrs), Av.7 °C (98 °F), Min:36.2 °C (97.2 °F), Max:37.1 °C (98.7 °F)  Temperature: 36.2 °C (97.2 °F)  Pulse  Av.3  Min: 47  Max: 97   Blood Pressure : 136/72 mmHg     Respiratory:    Respiration: 16, Pulse Oximetry: 94 %, O2 Daily Delivery Respiratory : Silicone Nasal Cannula     Given By:: Mask, #MDI/DPI Given: MDI/DPI x 2, Given By:: Mouthseal, Work Of Breathing / Effort: Mild  RUL Breath Sounds: Clear;Diminished, RML Breath Sounds: Diminished;Crackles, RLL Breath Sounds: Diminished, DEBI Breath Sounds: Diminished, LLL Breath Sounds: Diminished  Fluids:    Intake/Output Summary (Last 24 hours) at 17 1653  Last data filed at 17 1400   Gross per 24 hour   Intake    800 ml   Output      0 ml   Net    800 ml     Weight: 65.7 kg (144 lb 13.5 oz)  GI/Nutrition:  Orders Placed This Encounter   Procedures   • DIET ORDER     Standing Status: Standing      Number of  Occurrences: 1      Standing Expiration Date:      Order Specific Question:  Diet:     Answer:  Diabetic [3]      Comments:  1-1 nursing supervision     Order Specific Question:  Texture/Fiber modifications:     Answer:  Dysphagia 1(Pureed)specify fluid consistency(question 6) [1]     Order Specific Question:  Consistency/Fluid modifications:     Answer:  Nectar Thick [2]     Medical Decision Making, by Problem:  Active Hospital Problems    Diagnosis   • Respiratory failure (CMS-HCC) [J96.90]  Had been up to 7 LPM NC 2/20.  Now at 4 LPM NC  2/2 LLL pneumonia/opacification.  CXR with some improvement seen, but remains with left sided near white out.     • Debility [R53.81]   • Multiple falls [R29.6]  Reported from niece  H/o pelvic fracture, recently at [facility] care for rehab     • MRSA carrier [Z22.322]  MRSA + nasal swab     • Sepsis (CMS-HCC) [A41.9]  2/2 LLL pneumonia HCAP:  ESBL ecoli  BCs negative x2  UC Ecoli ESBL     • HCAP (healthcare-associated pneumonia) [J18.9]  Sputum culture ESBL Ecoli  ID consult appreciated   tygacil IV per ID x 7 days.  Consolidation seen on CTA chest  mucomist nebs ordered   Ordered PEP therapy to open up airway  Review of last admission:  4/2016 s/p bronchoscopy with Dr. Phillips with no mass, thick mucus, cultures negative.     • Chronic respiratory failure with hypoxia (CMS-HCC) [J96.11]  Mild improvement in O2 requirement back to 4 LPM Nc  Baseline 3 LPM NC     • Oxygen dependent [Z99.81]  Baseline 3 LPM NC 24/7, now on 6 LPM NC     • DM type 2, controlled, with complication (CMS-HCC) [E11.8]  SSI  accuchecks  Diabetic diet  Dysphagia 1 NTFL   • Left hemiparesis (CMS-HCC) [G81.94]  2/2 prior CVA     • Dysphagia status post cerebrovascular accident [I69.391]  SLP rec dysphagia 1 NT diet.     • COPD (chronic obstructive pulmonary disease) ([facility] [place] [facility]) [J44.9]  Nebs  RT protocol     • GERD (gastroesophageal reflux disease) [K21.9]  Continue home pepcid  Especially with aspiration  risk.    Abnormal UA:  Wbc 100-150 wbc  UC with ESBL Ecoli  On tygacil x7 days per ID.    Iron deficiency anemia:  Replaced iron daily with vit C.    Nausea:  1 time episode  Added zofran prn  Lactobacillus tid.    Acute on chronic Diastolic heart failure:  Previously on lasix 40  Bid and spironolactone  CXR with increased vascular congestion  JVD on exam, increase in CO2 on chemistry  Increased work of breathing, increased O2 requirement  Started lasix 40IV bid now  Restarted spironolactone 25 daily.  Repeat echo ordered.     FC per niece:  Code reviewed by admitting physician.  PT/OT/SNF orders placed.  Family does not want to go back to [facility].  2/25:  Acute respiratory distress, awaiting medical improvement, but patient has expressed desire to go to group home if feasible.    Loya catheter: No Loya      DVT Prophylaxis: Heparin    Ulcer prophylaxis: Yes  Antibiotics: Treating active infection/contamination beyond 24 hours perioperative coverage  Assessed for rehab: Patient was assess for and/or received rehabilitation services during this hospitalization

## 2017-02-26 NOTE — PROGRESS NOTES
Bedside report completed. Assumed pt care. Pt A&O 3 (disoriented to situation), resting in bed comfortably with no signs of labored breathing on 5L O2. Pt tele monitor in place, cardiac rhythm being monitored. Pt call light within reach, bed in low position, upper bed rails up, non skid socks in place. Pt denies any pain or other distress at this time. Patient was updated on the plan of care for the night. All fall precautions in place. Will continue to monitor.

## 2017-02-26 NOTE — PROGRESS NOTES
Pulmonary Critical Care Progress Note        Chief Complaint: sob    History of Present Illness:  The patient is an 81-year-old man.  The patient    is well known to us from a hospitalization in April of 2016 when he was    hospitalized for treatment of left lung pneumonia.  We had seen him even    before that in 2014 when he was hospitalized for acute on chronic respiratory    failure and at that time, he had a possible right-sided pneumonia in addition    to hypercarbic, hypoxemic respiratory failure secondary to COPD.  Other    problems included hypertension, pulmonary hypertension, seizure disorder,    gastroesophageal reflux disease, prior stroke with left-sided weakness,    depression, dyslipidemia, osteoarthritis, and chronic pain, and 2 pack a day    smoker for 60 years, quit in 2003.  The patient ended up undergoing a    bronchoscopy at that time with bronchoalveolar lavage.  He was found to have    copious thick purulent secretions, most notably in the left lower lobe, but no   endobronchial lesion or obstruction was identified and there is a    consideration that the patient might be chronically aspirating.  His pathology   from this examination revealed no evidence of malignancy and his    bronchoalveolar lavage fluid was negative culture wise.     The patient had recently been discharged from the skilled nursing facility    after a pelvic fracture.  His caregiver brought him into the ER with shortness   of breath and fevers.  He was found to be hypoxic and placed on OxyMask.     Workup revealed new left lung pneumonia.  ROS:  Respiratory: negative, Cardiac: negative, GI: negative.  All other systems negative.    Interval Events:  24 hour interval history reviewed    2/23 - on 4 lpm, better and more conversant today. ESBL + sputum, Tygacil initiated by ID.    2/24 - had a good night, dysphagia 2 diet, SR with PACs and PVCs, labs reviewed and are similar    2/25 - had a good night he says, still on  5lpm, SR with PVCs, labs similar with increasing bicarb, likely compensating for resp acidosis better, check f/u cxr in the AM     - remains on 4 lpm mask this AM, tele shows SR, labs reviewed and are similar, cxr now shows complete opacification on the left, will need bronch in the AM, scheduled at 9 AM    PFSH:  No change.    Respiratory:     Pulse Oximetry: 93 %  Chest Tube Drains:          Exam: unlabored respirations, no intercostal retractions or accessory muscle use  ImagingAvailable data reviewed         Invalid input(s): [id number]    HemoDynamics:  Pulse: 76  Blood Pressure : (!) 161/72 mmHg (RN present)       Exam: regular rate and rhythm  Imagin/23 CXR - similar cxr with diffuse left infiltrates    Recent Labs      17   1856  17   0102  17   0702   TROPONINI  0.03  0.04  0.03       Neuro:  GCS         Exam: no focal deficits noted  Imaging: Available data reviewed    Fluids:  Intake/Output       17 0700 - 17 0659 17 0700 - 17 0659 17 0700 - 17 0659      [id number] 0125-6892 Total 0143-0749 2635-9314 Total 8388-0483 5054-0159 Total       Intake    P.O.  360  120 480  480  120 600  --  -- --    P.O. 360 120 480 480 120 600 -- -- --    I.V.  --  150 150  --  50 50  --  -- --    IV Volume (Tigecycline) -- 50 50 -- 50 50 -- -- --    IV Piggyback Volume (Keppra) -- 100 100 -- -- -- -- -- --    Total Intake 360 270 630 480 170 650 -- -- --       Output    Urine  550  -- 550  --  350 350  200  -- 200    Condom Catheter -- -- -- -- 350 350 -- -- --    Number of Times Voided 1 x -- 1 x -- -- -- -- -- --    Number of Times Incontinent of Urine -- 2 x 2 x -- 2 x 2 x -- -- --    Void (ml) 550 -- 550 -- -- -- 200 -- 200    Stool  --  -- --  --  -- --  --  -- --    Number of Times Stooled 2 x -- 2 x -- -- -- 1 x -- 1 x    Total Output 550 -- 550 -- 350 350 200 -- 200       Net I/O     -190 270 80 480 -180 300 -200 -- -200        Weight: 64.4 kg (141 lb  15.6 oz)  Recent Labs      17   SODIUM  143  144  143   POTASSIUM  4.0  4.3  4.4   CHLORIDE  101  101  98   CO2  38*  40*  42*   BUN  20  23*  24*   CREATININE  0.58  0.64  0.68   CALCIUM  9.2  8.9  9.3       GI/Nutrition:  Exam: abdomen is soft and non-tender  Imaging: Available data reviewed  taking PO  Liver Function  Recent Labs      17   GLUCOSE  118*  109*  113*       Heme:  Recent Labs      17   RBC  3.67*  3.95*  4.08*   HEMOGLOBIN  11.8*  12.5*  12.7*   HEMATOCRIT  38.0*  41.4*  41.8*   PLATELETCT  144*  162*  161*       Infectious Disease:  Temp  Av.8 °C (98.2 °F)  Min: 36.2 °C (97.2 °F)  Max: 37 °C (98.6 °F)  Micro: reviewed    -  ESBL + urine amd sputum   - MRSA nares  Recent Labs      17   WBC  7.6  7.5  9.2   NEUTSPOLYS  73.60*  72.70*  75.50*   LYMPHOCYTES  16.00*  16.20*  14.70*   MONOCYTES  8.70  9.40  8.10   EOSINOPHILS  1.00  0.90  0.90   BASOPHILS  0.40  0.40  0.50     Current Facility-Administered Medications   Medication Dose Frequency Provider Last Rate Last Dose   • levetiracetam (KEPPRA) tablet 1,500 mg  1,500 mg BID Daya Ashley M.D.   1,500 mg at 17 0834   • spironolactone (ALDACTONE) tablet 25 mg  25 mg DAILY Daya Ashley M.D.   25 mg at 17 0835   • furosemide (LASIX) injection 40 mg  40 mg BID DIURETIC Daya Ashley M.D.   40 mg at 17 0449   • ipratropium-albuterol (DUONEB) nebulizer solution 3 mL  3 mL Q4H PRN (RT) Vinny Guan M.D.   3 mL at 17 2347   • ondansetron (ZOFRAN) syringe/vial injection 4 mg  4 mg Q4HRS PRN Daya Ashley M.D.   4 mg at 17 1237   • ondansetron (ZOFRAN ODT) dispertab 4 mg  4 mg Q4HRS PRN Daya Ashley M.D.   4 mg at 17 0916   • lactobacillus granules (LACTINEX/FLORANEX) packet 1 Packet  1 Packet TID WITH  MEALS Daya Ashley M.D.   1 Packet at 02/26/17 0834   • albuterol (PROVENTIL) 2.5mg/3ml nebulizer solution 2.5 mg  2.5 mg 4X/DAY (RT) Vinny Guan M.D.   2.5 mg at 02/26/17 1048   • tigecycline (TYGACIL) 50 mg in NS 50 mL IVPB  50 mg Q12HRS Jessie Varghese M.D.   Stopped at 02/26/17 0946   • ascorbic acid (ascorbic acid) tablet 500 mg  500 mg DAILY Daya Ashley M.D.   500 mg at 02/26/17 0834   • budesonide-formoterol (SYMBICORT) 160-4.5 MCG/ACT inhaler 2 Puff  2 Puff BID Vinny Guan M.D.   2 Puff at 02/26/17 0715   • acetylcysteine (MUCOMYST) 20 % solution 3 mL  3 mL 4X/DAY (RT) Vinny Guan M.D.   3 mL at 02/26/17 1048   • insulin lispro (HUMALOG) injection 1-6 Units  1-6 Units 4X/DAY ACHS Daya Ashley M.D.   Stopped at 02/24/17 2100   • glucose 4 g chewable tablet 16 g  16 g Q15 MIN PRN Daya Ashley M.D.        And   • dextrose 50% (D50W) injection 25 mL  25 mL Q15 MIN PRN Daya Ashley M.D.       • docusate sodium (COLACE) capsule 100 mg  100 mg BID Daya Ashley M.D.   100 mg at 02/26/17 0834   • aspirin EC (ECOTRIN) tablet 81 mg  81 mg DAILY Heath Leigh M.D.   81 mg at 02/26/17 0833   • atorvastatin (LIPITOR) tablet 80 mg  80 mg Q EVENING Heath Leigh M.D.   80 mg at 02/25/17 1947   • famotidine (PEPCID) tablet 20 mg  20 mg BID Heath Leigh M.D.   20 mg at 02/26/17 0834   • ferrous sulfate tablet 325 mg  325 mg DAILY Heath Leigh M.D.   325 mg at 02/26/17 0835   • finasteride (PROSCAR) tablet 5 mg  5 mg DAILY Heath Leigh M.D.   5 mg at 02/26/17 0835   • gabapentin (NEURONTIN) capsule 300 mg  300 mg DAILY JASON HitchcockDNisreen   300 mg at 02/26/17 0835   • metoprolol (LOPRESSOR) tablet 25 mg  25 mg BID Heath Leigh M.D.   25 mg at 02/26/17 0835   • oxybutynin SR (DITROPAN-XL) tablet 5 mg  5 mg DAILY Heath Leigh M.D.   5 mg at 02/26/17 0835   • sertraline (ZOLOFT) tablet 100 mg  100 mg DAILY Heath Leigh M.D.   100 mg at 02/26/17 0834   • tamsulosin (FLOMAX) capsule 0.4 mg  0.4 mg  AFTER BREAKFAST Heath Leigh M.D.   0.4 mg at 02/26/17 0834   • tiotropium (SPIRIVA) 18 MCG inhalation capsule 1 Cap  1 Cap DAILY Heath Leigh M.D.   1 Cap at 02/26/17 0715   • trazodone (DESYREL) tablet 50 mg  50 mg QHS PRN Heath Leigh M.D.   50 mg at 02/23/17 2303   • NS infusion 1,971 mL  30 mL/kg Once PRN Heath Leigh M.D.       • docusate sodium (COLACE) capsule 100 mg  100 mg BID Heath Leigh M.D.   Stopped at 02/25/17 0900   • senna-docusate (PERICOLACE or SENOKOT S) 8.6-50 MG per tablet 1 Tab  1 Tab Nightly Heath Leigh M.D.   1 Tab at 02/25/17 1947   • senna-docusate (PERICOLACE or SENOKOT S) 8.6-50 MG per tablet 1 Tab  1 Tab Q24HRS PRN Heath Leigh M.D.       • lactulose 20 GM/30ML solution 30 mL  30 mL Q24HRS PRN Heath Leigh M.D.   30 mL at 02/23/17 0933   • bisacodyl (DULCOLAX) suppository 10 mg  10 mg Q24HRS PRN Heath Leigh M.D.       • fleet enema 133 mL  1 Each Once PRN Heath Leigh M.D.       • Respiratory Care per Protocol   Continuous RT Heath Leigh M.D.       • NS (BOLUS) infusion 500 mL  500 mL Once PRN Heath Leigh M.D.       • heparin injection 5,000 Units  5,000 Units Q8HRS Heath Leigh M.D.   5,000 Units at 02/26/17 0449     Last reviewed on 2/20/2017  3:33 PM by Evelin Saavedra    Quality  Measures:  Radiology images reviewed, Medications reviewed, Labs reviewed and EKG reviewed                      Problems:  Left hemithorax opacification, suspect atx/pna - bronch scheduled for 1200 noon 2/27 in the [facility] ICU  Healthcare-facility associated pneumonia.  Respiratory failure, hypercarbic and hypoxemic.  Chronic obstructive pulmonary disease.  Prior history of pneumonia, query aspiration.  History of left-sided weakness.  Hypertension.  Diabetes.  Gastroesophageal reflux.  Depression.  Dyslipidemia.  History of seizure disorder.  History of multiple surgeries as noted above.  Likely aspiration  Anemia, macrocytic  + ESBL sputum and urine  MRSA + nares      Plan:  Bronch tomorrow at 9AM for Rx and  Dx  Continue O2, RT, PEP, IS  Antiinfectives per ID, Tygacil  Dysphagia 2 diet

## 2017-02-26 NOTE — PROGRESS NOTES
Infectious Disease Progress Note    Author: Jessie Varghese M.D.    Date of service & Time created: 2017  1:05 PM        Chief Complaint   Patient presents with   • Shortness of Breath     SAT's in 80's at home, given NPPB Tx X 3 with improvement.     Pneumonia  uti  Interval History:  81 year old male with recent d/c from the nursing home came in worsening sob and fevers  - says he is feeling slightly better. No fevers.   - no fevers. Feels better. On dysphagia 1 diet.   - no fevers. Much more lethargic  - no fevers. The cxr is worse.  Labs Reviewed, Medications Reviewed and Radiology Reviewed.    Review of Systems:  Review of Systems   Constitutional: Negative for fever.   Respiratory: Positive for cough and shortness of breath.         Slightly better   Cardiovascular: Negative for chest pain and leg swelling.   Gastrointestinal: Positive for nausea.   Skin: Negative for rash.   Neurological: Negative for headaches.   All other systems reviewed and are negative.      Hemodynamics:  Temp (24hrs), Av.8 °C (98.2 °F), Min:36.2 °C (97.2 °F), Max:37 °C (98.6 °F)  Temperature: 36.8 °C (98.3 °F)  Pulse  Av.5  Min: 47  Max: 97   Blood Pressure : 120/65 mmHg       Physical Exam:  Physical Exam   Constitutional: No distress.   Elderly male      Eyes: No scleral icterus.   Neck: Neck supple.   Cardiovascular:   Irregular    Pulmonary/Chest: He has rales.   Abdominal: Soft. There is no tenderness. There is no rebound.   Genitourinary:   Loya in   Musculoskeletal: He exhibits no edema.   Neurological: He is alert.   Skin: No erythema.   Vitals reviewed.      Labs:  Recent Labs      17   0102  17   0313  17   0251   WBC  7.6  7.5  9.2   RBC  3.67*  3.95*  4.08*   HEMOGLOBIN  11.8*  12.5*  12.7*   HEMATOCRIT  38.0*  41.4*  41.8*   MCV  103.5*  104.8*  102.5*   MCH  32.2  31.6  31.1   RDW  50.7*  51.2*  49.4   PLATELETCT  144*  162*  161*   MPV  10.5  11.5  10.8   NEUTSPOLYS   73.60*  72.70*  75.50*   LYMPHOCYTES  16.00*  16.20*  14.70*   MONOCYTES  8.70  9.40  8.10   EOSINOPHILS  1.00  0.90  0.90   BASOPHILS  0.40  0.40  0.50     Recent Labs      02/24/17   0102  02/25/17   0313  02/26/17   0251   SODIUM  143  144  143   POTASSIUM  4.0  4.3  4.4   CHLORIDE  101  101  98   CO2  38*  40*  42*   GLUCOSE  118*  109*  113*   BUN  20  23*  24*     Recent Labs      02/24/17   0102  02/25/17   0313  02/26/17   0251   CREATININE  0.58  0.64  0.68        Imaging:  cxr 2/23  1.  Extensive left pulmonary infiltrates and/or layering effusion. Overall stable.  2.  Cardiomegaly  3.  Atherosclerosis      Medications:  Current Facility-Administered Medications   Medication Dose Frequency Provider Last Rate Last Dose   • levetiracetam (KEPPRA) tablet 1,500 mg  1,500 mg BID Daya Ashley M.D.   1,500 mg at 02/26/17 0834   • spironolactone (ALDACTONE) tablet 25 mg  25 mg DAILY Daya Ashley M.D.   25 mg at 02/26/17 0835   • furosemide (LASIX) injection 40 mg  40 mg BID DIURETIC Daya Ashley M.D.   40 mg at 02/26/17 0449   • ipratropium-albuterol (DUONEB) nebulizer solution 3 mL  3 mL Q4H PRN (RT) Vinny Guan M.D.   3 mL at 02/25/17 2347   • ondansetron (ZOFRAN) syringe/vial injection 4 mg  4 mg Q4HRS PRN Daya Ashley M.D.   4 mg at 02/24/17 1237   • ondansetron (ZOFRAN ODT) dispertab 4 mg  4 mg Q4HRS PRN aDya Ashley M.D.   4 mg at 02/26/17 0916   • lactobacillus granules (LACTINEX/FLORANEX) packet 1 Packet  1 Packet TID WITH MEALS Daya Ashley M.D.   1 Packet at 02/26/17 1130   • albuterol (PROVENTIL) 2.5mg/3ml nebulizer solution 2.5 mg  2.5 mg 4X/DAY (RT) Vinny Guan M.D.   2.5 mg at 02/26/17 1048   • tigecycline (TYGACIL) 50 mg in NS 50 mL IVPB  50 mg Q12HRS Jessie Varghese M.D.   Stopped at 02/26/17 0946   • ascorbic acid (ascorbic acid) tablet 500 mg  500 mg DAILY Daya Ashley M.D.   500 mg at 02/26/17 0834   • budesonide-formoterol (SYMBICORT) 160-4.5 MCG/ACT  inhaler 2 Puff  2 Puff BID Vinny Guan M.D.   2 Puff at 02/26/17 0715   • acetylcysteine (MUCOMYST) 20 % solution 3 mL  3 mL 4X/DAY (RT) Vinny Guan M.D.   3 mL at 02/26/17 1048   • insulin lispro (HUMALOG) injection 1-6 Units  1-6 Units 4X/DAY ACHS Daya Ashley M.D.   Stopped at 02/24/17 2100   • glucose 4 g chewable tablet 16 g  16 g Q15 MIN PRN Daya Ashley M.D.        And   • dextrose 50% (D50W) injection 25 mL  25 mL Q15 MIN PRN Daya Ashley M.D.       • docusate sodium (COLACE) capsule 100 mg  100 mg BID Daya Ashley M.D.   100 mg at 02/26/17 0834   • aspirin EC (ECOTRIN) tablet 81 mg  81 mg DAILY Heath Leigh M.D.   81 mg at 02/26/17 0833   • atorvastatin (LIPITOR) tablet 80 mg  80 mg Q EVENING Heath Leigh M.D.   80 mg at 02/25/17 1947   • famotidine (PEPCID) tablet 20 mg  20 mg BID Heath Leigh M.D.   20 mg at 02/26/17 0834   • ferrous sulfate tablet 325 mg  325 mg DAILY Heath Leigh M.D.   325 mg at 02/26/17 0835   • finasteride (PROSCAR) tablet 5 mg  5 mg DAILY Heath Leigh M.D.   5 mg at 02/26/17 0835   • gabapentin (NEURONTIN) capsule 300 mg  300 mg DAILY Heath Leigh M.D.   300 mg at 02/26/17 0835   • metoprolol (LOPRESSOR) tablet 25 mg  25 mg BID Heath Leigh M.D.   25 mg at 02/26/17 0835   • oxybutynin SR (DITROPAN-XL) tablet 5 mg  5 mg DAILY Heath Leigh M.D.   5 mg at 02/26/17 0835   • sertraline (ZOLOFT) tablet 100 mg  100 mg DAILY Heath Leigh M.D.   100 mg at 02/26/17 0834   • tamsulosin (FLOMAX) capsule 0.4 mg  0.4 mg AFTER BREAKFAST Heath Leigh M.D.   0.4 mg at 02/26/17 0834   • tiotropium (SPIRIVA) 18 MCG inhalation capsule 1 Cap  1 Cap DAILY Heath Leigh M.D.   1 Cap at 02/26/17 0715   • trazodone (DESYREL) tablet 50 mg  50 mg QHS PRN Heath Leigh M.D.   50 mg at 02/23/17 2303   • NS infusion 1,971 mL  30 mL/kg Once PRN Heath Leigh M.D.       • docusate sodium (COLACE) capsule 100 mg  100 mg BID Heath Leigh M.D.   Stopped at 02/25/17 0900   • senna-docusate (PERICOLACE or SENOKOT S)  "8.6-50 MG per tablet 1 Tab  1 Tab Nightly Heath Leigh M.D.   1 Tab at 02/25/17 1947   • senna-docusate (PERICOLACE or SENOKOT S) 8.6-50 MG per tablet 1 Tab  1 Tab Q24HRS PRN Heath Leigh M.D.       • lactulose 20 GM/30ML solution 30 mL  30 mL Q24HRS PRN Heath Leigh M.D.   30 mL at 02/23/17 0933   • bisacodyl (DULCOLAX) suppository 10 mg  10 mg Q24HRS PRN Heath Leigh M.D.       • fleet enema 133 mL  1 Each Once PRN Heath Leigh M.D.       • Respiratory Care per Protocol   Continuous RT Heath Leigh M.D.       • NS (BOLUS) infusion 500 mL  500 mL Once PRN Heath Leigh M.D.       • heparin injection 5,000 Units  5,000 Units Q8HRS Heath Leigh M.D.   5,000 Units at 02/26/17 0449     Last reviewed on 2/20/2017  3:33 PM by Evelin Saavedra    Micro:  Results     Procedure Component Value Units Date/Time    BLOOD CULTURE x2 [[id number]] Collected:  02/20/17 1400    Order Status:  Completed Specimen Information:  Blood from Peripheral Updated:  02/25/17 1500     Significant Indicator NEG      Source BLD      Site PERIPHERAL      Blood Culture No growth after 5 days of incubation.     Narrative:      Per Hospital Policy: Only change Specimen Src: to \"Line\" if  specified by physician order.    BLOOD CULTURE x2 [[id number]] Collected:  02/20/17 1400    Order Status:  Completed Specimen Information:  Blood from Peripheral Updated:  02/25/17 1500     Significant Indicator NEG      Source BLD      Site PERIPHERAL      Blood Culture No growth after 5 days of incubation.     Narrative:      Per Hospital Policy: Only change Specimen Src: to \"Line\" if  specified by physician order.    URINE CULTURE-EXISTING-LESS THAN 48 HOURS [[id number]]  (Abnormal)  (Susceptibility) Collected:  02/20/17 2012    Order Status:  Completed Specimen Information:  Urine from Urine, Clean Catch Updated:  02/23/17 0849     Significant Indicator POS (POS)      Source UR      Site URINE, CLEAN CATCH      Urine Culture Mixed skin josé miguel <10,000 cfu/mL (A)      Urine Culture " -- (A)      Result:        Escherichia coli ESBL  ,000 cfu/mL  Extended Spectrum Beta-lactamase (ESBL) isolated.  ESBL's may be clinically resistant to therapy with  Penicillins,Cephalosporins or Aztreonam despite  apparent in vitro susceptibility to some of these agents.  The patient requires contact isolation.  Please contact pharmacy or an Infectious Disease Specialist  if you have any questions about appropriate therapy.      Narrative:      CALL  Garza  171 tel. [phone],  Droplet,Contact  Indication for culture:->Emergency Room Patient  ** had + UA in ER 2/20, please culture.    Culture & Susceptibility     ESCHERICHIA COLI ESBL     Antibiotic Sensitivity Microscan Unit Status    Ampicillin Resistant >16 mcg/mL Final    Cefepime Resistant >16 mcg/mL Final    Cefotaxime Extended Spectrum Beta Lactamase >32 mcg/mL Final    Cefotetan Sensitive <=16 mcg/mL Final    Ceftazidime Extended Spectrum Beta Lactamase 4 mcg/mL Final    Ceftriaxone Extended Spectrum Beta Lactamase >32 mcg/mL Final    Cefuroxime Resistant >16 mcg/mL Final    Cephalothin Resistant >16 mcg/mL Final    Ciprofloxacin Resistant >2 mcg/mL Final    Gentamicin Resistant >8 mcg/mL Final    Levofloxacin Intermediate 4 mcg/mL Final    Nitrofurantoin Intermediate 64 mcg/mL Final    Pip/Tazobactam Sensitive <=16 mcg/mL Final    Piperacillin Resistant >64 mcg/mL Final    Tigecycline Sensitive <=2 mcg/mL Final    Tobramycin Resistant >8 mcg/mL Final    Trimeth/Sulfa Resistant >2/38 mcg/mL Final                       Culture Respiratory W/ GRM STN [[id number]]  (Abnormal)  (Susceptibility) Collected:  02/20/17 6896    Order Status:  Completed Specimen Information:  Respirate from Sputum Updated:  02/22/17 0850     Gram Stain Result --      Result:        Moderate WBCs.  Rare epithelial cells.  Rare mixed bacteria, no predominant organism seen.  Specimen Quality Score: 3+       Significant Indicator POS (POS)      Source RESP      Site SPUTUM       Respiratory Culture        Result:        Light growth usual upper respiratory josé miguel (A)     Respiratory Culture -- (A)      Result:        Escherichia coli ESBL  Light growth  Extended Spectrum Beta-lactamase (ESBL) isolated.  ESBL's may be clinically resistant to therapy with  Penicillins,Cephalosporins or Aztreonam despite  apparent in vitro susceptibility to some of these agents.  The patient requires contact isolation.  Please contact pharmacy or an Infectious Disease Specialist  if you have any questions about appropriate therapy.      Narrative:      CALL  Garza  171 tel. [phone],  CALLED  171 tel. [phone] 02/22/2017, 08:49, RB PERF. RESULTS CALLED  TO:[id number] Rn, Inf C  If not done within the last 24 hours  Sputum cultures, induced if needed. If not done within the  last 24 hours  Send central line distal (brown) port venous blood gas    Culture & Susceptibility     ESCHERICHIA COLI ESBL     Antibiotic Sensitivity Microscan Unit Status    Ampicillin Resistant >16 mcg/mL Final    Cefepime Resistant >16 mcg/mL Final    Cefotaxime Extended Spectrum Beta Lactamase >32 mcg/mL Final    Cefotetan Sensitive <=16 mcg/mL Final    Ceftazidime Extended Spectrum Beta Lactamase >16 mcg/mL Final    Ceftriaxone Extended Spectrum Beta Lactamase >32 mcg/mL Final    Cefuroxime Resistant >16 mcg/mL Final    Ciprofloxacin Resistant >2 mcg/mL Final    Ertapenem Sensitive <=1 mcg/mL Final    Gentamicin Resistant >8 mcg/mL Final    Pip/Tazobactam Sensitive <=16 mcg/mL Final    Tigecycline Sensitive <=2 mcg/mL Final    Tobramycin Resistant >8 mcg/mL Final    Trimeth/Sulfa Resistant >2/38 mcg/mL Final                       MRSA BY PCR (AMP) [[id number]]  (Abnormal) Collected:  02/21/17 0122    Order Status:  Completed Specimen Information:  Respirate from Nares Updated:  02/21/17 1424     Significant Indicator POS (POS)      Source RESP      Site NARES      MRSA PCR POSITIVE for MRSA by PCR. (A)     Narrative:      CALL  Garza  171  "tel. [phone],  CALLED  171 tel. [phone] 02/21/2017, 14:24, RB PERF. RESULTS CALLED TO:  [id number]  Collected By:[id number] BEVERLY LOMBARDO    GRAM STAIN [[id number]] Collected:  02/20/17 1736    Order Status:  Completed Specimen Information:  Respirate Updated:  02/21/17 1010     Significant Indicator .      Source RESP      Site SPUTUM      Gram Stain Result --      Result:        Moderate WBCs.  Rare epithelial cells.  Rare mixed bacteria, no predominant organism seen.  Specimen Quality Score: 3+      Narrative:      If not done within the last 24 hours  Sputum cultures, induced if needed. If not done within the  last 24 hours  Send central line distal (brown) port venous blood gas    URINALYSIS [[id number]]  (Abnormal) Collected:  02/20/17 2012    Order Status:  Completed Specimen Information:  Urine from Urine, Clean Catch Updated:  02/20/17 2050     Micro Urine Req Microscopic      Color Yellow      Character Sl Cloudy (A)      Specific Gravity 1.024      Ph 5.5      Glucose Negative mg/dL      Ketones Trace (A) mg/dL      Protein 30 (A) mg/dL      Bilirubin Negative      Nitrite Positive (A)      Leukocyte Esterase Large (A)      Occult Blood Trace (A)     Narrative:      Collected By:[id number] BEVERLY LOMBARDO    Blood Culture [[id number]] Collected:  02/20/17 0000    Order Status:  Canceled Specimen Information:  Other from Peripheral     Narrative:      ORDER WAS CANCELLED 02/20/2017 17:58, Duplicate . 02/20/2017  17:59.  From different peripheral sites, if not done within the last  24 hours (Per Hospital Policy: Only change specimen source to  \"Line\" if specified by physician order)    Blood Culture [[id number]] Collected:  02/20/17 0000    Order Status:  Canceled Specimen Information:  Other from Peripheral     Narrative:      ORDER WAS CANCELLED 02/20/2017 17:59, Duplicate . 02/20/2017  17:59.  From different peripheral sites, if not done within the last  24 hours (Per Hospital Policy: Only " "change specimen source to  \"Line\" if specified by physician order)    Urinalysis [[id number]] Collected:  02/20/17 0000    Order Status:  Canceled Specimen Information:  Urine     Narrative:      171 tel. [phone] 02/22/2017, 08:49, RB PERF. RESULTS CALLED TO:[id number] Rn,  Inf C  TEST Urinalysis WAS CANCELLED, 02/24/17 01:05 Test autocancelled, not  collected or received  If not done within the last 24 hours  Sputum cultures, induced if needed. If not done within the  last 24 hours  Send central line distal (brown) port venous blood gas    FLUID CULTURE W/GRAM STAIN [[id number]] Collected:  02/20/17 0000    Order Status:  Canceled Specimen Information:  Other from Thoracentesis Fluid     Narrative:      TEST Fluid Culture w/Gram Stain WAS CANCELLED, 02/24/17 01:05 Test  autocancelled, not collected or received          Assessment:  Active Hospital Problems    Diagnosis   • Respiratory failure (CMS-HCC) [J96.90]   • Debility [R53.81]   • Multiple falls [R29.6]   • MRSA carrier [Z22.322]   • Sepsis (CMS-HCC) [A41.9]   • HCAP (healthcare-associated pneumonia) [J18.9]   • Chronic respiratory failure with hypoxia (CMS-HCC) [J96.11]   • Oxygen dependent [Z99.81]   • DM type 2, controlled, with complication (CMS-HCC) [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  No overt s/s of aspiration noted on swallow eval  On dysphagia 1 diet      Prognosis    poor     Discussed with internal medicine and pulmonary  "

## 2017-02-27 LAB
ANION GAP SERPL CALC-SCNC: 6 MMOL/L (ref 0–11.9)
BASOPHILS # BLD AUTO: 0.5 % (ref 0–1.8)
BASOPHILS # BLD: 0.04 K/UL (ref 0–0.12)
BUN SERPL-MCNC: 28 MG/DL (ref 8–22)
CALCIUM SERPL-MCNC: 9.2 MG/DL (ref 8.5–10.5)
CHLORIDE SERPL-SCNC: 93 MMOL/L (ref 96–112)
CO2 SERPL-SCNC: 42 MMOL/L (ref 20–33)
CREAT SERPL-MCNC: 0.66 MG/DL (ref 0.5–1.4)
EKG IMPRESSION: NORMAL
EOSINOPHIL # BLD AUTO: 0.09 K/UL (ref 0–0.51)
EOSINOPHIL NFR BLD: 1.1 % (ref 0–6.9)
ERYTHROCYTE [DISTWIDTH] IN BLOOD BY AUTOMATED COUNT: 49.1 FL (ref 35.9–50)
GFR SERPL CREATININE-BSD FRML MDRD: >60 ML/MIN/1.73 M 2
GLUCOSE BLD-MCNC: 102 MG/DL (ref 65–99)
GLUCOSE BLD-MCNC: 116 MG/DL (ref 65–99)
GLUCOSE BLD-MCNC: 148 MG/DL (ref 65–99)
GLUCOSE BLD-MCNC: 154 MG/DL (ref 65–99)
GLUCOSE SERPL-MCNC: 99 MG/DL (ref 65–99)
HCT VFR BLD AUTO: 41 % (ref 42–52)
HGB BLD-MCNC: 12.9 G/DL (ref 14–18)
IMM GRANULOCYTES # BLD AUTO: 0.04 K/UL (ref 0–0.11)
IMM GRANULOCYTES NFR BLD AUTO: 0.5 % (ref 0–0.9)
LYMPHOCYTES # BLD AUTO: 1.58 K/UL (ref 1–4.8)
LYMPHOCYTES NFR BLD: 19.1 % (ref 22–41)
MCH RBC QN AUTO: 32 PG (ref 27–33)
MCHC RBC AUTO-ENTMCNC: 31.5 G/DL (ref 33.7–35.3)
MCV RBC AUTO: 101.7 FL (ref 81.4–97.8)
MONOCYTES # BLD AUTO: 0.71 K/UL (ref 0–0.85)
MONOCYTES NFR BLD AUTO: 8.6 % (ref 0–13.4)
NEUTROPHILS # BLD AUTO: 5.81 K/UL (ref 1.82–7.42)
NEUTROPHILS NFR BLD: 70.2 % (ref 44–72)
NRBC # BLD AUTO: 0 K/UL
NRBC BLD AUTO-RTO: 0 /100 WBC
PLATELET # BLD AUTO: 178 K/UL (ref 164–446)
PMV BLD AUTO: 10.5 FL (ref 9–12.9)
POTASSIUM SERPL-SCNC: 4.3 MMOL/L (ref 3.6–5.5)
RBC # BLD AUTO: 4.03 M/UL (ref 4.7–6.1)
SODIUM SERPL-SCNC: 141 MMOL/L (ref 135–145)
WBC # BLD AUTO: 8.3 K/UL (ref 4.8–10.8)

## 2017-02-27 PROCEDURE — 99233 SBSQ HOSP IP/OBS HIGH 50: CPT | Performed by: HOSPITALIST

## 2017-02-27 PROCEDURE — A9270 NON-COVERED ITEM OR SERVICE: HCPCS | Performed by: HOSPITALIST

## 2017-02-27 PROCEDURE — 82962 GLUCOSE BLOOD TEST: CPT

## 2017-02-27 PROCEDURE — 700112 HCHG RX REV CODE 229: Performed by: HOSPITALIST

## 2017-02-27 PROCEDURE — 94668 MNPJ CHEST WALL SBSQ: CPT

## 2017-02-27 PROCEDURE — 700111 HCHG RX REV CODE 636 W/ 250 OVERRIDE (IP): Performed by: HOSPITALIST

## 2017-02-27 PROCEDURE — 700111 HCHG RX REV CODE 636 W/ 250 OVERRIDE (IP)

## 2017-02-27 PROCEDURE — 700101 HCHG RX REV CODE 250: Performed by: HOSPITALIST

## 2017-02-27 PROCEDURE — 80048 BASIC METABOLIC PNL TOTAL CA: CPT

## 2017-02-27 PROCEDURE — 700105 HCHG RX REV CODE 258: Performed by: INTERNAL MEDICINE

## 2017-02-27 PROCEDURE — 93010 ELECTROCARDIOGRAM REPORT: CPT | Performed by: INTERNAL MEDICINE

## 2017-02-27 PROCEDURE — 700102 HCHG RX REV CODE 250 W/ 637 OVERRIDE(OP): Performed by: HOSPITALIST

## 2017-02-27 PROCEDURE — A9270 NON-COVERED ITEM OR SERVICE: HCPCS | Performed by: INTERNAL MEDICINE

## 2017-02-27 PROCEDURE — 97535 SELF CARE MNGMENT TRAINING: CPT

## 2017-02-27 PROCEDURE — 700111 HCHG RX REV CODE 636 W/ 250 OVERRIDE (IP): Performed by: INTERNAL MEDICINE

## 2017-02-27 PROCEDURE — 36415 COLL VENOUS BLD VENIPUNCTURE: CPT

## 2017-02-27 PROCEDURE — 770020 HCHG ROOM/CARE - TELE (206)

## 2017-02-27 PROCEDURE — 700102 HCHG RX REV CODE 250 W/ 637 OVERRIDE(OP): Performed by: INTERNAL MEDICINE

## 2017-02-27 PROCEDURE — 700101 HCHG RX REV CODE 250: Performed by: INTERNAL MEDICINE

## 2017-02-27 PROCEDURE — 93005 ELECTROCARDIOGRAM TRACING: CPT | Performed by: HOSPITALIST

## 2017-02-27 PROCEDURE — 85025 COMPLETE CBC W/AUTO DIFF WBC: CPT

## 2017-02-27 PROCEDURE — 94640 AIRWAY INHALATION TREATMENT: CPT

## 2017-02-27 RX ORDER — LEVALBUTEROL INHALATION SOLUTION 1.25 MG/3ML
1.25 SOLUTION RESPIRATORY (INHALATION)
Status: DISCONTINUED | OUTPATIENT
Start: 2017-02-27 | End: 2017-03-08

## 2017-02-27 RX ORDER — METOPROLOL TARTRATE 50 MG/1
50 TABLET, FILM COATED ORAL 2 TIMES DAILY
Status: DISCONTINUED | OUTPATIENT
Start: 2017-02-27 | End: 2017-03-10

## 2017-02-27 RX ADMIN — ACETYLCYSTEINE 3 ML: 200 SOLUTION ORAL; RESPIRATORY (INHALATION) at 20:01

## 2017-02-27 RX ADMIN — TIOTROPIUM BROMIDE 1 CAPSULE: 18 CAPSULE ORAL; RESPIRATORY (INHALATION) at 06:52

## 2017-02-27 RX ADMIN — STANDARDIZED SENNA CONCENTRATE AND DOCUSATE SODIUM 1 TABLET: 8.6; 5 TABLET, FILM COATED ORAL at 20:46

## 2017-02-27 RX ADMIN — DOCUSATE SODIUM 100 MG: 100 CAPSULE ORAL at 20:46

## 2017-02-27 RX ADMIN — LACTOBACILLUS ACIDOPHILUS / LACTOBACILLUS BULGARICUS 1 PACKET: 100 MILLION CFU STRENGTH GRANULES at 17:30

## 2017-02-27 RX ADMIN — MEROPENEM 500 MG: 500 INJECTION, POWDER, FOR SOLUTION INTRAVENOUS at 12:17

## 2017-02-27 RX ADMIN — ALBUTEROL SULFATE 2.5 MG: 2.5 SOLUTION RESPIRATORY (INHALATION) at 06:51

## 2017-02-27 RX ADMIN — LEVALBUTEROL HYDROCHLORIDE 1.25 MG: 1.25 SOLUTION RESPIRATORY (INHALATION) at 10:35

## 2017-02-27 RX ADMIN — FUROSEMIDE 40 MG: 10 INJECTION, SOLUTION INTRAVENOUS at 05:08

## 2017-02-27 RX ADMIN — INSULIN LISPRO 1 UNITS: 100 INJECTION, SOLUTION INTRAVENOUS; SUBCUTANEOUS at 20:56

## 2017-02-27 RX ADMIN — BUDESONIDE AND FORMOTEROL FUMARATE DIHYDRATE 2 PUFF: 160; 4.5 AEROSOL RESPIRATORY (INHALATION) at 06:52

## 2017-02-27 RX ADMIN — MEROPENEM 500 MG: 500 INJECTION, POWDER, FOR SOLUTION INTRAVENOUS at 17:28

## 2017-02-27 RX ADMIN — METOPROLOL TARTRATE 50 MG: 50 TABLET, FILM COATED ORAL at 20:47

## 2017-02-27 RX ADMIN — FUROSEMIDE 40 MG: 10 INJECTION, SOLUTION INTRAVENOUS at 17:27

## 2017-02-27 RX ADMIN — ATORVASTATIN CALCIUM 80 MG: 80 TABLET, FILM COATED ORAL at 20:44

## 2017-02-27 RX ADMIN — METOPROLOL TARTRATE 5 MG: 5 INJECTION INTRAVENOUS at 08:52

## 2017-02-27 RX ADMIN — LEVALBUTEROL HYDROCHLORIDE 1.25 MG: 1.25 SOLUTION RESPIRATORY (INHALATION) at 14:25

## 2017-02-27 RX ADMIN — BUDESONIDE AND FORMOTEROL FUMARATE DIHYDRATE 2 PUFF: 160; 4.5 AEROSOL RESPIRATORY (INHALATION) at 20:01

## 2017-02-27 RX ADMIN — ACETYLCYSTEINE 3 ML: 200 SOLUTION ORAL; RESPIRATORY (INHALATION) at 06:52

## 2017-02-27 RX ADMIN — ACETYLCYSTEINE 3 ML: 200 SOLUTION ORAL; RESPIRATORY (INHALATION) at 10:35

## 2017-02-27 RX ADMIN — METOPROLOL TARTRATE 25 MG: 25 TABLET, FILM COATED ORAL at 08:59

## 2017-02-27 RX ADMIN — ASPIRIN 81 MG: 81 TABLET ORAL at 12:25

## 2017-02-27 RX ADMIN — FAMOTIDINE 20 MG: 20 TABLET, FILM COATED ORAL at 20:44

## 2017-02-27 RX ADMIN — HEPARIN SODIUM 5000 UNITS: 5000 INJECTION, SOLUTION INTRAVENOUS; SUBCUTANEOUS at 05:08

## 2017-02-27 RX ADMIN — HEPARIN SODIUM 5000 UNITS: 5000 INJECTION, SOLUTION INTRAVENOUS; SUBCUTANEOUS at 20:52

## 2017-02-27 RX ADMIN — LEVETIRACETAM 1500 MG: 500 TABLET, FILM COATED ORAL at 20:47

## 2017-02-27 RX ADMIN — ACETYLCYSTEINE 3 ML: 200 SOLUTION ORAL; RESPIRATORY (INHALATION) at 14:25

## 2017-02-27 RX ADMIN — HEPARIN SODIUM 5000 UNITS: 5000 INJECTION, SOLUTION INTRAVENOUS; SUBCUTANEOUS at 16:00

## 2017-02-27 RX ADMIN — LEVALBUTEROL HYDROCHLORIDE 1.25 MG: 1.25 SOLUTION RESPIRATORY (INHALATION) at 20:01

## 2017-02-27 RX ADMIN — LEVETIRACETAM 1500 MG: 500 TABLET, FILM COATED ORAL at 08:58

## 2017-02-27 ASSESSMENT — ENCOUNTER SYMPTOMS
FEVER: 0
EYE PAIN: 0
EYE DISCHARGE: 0
SORE THROAT: 0
BACK PAIN: 0
NAUSEA: 1
DIARRHEA: 0
CHILLS: 0
COUGH: 1
BRUISES/BLEEDS EASILY: 0
HEADACHES: 0
DIAPHORESIS: 0
LOSS OF CONSCIOUSNESS: 0
FOCAL WEAKNESS: 0
NECK PAIN: 0
DEPRESSION: 0
WHEEZING: 0
MYALGIAS: 0
CONSTIPATION: 0
HEMOPTYSIS: 0
WEAKNESS: 1
SENSORY CHANGE: 0
DIZZINESS: 0
ABDOMINAL PAIN: 0
PALPITATIONS: 0
SPEECH CHANGE: 0
CLAUDICATION: 0
NAUSEA: 0
SHORTNESS OF BREATH: 0
SPUTUM PRODUCTION: 0
VOMITING: 0

## 2017-02-27 ASSESSMENT — PAIN SCALES - GENERAL
PAINLEVEL_OUTOF10: 0
PAINLEVEL_OUTOF10: 0

## 2017-02-27 ASSESSMENT — LIFESTYLE VARIABLES: SUBSTANCE_ABUSE: 0

## 2017-02-27 NOTE — PROGRESS NOTES
RN called from monitor room. Patient taking nebs from RT and HR changed from 60s --> 150s sustaining. Patient asymptomatic. /79. Patient's HR now 90s-100s Afib. MD Ashley notified. EKG ordered

## 2017-02-27 NOTE — PROGRESS NOTES
Pt resting in bed at this time, even visible chest rise, no apparent signs of distress, bed alarm on, call light in place, Q2 turns, bed in low and locked position. Pt currently on 3L O2 N.C., with O2 sat 95%.

## 2017-02-27 NOTE — OR NURSING
Dr Burleson called back, stated he will re-evaluate patient and possibly reschedule case for later today.  Emmy LYON notified.  Patient to stay NPO.

## 2017-02-27 NOTE — PROGRESS NOTES
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  H/o aspiration, on dysphagia 1 diet per SLP.    2/21:  Had rapid last night, opens eyes to name only.  Remains full code per family.  CTA showing LLL pneumonia/opacification with hiatal hernia seen.  U/s chest did not show enough pleural effusion for IR to drain.  Nasal swab + MRSA, confused.  2/22:  Improved mentation today, sitting up, conversing appropriately after 1 dose of meropenem IV.  ID consulted for sputum + Ecoli ESBL, left lung white out.  2/23:  Continues to improve on Abx.  Mentation clear, sitting up in chair, appropriate, states does not remember first few days.  2/24:  Lungs improving with increased breath sounds on left.  Mentation remains clear.  States he could not control his urine.  Explained had UTI same as in lung.  2/25:  Worsening NEHEMIAS, JVD noted on exam, increased CO2 38 to 40.  Ordered repeat CXR with pulmonary edema noted.  Restarted Home meds, previously on lasix 40 bid and spironolactone 25 daily.  Prior echo 2014 EF 65% grade 1 dd, ordered repeat echo.  2/26:  Severe SOB since last night, acute respiratory distress with O2 up to 9 LPM VM, clearer mentation after IV lasix,O2 back to 4 LPM NC this a.m..   CXR with worsening left lung complete white out.  D/w Dr. Brunner.  For bronch tomorrow.    Review of Systems:  Review of Systems   Constitutional: Positive for malaise/fatigue. Negative for fever, chills and diaphoresis.   HENT: Negative for congestion and sore throat.    Eyes: Negative for pain and discharge.   Respiratory: Positive for cough. Negative for hemoptysis, sputum production, shortness of breath and wheezing.     Cardiovascular: Negative for chest pain, palpitations, claudication and leg swelling.   Gastrointestinal: Negative for nausea, vomiting, abdominal pain, diarrhea, constipation and melena.   Genitourinary: Negative for dysuria, urgency and frequency.   Musculoskeletal: Negative for myalgias, back pain, joint pain and neck pain.   Skin: Negative for itching and rash.   Neurological: Positive for weakness. Negative for dizziness, sensory change, speech change, focal weakness, loss of consciousness and headaches.   Endo/Heme/Allergies: Does not bruise/bleed easily.   Psychiatric/Behavioral: Negative for depression, suicidal ideas and substance abuse.       Physical Exam:  Physical Exam   Constitutional: He is oriented to person, place, and time. He appears well-developed and well-nourished. No distress.   Mentation clear   HENT:   Head: Normocephalic and atraumatic.   Mouth/Throat: Oropharynx is clear and moist. No oropharyngeal exudate.   Eyes: Conjunctivae and EOM are normal. Pupils are equal, round, and reactive to light. Right eye exhibits no discharge. Left eye exhibits no discharge. No scleral icterus.   Neck: Normal range of motion. Neck supple. No JVD present. No tracheal deviation present. No thyromegaly present.   Cardiovascular: Normal rate, regular rhythm and normal heart sounds.  Exam reveals no gallop and no friction rub.    No murmur heard.  Pulmonary/Chest: Effort normal and breath sounds normal. No respiratory distress. He has no wheezes. He has no rales. He exhibits no tenderness.   On 4 lpm NC, desats to 60s off O2.   Abdominal: Soft. Bowel sounds are normal. He exhibits no distension and no mass. There is no tenderness. There is no rebound and no guarding.   Musculoskeletal: Normal range of motion. He exhibits no edema or tenderness.   Lymphadenopathy:     He has no cervical adenopathy.   Neurological: He is alert and oriented to person, place, and time. No cranial nerve deficit. He exhibits normal  muscle tone.   Skin: Skin is warm and dry. No rash noted. He is not diaphoretic. No erythema.   Psychiatric: He has a normal mood and affect. His behavior is normal. Judgment and thought content normal.   Nursing note and vitals reviewed.      Labs:        Invalid input(s): [id number]  Recent Labs      17   1856  17   0702   TROPONINI  0.03  0.04  0.03     Recent Labs      17   0251   SODIUM  143  144  143   POTASSIUM  4.0  4.3  4.4   CHLORIDE  101  101  98   CO2  38*  40*  42*   BUN  20  23*  24*   CREATININE  0.58  0.64  0.68   CALCIUM  9.2  8.9  9.3     Recent Labs      17   0251   GLUCOSE  118*  109*  113*     Recent Labs      17   0251   RBC  3.67*  3.95*  4.08*   HEMOGLOBIN  11.8*  12.5*  12.7*   HEMATOCRIT  38.0*  41.4*  41.8*   PLATELETCT  144*  162*  161*     Recent Labs      17   0251   WBC  7.6  7.5  9.2   NEUTSPOLYS  73.60*  72.70*  75.50*   LYMPHOCYTES  16.00*  16.20*  14.70*   MONOCYTES  8.70  9.40  8.10   EOSINOPHILS  1.00  0.90  0.90   BASOPHILS  0.40  0.40  0.50           Hemodynamics:  Temp (24hrs), Av.9 °C (98.4 °F), Min:36.7 °C (98.1 °F), Max:37 °C (98.6 °F)  Temperature: 36.8 °C (98.3 °F)  Pulse  Av.3  Min: 47  Max: 97   Blood Pressure : 120/65 mmHg     Respiratory:    Respiration: 17, Pulse Oximetry: 94 %, O2 Daily Delivery Respiratory : Silicone Nasal Cannula     Given By:: Mask, #MDI/DPI Given: MDI/DPI x 2, PEP/CPT Method: Flutter Valve, Given By:: Mouthseal, Work Of Breathing / Effort: Mild  RUL Breath Sounds: Diminished, RML Breath Sounds: Diminished, RLL Breath Sounds: Diminished, DEBI Breath Sounds: Diminished, LLL Breath Sounds: Diminished  Fluids:    Intake/Output Summary (Last 24 hours) at 17 1720  Last data filed at 17 0800   Gross per 24 hour   Intake    290 ml   Output     550 ml   Net   -260 ml     Weight: 64.4 kg (141 lb 15.6 oz)  GI/Nutrition:  Orders Placed This Encounter   Procedures   • DIET ORDER     Standing Status: Standing      Number of Occurrences: 1      Standing Expiration Date:      Order Specific Question:  Diet:     Answer:  Diabetic [3]      Comments:  1-1 nursing supervision     Order Specific Question:  Texture/Fiber modifications:     Answer:  Dysphagia 1(Pureed)specify fluid consistency(question 6) [1]     Order Specific Question:  Consistency/Fluid modifications:     Answer:  Nectar Thick [2]     Medical Decision Making, by Problem:  Active Hospital Problems    Diagnosis   • Respiratory failure (CMS-HCC) [J96.90]  Had been up to 7 LPM NC 2/20.  Now at 4 LPM NC  2/2 LLL pneumonia/opacification.  CXR with worsening left sided near complete white out.  Clinically improved with addition of lasix 40 IV bid  Bronchoscopy 2/27.     • Debility [R53.81]   • Multiple falls [R29.6]  Reported from niece  H/o pelvic fracture, recently at [facility] for rehab     • MRSA carrier [Z22.322]  MRSA + nasal swab     • Sepsis (CMS-HCC) [A41.9]  2/2 LLL pneumonia HCAP:  ESBL ecoli  BCs negative x2  UC Ecoli ESBL     • HCAP (healthcare-associated pneumonia) [J18.9]  Sputum culture ESBL Ecoli  ID consult appreciated   tygacil IV per ID x 7 days.  Consolidation seen on CTA chest  mucomist nebs ordered   Ordered PEP therapy to open up airway  Review of last admission:  4/2016 s/p bronchoscopy with Dr. Phillips with no mass, thick mucus, cultures negative.     • Chronic respiratory failure with hypoxia (CMS-HCC) [J96.11]  Mild improvement in O2 requirement back to 4 LPM Nc  Baseline 3 LPM NC     • Oxygen dependent [Z99.81]  Baseline 3 LPM NC 24/7, now on 6 LPM NC     • DM type 2, controlled, with complication (CMS-HCC) [E11.8]  SSI  accuchecks  Diabetic diet  Dysphagia 1 NTFL   • Left hemiparesis (CMS-HCC) [G81.94]  2/2 prior CVA     • Dysphagia status post cerebrovascular accident  [I69.391]  SLP rec dysphagia 1 NT diet.     • COPD (chronic obstructive pulmonary disease) (CMS-HCC) [J44.9]  Nebs  RT protocol     • GERD (gastroesophageal reflux disease) [K21.9]  Continue home pepcid  Especially with aspiration risk.    Abnormal UA:  Wbc 100-150 wbc  UC with ESBL Ecoli  On tygacil x7 days per ID.    Iron deficiency anemia:  Replaced iron daily with vit C.    Nausea:  1 time episode  Added zofran prn  Lactobacillus tid.    Acute on chronic Diastolic heart failure:  Previously on lasix 40  Bid and spironolactone  CXR with increased vascular congestion  JVD on exam, increase in CO2 on chemistry  Increased work of breathing, increased O2 requirement  Started lasix 40IV bid now  Restarted spironolactone 25 daily.  Repeat echo EF 60% with grade 2 DD severe tricuspid regurgitation.     FC per niece:  Code reviewed by admitting physician.  PT/OT/SNF orders placed.  Family does not want to go back to [facility].  2/25:  Acute respiratory distress, awaiting medical improvement, but patient has expressed desire to go to group home if feasible.    Loya catheter: No Loya      DVT Prophylaxis: Heparin    Ulcer prophylaxis: Yes  Antibiotics: Treating active infection/contamination beyond 24 hours perioperative coverage  Assessed for rehab: Patient was assess for and/or received rehabilitation services during this hospitalization

## 2017-02-27 NOTE — PROGRESS NOTES
Bedside report completed. Assumed pt care. Pt A&O 4, resting in bed comfortably with no signs of labored breathing on 4L O2. Pt tele monitor in place, cardiac rhythm being monitored. Pt call light within reach, bed in low position, upper bed rails up, non skid socks in place. Pt denies any pain or other distress at this time. Patient was updated on the plan of care for the night.  All fall precautions in place.  Will continue to monitor.

## 2017-02-27 NOTE — PROGRESS NOTES
Infectious Disease Progress Note    Author: Odilia Kapoor M.D.    DOS & Time created: 2017  3:54 PM    Chief Complaint   Patient presents with   • Shortness of Breath     SAT's in 80's at home, given NPPB Tx X 3 with improvement.     Pneumonia and UTI FU    Interval History:  - says he is feeling slightly better. No fevers.   - no fevers. Feels better. On dysphagia 1 diet.   - no fevers. Much more lethargic  - no fevers. The cxr is worse.   AF WBC 8.3 Less dyspnea, +nausea   Labs Reviewed, Medications Reviewed and Radiology Reviewed.    Review of Systems:  Review of Systems   Constitutional: Negative for fever.   Respiratory: Positive for cough.         Decreased   Cardiovascular: Negative for chest pain and leg swelling.   Gastrointestinal: Positive for nausea. Negative for vomiting.   Skin: Negative for rash.   Neurological: Negative for headaches.   All other systems reviewed and are negative.      Hemodynamics:  Temp (24hrs), Av.3 °C (97.4 °F), Min:35.8 °C (96.5 °F), Max:36.7 °C (98.1 °F)  Temperature: 36.4 °C (97.5 °F)  Pulse  Av.1  Min: 47  Max: 98   Blood Pressure : 135/79 mmHg       Physical Exam:  Physical Exam   Constitutional: He appears well-developed. No distress.   Elderly and frail male      HENT:   Head: Normocephalic and atraumatic.   Eyes: EOM are normal. Pupils are equal, round, and reactive to light. No scleral icterus.   Neck: Neck supple.   Cardiovascular: Normal rate.    Murmur heard.  Irregular    Pulmonary/Chest: Effort normal. No respiratory distress. He has rales.   Abdominal: Soft. He exhibits no distension. There is no tenderness.   Musculoskeletal: He exhibits no edema.   Neurological: He is alert.   Skin: No erythema.   Nursing note and vitals reviewed.      Labs:  Recent Labs      17   0313  17   0251  17   0205   WBC  7.5  9.2  8.3   RBC  3.95*  4.08*  4.03*   HEMOGLOBIN  12.5*  12.7*  12.9*   HEMATOCRIT  41.4*  41.8*  41.0*    MCV  104.8*  102.5*  101.7*   MCH  31.6  31.1  32.0   RDW  51.2*  49.4  49.1   PLATELETCT  162*  161*  178   MPV  11.5  10.8  10.5   NEUTSPOLYS  72.70*  75.50*  70.20   LYMPHOCYTES  16.20*  14.70*  19.10*   MONOCYTES  9.40  8.10  8.60   EOSINOPHILS  0.90  0.90  1.10   BASOPHILS  0.40  0.50  0.50     Recent Labs      02/25/17   0313  02/26/17   0251  02/27/17   0205   SODIUM  144  143  141   POTASSIUM  4.3  4.4  4.3   CHLORIDE  101  98  93*   CO2  40*  42*  42*   GLUCOSE  109*  113*  99   BUN  23*  24*  28*     Recent Labs      02/25/17 0313  02/26/17   0251  02/27/17   0205   CREATININE  0.64  0.68  0.66        Imaging:  cxr 2/23  1.  Extensive left pulmonary infiltrates and/or layering effusion. Overall stable.  2.  Cardiomegaly  3.  Atherosclerosis      Medications:  Current Facility-Administered Medications   Medication Dose Frequency Provider Last Rate Last Dose   • meropenem (MERREM) 500 mg in  mL IVPB  500 mg Q6HRS Odilia Kapoor M.D.   Stopped at 02/27/17 1247   • levalbuterol (XOPENEX) 1.25 MG/3ML nebulizer solution 1.25 mg  1.25 mg 4X/DAY (RT) Vinny Guan M.D.   1.25 mg at 02/27/17 1425   • levetiracetam (KEPPRA) tablet 1,500 mg  1,500 mg BID Daya Ashley M.D.   1,500 mg at 02/27/17 0858   • spironolactone (ALDACTONE) tablet 25 mg  25 mg DAILY Daya Ashley M.D.   Stopped at 02/27/17 0900   • furosemide (LASIX) injection 40 mg  40 mg BID DIURETIC Daya Ashley M.D.   40 mg at 02/27/17 0508   • ipratropium-albuterol (DUONEB) nebulizer solution 3 mL  3 mL Q4H PRN (RT) Vinny Guan M.D.   3 mL at 02/25/17 2347   • ondansetron (ZOFRAN) syringe/vial injection 4 mg  4 mg Q4HRS PRN Daya Ashley M.D.   4 mg at 02/24/17 1237   • ondansetron (ZOFRAN ODT) dispertab 4 mg  4 mg Q4HRS PRN Daya Ashley M.D.   4 mg at 02/26/17 1442   • lactobacillus granules (LACTINEX/FLORANEX) packet 1 Packet  1 Packet TID WITH MEALS Daya Ashley M.D.   Stopped at 02/27/17 0730   •  ascorbic acid (ascorbic acid) tablet 500 mg  500 mg DAILY Daya Ashley M.D.   Stopped at 02/27/17 0900   • budesonide-formoterol (SYMBICORT) 160-4.5 MCG/ACT inhaler 2 Puff  2 Puff BID Vinny Guan M.D.   2 Puff at 02/27/17 0652   • acetylcysteine (MUCOMYST) 20 % solution 3 mL  3 mL 4X/DAY (RT) Vinny Guan M.D.   3 mL at 02/27/17 1425   • insulin lispro (HUMALOG) injection 1-6 Units  1-6 Units 4X/DAY ACHS Daya Ashley M.D.   Stopped at 02/24/17 2100   • glucose 4 g chewable tablet 16 g  16 g Q15 MIN PRN Daya Ashley M.D.        And   • dextrose 50% (D50W) injection 25 mL  25 mL Q15 MIN PRN Daya Ashley M.D.       • docusate sodium (COLACE) capsule 100 mg  100 mg BID Daya Ashley M.D.   Stopped at 02/27/17 0900   • aspirin EC (ECOTRIN) tablet 81 mg  81 mg DAILY Heath Leigh M.D.   81 mg at 02/27/17 1225   • atorvastatin (LIPITOR) tablet 80 mg  80 mg Q EVENING Heath Leigh M.D.   80 mg at 02/26/17 2039   • famotidine (PEPCID) tablet 20 mg  20 mg BID Heath Leigh M.D.   Stopped at 02/27/17 0900   • ferrous sulfate tablet 325 mg  325 mg DAILY Heath Leigh M.D.   Stopped at 02/27/17 0900   • finasteride (PROSCAR) tablet 5 mg  5 mg DAILY Heath Leigh M.D.   Stopped at 02/27/17 0900   • gabapentin (NEURONTIN) capsule 300 mg  300 mg DAILY Heath Leigh M.D.   Stopped at 02/27/17 0900   • metoprolol (LOPRESSOR) tablet 25 mg  25 mg BID Heath Leigh M.D.   25 mg at 02/27/17 0859   • oxybutynin SR (DITROPAN-XL) tablet 5 mg  5 mg DAILY Heath Leigh M.D.   Stopped at 02/27/17 0900   • sertraline (ZOLOFT) tablet 100 mg  100 mg DAILY Heath Leigh M.D.   Stopped at 02/27/17 0900   • tamsulosin (FLOMAX) capsule 0.4 mg  0.4 mg AFTER BREAKFAST Heath Leigh M.D.   0.4 mg at 02/26/17 0834   • tiotropium (SPIRIVA) 18 MCG inhalation capsule 1 Cap  1 Cap DAILY Heath Leigh M.D.   1 Cap at 02/27/17 0652   • trazodone (DESYREL) tablet 50 mg  50 mg QHS PRN Heath Leigh M.D.   50 mg at 02/23/17 2303   • NS infusion 1,971 mL  30 mL/kg  "Once PRN Heath Leigh M.D.       • docusate sodium (COLACE) capsule 100 mg  100 mg BID Heath Leigh M.D.   Stopped at 02/25/17 0900   • senna-docusate (PERICOLACE or SENOKOT S) 8.6-50 MG per tablet 1 Tab  1 Tab Nightly Heath Leigh M.D.   1 Tab at 02/26/17 2039   • senna-docusate (PERICOLACE or SENOKOT S) 8.6-50 MG per tablet 1 Tab  1 Tab Q24HRS PRN Heath Leigh M.D.       • lactulose 20 GM/30ML solution 30 mL  30 mL Q24HRS PRN Heath Leigh M.D.   30 mL at 02/23/17 0933   • bisacodyl (DULCOLAX) suppository 10 mg  10 mg Q24HRS PRN Heath Leigh M.D.       • fleet enema 133 mL  1 Each Once PRN Heath Leigh M.D.       • Respiratory Care per Protocol   Continuous RT Heath Leigh M.D.       • NS (BOLUS) infusion 500 mL  500 mL Once PRN Heath Leigh M.D.       • heparin injection 5,000 Units  5,000 Units Q8HRS Heath Leigh M.D.   5,000 Units at 02/27/17 0508     Last reviewed on 2/20/2017  3:33 PM by Evelin Saavedra    Micro:  Results     Procedure Component Value Units Date/Time    BLOOD CULTURE x2 [[id number]] Collected:  02/20/17 1400    Order Status:  Completed Specimen Information:  Blood from Peripheral Updated:  02/25/17 1500     Significant Indicator NEG      Source BLD      Site PERIPHERAL      Blood Culture No growth after 5 days of incubation.     Narrative:      Per Hospital Policy: Only change Specimen Src: to \"Line\" if  specified by physician order.    BLOOD CULTURE x2 [[id number]] Collected:  02/20/17 1400    Order Status:  Completed Specimen Information:  Blood from Peripheral Updated:  02/25/17 1500     Significant Indicator NEG      Source BLD      Site PERIPHERAL      Blood Culture No growth after 5 days of incubation.     Narrative:      Per Hospital Policy: Only change Specimen Src: to \"Line\" if  specified by physician order.    URINE CULTURE-EXISTING-LESS THAN 48 HOURS [[id number]]  (Abnormal)  (Susceptibility) Collected:  02/20/17 2012    Order Status:  Completed Specimen Information:  Urine from Urine, Clean Catch " Updated:  02/23/17 0849     Significant Indicator POS (POS)      Source UR      Site URINE, CLEAN CATCH      Urine Culture Mixed skin josé miguel <10,000 cfu/mL (A)      Urine Culture -- (A)      Result:        Escherichia coli ESBL  ,000 cfu/mL  Extended Spectrum Beta-lactamase (ESBL) isolated.  ESBL's may be clinically resistant to therapy with  Penicillins,Cephalosporins or Aztreonam despite  apparent in vitro susceptibility to some of these agents.  The patient requires contact isolation.  Please contact pharmacy or an Infectious Disease Specialist  if you have any questions about appropriate therapy.      Narrative:      CALL  Garza  171 tel. [phone],  Droplet,Contact  Indication for culture:->Emergency Room Patient  ** had + UA in ER 2/20, please culture.    Culture & Susceptibility     ESCHERICHIA COLI ESBL     Antibiotic Sensitivity Microscan Unit Status    Ampicillin Resistant >16 mcg/mL Final    Cefepime Resistant >16 mcg/mL Final    Cefotaxime Extended Spectrum Beta Lactamase >32 mcg/mL Final    Cefotetan Sensitive <=16 mcg/mL Final    Ceftazidime Extended Spectrum Beta Lactamase 4 mcg/mL Final    Ceftriaxone Extended Spectrum Beta Lactamase >32 mcg/mL Final    Cefuroxime Resistant >16 mcg/mL Final    Cephalothin Resistant >16 mcg/mL Final    Ciprofloxacin Resistant >2 mcg/mL Final    Gentamicin Resistant >8 mcg/mL Final    Levofloxacin Intermediate 4 mcg/mL Final    Nitrofurantoin Intermediate 64 mcg/mL Final    Pip/Tazobactam Sensitive <=16 mcg/mL Final    Piperacillin Resistant >64 mcg/mL Final    Tigecycline Sensitive <=2 mcg/mL Final    Tobramycin Resistant >8 mcg/mL Final    Trimeth/Sulfa Resistant >2/38 mcg/mL Final                       Culture Respiratory W/ GRM STN [[id number]]  (Abnormal)  (Susceptibility) Collected:  02/20/17 8006    Order Status:  Completed Specimen Information:  Respirate from Sputum Updated:  02/22/17 0850     Gram Stain Result --      Result:        Moderate WBCs.  Rare  epithelial cells.  Rare mixed bacteria, no predominant organism seen.  Specimen Quality Score: 3+       Significant Indicator POS (POS)      Source RESP      Site SPUTUM      Respiratory Culture        Result:        Light growth usual upper respiratory josé miguel (A)     Respiratory Culture -- (A)      Result:        Escherichia coli ESBL  Light growth  Extended Spectrum Beta-lactamase (ESBL) isolated.  ESBL's may be clinically resistant to therapy with  Penicillins,Cephalosporins or Aztreonam despite  apparent in vitro susceptibility to some of these agents.  The patient requires contact isolation.  Please contact pharmacy or an Infectious Disease Specialist  if you have any questions about appropriate therapy.      Narrative:      CALL  Garza  171 tel. [phone],  CALLED  171 tel. [phone] 02/22/2017, 08:49, RB PERF. RESULTS CALLED  TO:[id number] Rn, Inf C  If not done within the last 24 hours  Sputum cultures, induced if needed. If not done within the  last 24 hours  Send central line distal (brown) port venous blood gas    Culture & Susceptibility     ESCHERICHIA COLI ESBL     Antibiotic Sensitivity Microscan Unit Status    Ampicillin Resistant >16 mcg/mL Final    Cefepime Resistant >16 mcg/mL Final    Cefotaxime Extended Spectrum Beta Lactamase >32 mcg/mL Final    Cefotetan Sensitive <=16 mcg/mL Final    Ceftazidime Extended Spectrum Beta Lactamase >16 mcg/mL Final    Ceftriaxone Extended Spectrum Beta Lactamase >32 mcg/mL Final    Cefuroxime Resistant >16 mcg/mL Final    Ciprofloxacin Resistant >2 mcg/mL Final    Ertapenem Sensitive <=1 mcg/mL Final    Gentamicin Resistant >8 mcg/mL Final    Pip/Tazobactam Sensitive <=16 mcg/mL Final    Tigecycline Sensitive <=2 mcg/mL Final    Tobramycin Resistant >8 mcg/mL Final    Trimeth/Sulfa Resistant >2/38 mcg/mL Final                       MRSA BY PCR (AMP) [[id number]]  (Abnormal) Collected:  02/21/17 0122    Order Status:  Completed Specimen Information:  Respirate from  Nares Updated:  02/21/17 1424     Significant Indicator POS (POS)      Source RESP      Site NARES      MRSA PCR POSITIVE for MRSA by PCR. (A)     Narrative:      CALL  Garza  171 tel. [phone],  CALLED  171 tel. [phone] 02/21/2017, 14:24, RB PERF. RESULTS CALLED TO:  [id number]  Collected By:[id number] BEVERLY LOMBARDO    GRAM STAIN [[id number]] Collected:  02/20/17 1736    Order Status:  Completed Specimen Information:  Respirate Updated:  02/21/17 1010     Significant Indicator .      Source RESP      Site SPUTUM      Gram Stain Result --      Result:        Moderate WBCs.  Rare epithelial cells.  Rare mixed bacteria, no predominant organism seen.  Specimen Quality Score: 3+      Narrative:      If not done within the last 24 hours  Sputum cultures, induced if needed. If not done within the  last 24 hours  Send central line distal (brown) port venous blood gas    URINALYSIS [[id number]]  (Abnormal) Collected:  02/20/17 2012    Order Status:  Completed Specimen Information:  Urine from Urine, Clean Catch Updated:  02/20/17 2050     Micro Urine Req Microscopic      Color Yellow      Character Sl Cloudy (A)      Specific Gravity 1.024      Ph 5.5      Glucose Negative mg/dL      Ketones Trace (A) mg/dL      Protein 30 (A) mg/dL      Bilirubin Negative      Nitrite Positive (A)      Leukocyte Esterase Large (A)      Occult Blood Trace (A)     Narrative:      Collected By:[id number] BEVERLY LOMBARDO          Assessment:  Active Hospital Problems    Diagnosis   • Respiratory failure (CMS-HCC) [J96.90]   • Debility [R53.81]   • Multiple falls [R29.6]   • MRSA carrier [Z22.322]   • Sepsis (CMS-HCC) [A41.9]   • HCAP (healthcare-associated pneumonia) [J18.9]   • Chronic respiratory failure with hypoxia (CMS-HCC) [J96.11]   • Oxygen dependent [Z99.81]   • DM type 2, controlled, with complication (CMS-HCC) [E11.8]   • Left hemiparesis (CMS-HCC) [G81.94]   • Dysphagia status post cerebrovascular accident [I69.391]   • COPD (chronic  obstructive pulmonary disease) (CMS-HCC) [J44.9]       Plan:  HCAP   Patient recently d/[facility] from SNF  ESBL E coli  DC Tygacil and change to Meropenem due to concurrent UTI  CXR no sig change by my read from 2/26    UTI  Urine c/s ESBL E coli also  DCTygacil as does not cover UTI    Aspiration, possible  No overt s/s of aspiration noted on swallow eval  On dysphagia 1 diet  Aspiration precautions    DM  Keep BS under 150 to help control current infection      Discussed with internal medicine

## 2017-02-27 NOTE — THERAPY
"Occupational Therapy Treatment completed with focus on ADLs, ADL transfers, patient education, cognition and upper extremity function.  Functional Status:  Min A supine>sit EOB, min A sit>stand pivot to chair incontinent of urine in bed, min A w/UB dressing, max A LB dressing, sat up in chair ~20 min, min A sit>Stand ambulated to bathroom w/fww stood at toilet w/CGA attempting to urinate, returned to bed w/CGA, able to use fww w/min A to safely maneuver d/t LUE limited coordination.   Plan of Care: Will benefit from Occupational Therapy 3 times per week  Discharge Recommendations:  Equipment Will Continue to Assess for Equipment Needs. Post-acute therapy recommended before discharged home- needs 24/7 assist     See \"Rehab Therapy-Acute\" Patient Summary Report for complete documentation.   "

## 2017-02-27 NOTE — CARE PLAN
Problem: Oxygenation:  Goal: Maintain adequate oxygenation dependent on patient condition  4 Liters NC   This is the pt home oxygen flow     Problem: Bronchoconstriction:  Goal: Improve in air movement and diminished wheezing  Xop QID   Muco QID  Via mask    Problem: Hyperinflation:  Goal: Prevent or improve atelectasis  PEP QID  IS values 600  Pt benefits when sat upright or in chair              Problem: Bronchopulmonary Hygiene:  Goal: Increase mobilization of retained secretions  Muco QID  CPT via vibration wand (G5)  Flutter valve initiates cough which is weak, pt refrains from strong cough due to chest pain  Pt understands importance of cough and RN notified of pain

## 2017-02-27 NOTE — OR NURSING
Patient went into Afib, new to patient.  Dr Burleson notified and procedure cancelled.  Emym LYON notified

## 2017-02-27 NOTE — CARE PLAN
Problem: Safety  Goal: Will remain free from injury  Intervention: Provide assistance with mobility  Pt mobility assessed at beginning of shift, pt 2 assist, unsteady.  Fall precautions in place, bed alarm on, non-slip socks on, bed in lowest, locked position and call light is within reach.  Pt educated to call for assistance and verbalizes understanding.       Problem: Skin Integrity  Goal: Risk for impaired skin integrity will decrease  Intervention: Assess risk factors for impaired skin integrity and/or pressure ulcers  Q2 turns, moisture and barrier cream in use.  Pt up to chair for all meals. Floating heels.

## 2017-02-27 NOTE — PROGRESS NOTES
Pulmonary Critical Care Progress Note        Name:Alverto Mancini  MRN:[id number]  Date of Service: 2/27/17  Referring physician: Daya Ashley M.D.    Chief Complaint: Shortness of breath    History of Present Illness: 81 y.o. male admitted for SOB. Hx of L lung pneumonia, chronic aspiration on dysphagia diet, COPD    ROS:  Respiratory: negative cough, negative hemoptysis, negative pleuritic chest pain and negative shortness of breath, Cardiac: negative, GI: negative.  All other systems negative.    Interval Events:  24 hour interval history reviewed   Feels well, no complaints, not expectorating  No chest pain  Developed AF RVr this AM, no palpitations  4LPM NC SpO2 94-96%    PFSH:  No change.    Respiratory:     Pulse Oximetry: 96 %  Chest Tube Drains:          Exam: diminished air entry bilaterally, rhonchorous in L upper lung zones and unlabored respirations, no intercostal retractions or accessory muscle use  ImagingAvailable data reviewed         Invalid input(s): [id number]    HemoDynamics:  Pulse: 75  Blood Pressure : 135/79 mmHg       Exam: Irregularly irregular, tachycardic  Imaging: Available data reviewed        Neuro:  GCS         Exam: no focal deficits noted mental status intact  Imaging: Available data reviewed    Fluids:  Intake/Output       02/25/17 0700 - 02/26/17 0659 02/26/17 0700 - 02/27/17 0659 02/27/17 0700 - 02/28/17 0659      [id number] 2824-7706 Total 8949-6486 4140-6045 Total 1333-0175 4322-3495 Total       Intake    P.O.  480  120 600  300  120 420  --  -- --    P.O. 480 120 600 300 120 420 -- -- --    I.V.  --  50 50  --  50 50  --  -- --    IV Volume (Tigecycline) -- 50 50 -- 50 50 -- -- --    Total Intake 480 170 650 300 170 470 -- -- --       Output    Urine  --  350 350  200  350 550  --  -- --    Condom Catheter -- 350 350 -- 350 350 -- -- --    Number of Times Incontinent of Urine -- 2 x 2 x 1 x -- 1 x -- -- --    Void (ml) -- -- -- 200 -- 200 -- -- --    Stool  --  -- --  --   -- --  --  -- --    Number of Times Stooled -- -- -- 1 x -- 1 x -- -- --    Total Output -- 350 350 200 350 550 -- -- --       Net I/O     480 -180 300 100 -180 -80 -- -- --        Weight: 61.9 kg (136 lb 7.4 oz)  Recent Labs      17   0205   SODIUM  144  143  141   POTASSIUM  4.3  4.4  4.3   CHLORIDE  101  98  93*   CO2  40*  42*  42*   BUN  23*  24*  28*   CREATININE  0.64  0.68  0.66   CALCIUM  8.9  9.3  9.2       GI/Nutrition:  Exam: abdomen is soft and non-tender  Imaging: Available data reviewed  NPO for procedure  Liver Function  Recent Labs      17   0205   GLUCOSE  109*  113*  99       Heme:  Recent Labs      17   0205   RBC  3.95*  4.08*  4.03*   HEMOGLOBIN  12.5*  12.7*  12.9*   HEMATOCRIT  41.4*  41.8*  41.0*   PLATELETCT  162*  161*  178       Infectious Disease:  Temp  Av.4 °C (97.5 °F)  Min: 35.8 °C (96.5 °F)  Max: 36.8 °C (98.3 °F)  Micro: antibiotics reviewed  Recent Labs      17   0205   WBC  7.5  9.2  8.3   NEUTSPOLYS  72.70*  75.50*  70.20   LYMPHOCYTES  16.20*  14.70*  19.10*   MONOCYTES  9.40  8.10  8.60   EOSINOPHILS  0.90  0.90  1.10   BASOPHILS  0.40  0.50  0.50     Current Facility-Administered Medications   Medication Dose Frequency Provider Last Rate Last Dose   • meropenem (MERREM) 500 mg in  mL IVPB  500 mg Q6HRS Odilia Kapoor M.D.       • levetiracetam (KEPPRA) tablet 1,500 mg  1,500 mg BID Daya Ashley M.D.   1,500 mg at 17 0858   • spironolactone (ALDACTONE) tablet 25 mg  25 mg DAILY Daya Ashley M.D.   25 mg at 17 0835   • furosemide (LASIX) injection 40 mg  40 mg BID DIURETIC Daya Ashley M.D.   40 mg at 17 0508   • ipratropium-albuterol (DUONEB) nebulizer solution 3 mL  3 mL Q4H PRN (RT) Vinny Guan M.D.   3 mL at 17 8222   • ondansetron (ZOFRAN) syringe/vial  injection 4 mg  4 mg Q4HRS PRN Daya Ashley M.D.   4 mg at 02/24/17 1237   • ondansetron (ZOFRAN ODT) dispertab 4 mg  4 mg Q4HRS PRN Daya Ashley M.D.   4 mg at 02/26/17 1442   • lactobacillus granules (LACTINEX/FLORANEX) packet 1 Packet  1 Packet TID WITH MEALS Daya Ashley M.D.   Stopped at 02/27/17 0730   • ascorbic acid (ascorbic acid) tablet 500 mg  500 mg DAILY Daya Ashley M.D.   500 mg at 02/26/17 0834   • budesonide-formoterol (SYMBICORT) 160-4.5 MCG/ACT inhaler 2 Puff  2 Puff BID Vinny Guan M.D.   2 Puff at 02/27/17 0652   • acetylcysteine (MUCOMYST) 20 % solution 3 mL  3 mL 4X/DAY (RT) Vinny Guan M.D.   3 mL at 02/27/17 0652   • insulin lispro (HUMALOG) injection 1-6 Units  1-6 Units 4X/DAY ACHS Daya Ashley M.D.   Stopped at 02/24/17 2100   • glucose 4 g chewable tablet 16 g  16 g Q15 MIN PRN Daay Ashley M.D.        And   • dextrose 50% (D50W) injection 25 mL  25 mL Q15 MIN PRN Daya Ashley M.D.       • docusate sodium (COLACE) capsule 100 mg  100 mg BID Daya Ashley M.D.   100 mg at 02/26/17 2039   • aspirin EC (ECOTRIN) tablet 81 mg  81 mg DAILY Heath Leigh M.D.   81 mg at 02/26/17 0833   • atorvastatin (LIPITOR) tablet 80 mg  80 mg Q EVENING Heath Leigh M.D.   80 mg at 02/26/17 2039   • famotidine (PEPCID) tablet 20 mg  20 mg BID Heath Leigh M.D.   20 mg at 02/26/17 2038   • ferrous sulfate tablet 325 mg  325 mg DAILY Heath Leigh M.D.   325 mg at 02/26/17 0835   • finasteride (PROSCAR) tablet 5 mg  5 mg DAILY Heath Leigh M.D.   5 mg at 02/26/17 0835   • gabapentin (NEURONTIN) capsule 300 mg  300 mg DAILY Heath Leigh M.D.   300 mg at 02/26/17 0835   • metoprolol (LOPRESSOR) tablet 25 mg  25 mg BID Heath Leigh M.D.   25 mg at 02/27/17 0859   • oxybutynin SR (DITROPAN-XL) tablet 5 mg  5 mg DAILY Heath Leigh M.D.   5 mg at 02/26/17 0835   • sertraline (ZOLOFT) tablet 100 mg  100 mg DAILY Heath Leigh M.D.   100 mg at 02/26/17 0834   • tamsulosin (FLOMAX) capsule 0.4  mg  0.4 mg AFTER BREAKFAST Heath Leigh M.D.   0.4 mg at 02/26/17 0834   • tiotropium (SPIRIVA) 18 MCG inhalation capsule 1 Cap  1 Cap DAILY Heath Leigh M.D.   1 Cap at 02/27/17 0652   • trazodone (DESYREL) tablet 50 mg  50 mg QHS PRN Heath Leigh M.D.   50 mg at 02/23/17 2303   • NS infusion 1,971 mL  30 mL/kg Once PRN Heath Leigh M.D.       • docusate sodium (COLACE) capsule 100 mg  100 mg BID Heath Leigh M.D.   Stopped at 02/25/17 0900   • senna-docusate (PERICOLACE or SENOKOT S) 8.6-50 MG per tablet 1 Tab  1 Tab Nightly Heath Leigh M.D.   1 Tab at 02/26/17 2039   • senna-docusate (PERICOLACE or SENOKOT S) 8.6-50 MG per tablet 1 Tab  1 Tab Q24HRS PRN Heath Leigh M.D.       • lactulose 20 GM/30ML solution 30 mL  30 mL Q24HRS PRN Heath Leigh M.D.   30 mL at 02/23/17 0933   • bisacodyl (DULCOLAX) suppository 10 mg  10 mg Q24HRS PRN Heath Leigh M.D.       • fleet enema 133 mL  1 Each Once PRN Heath Leigh M.D.       • Respiratory Care per Protocol   Continuous RT Heath Leigh M.D.       • NS (BOLUS) infusion 500 mL  500 mL Once PRN Heath Leigh M.D.       • heparin injection 5,000 Units  5,000 Units Q8HRS Heath Leigh M.D.   5,000 Units at 02/27/17 0508     Last reviewed on 2/20/2017  3:33 PM by Evelin Saavedra    Quality  Measures:  Core Measures & Quality Metrics    Problems:  Acute on chronic, mixed hypoxic/hypercapnic respiratory failure  Severe COPD  Hypoxia  HCAP  L sided opacification    Plan:  Patient developed AF RVR this AM. Additionally, he is very weak and with his severity of COPD, he has a fair chance of prolonged intubation following bronchoscopy. Therefore, will continue medical management and reassess. This is most likely a mucous plug, as evidenced by endobronchial filling on CT chest. I have discussed with respiratory therapy today. He is to continue bronchodilators with mucomyst. He will get PEP therapy and flutter valve, and we will be doing positional therapy. Also, he will get percussive therapy in hopes of  mobilizing any mucous plugs. We will check daily CXR and reassess clinically. He may require a bronch in the next few days should these modalities fail.  Agree with diuresis  ABX   Supplemental o2    Ajit Burleson MD  [facility]

## 2017-02-28 ENCOUNTER — APPOINTMENT (OUTPATIENT)
Dept: RADIOLOGY | Facility: MEDICAL CENTER | Age: 82
DRG: 871 | End: 2017-02-28
Attending: INTERNAL MEDICINE
Payer: MEDICARE

## 2017-02-28 LAB
ANION GAP SERPL CALC-SCNC: 6 MMOL/L (ref 0–11.9)
BASOPHILS # BLD AUTO: 0.7 % (ref 0–1.8)
BASOPHILS # BLD: 0.06 K/UL (ref 0–0.12)
BUN SERPL-MCNC: 29 MG/DL (ref 8–22)
CALCIUM SERPL-MCNC: 9.7 MG/DL (ref 8.5–10.5)
CHLORIDE SERPL-SCNC: 92 MMOL/L (ref 96–112)
CO2 SERPL-SCNC: 43 MMOL/L (ref 20–33)
CREAT SERPL-MCNC: 0.75 MG/DL (ref 0.5–1.4)
EOSINOPHIL # BLD AUTO: 0.14 K/UL (ref 0–0.51)
EOSINOPHIL NFR BLD: 1.5 % (ref 0–6.9)
ERYTHROCYTE [DISTWIDTH] IN BLOOD BY AUTOMATED COUNT: 50.3 FL (ref 35.9–50)
GFR SERPL CREATININE-BSD FRML MDRD: >60 ML/MIN/1.73 M 2
GLUCOSE BLD-MCNC: 107 MG/DL (ref 65–99)
GLUCOSE BLD-MCNC: 130 MG/DL (ref 65–99)
GLUCOSE BLD-MCNC: 177 MG/DL (ref 65–99)
GLUCOSE BLD-MCNC: 269 MG/DL (ref 65–99)
GLUCOSE SERPL-MCNC: 108 MG/DL (ref 65–99)
HCT VFR BLD AUTO: 45 % (ref 42–52)
HGB BLD-MCNC: 13.8 G/DL (ref 14–18)
IMM GRANULOCYTES # BLD AUTO: 0.03 K/UL (ref 0–0.11)
IMM GRANULOCYTES NFR BLD AUTO: 0.3 % (ref 0–0.9)
LYMPHOCYTES # BLD AUTO: 1.55 K/UL (ref 1–4.8)
LYMPHOCYTES NFR BLD: 17 % (ref 22–41)
MCH RBC QN AUTO: 31.2 PG (ref 27–33)
MCHC RBC AUTO-ENTMCNC: 30.7 G/DL (ref 33.7–35.3)
MCV RBC AUTO: 101.8 FL (ref 81.4–97.8)
MONOCYTES # BLD AUTO: 0.78 K/UL (ref 0–0.85)
MONOCYTES NFR BLD AUTO: 8.6 % (ref 0–13.4)
NEUTROPHILS # BLD AUTO: 6.54 K/UL (ref 1.82–7.42)
NEUTROPHILS NFR BLD: 71.9 % (ref 44–72)
NRBC # BLD AUTO: 0 K/UL
NRBC BLD AUTO-RTO: 0 /100 WBC
PLATELET # BLD AUTO: 207 K/UL (ref 164–446)
PMV BLD AUTO: 10.7 FL (ref 9–12.9)
POTASSIUM SERPL-SCNC: 4.5 MMOL/L (ref 3.6–5.5)
RBC # BLD AUTO: 4.42 M/UL (ref 4.7–6.1)
SODIUM SERPL-SCNC: 141 MMOL/L (ref 135–145)
WBC # BLD AUTO: 9.1 K/UL (ref 4.8–10.8)

## 2017-02-28 PROCEDURE — 71010 DX-CHEST-PORTABLE (1 VIEW): CPT

## 2017-02-28 PROCEDURE — 700102 HCHG RX REV CODE 250 W/ 637 OVERRIDE(OP): Performed by: HOSPITALIST

## 2017-02-28 PROCEDURE — 97530 THERAPEUTIC ACTIVITIES: CPT

## 2017-02-28 PROCEDURE — 700101 HCHG RX REV CODE 250: Performed by: INTERNAL MEDICINE

## 2017-02-28 PROCEDURE — 99233 SBSQ HOSP IP/OBS HIGH 50: CPT | Performed by: INTERNAL MEDICINE

## 2017-02-28 PROCEDURE — A9270 NON-COVERED ITEM OR SERVICE: HCPCS | Performed by: INTERNAL MEDICINE

## 2017-02-28 PROCEDURE — 700112 HCHG RX REV CODE 229: Performed by: HOSPITALIST

## 2017-02-28 PROCEDURE — 94669 MECHANICAL CHEST WALL OSCILL: CPT

## 2017-02-28 PROCEDURE — 36415 COLL VENOUS BLD VENIPUNCTURE: CPT

## 2017-02-28 PROCEDURE — 85025 COMPLETE CBC W/AUTO DIFF WBC: CPT

## 2017-02-28 PROCEDURE — 304222 HCHG STAT ISOLATION DAILY CHARGE

## 2017-02-28 PROCEDURE — 82962 GLUCOSE BLOOD TEST: CPT | Mod: 91

## 2017-02-28 PROCEDURE — 94640 AIRWAY INHALATION TREATMENT: CPT

## 2017-02-28 PROCEDURE — 700105 HCHG RX REV CODE 258: Performed by: INTERNAL MEDICINE

## 2017-02-28 PROCEDURE — 700102 HCHG RX REV CODE 250 W/ 637 OVERRIDE(OP): Performed by: INTERNAL MEDICINE

## 2017-02-28 PROCEDURE — 770020 HCHG ROOM/CARE - TELE (206)

## 2017-02-28 PROCEDURE — 700111 HCHG RX REV CODE 636 W/ 250 OVERRIDE (IP): Performed by: HOSPITALIST

## 2017-02-28 PROCEDURE — A9270 NON-COVERED ITEM OR SERVICE: HCPCS | Performed by: HOSPITALIST

## 2017-02-28 PROCEDURE — 94668 MNPJ CHEST WALL SBSQ: CPT

## 2017-02-28 PROCEDURE — 700111 HCHG RX REV CODE 636 W/ 250 OVERRIDE (IP): Performed by: INTERNAL MEDICINE

## 2017-02-28 PROCEDURE — 80048 BASIC METABOLIC PNL TOTAL CA: CPT

## 2017-02-28 RX ORDER — FUROSEMIDE 40 MG/1
40 TABLET ORAL
Status: DISCONTINUED | OUTPATIENT
Start: 2017-03-01 | End: 2017-03-01

## 2017-02-28 RX ORDER — OXYCODONE HYDROCHLORIDE AND ACETAMINOPHEN 5; 325 MG/1; MG/1
1 TABLET ORAL EVERY 4 HOURS PRN
Status: DISCONTINUED | OUTPATIENT
Start: 2017-02-28 | End: 2017-03-02

## 2017-02-28 RX ADMIN — TIOTROPIUM BROMIDE 1 CAPSULE: 18 CAPSULE ORAL; RESPIRATORY (INHALATION) at 08:02

## 2017-02-28 RX ADMIN — OXYBUTYNIN CHLORIDE 5 MG: 5 TABLET, FILM COATED, EXTENDED RELEASE ORAL at 09:00

## 2017-02-28 RX ADMIN — STANDARDIZED SENNA CONCENTRATE AND DOCUSATE SODIUM 1 TABLET: 8.6; 5 TABLET, FILM COATED ORAL at 20:20

## 2017-02-28 RX ADMIN — LEVALBUTEROL HYDROCHLORIDE 1.25 MG: 1.25 SOLUTION RESPIRATORY (INHALATION) at 14:32

## 2017-02-28 RX ADMIN — LACTOBACILLUS ACIDOPHILUS / LACTOBACILLUS BULGARICUS 1 PACKET: 100 MILLION CFU STRENGTH GRANULES at 09:30

## 2017-02-28 RX ADMIN — GABAPENTIN 300 MG: 300 CAPSULE ORAL at 09:31

## 2017-02-28 RX ADMIN — LACTOBACILLUS ACIDOPHILUS / LACTOBACILLUS BULGARICUS 1 PACKET: 100 MILLION CFU STRENGTH GRANULES at 18:10

## 2017-02-28 RX ADMIN — FAMOTIDINE 20 MG: 20 TABLET, FILM COATED ORAL at 20:20

## 2017-02-28 RX ADMIN — FUROSEMIDE 40 MG: 10 INJECTION, SOLUTION INTRAVENOUS at 05:53

## 2017-02-28 RX ADMIN — DOCUSATE SODIUM 100 MG: 100 CAPSULE ORAL at 20:20

## 2017-02-28 RX ADMIN — MEROPENEM 500 MG: 500 INJECTION, POWDER, FOR SOLUTION INTRAVENOUS at 12:49

## 2017-02-28 RX ADMIN — LEVETIRACETAM 1500 MG: 500 TABLET, FILM COATED ORAL at 09:32

## 2017-02-28 RX ADMIN — ACETYLCYSTEINE 3 ML: 200 SOLUTION ORAL; RESPIRATORY (INHALATION) at 14:32

## 2017-02-28 RX ADMIN — MEROPENEM 500 MG: 500 INJECTION, POWDER, FOR SOLUTION INTRAVENOUS at 18:03

## 2017-02-28 RX ADMIN — MEROPENEM 500 MG: 500 INJECTION, POWDER, FOR SOLUTION INTRAVENOUS at 05:55

## 2017-02-28 RX ADMIN — BUDESONIDE AND FORMOTEROL FUMARATE DIHYDRATE 2 PUFF: 160; 4.5 AEROSOL RESPIRATORY (INHALATION) at 20:29

## 2017-02-28 RX ADMIN — DOCUSATE SODIUM 100 MG: 100 CAPSULE ORAL at 09:31

## 2017-02-28 RX ADMIN — LEVALBUTEROL HYDROCHLORIDE 1.25 MG: 1.25 SOLUTION RESPIRATORY (INHALATION) at 10:35

## 2017-02-28 RX ADMIN — ATORVASTATIN CALCIUM 80 MG: 80 TABLET, FILM COATED ORAL at 20:20

## 2017-02-28 RX ADMIN — LEVALBUTEROL HYDROCHLORIDE 1.25 MG: 1.25 SOLUTION RESPIRATORY (INHALATION) at 20:54

## 2017-02-28 RX ADMIN — ASPIRIN 81 MG: 81 TABLET ORAL at 09:31

## 2017-02-28 RX ADMIN — HEPARIN SODIUM 5000 UNITS: 5000 INJECTION, SOLUTION INTRAVENOUS; SUBCUTANEOUS at 20:23

## 2017-02-28 RX ADMIN — ACETYLCYSTEINE 3 ML: 200 SOLUTION ORAL; RESPIRATORY (INHALATION) at 08:02

## 2017-02-28 RX ADMIN — LACTOBACILLUS ACIDOPHILUS / LACTOBACILLUS BULGARICUS 1 PACKET: 100 MILLION CFU STRENGTH GRANULES at 12:49

## 2017-02-28 RX ADMIN — BUDESONIDE AND FORMOTEROL FUMARATE DIHYDRATE 2 PUFF: 160; 4.5 AEROSOL RESPIRATORY (INHALATION) at 08:02

## 2017-02-28 RX ADMIN — LEVETIRACETAM 1500 MG: 500 TABLET, FILM COATED ORAL at 20:14

## 2017-02-28 RX ADMIN — ACETYLCYSTEINE 3 ML: 200 SOLUTION ORAL; RESPIRATORY (INHALATION) at 10:35

## 2017-02-28 RX ADMIN — METOPROLOL TARTRATE 50 MG: 50 TABLET, FILM COATED ORAL at 09:32

## 2017-02-28 RX ADMIN — METOPROLOL TARTRATE 50 MG: 50 TABLET, FILM COATED ORAL at 20:20

## 2017-02-28 RX ADMIN — FAMOTIDINE 20 MG: 20 TABLET, FILM COATED ORAL at 09:33

## 2017-02-28 RX ADMIN — MEROPENEM 500 MG: 500 INJECTION, POWDER, FOR SOLUTION INTRAVENOUS at 00:49

## 2017-02-28 RX ADMIN — ACETYLCYSTEINE 3 ML: 200 SOLUTION ORAL; RESPIRATORY (INHALATION) at 20:54

## 2017-02-28 RX ADMIN — INSULIN LISPRO 1 UNITS: 100 INJECTION, SOLUTION INTRAVENOUS; SUBCUTANEOUS at 20:30

## 2017-02-28 RX ADMIN — SERTRALINE 50 MG: 100 TABLET, FILM COATED ORAL at 10:13

## 2017-02-28 RX ADMIN — Medication 325 MG: at 09:33

## 2017-02-28 RX ADMIN — HEPARIN SODIUM 5000 UNITS: 5000 INJECTION, SOLUTION INTRAVENOUS; SUBCUTANEOUS at 14:00

## 2017-02-28 RX ADMIN — FINASTERIDE 5 MG: 5 TABLET, FILM COATED ORAL at 09:32

## 2017-02-28 RX ADMIN — LEVALBUTEROL HYDROCHLORIDE 1.25 MG: 1.25 SOLUTION RESPIRATORY (INHALATION) at 08:02

## 2017-02-28 RX ADMIN — FUROSEMIDE 40 MG: 10 INJECTION, SOLUTION INTRAVENOUS at 16:45

## 2017-02-28 RX ADMIN — OXYCODONE HYDROCHLORIDE AND ACETAMINOPHEN 500 MG: 500 TABLET ORAL at 09:33

## 2017-02-28 RX ADMIN — TAMSULOSIN HYDROCHLORIDE 0.4 MG: 0.4 CAPSULE ORAL at 09:31

## 2017-02-28 RX ADMIN — SPIRONOLACTONE 25 MG: 25 TABLET, FILM COATED ORAL at 09:32

## 2017-02-28 RX ADMIN — INSULIN LISPRO 3 UNITS: 100 INJECTION, SOLUTION INTRAVENOUS; SUBCUTANEOUS at 12:43

## 2017-02-28 RX ADMIN — MEROPENEM 500 MG: 500 INJECTION, POWDER, FOR SOLUTION INTRAVENOUS at 23:18

## 2017-02-28 RX ADMIN — OXYCODONE HYDROCHLORIDE AND ACETAMINOPHEN 1 TABLET: 5; 325 TABLET ORAL at 10:12

## 2017-02-28 RX ADMIN — HEPARIN SODIUM 5000 UNITS: 5000 INJECTION, SOLUTION INTRAVENOUS; SUBCUTANEOUS at 05:57

## 2017-02-28 ASSESSMENT — ENCOUNTER SYMPTOMS
SENSORY CHANGE: 0
DEPRESSION: 0
HEADACHES: 0
BRUISES/BLEEDS EASILY: 0
VOMITING: 0
EYE DISCHARGE: 0
SPEECH CHANGE: 0
ABDOMINAL PAIN: 0
CONSTIPATION: 0
DIAPHORESIS: 0
DIZZINESS: 0
DIARRHEA: 0
FEVER: 0
NAUSEA: 0
NAUSEA: 1
WHEEZING: 0
SORE THROAT: 0
MYALGIAS: 0
HEMOPTYSIS: 0
LOSS OF CONSCIOUSNESS: 0
PALPITATIONS: 0
SPUTUM PRODUCTION: 0
CLAUDICATION: 0
BACK PAIN: 0
EYE PAIN: 0
SHORTNESS OF BREATH: 0
CHILLS: 0
FOCAL WEAKNESS: 0
WEAKNESS: 1
COUGH: 1
NECK PAIN: 0

## 2017-02-28 ASSESSMENT — GAIT ASSESSMENTS
ASSISTIVE DEVICE: FRONT WHEEL WALKER
DEVIATION: BRADYKINETIC;DECREASED BASE OF SUPPORT
DISTANCE (FEET): 10
GAIT LEVEL OF ASSIST: CONTACT GUARD ASSIST

## 2017-02-28 ASSESSMENT — PAIN SCALES - GENERAL
PAINLEVEL_OUTOF10: 3
PAINLEVEL_OUTOF10: 0

## 2017-02-28 ASSESSMENT — LIFESTYLE VARIABLES: SUBSTANCE_ABUSE: 0

## 2017-02-28 NOTE — PROGRESS NOTES
Philip from Lab called with critical result of CO2 of 43 at 0409. Critical lab result read back to Philip.   Dr. Bautista notified of critical lab result at 0417.  Critical lab result read back by Dr. Bautista. No new orders at this time.

## 2017-02-28 NOTE — DISCHARGE PLANNING
Care Transition Team Assessment    Information Source  Orientation : Oriented x 4  Information Given By: Patient  Who is responsible for making decisions for patient? : Patient    Readmission Evaluation  Is this a readmission?: No    Elopement Risk  Legal Hold: No  Ambulatory or Self Mobile in Wheelchair: Yes  Disoriented: Situation-At Risk for Elopement  Elopement Risk: Not at Risk for Elopement    Interdisciplinary Discharge Planning  Lives with - Patient's Self Care Capacity: Unrelated Adult, Other (Comments)  Patient or legal guardian wants to designate a caregiver (see row info): No  Housing / Facility: 1 Story Apartment / Condo  Prior Services: Unable To Determine At This Time    Discharge Preparedness  What is your plan after discharge?: Group home, Skilled nursing facility  Prior Functional Level: Ambulatory, Independent with Activities of Daily Living, Independent with Medication Management  Difficulity with ADLs: Walking  Difficulity with IADLs: Cooking, Driving, Laundry, Managing medication, Shopping    Functional Assesment  Prior Functional Level: Ambulatory, Independent with Activities of Daily Living, Independent with Medication Management    Finances  Financial Barriers to Discharge: Yes  Average Monthly Income: 2500 $  Prescription Coverage: Yes    Vision / Hearing Impairment  Vision Impairment : Yes  Hearing Impairment: Both Ears, Hearing Device Not Available    Values / Beliefs / Concerns  Values / Beliefs Concerns : No    Advance Directive  Advance Directive?: None    Domestic Abuse  Have you ever been the victim of abuse or violence?: No    Psychological Assessment  History of Substance Abuse: None  History of Psychiatric Problems: No  Non-compliant with Treatment: No  Newly Diagnosed Illness: No    Discharge Risks or Barriers  Discharge risks or barriers?: Post-acute placement / services, Lives alone, no community support  Patient risk factors: Lack of outside supports    Anticipated Discharge  Information  Anticipated discharge disposition: SNF, Group home

## 2017-02-28 NOTE — PROGRESS NOTES
Received bedside report. Assumed care of patient. Patient A&Ox2, fatigued, included in plan of care. Patient complains of pain 0/10. Patient educated on safety and use of call light.   Call light within reach, bed locked and at lowest position. Bed alarm on

## 2017-02-28 NOTE — PROGRESS NOTES
Bedside report received. Assumed care of patient. Patient A&Ox3, and included in POC. Educated patient about safety; bed in low and locked position, call light within reach, bed alarm on. Assessment performed and patient complains of pain no pain. Will continue to monitor

## 2017-02-28 NOTE — PROGRESS NOTES
Pt resting comfortably in bed. No signs of distress. Bed alarm on. Bed locked and in lowest position. Call light within reach. Will continue to assess and provide care.

## 2017-02-28 NOTE — PROGRESS NOTES
12 hour chart check    Telemetry monitoring  SR/SB 59-61 with PVCs, couplets, triplets, Bigeminal PACs  .14/.08/.40

## 2017-02-28 NOTE — DISCHARGE PLANNING
Care Transition Team Discharge Planning    Anticipated Discharge Information  Anticipated discharge disposition: SNF, Group home    Care Transition Team Interventions  Were Resources Provided?: Yes  Medical Referrals: SNF referral  Does patient have qualifying stay?: In progress  Medication Authorization: No         Discharge Plan:  Pt is refusing SNF, despite the risks explained to him. Pt interested in group home setting, however, unsure if pt will be agreeable due to cost. LSW will discuss with pt the option of assisted living/group home.

## 2017-02-28 NOTE — CARE PLAN
Problem: Safety  Goal: Will remain free from injury  Outcome: PROGRESSING AS EXPECTED  Fall precautions in place. Bed wheels locked, bed is in the lowest position. Call light in reach. Bed alarm on and in place. Non-skid socks provided. Patient provides successful verbalization of fall and safety precautions and demonstration of use of call bell. Upper side rails are up. Hourly rounding in progress.         Problem: Knowledge Deficit  Goal: Knowledge of disease process/condition, treatment plan, diagnostic tests, and medications will improve  Outcome: PROGRESSING SLOWER THAN EXPECTED  POC discussed with the patient. All questions and concerns addressed and answered. Patient is involved in POC and verbalizes the understanding of the disease process, medications, tests and treatments. Questions on POC encouraged throughout care.

## 2017-02-28 NOTE — THERAPY
Physical Therapy Contact Note    Eating and receiving pills upon attempt; will attempt back when able;    Ailyn Maynard PT, DPT Pager: [phone]

## 2017-02-28 NOTE — CARE PLAN
Problem: Nutritional:  Goal: Achieve adequate nutritional intake  Patient will consume >50% of meals  Outcome: MET Date Met:  02/28/17  Pt reports eating ~ 50% of meals and 100% of Boost supplements (sent three times daily with meals). Pt with no further questions/concerns and happy with current regimen. RN confirms pt with good PO intake. RD available PRN.

## 2017-02-28 NOTE — PROGRESS NOTES
Bedside report received from day RN. Assumed pt care. POC discussed. Pt denies any needs at this time. Bed alarm on. Bed locked and in lowest position. Call light within reach. Will continue to assess and provide care.

## 2017-02-28 NOTE — CARE PLAN
Problem: Venous Thromboembolism (VTW)/Deep Vein Thrombosis (DVT) Prevention:  Goal: Patient will participate in Venous Thrombosis (VTE)/Deep Vein Thrombosis (DVT)Prevention Measures  Outcome: PROGRESSING AS EXPECTED  Administering SQ Heparin per orders, patient wearing SCDs while in bed   Intervention: Assess and monitor for anticoagulation complications  No s/s DVT      Problem: Respiratory:  Goal: Respiratory status will improve  Outcome: PROGRESSING AS EXPECTED  Continuous pulse ox monitoring  patietn satting 97-99% on 4L NC (baseline Home O2 req)  Resp attempted CPT and patient was premedicated with pain meds per orders. Patient was able to tolerate a small amount of CPT.

## 2017-02-28 NOTE — PROGRESS NOTES
Pulmonary Critical Care Progress Note        Name:Alverto Mancini  MRN:[id number]  Date of Service: 2/28/17  Referring physician: Humphrey Treviño M.D.      Chief Complaint: Shortness of breath    History of Present Illness: 81 y.o. male admitted for SOB. Hx of L lung pneumonia, chronic aspiration on dysphagia diet, COPD    ROS:  Respiratory: negative cough, negative hemoptysis, negative pleuritic chest pain and negative shortness of breath, Cardiac: negative, GI: negative.  All other systems negative.    Interval Events:  24 hour interval history reviewed    Sitting at EOB, eating. No clinical s/sx of aspiration  Still feels congested. Very weak cough. Received percussive therapy in addition to rest of RT   No chest pain  In sinus rhythm this AM  4LPM NC SpO2 94-96%    PFSH:  No change.    Respiratory:     Pulse Oximetry: 99 %  Chest Tube Drains:          Exam: Unlabored respirations. Very weak cough. Diminished air entry bilaterally. L lung zones with bronchiolar breath sounds/wheezing. R lung zones no wheezing, +crackles  ImagingAvailable data reviewed         Invalid input(s): [id number]    HemoDynamics:  Pulse: 65  Blood Pressure : 136/70 mmHg       Exam: regular rate and rhythm  Imaging: Available data reviewed        Neuro:  GCS         Exam: mental status intact  Imaging: Available data reviewed    Fluids:  Intake/Output       02/26/17 0700 - 02/27/17 0659 02/27/17 0700 - 02/28/17 0659 02/28/17 0700 - 03/01/17 0659      3541-3013 4968-8788 Total 6880-8750 4630-6419 Total 2067-8024 8216-5479 Total       Intake    P.O.  300  120 420  --  640 640  --  -- --    P.O. 300 120 420 -- 640 640 -- -- --    I.V.  --  50 50  --  200 200  --  -- --    IV Volume (Meropenum) -- -- -- -- 200 200 -- -- --    IV Volume (Tigecycline) -- 50 50 -- -- -- -- -- --    Total Intake 300 170 470 -- 840 840 -- -- --       Output    Urine  200  350 550  --  500 500  --  -- --    Condom Catheter -- 350 350 -- 500 500 -- -- --    Number of  Times Incontinent of Urine 1 x -- 1 x 3 x 2 x 5 x -- -- --    Void (ml) 200 -- 200 -- -- -- -- -- --    Stool  --  -- --  --  -- --  --  -- --    Number of Times Stooled 1 x -- 1 x -- -- -- -- -- --    Total Output 200 350 550 -- 500 500 -- -- --       Net I/O     100 -180 -80 -- 340 340 -- -- --        Weight: 58.9 kg (129 lb 13.6 oz)  Recent Labs      17   0331   SODIUM  143  141  141   POTASSIUM  4.4  4.3  4.5   CHLORIDE  98  93*  92*   CO2  42*  42*  43*   BUN  24*  28*  29*   CREATININE  0.68  0.66  0.75   CALCIUM  9.3  9.2  9.7       GI/Nutrition:  Exam: abdomen is soft and non-tender, normal active bowel sounds, nontender  Imaging: Available data reviewed  taking PO  Liver Function  Recent Labs      17   0331   GLUCOSE  113*  99  108*       Heme:  Recent Labs      17   0331   RBC  4.08*  4.03*  4.42*   HEMOGLOBIN  12.7*  12.9*  13.8*   HEMATOCRIT  41.8*  41.0*  45.0   PLATELETCT  161*  178  207       Infectious Disease:  Temp  Av.2 °C (97.2 °F)  Min: 36 °C (96.8 °F)  Max: 36.4 °C (97.5 °F)  Micro: no new positive cultures  Recent Labs      17   0331   WBC  9.2  8.3  9.1   NEUTSPOLYS  75.50*  70.20  71.90   LYMPHOCYTES  14.70*  19.10*  17.00*   MONOCYTES  8.10  8.60  8.60   EOSINOPHILS  0.90  1.10  1.50   BASOPHILS  0.50  0.50  0.70     Current Facility-Administered Medications   Medication Dose Frequency Provider Last Rate Last Dose   • influenza vaccine high-dose injection 0.5 mL  0.5 mL Once PRN Daya Ashley M.D.       • oxycodone-acetaminophen (PERCOCET) 5-325 MG per tablet 1 Tab  1 Tab Q4HRS PRN Humphrey Treviño M.D.       • meropenem (MERREM) 500 mg in  mL IVPB  500 mg Q6HRS Odilia Kapoor M.D. 200 mL/hr at 17 0555 500 mg at 17 0555   • levalbuterol (XOPENEX) 1.25 MG/3ML nebulizer solution 1.25 mg  1.25 mg 4X/DAY (RT)  Vinny Guan M.D.   1.25 mg at 02/28/17 0802   • metoprolol (LOPRESSOR) tablet 50 mg  50 mg BID Daya Ashley M.D.   50 mg at 02/27/17 2047   • levetiracetam (KEPPRA) tablet 1,500 mg  1,500 mg BID Daya Ashley M.D.   1,500 mg at 02/27/17 2047   • spironolactone (ALDACTONE) tablet 25 mg  25 mg DAILY Daya Ashley M.D.   Stopped at 02/27/17 0900   • furosemide (LASIX) injection 40 mg  40 mg BID DIURETIC Daya Ashley M.D.   40 mg at 02/28/17 0553   • ipratropium-albuterol (DUONEB) nebulizer solution 3 mL  3 mL Q4H PRN (RT) Vinny Guan M.D.   3 mL at 02/25/17 2347   • ondansetron (ZOFRAN) syringe/vial injection 4 mg  4 mg Q4HRS PRN Daya Ashley M.D.   4 mg at 02/24/17 1237   • ondansetron (ZOFRAN ODT) dispertab 4 mg  4 mg Q4HRS PRN Daya Ashley M.D.   4 mg at 02/26/17 1442   • lactobacillus granules (LACTINEX/FLORANEX) packet 1 Packet  1 Packet TID WITH MEALS Daya Ashley M.D.   1 Packet at 02/27/17 1730   • ascorbic acid (ascorbic acid) tablet 500 mg  500 mg DAILY Daya Ashley M.D.   Stopped at 02/27/17 0900   • budesonide-formoterol (SYMBICORT) 160-4.5 MCG/ACT inhaler 2 Puff  2 Puff BID Vinny Guan M.D.   2 Puff at 02/28/17 0802   • acetylcysteine (MUCOMYST) 20 % solution 3 mL  3 mL 4X/DAY (RT) Vinny Guan M.D.   3 mL at 02/28/17 0802   • insulin lispro (HUMALOG) injection 1-6 Units  1-6 Units 4X/DAY GIDEON Ashley M.D.   Stopped at 02/28/17 0700   • glucose 4 g chewable tablet 16 g  16 g Q15 MIN PRN Daya Ashley M.D.        And   • dextrose 50% (D50W) injection 25 mL  25 mL Q15 MIN PRN Daya Ashley M.D.       • docusate sodium (COLACE) capsule 100 mg  100 mg BID Daya Ashley M.D.   100 mg at 02/27/17 2046   • aspirin EC (ECOTRIN) tablet 81 mg  81 mg DAILY Heath Leigh M.D.   81 mg at 02/27/17 1225   • atorvastatin (LIPITOR) tablet 80 mg  80 mg Q EVENING Heath Leigh M.D.   80 mg at 02/27/17 2044   • famotidine (PEPCID) tablet 20 mg  20 mg BID  Heath Leigh M.D.   20 mg at 02/27/17 2044   • ferrous sulfate tablet 325 mg  325 mg DAILY Heath Leigh M.D.   Stopped at 02/27/17 0900   • finasteride (PROSCAR) tablet 5 mg  5 mg DAILY Heath Leigh M.D.   Stopped at 02/27/17 0900   • gabapentin (NEURONTIN) capsule 300 mg  300 mg DAILY Heath Leigh M.D.   Stopped at 02/27/17 0900   • oxybutynin SR (DITROPAN-XL) tablet 5 mg  5 mg DAILY Heath Leigh M.D.   Stopped at 02/27/17 0900   • sertraline (ZOLOFT) tablet 100 mg  100 mg DAILY Heath Leigh M.D.   Stopped at 02/27/17 0900   • tamsulosin (FLOMAX) capsule 0.4 mg  0.4 mg AFTER BREAKFAST Heath Leigh M.D.   0.4 mg at 02/26/17 0834   • tiotropium (SPIRIVA) 18 MCG inhalation capsule 1 Cap  1 Cap DAILY Heath Leigh M.D.   1 Cap at 02/28/17 0802   • trazodone (DESYREL) tablet 50 mg  50 mg QHS PRN Heath Leigh M.D.   50 mg at 02/23/17 2303   • NS infusion 1,971 mL  30 mL/kg Once PRN Heath Leigh M.D.       • senna-docusate (PERICOLACE or SENOKOT S) 8.6-50 MG per tablet 1 Tab  1 Tab Nightly Heath Leigh M.D.   1 Tab at 02/27/17 2046   • senna-docusate (PERICOLACE or SENOKOT S) 8.6-50 MG per tablet 1 Tab  1 Tab Q24HRS PRN Heath Leigh M.D.       • lactulose 20 GM/30ML solution 30 mL  30 mL Q24HRS PRN Heath Leigh M.D.   30 mL at 02/23/17 0933   • bisacodyl (DULCOLAX) suppository 10 mg  10 mg Q24HRS PRN Heath Leigh M.D.       • fleet enema 133 mL  1 Each Once PRN Heath Leigh M.D.       • Respiratory Care per Protocol   Continuous RT Heath Leigh M.D.       • NS (BOLUS) infusion 500 mL  500 mL Once PRN Heath Leigh M.D.       • heparin injection 5,000 Units  5,000 Units Q8HRS Heath Leigh M.D.   5,000 Units at 02/28/17 0557     Last reviewed on 2/20/2017  3:33 PM by Evelin Saavedra    Quality  Measures:  Core Measures & Quality Metrics    Problems:  Acute on chronic, mixed hypoxic/hypercapnic respiratory failure  Severe COPD  Hypoxia  HCAP  L sided opacification    Plan:  Continues to be extremely weak. I do not believe he will tolerate sedation and may end  up with a prolonged ventilation course if we perform bronchoscopy right now. I would like to continue medical therapy while oxygenation is still stable.     This is most likely a mucous plug, as evidenced by endobronchial filling on CT chest. I have discussed with respiratory therapy today. He is to continue bronchodilators with mucomyst. He will get PEP therapy and flutter valve, and we will be doing positional therapy. Also, he will get percussive therapy in hopes of mobilizing any mucous plugs. He may require a bronch in the next few days should these modalities fail. Will await CXR this AM, restart IPV.    Agree with diuresis  ABX    Supplemental o2    Ajit Burleson MD  [facility]

## 2017-02-28 NOTE — PROGRESS NOTES
Hospital Medicine Progress Note, Adult, Complex               Author: Daya Ashley Date & Time created: 2/27/2017  5:02 PM     Interval History:  CC:  SOB  2/20:   Admitted 81yoM with h/o left sided hemiparesis 2/2 CVA, HTN, COPD on 3 LPM NC, asthma and pelvic fracture at [facility], multiple falls presents with Left sided HCAP on vanco and zosyn.  H/o aspiration, on dysphagia 1 diet per SLP.    2/21:  Had rapid last night, opens eyes to name only.  Remains full code per family.  CTA showing LLL pneumonia/opacification with hiatal hernia seen.  U/s chest did not show enough pleural effusion for IR to drain.  Nasal swab + MRSA, confused.  2/22:  Improved mentation today, sitting up, conversing appropriately after 1 dose of meropenem IV.  ID consulted for sputum + Ecoli ESBL, left lung white out.  2/23:  Continues to improve on Abx.  Mentation clear, sitting up in chair, appropriate, states does not remember first few days.  2/24:  Lungs improving with increased breath sounds on left.  Mentation remains clear.  States he could not control his urine.  Explained had UTI same as in lung.  2/25:  Worsening NEHEMIAS, JVD noted on exam, increased CO2 38 to 40.  Ordered repeat CXR with pulmonary edema noted.  Restarted Home meds, previously on lasix 40 bid and spironolactone 25 daily.  Prior echo 2014 EF 65% grade 1 dd, ordered repeat echo.  2/26:  Severe SOB since last night, acute respiratory distress with O2 up to 9 LPM VM, clearer mentation after IV lasix,O2 back to 4 LPM NC this a.m..   CXR with worsening left lung complete white out.  D/w Dr. Brunner.  For bronch tomorrow.    Review of Systems:  Review of Systems   Constitutional: Positive for malaise/fatigue. Negative for fever, chills and diaphoresis.   HENT: Negative for congestion and sore throat.    Eyes: Negative for pain and discharge.   Respiratory: Positive for cough. Negative for hemoptysis, sputum production, shortness of breath and wheezing.     Cardiovascular: Negative for chest pain, palpitations, claudication and leg swelling.   Gastrointestinal: Negative for nausea, vomiting, abdominal pain, diarrhea, constipation and melena.   Genitourinary: Negative for dysuria, urgency and frequency.   Musculoskeletal: Negative for myalgias, back pain, joint pain and neck pain.   Skin: Negative for itching and rash.   Neurological: Positive for weakness. Negative for dizziness, sensory change, speech change, focal weakness, loss of consciousness and headaches.   Endo/Heme/Allergies: Does not bruise/bleed easily.   Psychiatric/Behavioral: Negative for depression, suicidal ideas and substance abuse.       Physical Exam:  Physical Exam   Constitutional: He is oriented to person, place, and time. He appears well-developed and well-nourished. No distress.   Mentation clear   HENT:   Head: Normocephalic and atraumatic.   Mouth/Throat: Oropharynx is clear and moist. No oropharyngeal exudate.   Eyes: Conjunctivae and EOM are normal. Pupils are equal, round, and reactive to light. Right eye exhibits no discharge. Left eye exhibits no discharge. No scleral icterus.   Neck: Normal range of motion. Neck supple. No JVD present. No tracheal deviation present. No thyromegaly present.   Cardiovascular: Normal rate, regular rhythm and normal heart sounds.  Exam reveals no gallop and no friction rub.    No murmur heard.  Pulmonary/Chest: Effort normal and breath sounds normal. No respiratory distress. He has no wheezes. He has no rales. He exhibits no tenderness.   On 4 lpm NC, desats to 60s off O2.   Abdominal: Soft. Bowel sounds are normal. He exhibits no distension and no mass. There is no tenderness. There is no rebound and no guarding.   Musculoskeletal: Normal range of motion. He exhibits no edema or tenderness.   Lymphadenopathy:     He has no cervical adenopathy.   Neurological: He is alert and oriented to person, place, and time. No cranial nerve deficit. He exhibits normal  muscle tone.   Skin: Skin is warm and dry. No rash noted. He is not diaphoretic. No erythema.   Psychiatric: He has a normal mood and affect. His behavior is normal. Judgment and thought content normal.   Nursing note and vitals reviewed.      Labs:        Invalid input(s): [id number]      Recent Labs      17   0205   SODIUM  144  143  141   POTASSIUM  4.3  4.4  4.3   CHLORIDE  101  98  93*   CO2  40*  42*  42*   BUN  23*  24*  28*   CREATININE  0.64  0.68  0.66   CALCIUM  8.9  9.3  9.2     Recent Labs      17   0205   GLUCOSE  109*  113*  99     Recent Labs      17   0205   RBC  3.95*  4.08*  4.03*   HEMOGLOBIN  12.5*  12.7*  12.9*   HEMATOCRIT  41.4*  41.8*  41.0*   PLATELETCT  162*  161*  178     Recent Labs      17   0205   WBC  7.5  9.2  8.3   NEUTSPOLYS  72.70*  75.50*  70.20   LYMPHOCYTES  16.20*  14.70*  19.10*   MONOCYTES  9.40  8.10  8.60   EOSINOPHILS  0.90  0.90  1.10   BASOPHILS  0.40  0.50  0.50           Hemodynamics:  Temp (24hrs), Av.4 °C (97.6 °F), Min:36.3 °C (97.3 °F), Max:36.7 °C (98.1 °F)  Temperature: 36.4 °C (97.5 °F)  Pulse  Av.1  Min: 47  Max: 98   Blood Pressure : 135/79 mmHg     Respiratory:    Respiration: 17, Pulse Oximetry: 99 %, O2 Daily Delivery Respiratory : Silicone Nasal Cannula     Given By:: Mask, #MDI/DPI Given: MDI/DPI x 2, PEP/CPT Method: Flutter Valve, Work Of Breathing / Effort: Mild  RUL Breath Sounds: Diminished, RML Breath Sounds: Diminished, RLL Breath Sounds: Diminished, DEBI Breath Sounds: Diminished, LLL Breath Sounds: Diminished  Fluids:    Intake/Output Summary (Last 24 hours) at 17 1702  Last data filed at 17 0600   Gross per 24 hour   Intake    170 ml   Output    350 ml   Net   -180 ml     Weight: 61.9 kg (136 lb 7.4 oz)  GI/Nutrition:  Orders Placed This Encounter   Procedures   •  DIET NPO     Standing Status: Standing      Number of Occurrences: 1      Standing Expiration Date:      Order Specific Question:  Restrict to:     Answer:  Sips with Medications [3]     Medical Decision Making, by Problem:  Active Hospital Problems    Diagnosis   • Respiratory failure (CMS-[facility]) [J96.90]  Had been up to 7 LPM NC 2/20.  Now at 4 LPM NC  2/2 LLL pneumonia/opacification.  CXR with worsening left sided near complete white out.  Contnue lasix 40 IV bid  Bronchoscopy held due to patient's frail state and recent afib episode per Dr. Burleson 2/27.     • Debility [R53.81]   • Multiple falls [R29.6]  Reported from niece  H/o pelvic fracture, recently at [facility] for rehab     • MRSA carrier [Z22.322]  MRSA + nasal swab     • Sepsis (CMS-[facility]) [A41.9]  2/2 LLL pneumonia HCAP:  ESBL ecoli  BCs negative x2  UC Ecoli ESBL     • HCAP (healthcare-associated pneumonia) [J18.9]  Sputum culture ESBL Ecoli  ID consult appreciated   ID changed tygacil to merropenem IV for better urine penetration on 2/27.  Consolidation seen on CTA chest  mucomist nebs ordered   Ordered PEP therapy to open up airway  Review of last admission:  4/2016 s/p bronchoscopy with Dr. Phillips with no mass, thick mucus, cultures negative.     • Chronic respiratory failure with hypoxia (CMS-[facility]) [J96.11]  Mild improvement in O2 requirement back to 4 LPM Nc  Baseline 3 LPM NC     • Oxygen dependent [Z99.81]  Baseline 3 LPM NC 24/7, now on 6 LPM NC     • DM type 2, controlled, with complication (CMS-[facility]) [E11.8]  SSI  accuchecks  Diabetic diet  Dysphagia 1 NTFL   • Left hemiparesis (CMS-[facility]) [G81.94]  2/2 prior CVA     • Dysphagia status post cerebrovascular accident [I69.391]  SLP rec dysphagia 1 NT diet.     • COPD (chronic obstructive pulmonary disease) (CMS-[facility]) [J44.9]  Nebs  RT protocol     • GERD (gastroesophageal reflux disease) [K21.9]  Continue home pepcid  Especially with aspiration risk.    Abnormal UA:  Wbc 100-150 wbc  UC with ESBL  Ecoli  On tygacil x7 days per ID.    Iron deficiency anemia:  Replaced iron daily with vit C.    Nausea:  1 time episode  Added zofran prn  Lactobacillus tid.    Acute on chronic Diastolic heart failure:  Home meds:  lasix 40  Bid and spironolactone  CXR with increased vascular congestion on 2/25  JVD on exam, increase in CO2 on chemistry  Increased work of breathing, increased O2 requirement  continue lasix 40IV bid now  Restarted spironolactone 25 daily.  Repeat echo EF 60% with grade 2 DD severe tricuspid regurgitation.    PAF:  Episode 2/27 a.m. After albuterol neb treatment  Gave metoprolol 5mg IV x 1 returned to NSR  dc'd albuterol neb treatment since no wheezes on exam since admission  Change to xopenex prn  Increased metoprolol 25 to 50 bid     FC per niece:  Code reviewed by admitting physician.  PT/OT/SNF orders placed.  Family does not want to go back to [facility].  2/25:  Acute respiratory distress, awaiting medical improvement, but patient has expressed desire to go to group home if feasible.  Patient remains critically ill.      Loya catheter: No Loya      DVT Prophylaxis: Heparin    Ulcer prophylaxis: Yes  Antibiotics: Treating active infection/contamination beyond 24 hours perioperative coverage  Assessed for rehab: Patient was assess for and/or received rehabilitation services during this hospitalization

## 2017-02-28 NOTE — THERAPY
"Physical Therapy Treatment completed.   Bed Mobility:  Supine to Sit: Minimal Assist  Transfers: Sit to Stand: Contact Guard Assist (with FWW X2)  Gait: Level Of Assist: Contact Guard Assist with Front-Wheel Walker       Plan of Care: Will benefit from Physical Therapy 3 times per week  Discharge Recommendations: Equipment: Will Continue to Assess for Equipment Needs.     Pt progressing as expected. Improved sit <> stand and stand pivot transfer with FWW this session. Pt continues to be most limited by impaired endurance and activity tolerance. Acute PT will continue to follow; anticipate slow and steady progress. Recommend placement prior to return home upon d/c.     See \"Rehab Therapy-Acute\" Patient Summary Report for complete documentation.       "

## 2017-02-28 NOTE — PROGRESS NOTES
Infectious Disease Progress Note    Author: Odilia Kapoor M.D.    DOS & Time created: 2017  12:33 PM    Chief Complaint   Patient presents with   • Shortness of Breath     SAT's in 80's at home, given NPPB Tx X 3 with improvement.     Pneumonia and UTI FU    Interval History:  - says he is feeling slightly better. No fevers.   - no fevers. Feels better. On dysphagia 1 diet.   - no fevers. Much more lethargic  - no fevers. The cxr is worse.   AF WBC 8.3 Less dyspnea, +nausea    AF WBC 9.1 denies N/V today. No pain  Labs Reviewed, Medications Reviewed and Radiology Reviewed.    Review of Systems:  Review of Systems   Constitutional: Negative for fever.   Respiratory: Positive for cough.         Decreased   Cardiovascular: Negative for chest pain and leg swelling.   Gastrointestinal: Positive for nausea. Negative for vomiting.   Skin: Negative for rash.   Neurological: Negative for headaches.   All other systems reviewed and are negative.      Hemodynamics:  Temp (24hrs), Av.2 °C (97.2 °F), Min:36 °C (96.8 °F), Max:36.4 °C (97.5 °F)  Temperature: 36.3 °C (97.4 °F)  Pulse  Av  Min: 47  Max: 98   Blood Pressure : 136/70 mmHg       Physical Exam:  Physical Exam   Constitutional: He appears well-developed. No distress.   Elderly and frail male      HENT:   Head: Normocephalic and atraumatic.   Eyes: EOM are normal. Pupils are equal, round, and reactive to light. No scleral icterus.   Neck: Neck supple.   Cardiovascular: Normal rate.    Murmur heard.  Irregular    Pulmonary/Chest: Effort normal. No respiratory distress. He has rales.   Abdominal: Soft. He exhibits no distension. There is no tenderness.   Musculoskeletal: He exhibits no edema.   Neurological: He is alert.   Skin: No erythema.   Nursing note and vitals reviewed.      Labs:  Recent Labs      17   0251  17   0205  17   0331   WBC  9.2  8.3  9.1   RBC  4.08*  4.03*  4.42*   HEMOGLOBIN  12.7*  12.9*   13.8*   HEMATOCRIT  41.8*  41.0*  45.0   MCV  102.5*  101.7*  101.8*   MCH  31.1  32.0  31.2   RDW  49.4  49.1  50.3*   PLATELETCT  161*  178  207   MPV  10.8  10.5  10.7   NEUTSPOLYS  75.50*  70.20  71.90   LYMPHOCYTES  14.70*  19.10*  17.00*   MONOCYTES  8.10  8.60  8.60   EOSINOPHILS  0.90  1.10  1.50   BASOPHILS  0.50  0.50  0.70     Recent Labs      02/26/17   0251  02/27/17   0205  02/28/17   0331   SODIUM  143  141  141   POTASSIUM  4.4  4.3  4.5   CHLORIDE  98  93*  92*   CO2  42*  42*  43*   GLUCOSE  113*  99  108*   BUN  24*  28*  29*     Recent Labs      02/26/17   0251  02/27/17   0205  02/28/17   0331   CREATININE  0.68  0.66  0.75        Imaging:  cxr 2/23  1.  Extensive left pulmonary infiltrates and/or layering effusion. Overall stable.  2.  Cardiomegaly  3.  Atherosclerosis      Medications:  Current Facility-Administered Medications   Medication Dose Frequency Provider Last Rate Last Dose   • influenza vaccine high-dose injection 0.5 mL  0.5 mL Once PRN Daya Ashley M.D.       • oxycodone-acetaminophen (PERCOCET) 5-325 MG per tablet 1 Tab  1 Tab Q4HRS PRN Humphrey Treviño M.D.   1 Tab at 02/28/17 1012   • meropenem (MERREM) 500 mg in  mL IVPB  500 mg Q6HRS Odilia Kapoor M.D.   Stopped at 02/28/17 0625   • levalbuterol (XOPENEX) 1.25 MG/3ML nebulizer solution 1.25 mg  1.25 mg 4X/DAY (RT) Vinny Guan M.D.   1.25 mg at 02/28/17 1035   • metoprolol (LOPRESSOR) tablet 50 mg  50 mg BID Daya Ashley M.D.   50 mg at 02/28/17 0932   • levetiracetam (KEPPRA) tablet 1,500 mg  1,500 mg BID Daya Ashley M.D.   1,500 mg at 02/28/17 0932   • spironolactone (ALDACTONE) tablet 25 mg  25 mg DAILY Daya Ashley M.D.   25 mg at 02/28/17 0932   • furosemide (LASIX) injection 40 mg  40 mg BID DIURETIC Daya Ashley M.D.   40 mg at 02/28/17 0553   • ipratropium-albuterol (DUONEB) nebulizer solution 3 mL  3 mL Q4H PRN (RT) Vinny Guan M.D.   3 mL at 02/25/17 4221   • ondansetron  (ZOFRAN) syringe/vial injection 4 mg  4 mg Q4HRS PRN Daya Ashley M.D.   4 mg at 02/24/17 1237   • ondansetron (ZOFRAN ODT) dispertab 4 mg  4 mg Q4HRS PRN Daya Ashley M.D.   4 mg at 02/26/17 1442   • lactobacillus granules (LACTINEX/FLORANEX) packet 1 Packet  1 Packet TID WITH MEALS Daya Ashley M.D.   1 Packet at 02/28/17 0930   • ascorbic acid (ascorbic acid) tablet 500 mg  500 mg DAILY Daya Ashley M.D.   500 mg at 02/28/17 0933   • budesonide-formoterol (SYMBICORT) 160-4.5 MCG/ACT inhaler 2 Puff  2 Puff BID Vinny Guan M.D.   2 Puff at 02/28/17 0802   • acetylcysteine (MUCOMYST) 20 % solution 3 mL  3 mL 4X/DAY (RT) Vinny Guan M.D.   3 mL at 02/28/17 1035   • insulin lispro (HUMALOG) injection 1-6 Units  1-6 Units 4X/DAY ACHS Daya Ashley M.D.   Stopped at 02/28/17 0700   • glucose 4 g chewable tablet 16 g  16 g Q15 MIN PRN Daya Ashley M.D.        And   • dextrose 50% (D50W) injection 25 mL  25 mL Q15 MIN PRN Daya Ashley M.D.       • docusate sodium (COLACE) capsule 100 mg  100 mg BID Daya Ashley M.D.   100 mg at 02/28/17 0931   • aspirin EC (ECOTRIN) tablet 81 mg  81 mg DAILY Heath Leigh M.D.   81 mg at 02/28/17 0931   • atorvastatin (LIPITOR) tablet 80 mg  80 mg Q EVENING Heath Leigh M.D.   80 mg at 02/27/17 2044   • famotidine (PEPCID) tablet 20 mg  20 mg BID Heath Leigh M.D.   20 mg at 02/28/17 0933   • ferrous sulfate tablet 325 mg  325 mg DAILY Heath Leigh M.D.   325 mg at 02/28/17 0933   • finasteride (PROSCAR) tablet 5 mg  5 mg DAILY Heath Leigh M.D.   5 mg at 02/28/17 0932   • gabapentin (NEURONTIN) capsule 300 mg  300 mg DAILY Heath Leigh M.D.   300 mg at 02/28/17 0931   • oxybutynin SR (DITROPAN-XL) tablet 5 mg  5 mg DAILY Heath Leigh M.D.   5 mg at 02/28/17 0900   • sertraline (ZOLOFT) tablet 100 mg  100 mg DAILY Heath Leigh M.D.   50 mg at 02/28/17 1013   • tamsulosin (FLOMAX) capsule 0.4 mg  0.4 mg AFTER BREAKFAST Heath Leigh M.D.   0.4 mg at 02/28/17 0931  "  • tiotropium (SPIRIVA) 18 MCG inhalation capsule 1 Cap  1 Cap DAILY Heath Leigh M.D.   1 Cap at 02/28/17 0802   • trazodone (DESYREL) tablet 50 mg  50 mg QHS PRN Heath Leigh M.D.   50 mg at 02/23/17 2303   • NS infusion 1,971 mL  30 mL/kg Once PRN Heath Leigh M.D.       • senna-docusate (PERICOLACE or SENOKOT S) 8.6-50 MG per tablet 1 Tab  1 Tab Nightly Heath Leigh M.D.   1 Tab at 02/27/17 2046   • senna-docusate (PERICOLACE or SENOKOT S) 8.6-50 MG per tablet 1 Tab  1 Tab Q24HRS PRN Heath Leigh M.D.       • lactulose 20 GM/30ML solution 30 mL  30 mL Q24HRS PRN Heath Leigh M.D.   30 mL at 02/23/17 0933   • bisacodyl (DULCOLAX) suppository 10 mg  10 mg Q24HRS PRN Heath Leigh M.D.       • fleet enema 133 mL  1 Each Once PRN Heath Leigh M.D.       • Respiratory Care per Protocol   Continuous RT Heath Leigh M.D.       • NS (BOLUS) infusion 500 mL  500 mL Once PRN Heath Leigh M.D.       • heparin injection 5,000 Units  5,000 Units Q8HRS Heath Leigh M.D.   5,000 Units at 02/28/17 0557     Last reviewed on 2/20/2017  3:33 PM by Evelin Saavedra    Micro:  Results     Procedure Component Value Units Date/Time    BLOOD CULTURE x2 [[id number]] Collected:  02/20/17 1400    Order Status:  Completed Specimen Information:  Blood from Peripheral Updated:  02/25/17 1500     Significant Indicator NEG      Source BLD      Site PERIPHERAL      Blood Culture No growth after 5 days of incubation.     Narrative:      Per Hospital Policy: Only change Specimen Src: to \"Line\" if  specified by physician order.    BLOOD CULTURE x2 [[id number]] Collected:  02/20/17 1400    Order Status:  Completed Specimen Information:  Blood from Peripheral Updated:  02/25/17 1500     Significant Indicator NEG      Source BLD      Site PERIPHERAL      Blood Culture No growth after 5 days of incubation.     Narrative:      Per Hospital Policy: Only change Specimen Src: to \"Line\" if  specified by physician order.    URINE CULTURE-EXISTING-LESS THAN 48 HOURS [[id number]]  " (Abnormal)  (Susceptibility) Collected:  02/20/17 2012    Order Status:  Completed Specimen Information:  Urine from Urine, Clean Catch Updated:  02/23/17 0849     Significant Indicator POS (POS)      Source UR      Site URINE, CLEAN CATCH      Urine Culture Mixed skin josé miguel <10,000 cfu/mL (A)      Urine Culture -- (A)      Result:        Escherichia coli ESBL  ,000 cfu/mL  Extended Spectrum Beta-lactamase (ESBL) isolated.  ESBL's may be clinically resistant to therapy with  Penicillins,Cephalosporins or Aztreonam despite  apparent in vitro susceptibility to some of these agents.  The patient requires contact isolation.  Please contact pharmacy or an Infectious Disease Specialist  if you have any questions about appropriate therapy.      Narrative:      CALL  Garza  171 tel. [phone],  Droplet,Contact  Indication for culture:->Emergency Room Patient  ** had + UA in ER 2/20, please culture.    Culture & Susceptibility     ESCHERICHIA COLI ESBL     Antibiotic Sensitivity Microscan Unit Status    Ampicillin Resistant >16 mcg/mL Final    Cefepime Resistant >16 mcg/mL Final    Cefotaxime Extended Spectrum Beta Lactamase >32 mcg/mL Final    Cefotetan Sensitive <=16 mcg/mL Final    Ceftazidime Extended Spectrum Beta Lactamase 4 mcg/mL Final    Ceftriaxone Extended Spectrum Beta Lactamase >32 mcg/mL Final    Cefuroxime Resistant >16 mcg/mL Final    Cephalothin Resistant >16 mcg/mL Final    Ciprofloxacin Resistant >2 mcg/mL Final    Gentamicin Resistant >8 mcg/mL Final    Levofloxacin Intermediate 4 mcg/mL Final    Nitrofurantoin Intermediate 64 mcg/mL Final    Pip/Tazobactam Sensitive <=16 mcg/mL Final    Piperacillin Resistant >64 mcg/mL Final    Tigecycline Sensitive <=2 mcg/mL Final    Tobramycin Resistant >8 mcg/mL Final    Trimeth/Sulfa Resistant >2/38 mcg/mL Final                       Culture Respiratory W/ GRM STN [[id number]]  (Abnormal)  (Susceptibility) Collected:  02/20/17 3356    Order Status:  Completed  Specimen Information:  Respirate from Sputum Updated:  02/22/17 0850     Gram Stain Result --      Result:        Moderate WBCs.  Rare epithelial cells.  Rare mixed bacteria, no predominant organism seen.  Specimen Quality Score: 3+       Significant Indicator POS (POS)      Source RESP      Site SPUTUM      Respiratory Culture        Result:        Light growth usual upper respiratory josé miguel (A)     Respiratory Culture -- (A)      Result:        Escherichia coli ESBL  Light growth  Extended Spectrum Beta-lactamase (ESBL) isolated.  ESBL's may be clinically resistant to therapy with  Penicillins,Cephalosporins or Aztreonam despite  apparent in vitro susceptibility to some of these agents.  The patient requires contact isolation.  Please contact pharmacy or an Infectious Disease Specialist  if you have any questions about appropriate therapy.      Narrative:      CALL  Garza  171 tel. [phone],  CALLED  171 tel. [phone] 02/22/2017, 08:49, RB PERF. RESULTS CALLED  TO:[id number] Rn, Inf C  If not done within the last 24 hours  Sputum cultures, induced if needed. If not done within the  last 24 hours  Send central line distal (brown) port venous blood gas    Culture & Susceptibility     ESCHERICHIA COLI ESBL     Antibiotic Sensitivity Microscan Unit Status    Ampicillin Resistant >16 mcg/mL Final    Cefepime Resistant >16 mcg/mL Final    Cefotaxime Extended Spectrum Beta Lactamase >32 mcg/mL Final    Cefotetan Sensitive <=16 mcg/mL Final    Ceftazidime Extended Spectrum Beta Lactamase >16 mcg/mL Final    Ceftriaxone Extended Spectrum Beta Lactamase >32 mcg/mL Final    Cefuroxime Resistant >16 mcg/mL Final    Ciprofloxacin Resistant >2 mcg/mL Final    Ertapenem Sensitive <=1 mcg/mL Final    Gentamicin Resistant >8 mcg/mL Final    Pip/Tazobactam Sensitive <=16 mcg/mL Final    Tigecycline Sensitive <=2 mcg/mL Final    Tobramycin Resistant >8 mcg/mL Final    Trimeth/Sulfa Resistant >2/38 mcg/mL Final                        MRSA BY PCR (AMP) [[id number]]  (Abnormal) Collected:  02/21/17 0122    Order Status:  Completed Specimen Information:  Respirate from Nares Updated:  02/21/17 1424     Significant Indicator POS (POS)      Source RESP      Site NARES      MRSA PCR POSITIVE for MRSA by PCR. (A)     Narrative:      CALL  Garza  171 tel. [phone],  CALLED  171 tel. [phone] 02/21/2017, 14:24, RB PERF. 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]   • Left hemiparesis (CMS-HCC) [G81.94]   • Dysphagia status post cerebrovascular accident [I69.391]   • COPD (chronic obstructive pulmonary disease) (CMS-HCC) [J44.9]       Plan:  HCAP   ESBL E coli in sputum  DC'd Tygacil and change to Meropenem due to concurrent UTI  CXR no sig change by my read from 2/26  Stop date 3/7/2017  Aspiration precautions    UTI  Urine c/s ESBL E coli also  DC'd Tygacil as does not cover UTI  Stop date same as above    Aspiration, possible  No overt s/s of aspiration noted on swallow eval  On dysphagia 1 diet  Aspiration precautions    DM  Keep BS under 150 to help control current infection      Discussed with internal medicine

## 2017-03-01 ENCOUNTER — APPOINTMENT (OUTPATIENT)
Dept: RADIOLOGY | Facility: MEDICAL CENTER | Age: 82
DRG: 871 | End: 2017-03-01
Attending: INTERNAL MEDICINE
Payer: MEDICARE

## 2017-03-01 LAB
ANION GAP SERPL CALC-SCNC: 4 MMOL/L (ref 0–11.9)
BASOPHILS # BLD AUTO: 0.4 % (ref 0–1.8)
BASOPHILS # BLD: 0.04 K/UL (ref 0–0.12)
BUN SERPL-MCNC: 33 MG/DL (ref 8–22)
CALCIUM SERPL-MCNC: 9.7 MG/DL (ref 8.5–10.5)
CHLORIDE SERPL-SCNC: 92 MMOL/L (ref 96–112)
CO2 SERPL-SCNC: 45 MMOL/L (ref 20–33)
CREAT SERPL-MCNC: 0.82 MG/DL (ref 0.5–1.4)
EOSINOPHIL # BLD AUTO: 0.15 K/UL (ref 0–0.51)
EOSINOPHIL NFR BLD: 1.4 % (ref 0–6.9)
ERYTHROCYTE [DISTWIDTH] IN BLOOD BY AUTOMATED COUNT: 50.2 FL (ref 35.9–50)
GFR SERPL CREATININE-BSD FRML MDRD: >60 ML/MIN/1.73 M 2
GLUCOSE BLD-MCNC: 109 MG/DL (ref 65–99)
GLUCOSE BLD-MCNC: 113 MG/DL (ref 65–99)
GLUCOSE BLD-MCNC: 120 MG/DL (ref 65–99)
GLUCOSE BLD-MCNC: 127 MG/DL (ref 65–99)
GLUCOSE SERPL-MCNC: 134 MG/DL (ref 65–99)
HCT VFR BLD AUTO: 44.7 % (ref 42–52)
HGB BLD-MCNC: 14 G/DL (ref 14–18)
IMM GRANULOCYTES # BLD AUTO: 0.03 K/UL (ref 0–0.11)
IMM GRANULOCYTES NFR BLD AUTO: 0.3 % (ref 0–0.9)
LYMPHOCYTES # BLD AUTO: 1.7 K/UL (ref 1–4.8)
LYMPHOCYTES NFR BLD: 15.5 % (ref 22–41)
MCH RBC QN AUTO: 31.7 PG (ref 27–33)
MCHC RBC AUTO-ENTMCNC: 31.3 G/DL (ref 33.7–35.3)
MCV RBC AUTO: 101.4 FL (ref 81.4–97.8)
MONOCYTES # BLD AUTO: 0.96 K/UL (ref 0–0.85)
MONOCYTES NFR BLD AUTO: 8.7 % (ref 0–13.4)
NEUTROPHILS # BLD AUTO: 8.11 K/UL (ref 1.82–7.42)
NEUTROPHILS NFR BLD: 73.7 % (ref 44–72)
NRBC # BLD AUTO: 0 K/UL
NRBC BLD AUTO-RTO: 0 /100 WBC
PLATELET # BLD AUTO: 199 K/UL (ref 164–446)
PMV BLD AUTO: 10.9 FL (ref 9–12.9)
POTASSIUM SERPL-SCNC: 4.1 MMOL/L (ref 3.6–5.5)
RBC # BLD AUTO: 4.41 M/UL (ref 4.7–6.1)
SODIUM SERPL-SCNC: 141 MMOL/L (ref 135–145)
WBC # BLD AUTO: 11 K/UL (ref 4.8–10.8)

## 2017-03-01 PROCEDURE — 770020 HCHG ROOM/CARE - TELE (206)

## 2017-03-01 PROCEDURE — 700102 HCHG RX REV CODE 250 W/ 637 OVERRIDE(OP): Performed by: INTERNAL MEDICINE

## 2017-03-01 PROCEDURE — 94640 AIRWAY INHALATION TREATMENT: CPT

## 2017-03-01 PROCEDURE — 700111 HCHG RX REV CODE 636 W/ 250 OVERRIDE (IP): Performed by: INTERNAL MEDICINE

## 2017-03-01 PROCEDURE — 36415 COLL VENOUS BLD VENIPUNCTURE: CPT

## 2017-03-01 PROCEDURE — 700112 HCHG RX REV CODE 229: Performed by: HOSPITALIST

## 2017-03-01 PROCEDURE — 700101 HCHG RX REV CODE 250: Performed by: INTERNAL MEDICINE

## 2017-03-01 PROCEDURE — 82962 GLUCOSE BLOOD TEST: CPT

## 2017-03-01 PROCEDURE — A9270 NON-COVERED ITEM OR SERVICE: HCPCS | Performed by: INTERNAL MEDICINE

## 2017-03-01 PROCEDURE — 92526 ORAL FUNCTION THERAPY: CPT

## 2017-03-01 PROCEDURE — 94669 MECHANICAL CHEST WALL OSCILL: CPT

## 2017-03-01 PROCEDURE — 80048 BASIC METABOLIC PNL TOTAL CA: CPT

## 2017-03-01 PROCEDURE — A9270 NON-COVERED ITEM OR SERVICE: HCPCS | Performed by: HOSPITALIST

## 2017-03-01 PROCEDURE — 700102 HCHG RX REV CODE 250 W/ 637 OVERRIDE(OP): Performed by: HOSPITALIST

## 2017-03-01 PROCEDURE — 71010 DX-CHEST-PORTABLE (1 VIEW): CPT

## 2017-03-01 PROCEDURE — 99232 SBSQ HOSP IP/OBS MODERATE 35: CPT | Performed by: INTERNAL MEDICINE

## 2017-03-01 PROCEDURE — 700105 HCHG RX REV CODE 258: Performed by: INTERNAL MEDICINE

## 2017-03-01 PROCEDURE — 85025 COMPLETE CBC W/AUTO DIFF WBC: CPT

## 2017-03-01 RX ORDER — FUROSEMIDE 20 MG/1
20 TABLET ORAL
Status: DISCONTINUED | OUTPATIENT
Start: 2017-03-01 | End: 2017-03-01

## 2017-03-01 RX ORDER — ACETAZOLAMIDE 250 MG/1
250 TABLET ORAL
Status: DISCONTINUED | OUTPATIENT
Start: 2017-03-01 | End: 2017-03-02

## 2017-03-01 RX ADMIN — OXYCODONE HYDROCHLORIDE AND ACETAMINOPHEN 500 MG: 500 TABLET ORAL at 10:52

## 2017-03-01 RX ADMIN — GABAPENTIN 300 MG: 300 CAPSULE ORAL at 10:51

## 2017-03-01 RX ADMIN — STANDARDIZED SENNA CONCENTRATE AND DOCUSATE SODIUM 1 TABLET: 8.6; 5 TABLET, FILM COATED ORAL at 21:35

## 2017-03-01 RX ADMIN — DOCUSATE SODIUM 100 MG: 100 CAPSULE ORAL at 10:51

## 2017-03-01 RX ADMIN — TAMSULOSIN HYDROCHLORIDE 0.4 MG: 0.4 CAPSULE ORAL at 08:00

## 2017-03-01 RX ADMIN — FINASTERIDE 5 MG: 5 TABLET, FILM COATED ORAL at 09:00

## 2017-03-01 RX ADMIN — HEPARIN SODIUM 5000 UNITS: 5000 INJECTION, SOLUTION INTRAVENOUS; SUBCUTANEOUS at 13:16

## 2017-03-01 RX ADMIN — LACTOBACILLUS ACIDOPHILUS / LACTOBACILLUS BULGARICUS 1 PACKET: 100 MILLION CFU STRENGTH GRANULES at 08:00

## 2017-03-01 RX ADMIN — LEVETIRACETAM 1500 MG: 500 TABLET, FILM COATED ORAL at 10:52

## 2017-03-01 RX ADMIN — OXYBUTYNIN CHLORIDE 5 MG: 5 TABLET, FILM COATED, EXTENDED RELEASE ORAL at 08:00

## 2017-03-01 RX ADMIN — FAMOTIDINE 20 MG: 20 TABLET, FILM COATED ORAL at 21:35

## 2017-03-01 RX ADMIN — ACETAZOLAMIDE 250 MG: 250 TABLET ORAL at 17:40

## 2017-03-01 RX ADMIN — DOCUSATE SODIUM 100 MG: 100 CAPSULE ORAL at 21:35

## 2017-03-01 RX ADMIN — ATORVASTATIN CALCIUM 80 MG: 80 TABLET, FILM COATED ORAL at 21:34

## 2017-03-01 RX ADMIN — SPIRONOLACTONE 25 MG: 25 TABLET, FILM COATED ORAL at 08:00

## 2017-03-01 RX ADMIN — ASPIRIN 81 MG: 81 TABLET ORAL at 10:52

## 2017-03-01 RX ADMIN — BUDESONIDE AND FORMOTEROL FUMARATE DIHYDRATE 2 PUFF: 160; 4.5 AEROSOL RESPIRATORY (INHALATION) at 08:01

## 2017-03-01 RX ADMIN — MEROPENEM 500 MG: 500 INJECTION, POWDER, FOR SOLUTION INTRAVENOUS at 17:40

## 2017-03-01 RX ADMIN — LEVALBUTEROL HYDROCHLORIDE 1.25 MG: 1.25 SOLUTION RESPIRATORY (INHALATION) at 19:00

## 2017-03-01 RX ADMIN — BUDESONIDE AND FORMOTEROL FUMARATE DIHYDRATE 2 PUFF: 160; 4.5 AEROSOL RESPIRATORY (INHALATION) at 19:25

## 2017-03-01 RX ADMIN — Medication 325 MG: at 10:52

## 2017-03-01 RX ADMIN — SERTRALINE 100 MG: 100 TABLET, FILM COATED ORAL at 10:52

## 2017-03-01 RX ADMIN — MEROPENEM 500 MG: 500 INJECTION, POWDER, FOR SOLUTION INTRAVENOUS at 05:44

## 2017-03-01 RX ADMIN — ACETAZOLAMIDE 250 MG: 250 TABLET ORAL at 13:16

## 2017-03-01 RX ADMIN — METOPROLOL TARTRATE 50 MG: 50 TABLET, FILM COATED ORAL at 21:34

## 2017-03-01 RX ADMIN — TIOTROPIUM BROMIDE 1 CAPSULE: 18 CAPSULE ORAL; RESPIRATORY (INHALATION) at 08:01

## 2017-03-01 RX ADMIN — HEPARIN SODIUM 5000 UNITS: 5000 INJECTION, SOLUTION INTRAVENOUS; SUBCUTANEOUS at 05:49

## 2017-03-01 RX ADMIN — MEROPENEM 500 MG: 500 INJECTION, POWDER, FOR SOLUTION INTRAVENOUS at 13:18

## 2017-03-01 RX ADMIN — FUROSEMIDE 40 MG: 40 TABLET ORAL at 05:46

## 2017-03-01 RX ADMIN — ACETYLCYSTEINE 3 ML: 200 SOLUTION ORAL; RESPIRATORY (INHALATION) at 12:16

## 2017-03-01 RX ADMIN — METOPROLOL TARTRATE 50 MG: 50 TABLET, FILM COATED ORAL at 10:52

## 2017-03-01 RX ADMIN — LACTOBACILLUS ACIDOPHILUS / LACTOBACILLUS BULGARICUS 1 PACKET: 100 MILLION CFU STRENGTH GRANULES at 13:16

## 2017-03-01 RX ADMIN — FAMOTIDINE 20 MG: 20 TABLET, FILM COATED ORAL at 10:51

## 2017-03-01 RX ADMIN — ACETYLCYSTEINE 3 ML: 200 SOLUTION ORAL; RESPIRATORY (INHALATION) at 19:00

## 2017-03-01 RX ADMIN — LEVALBUTEROL HYDROCHLORIDE 1.25 MG: 1.25 SOLUTION RESPIRATORY (INHALATION) at 07:38

## 2017-03-01 RX ADMIN — HEPARIN SODIUM 5000 UNITS: 5000 INJECTION, SOLUTION INTRAVENOUS; SUBCUTANEOUS at 22:00

## 2017-03-01 RX ADMIN — ACETYLCYSTEINE 3 ML: 200 SOLUTION ORAL; RESPIRATORY (INHALATION) at 07:37

## 2017-03-01 RX ADMIN — LEVALBUTEROL HYDROCHLORIDE 1.25 MG: 1.25 SOLUTION RESPIRATORY (INHALATION) at 12:17

## 2017-03-01 RX ADMIN — LEVETIRACETAM 1500 MG: 500 TABLET, FILM COATED ORAL at 21:33

## 2017-03-01 RX ADMIN — LACTOBACILLUS ACIDOPHILUS / LACTOBACILLUS BULGARICUS 1 PACKET: 100 MILLION CFU STRENGTH GRANULES at 17:41

## 2017-03-01 ASSESSMENT — ENCOUNTER SYMPTOMS
PALPITATIONS: 0
VOMITING: 0
DIZZINESS: 0
ABDOMINAL PAIN: 0
WEAKNESS: 1
SORE THROAT: 0
SPEECH CHANGE: 0
NAUSEA: 1
DEPRESSION: 0
SHORTNESS OF BREATH: 0
DIARRHEA: 0
NAUSEA: 0
HEMOPTYSIS: 0
CLAUDICATION: 0
CONSTIPATION: 0
FOCAL WEAKNESS: 0
SPUTUM PRODUCTION: 0
EYE PAIN: 0
COUGH: 1
BRUISES/BLEEDS EASILY: 0
BACK PAIN: 0
MYALGIAS: 0
FEVER: 0
DIAPHORESIS: 0
SENSORY CHANGE: 0
WHEEZING: 0
NECK PAIN: 0
EYE DISCHARGE: 0
LOSS OF CONSCIOUSNESS: 0
HEADACHES: 0
CHILLS: 0

## 2017-03-01 ASSESSMENT — PAIN SCALES - GENERAL
PAINLEVEL_OUTOF10: 0

## 2017-03-01 ASSESSMENT — LIFESTYLE VARIABLES: SUBSTANCE_ABUSE: 0

## 2017-03-01 NOTE — PROGRESS NOTES
Pulmonary Critical Care Progress Note        Name:Alverto Mancini  MRN:[id number]  Date of Service: 3/1/17  Referring physician: Humphrey Treviño M.D.    Chief Complaint: Shortness of breath    History of Present Illness: 81 y.o. male admitted for SOB. Hx of L lung pneumonia, chronic aspiration on dysphagia diet, COPD    ROS:  Respiratory: negative cough, negative hemoptysis, negative pleuritic chest pain and negative shortness of breath, Cardiac: negative, GI: negative.  All other systems negative.    Interval Events:  24 hour interval history reviewed    Sitting at [facility], eating.   Still feels congested. Very weak cough but attempting  No chest pain  In sinus rhythm this AM  94% on RA    PFSH:  No change.    Respiratory:     Pulse Oximetry: 95 %  Chest Tube Drains:          Exam: coarse crackles in mid-lower lung zones, rhonchi. Weak cough and unlabored respirations, no intercostal retractions or accessory muscle use  ImagingCXR  I have personally reviewed the chest x-ray my impression is   L lung zones are better aerated now, minimal infiltrates are present, small bilaterally pleural effusion and no PTX         Invalid input(s): [id number]    HemoDynamics:  Pulse: 74  Blood Pressure : 128/63 mmHg       Exam: regular rate and rhythm, regular rhythm (Sinus)  Imaging: Available data reviewed        Neuro:  GCS         Exam: no focal deficits noted mental status intact  Imaging: Available data reviewed    Fluids:  Intake/Output       02/27/17 0700 - 02/28/17 0659 02/28/17 0700 - 03/01/17 0659 03/01/17 0700 - 03/02/17 0659      [id number] 1805-1412 Total 3199-4681 4869-1001 Total 9376-3397 1906-7754 Total       Intake    P.O.  --  640 640  480  540 1020  --  -- --    P.O. -- 640 640  -- -- --    I.V.  --  200 200  100  200 300  --  -- --    IV Volume (Meropenum) -- 200 200 100 200 300 -- -- --    Total Intake -- 840 840  -- -- --       Output    Urine  --  500 500  750  800 1550  --  -- --    Condom  Catheter -- 500 500  -- -- --    Number of Times Incontinent of Urine 3 x 2 x 5 x -- -- -- -- -- --    Total Output -- 500 500  -- -- --       Net I/O     -- 340 340 -170 -60 -230 -- -- --        Weight: 58.7 kg (129 lb 6.6 oz)  Recent Labs      17   0243   SODIUM  141  141  141   POTASSIUM  4.3  4.5  4.1   CHLORIDE  93*  92*  92*   CO2  42*  43*  45*   BUN  28*  29*  33*   CREATININE  0.66  0.75  0.82   CALCIUM  9.2  9.7  9.7       GI/Nutrition:  Exam: abdomen is soft and non-tender, normal active bowel sounds  Imaging: Available data reviewed  taking PO  Liver Function  Recent Labs      17   0243   GLUCOSE  99  108*  134*       Heme:  Recent Labs      17   0243   RBC  4.03*  4.42*  4.41*   HEMOGLOBIN  12.9*  13.8*  14.0   HEMATOCRIT  41.0*  45.0  44.7   PLATELETCT  178  207  199       Infectious Disease:  Temp  Av.4 °C (97.5 °F)  Min: 36.2 °C (97.1 °F)  Max: 37.1 °C (98.7 °F)  Micro: antibiotics reviewed  Recent Labs      17   0243   WBC  8.3  9.1  11.0*   NEUTSPOLYS  70.20  71.90  73.70*   LYMPHOCYTES  19.10*  17.00*  15.50*   MONOCYTES  8.60  8.60  8.70   EOSINOPHILS  1.10  1.50  1.40   BASOPHILS  0.50  0.70  0.40     Current Facility-Administered Medications   Medication Dose Frequency Provider Last Rate Last Dose   • acetaZOLAMIDE (DIAMOX) tablet 250 mg  250 mg BID DIURETIC Ajit Burleson M.D.       • furosemide (LASIX) tablet 20 mg  20 mg BID DIURETIC Ajit Burleson M.D.       • influenza vaccine high-dose injection 0.5 mL  0.5 mL Once PRN Daya Ashley M.D.       • oxycodone-acetaminophen (PERCOCET) 5-325 MG per tablet 1 Tab  1 Tab Q4HRS PRN Humphrey Treviño M.D.   1 Tab at 17 1012   • meropenem (MERREM) 500 mg in  mL IVPB  500 mg Q6HRS Odilia Kapoor M.D.   Stopped at 17 0614   • levalbuterol (XOPENEX) 1.25  MG/3ML nebulizer solution 1.25 mg  1.25 mg 4X/DAY (RT) Vinny Guan M.D.   1.25 mg at 03/01/17 0738   • metoprolol (LOPRESSOR) tablet 50 mg  50 mg BID Daya Ashley M.D.   50 mg at 02/28/17 2020   • levetiracetam (KEPPRA) tablet 1,500 mg  1,500 mg BID Daya Ashley M.D.   1,500 mg at 02/28/17 2014   • spironolactone (ALDACTONE) tablet 25 mg  25 mg DAILY Daya Ashley M.D.   25 mg at 02/28/17 0932   • ipratropium-albuterol (DUONEB) nebulizer solution 3 mL  3 mL Q4H PRN (RT) Vinny Guan M.D.   3 mL at 02/25/17 2347   • ondansetron (ZOFRAN) syringe/vial injection 4 mg  4 mg Q4HRS PRN Daya Ashley M.D.   4 mg at 02/24/17 1237   • ondansetron (ZOFRAN ODT) dispertab 4 mg  4 mg Q4HRS PRN Daya Ashley M.D.   4 mg at 02/26/17 1442   • lactobacillus granules (LACTINEX/FLORANEX) packet 1 Packet  1 Packet TID WITH MEALS Daya Ashley M.D.   1 Packet at 02/28/17 1810   • ascorbic acid (ascorbic acid) tablet 500 mg  500 mg DAILY Daya Ashley M.D.   500 mg at 02/28/17 0933   • budesonide-formoterol (SYMBICORT) 160-4.5 MCG/ACT inhaler 2 Puff  2 Puff BID Vinny Guan M.D.   2 Puff at 03/01/17 0801   • acetylcysteine (MUCOMYST) 20 % solution 3 mL  3 mL 4X/DAY (RT) Vinny Guan M.D.   3 mL at 03/01/17 0737   • insulin lispro (HUMALOG) injection 1-6 Units  1-6 Units 4X/DAY ACHS Daya Ashley M.D.   Stopped at 03/01/17 0700   • glucose 4 g chewable tablet 16 g  16 g Q15 MIN PRN Daya Ashley M.D.        And   • dextrose 50% (D50W) injection 25 mL  25 mL Q15 MIN PRN Daya Ashley M.D.       • docusate sodium (COLACE) capsule 100 mg  100 mg BID Daya Ashley M.D.   100 mg at 02/28/17 2020   • aspirin EC (ECOTRIN) tablet 81 mg  81 mg DAILY Heath Leigh M.D.   81 mg at 02/28/17 0931   • atorvastatin (LIPITOR) tablet 80 mg  80 mg Q EVENING Heath Leigh M.D.   80 mg at 02/28/17 2020   • famotidine (PEPCID) tablet 20 mg  20 mg BID Heath Leigh M.D.   20 mg at 02/28/17 2020   • ferrous  sulfate tablet 325 mg  325 mg DAILY Heath Leigh M.D.   325 mg at 02/28/17 0933   • finasteride (PROSCAR) tablet 5 mg  5 mg DAILY Heath Leigh M.D.   5 mg at 02/28/17 0932   • gabapentin (NEURONTIN) capsule 300 mg  300 mg DAILY Heath Leigh M.D.   300 mg at 02/28/17 0931   • oxybutynin SR (DITROPAN-XL) tablet 5 mg  5 mg DAILY Heath Leigh M.D.   5 mg at 02/28/17 0900   • sertraline (ZOLOFT) tablet 100 mg  100 mg DAILY Heath Leigh M.D.   50 mg at 02/28/17 1013   • tamsulosin (FLOMAX) capsule 0.4 mg  0.4 mg AFTER BREAKFAST Heath Leigh M.D.   0.4 mg at 02/28/17 0931   • tiotropium (SPIRIVA) 18 MCG inhalation capsule 1 Cap  1 Cap DAILY Heath Leigh M.D.   1 Cap at 03/01/17 0801   • trazodone (DESYREL) tablet 50 mg  50 mg QHS PRN Heath Leigh M.D.   50 mg at 02/23/17 2303   • NS infusion 1,971 mL  30 mL/kg Once PRN Heath Leigh M.D.       • senna-docusate (PERICOLACE or SENOKOT S) 8.6-50 MG per tablet 1 Tab  1 Tab Nightly Heath Leigh M.D.   1 Tab at 02/28/17 2020   • senna-docusate (PERICOLACE or SENOKOT S) 8.6-50 MG per tablet 1 Tab  1 Tab Q24HRS PRN Heath Leigh M.D.       • lactulose 20 GM/30ML solution 30 mL  30 mL Q24HRS PRN Heath Leigh M.D.   30 mL at 02/23/17 0933   • bisacodyl (DULCOLAX) suppository 10 mg  10 mg Q24HRS PRN Heath Leigh M.D.       • fleet enema 133 mL  1 Each Once PRN Heath Leigh M.D.       • Respiratory Care per Protocol   Continuous RT Heath Leigh M.D.       • NS (BOLUS) infusion 500 mL  500 mL Once PRN Heath Leigh M.D.       • heparin injection 5,000 Units  5,000 Units Q8HRS Heath Leigh M.D.   5,000 Units at 03/01/17 0549     Last reviewed on 2/20/2017  3:33 PM by Evelin Saavedra    Quality  Measures:  Core Measures & Quality Metrics    Problems:  Acute on chronic, mixed hypoxic/hypercapnic respiratory failure  Severe COPD  Hypoxia  HCAP  L sided opacification    Plan:  Resolution of L lung opacification, most c/w mucous plug now dislodged with hyperexpansion therapy and chest PT- RT input appreciated  - He appears  dehydrated, Na is rising in addition to possible intravascular depletion although lung exam continues to suggest some degree of pulm edema-- will decrease diruesis  - Presence of contraction alkalosis- will discontinue lasix, add diamox for diuresis effect and decrease co2-bicarb conversion  Continues to be extremely weak    . He will get PEP therapy and flutter valve, and we will be doing positional therapy. Also, he will get percussive therapy in hopes of mobilizing any additional mucous plugs.     ABX    Supplemental o2    Ajit Burleson MD  [facility]

## 2017-03-01 NOTE — PROGRESS NOTES
Infectious Disease Progress Note    Author: Odilia Kapoor M.D.    DOS & Time created: 3/1/2017  12:12 PM    Chief Complaint   Patient presents with   • Shortness of Breath     SAT's in 80's at home, given NPPB Tx X 3 with improvement.     Pneumonia and UTI FU    Interval History:  - says he is feeling slightly better. No fevers.   - no fevers. Feels better. On dysphagia 1 diet.   - no fevers. Much more lethargic  - no fevers. The cxr is worse.   AF WBC 8.3 Less dyspnea, +nausea    AF WBC 9.1 denies N/V today. No pain  3/1 AF, WBC 11 denies dyspnea PE unchanged from   Labs Reviewed, Medications Reviewed and Radiology Reviewed.    Review of Systems:  Review of Systems   Constitutional: Negative for fever.   Respiratory: Positive for cough.         Decreased   Cardiovascular: Negative for chest pain and leg swelling.   Gastrointestinal: Positive for nausea. Negative for vomiting.   Skin: Negative for rash.   Neurological: Negative for headaches.   All other systems reviewed and are negative.      Hemodynamics:  Temp (24hrs), Av.4 °C (97.5 °F), Min:36.2 °C (97.1 °F), Max:37.1 °C (98.7 °F)  Temperature: 36.3 °C (97.4 °F)  Pulse  Av.8  Min: 47  Max: 98   Blood Pressure : 128/63 mmHg       Physical Exam:  Physical Exam   Constitutional: He appears well-developed. No distress.   Elderly and frail male      HENT:   Head: Normocephalic and atraumatic.   Eyes: EOM are normal. Pupils are equal, round, and reactive to light. No scleral icterus.   Neck: Neck supple.   Cardiovascular: Normal rate.    Murmur heard.  Irregular    Pulmonary/Chest: Effort normal. No respiratory distress. He has rales.   Abdominal: Soft. He exhibits no distension. There is no tenderness.   Musculoskeletal: He exhibits no edema.   Neurological: He is alert.   Skin: No erythema.   Nursing note and vitals reviewed.      Labs:  Recent Labs      17   0205  17   0331  17   0243   WBC  8.3  9.1   11.0*   RBC  4.03*  4.42*  4.41*   HEMOGLOBIN  12.9*  13.8*  14.0   HEMATOCRIT  41.0*  45.0  44.7   MCV  101.7*  101.8*  101.4*   MCH  32.0  31.2  31.7   RDW  49.1  50.3*  50.2*   PLATELETCT  178  207  199   MPV  10.5  10.7  10.9   NEUTSPOLYS  70.20  71.90  73.70*   LYMPHOCYTES  19.10*  17.00*  15.50*   MONOCYTES  8.60  8.60  8.70   EOSINOPHILS  1.10  1.50  1.40   BASOPHILS  0.50  0.70  0.40     Recent Labs      02/27/17   0205  02/28/17   0331  03/01/17   0243   SODIUM  141  141  141   POTASSIUM  4.3  4.5  4.1   CHLORIDE  93*  92*  92*   CO2  42*  43*  45*   GLUCOSE  99  108*  134*   BUN  28*  29*  33*     Recent Labs      02/27/17 0205  02/28/17   0331  03/01/17   0243   CREATININE  0.66  0.75  0.82        Imaging:  cxr 2/23  1.  Extensive left pulmonary infiltrates and/or layering effusion. Overall stable.  2.  Cardiomegaly  3.  Atherosclerosis      Medications:  Current Facility-Administered Medications   Medication Dose Frequency Provider Last Rate Last Dose   • acetaZOLAMIDE (DIAMOX) tablet 250 mg  250 mg BID DIURETIC Ajit Burleson M.D.       • influenza vaccine high-dose injection 0.5 mL  0.5 mL Once PRN Daya Ashley M.D.       • oxycodone-acetaminophen (PERCOCET) 5-325 MG per tablet 1 Tab  1 Tab Q4HRS PRN Humphrey Treviño M.D.   1 Tab at 02/28/17 1012   • meropenem (MERREM) 500 mg in  mL IVPB  500 mg Q6HRS Odilia Kapoor M.D.   Stopped at 03/01/17 0614   • levalbuterol (XOPENEX) 1.25 MG/3ML nebulizer solution 1.25 mg  1.25 mg 4X/DAY (RT) Vinny Guan M.D.   1.25 mg at 03/01/17 0738   • metoprolol (LOPRESSOR) tablet 50 mg  50 mg BID Daya Ashley M.D.   50 mg at 03/01/17 1052   • levetiracetam (KEPPRA) tablet 1,500 mg  1,500 mg BID Daya Ashley M.D.   1,500 mg at 03/01/17 1052   • spironolactone (ALDACTONE) tablet 25 mg  25 mg DAILY Daya Ashley M.D.   25 mg at 03/01/17 0800   • ipratropium-albuterol (DUONEB) nebulizer solution 3 mL  3 mL Q4H PRN (RT) Vinny Guan M.D.    3 mL at 02/25/17 2347   • ondansetron (ZOFRAN) syringe/vial injection 4 mg  4 mg Q4HRS PRN Daya Ashley M.D.   4 mg at 02/24/17 1237   • ondansetron (ZOFRAN ODT) dispertab 4 mg  4 mg Q4HRS PRN Daya Ashley M.D.   4 mg at 02/26/17 1442   • lactobacillus granules (LACTINEX/FLORANEX) packet 1 Packet  1 Packet TID WITH MEALS Daya Ashley M.D.   1 Packet at 03/01/17 0800   • ascorbic acid (ascorbic acid) tablet 500 mg  500 mg DAILY Daya Ashley M.D.   500 mg at 03/01/17 1052   • budesonide-formoterol (SYMBICORT) 160-4.5 MCG/ACT inhaler 2 Puff  2 Puff BID Vinny Guan M.D.   2 Puff at 03/01/17 0801   • acetylcysteine (MUCOMYST) 20 % solution 3 mL  3 mL 4X/DAY (RT) Vinny Guan M.D.   3 mL at 03/01/17 0737   • insulin lispro (HUMALOG) injection 1-6 Units  1-6 Units 4X/DAY ACHS Daya Ashley M.D.   Stopped at 03/01/17 0700   • glucose 4 g chewable tablet 16 g  16 g Q15 MIN PRN Daya Ashley M.D.        And   • dextrose 50% (D50W) injection 25 mL  25 mL Q15 MIN PRN Daya Ashley M.D.       • docusate sodium (COLACE) capsule 100 mg  100 mg BID Daya Ashley M.D.   100 mg at 03/01/17 1051   • aspirin EC (ECOTRIN) tablet 81 mg  81 mg DAILY Heath Leigh M.D.   81 mg at 03/01/17 1052   • atorvastatin (LIPITOR) tablet 80 mg  80 mg Q EVENING Heath Leigh M.D.   80 mg at 02/28/17 2020   • famotidine (PEPCID) tablet 20 mg  20 mg BID Heath Leigh M.D.   20 mg at 03/01/17 1051   • ferrous sulfate tablet 325 mg  325 mg DAILY Pluen Ziu, M.D.   325 mg at 03/01/17 1052   • finasteride (PROSCAR) tablet 5 mg  5 mg DAILY Heath Leigh M.D.   5 mg at 03/01/17 0900   • gabapentin (NEURONTIN) capsule 300 mg  300 mg DAILY Heath Leigh M.D.   300 mg at 03/01/17 1051   • oxybutynin SR (DITROPAN-XL) tablet 5 mg  5 mg DAILY Heath Leigh M.D.   5 mg at 03/01/17 0800   • sertraline (ZOLOFT) tablet 100 mg  100 mg DAILY Heath Leigh M.D.   100 mg at 03/01/17 1052   • tamsulosin (FLOMAX) capsule 0.4 mg  0.4 mg AFTER BREAKFAST  "Heath Leigh M.D.   0.4 mg at 03/01/17 0800   • tiotropium (SPIRIVA) 18 MCG inhalation capsule 1 Cap  1 Cap DAILY Heath Leigh M.D.   1 Cap at 03/01/17 0801   • trazodone (DESYREL) tablet 50 mg  50 mg QHS PRN Heath Leigh M.D.   50 mg at 02/23/17 2303   • NS infusion 1,971 mL  30 mL/kg Once PRN Heath Leigh M.D.       • senna-docusate (PERICOLACE or SENOKOT S) 8.6-50 MG per tablet 1 Tab  1 Tab Nightly Heath Leigh M.D.   1 Tab at 02/28/17 2020   • senna-docusate (PERICOLACE or SENOKOT S) 8.6-50 MG per tablet 1 Tab  1 Tab Q24HRS PRN Heath Leigh M.D.       • lactulose 20 GM/30ML solution 30 mL  30 mL Q24HRS PRN Heath Leigh M.D.   30 mL at 02/23/17 0933   • bisacodyl (DULCOLAX) suppository 10 mg  10 mg Q24HRS PRN Heath Leigh M.D.       • fleet enema 133 mL  1 Each Once PRN Heath Leigh M.D.       • Respiratory Care per Protocol   Continuous RT Heath Leigh M.D.       • NS (BOLUS) infusion 500 mL  500 mL Once PRN Heath Leigh M.D.       • heparin injection 5,000 Units  5,000 Units Q8HRS Heath Leigh M.D.   5,000 Units at 03/01/17 0549     Last reviewed on 2/20/2017  3:33 PM by Evelin Saavedra    Micro:  Results     Procedure Component Value Units Date/Time    BLOOD CULTURE x2 [[id number]] Collected:  02/20/17 1400    Order Status:  Completed Specimen Information:  Blood from Peripheral Updated:  02/25/17 1500     Significant Indicator NEG      Source BLD      Site PERIPHERAL      Blood Culture No growth after 5 days of incubation.     Narrative:      Per Hospital Policy: Only change Specimen Src: to \"Line\" if  specified by physician order.    BLOOD CULTURE x2 [[id number]] Collected:  02/20/17 1400    Order Status:  Completed Specimen Information:  Blood from Peripheral Updated:  02/25/17 1500     Significant Indicator NEG      Source BLD      Site PERIPHERAL      Blood Culture No growth after 5 days of incubation.     Narrative:      Per Hospital Policy: Only change Specimen Src: to \"Line\" if  specified by physician order.    URINE " CULTURE-EXISTING-LESS THAN 48 HOURS [[id number]]  (Abnormal)  (Susceptibility) Collected:  02/20/17 2012    Order Status:  Completed Specimen Information:  Urine from Urine, Clean Catch Updated:  02/23/17 0849     Significant Indicator POS (POS)      Source UR      Site URINE, CLEAN CATCH      Urine Culture Mixed skin josé miguel <10,000 cfu/mL (A)      Urine Culture -- (A)      Result:        Escherichia coli ESBL  ,000 cfu/mL  Extended Spectrum Beta-lactamase (ESBL) isolated.  ESBL's may be clinically resistant to therapy with  Penicillins,Cephalosporins or Aztreonam despite  apparent in vitro susceptibility to some of these agents.  The patient requires contact isolation.  Please contact pharmacy or an Infectious Disease Specialist  if you have any questions about appropriate therapy.      Narrative:      CALL  [facility]  [address] tel. [phone],  Droplet,Contact  Indication for culture:->Emergency Room Patient  ** had + UA in ER 2/20, please culture.    Culture & Susceptibility     ESCHERICHIA COLI ESBL     Antibiotic Sensitivity Microscan Unit Status    Ampicillin Resistant >16 mcg/mL Final    Cefepime Resistant >16 mcg/mL Final    Cefotaxime Extended Spectrum Beta Lactamase >32 mcg/mL Final    Cefotetan Sensitive <=16 mcg/mL Final    Ceftazidime Extended Spectrum Beta Lactamase 4 mcg/mL Final    Ceftriaxone Extended Spectrum Beta Lactamase >32 mcg/mL Final    Cefuroxime Resistant >16 mcg/mL Final    Cephalothin Resistant >16 mcg/mL Final    Ciprofloxacin Resistant >2 mcg/mL Final    Gentamicin Resistant >8 mcg/mL Final    Levofloxacin Intermediate 4 mcg/mL Final    Nitrofurantoin Intermediate 64 mcg/mL Final    Pip/Tazobactam Sensitive <=16 mcg/mL Final    Piperacillin Resistant >64 mcg/mL Final    Tigecycline Sensitive <=2 mcg/mL Final    Tobramycin Resistant >8 mcg/mL Final    Trimeth/Sulfa Resistant >2/38 mcg/mL Final                             Assessment:  Active Hospital Problems    Diagnosis   • Respiratory  failure (CMS-HCC) [J96.90]   • Debility [R53.81]   • Multiple falls [R29.6]   • MRSA carrier [Z22.322]   • Sepsis (CMS-HCC) [A41.9]   • HCAP (healthcare-associated pneumonia) [J18.9]   • Chronic respiratory failure with hypoxia (CMS-HCC) [J96.11]   • DM type 2, controlled, with complication (CMS-HCC) [E11.8]   • Left hemiparesis (CMS-HCC) [G81.94]   • Dysphagia status post cerebrovascular accident [I69.391]   • COPD (chronic obstructive pulmonary disease) (CMS-HCC) [J44.9]       Plan:  HCAP   ESBL E coli in sputum  DC'd Tygacil and change to Meropenem due to concurrent UTI  CXR improved by my read from 2/26 to 2/28.   CXR 3/1 change likely edema  Stop date 3/7/2017  Aspiration precautions    UTI  Urine c/s ESBL E coli also  Stop date same as above    Aspiration, possible  No overt s/s of aspiration noted on swallow eval  On dysphagia 1 diet  Aspiration precautions    DM  Keep BS under 150 to help control current infection    Leukocytosis  Mild, new  Repeat if increases  Encourage pulm toilet      Discussed with internal medicine

## 2017-03-01 NOTE — THERAPY
"Speech Language Therapy dysphagia treatment completed.   Functional Status: RN and pt report no overt difficulty with current dys1/ntl diet. Pt sleeping but able to rouse and trial ntl and purees from tray with no overt s/sx of aspiration. Mod cues required for pt to follow simple swallow precautions. Pt with thin liquids at bedside which were trialed with immediate coughing. Pts cough is very weak and breathy. Educ pt regarding NTL only.    Recommendations: Recommend continue dys1/ntl with supervision.   Plan of Care: Will benefit from Speech Therapy 3 times per week    See \"Rehab Therapy-Acute\" Patient Summary Report for complete documentation.     "

## 2017-03-01 NOTE — PROGRESS NOTES
Received bedside report. Assumed care of patient. Patient appears comfortable in bed. Call light within reach, bed locked and at lowest position, bed alarm on. Will continue to monitor.

## 2017-03-01 NOTE — PROGRESS NOTES
Bedside report received from day RN. Assumed pt care. Pt alert and sitting up in bed. POC discussed. Pt denies any needs at this time. Bed alarm on. Bed locked and in lowest position. Call light within reach. Will continue to assess and provide care.

## 2017-03-01 NOTE — PROGRESS NOTES
Ashley from lab called at 0335 with a critical lab value of Co2 of 45. Results read back to Ashley. BRYANNA Keys paged at 0340. Results read to Massimo at 0344. Results read back from Massimo. No new orders at this time.

## 2017-03-01 NOTE — CARE PLAN
Problem: Oxygenation:  Goal: Maintain adequate oxygenation dependent on patient condition  Outcome: PROGRESSING AS EXPECTED  Intervention: Manage oxygen therapy by monitoring pulse oximetry and/or ABG values  Pt on 4L n/c, his baseline is also 4L n/c      Problem: Bronchoconstriction:  Goal: Improve in air movement and diminished wheezing  Outcome: PROGRESSING AS EXPECTED  Intervention: Implement inhaled treatments  QID SvN with Xopenex/Mucomyst    QID IPV    BID Symbicort

## 2017-03-01 NOTE — PROGRESS NOTES
Hospital Medicine Progress Note, Adult, Complex               Author: Humphrey Treviño Date & Time created: 2/28/2017  5:52 PM     Interval History:  81 year old male with a PMHx of Stroke with left sided hemiparesis, HTN, COPD on 3 L of oxygen, asthma, multiple falls and pelvic fracture presented from [facility] care SN[facility],presented on 2/20 with shortness of breath and cough and found to have left sided opacification started on vanco and zosyn for HCAP.  H/o aspiration, on dysphagia 1 diet per SLP.      2/21:  Had rapid last night, opens eyes to name only.  Remains full code per family.  CTA showing LLL pneumonia/opacification with hiatal hernia seen.  U/s chest did not show enough pleural effusion for IR to drain.  Nasal swab + MRSA, confused.  2/22:  Improved mentation today, sitting up, conversing appropriately after 1 dose of meropenem IV.  ID consulted for sputum + Ecoli ESBL, left lung white out.  2/23:  Continues to improve on Abx.  Mentation clear, sitting up in chair, appropriate, states does not remember first few days.  2/24:  Lungs improving with increased breath sounds on left.  Mentation remains clear.  States he could not control his urine.  Explained had UTI same as in lung.  2/25:  Worsening NEHEMIAS, JVD noted on exam, increased CO2 38 to 40.  Ordered repeat CXR with pulmonary edema noted.  Restarted Home meds, previously on lasix 40 bid and spironolactone 25 daily.  Prior echo 2014 EF 65% grade 1 dd, ordered repeat echo.  2/26:  Severe SOB since last night, acute respiratory distress with O2 up to 9 LPM VM, clearer mentation after IV lasix,O2 back to 4 LPM NC this a.m..   CXR with worsening left lung complete white out.  D/w Dr. Brunner.  For bronch tomorrow.    2/28 Patient is sitting up in bed in no acute distress. Alert and oriented x3. States his breathing has improved. Currently on 4L of oxygen. Continue RT protocol with PEP therapy, flutter valve, bronchodilators with mucomyst. Bronch has been defered at  this time per Pulm. Continue Abx, stop date 3/7/17 per ID. continue diuresis with strict intake and output.     Review of Systems:  Review of Systems   Constitutional: Positive for malaise/fatigue. Negative for fever, chills and diaphoresis.   HENT: Negative for congestion and sore throat.    Eyes: Negative for pain and discharge.   Respiratory: Positive for cough. Negative for hemoptysis, sputum production, shortness of breath and wheezing.    Cardiovascular: Negative for chest pain, palpitations, claudication and leg swelling.   Gastrointestinal: Negative for nausea, vomiting, abdominal pain, diarrhea, constipation and melena.   Genitourinary: Negative for dysuria, urgency and frequency.   Musculoskeletal: Negative for myalgias, back pain, joint pain and neck pain.   Skin: Negative for itching and rash.   Neurological: Positive for weakness. Negative for dizziness, sensory change, speech change, focal weakness, loss of consciousness and headaches.   Endo/Heme/Allergies: Does not bruise/bleed easily.   Psychiatric/Behavioral: Negative for depression, suicidal ideas and substance abuse.       Physical Exam:  Physical Exam   Constitutional: He is oriented to person, place, and time. He appears well-developed and well-nourished. No distress.   HENT:   Head: Normocephalic and atraumatic.   Mouth/Throat: Oropharynx is clear and moist. No oropharyngeal exudate.   Eyes: Conjunctivae and EOM are normal. Pupils are equal, round, and reactive to light. Right eye exhibits no discharge. Left eye exhibits no discharge. No scleral icterus.   Neck: Normal range of motion. Neck supple. No JVD present. No tracheal deviation present. No thyromegaly present.   Cardiovascular: Normal rate, regular rhythm and normal heart sounds.  Exam reveals no gallop and no friction rub.    No murmur heard.  Pulmonary/Chest: Effort normal and breath sounds normal. No respiratory distress. He has no wheezes. He has no rales. He exhibits no  tenderness.   Diminished breath sounds bilaterally L > R   Abdominal: Soft. Bowel sounds are normal. He exhibits no distension and no mass. There is no tenderness. There is no rebound and no guarding.   Musculoskeletal: Normal range of motion. He exhibits no edema or tenderness.   Lymphadenopathy:     He has no cervical adenopathy.   Neurological: He is alert and oriented to person, place, and time. No cranial nerve deficit. He exhibits normal muscle tone.   Skin: Skin is warm and dry. No rash noted. He is not diaphoretic. No erythema.   Psychiatric: He has a normal mood and affect. His behavior is normal. Judgment and thought content normal.   Nursing note and vitals reviewed.      Labs:        Invalid input(s): [id number]      Recent Labs      17   0331   SODIUM  143  141  141   POTASSIUM  4.4  4.3  4.5   CHLORIDE  98  93*  92*   CO2  42*  42*  43*   BUN  24*  28*  29*   CREATININE  0.68  0.66  0.75   CALCIUM  9.3  9.2  9.7     Recent Labs      175  17   0331   GLUCOSE  113*  99  108*     Recent Labs      17   0205  17   0331   RBC  4.08*  4.03*  4.42*   HEMOGLOBIN  12.7*  12.9*  13.8*   HEMATOCRIT  41.8*  41.0*  45.0   PLATELETCT  161*  178  207     Recent Labs      17   0331   WBC  9.2  8.3  9.1   NEUTSPOLYS  75.50*  70.20  71.90   LYMPHOCYTES  14.70*  19.10*  17.00*   MONOCYTES  8.10  8.60  8.60   EOSINOPHILS  0.90  1.10  1.50   BASOPHILS  0.50  0.50  0.70           Hemodynamics:  Temp (24hrs), Av.3 °C (97.3 °F), Min:36.2 °C (97.1 °F), Max:36.4 °C (97.5 °F)  Temperature: 36.3 °C (97.4 °F)  Pulse  Av.7  Min: 47  Max: 98   Blood Pressure : 136/70 mmHg     Respiratory:    Respiration: 17, Pulse Oximetry: 96 %, O2 Daily Delivery Respiratory : Silicone Nasal Cannula     Given By:: Mask, #MDI/DPI Given: MDI/DPI x 2, PEP/CPT Method: Kira  Valve;Percussion/Vibration, Given By:: Mouthpiece, Work Of Breathing / Effort: Mild  RUL Breath Sounds: Diminished, RML Breath Sounds: Diminished, RLL Breath Sounds: Diminished, DEBI Breath Sounds: Diminished, LLL Breath Sounds: Diminished  Fluids:    Intake/Output Summary (Last 24 hours) at 02/28/17 1752  Last data filed at 02/28/17 1302   Gross per 24 hour   Intake   1420 ml   Output   1250 ml   Net    170 ml     Weight: 58.9 kg (129 lb 13.6 oz)  GI/Nutrition:  Orders Placed This Encounter   Procedures   • DIET ORDER     Standing Status: Standing      Number of Occurrences: 1      Standing Expiration Date:      Order Specific Question:  Diet:     Answer:  Diabetic [3]     Order Specific Question:  Diet:     Answer:  Cardiac [6]     Order Specific Question:  Texture/Fiber modifications:     Answer:  Dysphagia 1(Pureed)specify fluid consistency(question 6) [1]     Order Specific Question:  Consistency/Fluid modifications:     Answer:  Nectar Thick [2]     Medical Decision Making, by Problem:  Active Hospital Problems    Diagnosis   • Acute on chronic hypoxemic respiratory failure (CMS-HCC) [J96.90]  -persistent on 4 L NC, uses 2L at home  -secondary to LLL pneumonia/opacification in the setting COPD  -Sputum culture revealed ESBL E Coli, continue Merrem, stop date 3/27  -Antibiotics per ID  -CXR with worsening left sided near complete white out.  -Contnue lasix 40 IV bid  -Bronchoscopy held due to patient's frail state, low reserve and may require prolonged ventilation per Dr. Burleson 2/27.     • Debility [R53.81]  -PT/OT  -encouraged feeding and supplements   • Multiple falls [R29.6]  -Reported from niece  -H/o pelvic fracture, recently at [facility] for rehab  -fall precautions   • MRSA carrier [Z22.322]  -MRSA + nasal swab  -Antibiotics per ID   • Sepsis (CMS-HCC) [A41.9]  -2/2 LLL pneumonia HCAP and UTI with ESBL ecoli  -BCs negative x2  -continue Merrem with stop date 3/27  -antibiotics per ID   • HCAP  (healthcare-associated pneumonia) [J18.9]  -As above  -continue breathing treatments, mucomyst, PEP therapy, flutter valve, positional therapy, percussive therapy to help clear any mucous plugs  Review of last admission:  4/2016 s/p bronchoscopy with Dr. Phillips with no mass, thick mucus, cultures negative.   • Chronic respiratory failure with hypoxia (CMS-HCC) [J96.11]  Mild improvement in O2 requirement back to 4 LPM Nc  Baseline 2-3 LPM NC     • Oxygen dependent [Z99.81]  Baseline 2-3 LPM NC 24/7, currently on 4 LPM NC     • DM type 2, controlled, with complication (CMS-HCC) [E11.8]  SSI  accuchecks  Diabetic diet  Dysphagia 1 NTFL   • Left hemiparesis (CMS-HCC) [G81.94]  2/2 prior CVA     • Dysphagia status post cerebrovascular accident [I69.391]  SLP rec dysphagia 1 NT diet.     • COPD (chronic obstructive pulmonary disease) (CMS-HCC) [J44.9]  -no evidence of acute exacerbation  -continue breathing treatments, RT protocol and inhalers   • GERD (gastroesophageal reflux disease) [K21.9]  -stable  -Continue home pepcid    Abnormal UA:  Wbc 100-150 wbc  UC with ESBL Ecoli  On Merrem days per ID.    Iron deficiency anemia:  -Replace iron daily with vit C.    Acute on chronic Diastolic heart failure:  -improving  -Home meds:  lasix 40  Bid and spironolactone  -CXR with increased vascular congestion on 2/25, now shows improvement  -switch lasix 40 IV to PO now  -Restarted spironolactone 25 daily.  -Repeat echo EF 60% with grade 2 DD severe tricuspid regurgitation.    PAF:  -Episode 2/27 a.m. After albuterol neb treatment  -Gave metoprolol 5mg IV x 1 returned to NSR  -dc'd albuterol neb treatment since no wheezes on exam since admission  -Change to xopenex prn  -Increased metoprolol 25 to 50 bid     FC per niece:  Code reviewed by admitting physician.  PT/OT/SNF orders placed.  Family does not want to go back to [facility].    Disposition: Pending due to acute respiratory distress, but patient has expressed desire to go  to group home if feasible. Will get SW to assist.  Patient remains critically ill.        Loya catheter: No Loya      DVT Prophylaxis: Heparin    Ulcer prophylaxis: Yes  Antibiotics: Treating active infection/contamination beyond 24 hours perioperative coverage  Assessed for rehab: Patient was assess for and/or received rehabilitation services during this hospitalization

## 2017-03-01 NOTE — FLOWSHEET NOTE
03/01/17 1217   Events/Summary/Plan   Events/Summary/Plan IPV/SvN   Interdisciplinary Plan of Care-Goals (Indications)   Obstructive Ventilatory Defect or Pulmonary Disease without Obvious Obstruction PFT / Reduced Airflow   Bronchopulmonary Hygiene Indications Difficulty with Secretion Clearance   Hyperinflation Protocol Indications Restrictive Lung Disorder / Consolidation   Interdisciplinary Plan of Care-Outcomes    Bronchodilator Outcome Improvement in Airflow (peak flow, PFT)   Bronchopulmonary Hygiene Outcome Improvement in Vital Signs and Measures of Gas Exchange   Hyperinflation Protocol Goals/Outcome Stable Vital Capacity x24 hrs and Patient Understands / uses I.S.   Education   Education Yes - Pt. / Family has been Instructed in use of Respiratory Equipment   Therapy Not Performed   Type of Therapy Not Performed IPV   Reason Therapy Not Performed Refused   RT Assessment of Delivered Medications   Evaluation of Medication Delivery Daily Yes-- Pt /Family has been Instructed in use of Respiratory Medications and Adverse Reactions   SVN Group   #SVN Performed Yes   Given By: Mask   Chest Exam   Work Of Breathing / Effort Mild   Respiration 18   Pulse 80   Breath Sounds   Pre/Post Intervention Pre Intervention Assessment   RUL Breath Sounds Diminished   RML Breath Sounds Diminished   RLL Breath Sounds Diminished   DEBI Breath Sounds Diminished   LLL Breath Sounds Diminished   Secretions   Cough Non Productive   Oxygen   O2 (LPM) 4   O2 Daily Delivery Respiratory  Silicone Nasal Cannula

## 2017-03-01 NOTE — FLOWSHEET NOTE
03/01/17 0736   Events/Summary/Plan   Events/Summary/Plan IPV/SVN/MDI's   Interdisciplinary Plan of Care-Goals (Indications)   Obstructive Ventilatory Defect or Pulmonary Disease without Obvious Obstruction History / Diagnosis   Bronchopulmonary Hygiene Indications Difficulty with Secretion Clearance   Hyperinflation Protocol Indications Restrictive Lung Disorder / Consolidation   Interdisciplinary Plan of Care-Outcomes    Bronchodilator Outcome Improvement in Airflow (peak flow, PFT)   Bronchopulmonary Hygiene Outcome Improvement in Vital Signs and Measures of Gas Exchange   Hyperinflation Protocol Goals/Outcome Stable Vital Capacity x24 hrs and Patient Understands / uses I.S.   Education   Education Yes - Pt. / Family has been Instructed in use of Respiratory Equipment   RT Assessment of Delivered Medications   Evaluation of Medication Delivery Daily Yes-- Pt /Family has been Instructed in use of Respiratory Medications and Adverse Reactions   SVN Group   #SVN Performed Yes   Given By: Mask   Date SVN Last Changed 02/28/17   Date SVN Next Change Due (Q 7 Days) 03/07/17   MDI/DPI Group   #MDI/DPI Given MDI/DPI x 2   IPV Group   #IPV Performed Yes   Operational Pressure 35-40 psig (Adult)   Given By: Mouthpiece   Frequency 9 O'Clock Position;3 O'Clock Position   Inspiratory Flow Adjusted to achieve chest wiggle   Date Circuit Last Changed 02/28/17   Date Circuit Next Change Due (Q 30 Days) 03/30/17   Incentive Spirometry Group   Incentive Spirometry Instruction Yes   Breathing Exercises Yes   Incentive Spirometer Volume 500 mL   Chest Exam   Work Of Breathing / Effort Mild   Respiration 18   Pulse (!) 52   Breath Sounds   Pre/Post Intervention Pre Intervention Assessment   RUL Breath Sounds Diminished   RML Breath Sounds Diminished   RLL Breath Sounds Diminished   DEBI Breath Sounds Diminished   LLL Breath Sounds Diminished   Secretions   Cough Non Productive   Oximetry   Continuous Oximetry Yes   Oxygen   Pulse  Oximetry 98 %   O2 (LPM) 4

## 2017-03-02 ENCOUNTER — APPOINTMENT (OUTPATIENT)
Dept: RADIOLOGY | Facility: MEDICAL CENTER | Age: 82
DRG: 871 | End: 2017-03-02
Attending: INTERNAL MEDICINE
Payer: MEDICARE

## 2017-03-02 PROBLEM — J96.92 HYPERCAPNIC RESPIRATORY FAILURE (HCC): Status: ACTIVE | Noted: 2017-03-02

## 2017-03-02 LAB
ANION GAP SERPL CALC-SCNC: 6 MMOL/L (ref 0–11.9)
BASE EXCESS BLDA CALC-SCNC: 9 MMOL/L (ref -4–3)
BASOPHILS # BLD AUTO: 0.5 % (ref 0–1.8)
BASOPHILS # BLD: 0.05 K/UL (ref 0–0.12)
BODY TEMPERATURE: ABNORMAL CENTIGRADE
BUN SERPL-MCNC: 28 MG/DL (ref 8–22)
CALCIUM SERPL-MCNC: 9.7 MG/DL (ref 8.5–10.5)
CHLORIDE SERPL-SCNC: 96 MMOL/L (ref 96–112)
CO2 SERPL-SCNC: 38 MMOL/L (ref 20–33)
CREAT SERPL-MCNC: 0.66 MG/DL (ref 0.5–1.4)
EKG IMPRESSION: NORMAL
EOSINOPHIL # BLD AUTO: 0.22 K/UL (ref 0–0.51)
EOSINOPHIL NFR BLD: 2.1 % (ref 0–6.9)
ERYTHROCYTE [DISTWIDTH] IN BLOOD BY AUTOMATED COUNT: 51.3 FL (ref 35.9–50)
GFR SERPL CREATININE-BSD FRML MDRD: >60 ML/MIN/1.73 M 2
GLUCOSE BLD-MCNC: 113 MG/DL (ref 65–99)
GLUCOSE BLD-MCNC: 115 MG/DL (ref 65–99)
GLUCOSE BLD-MCNC: 116 MG/DL (ref 65–99)
GLUCOSE BLD-MCNC: 139 MG/DL (ref 65–99)
GLUCOSE SERPL-MCNC: 116 MG/DL (ref 65–99)
HCO3 BLDA-SCNC: 38 MMOL/L (ref 17–25)
HCT VFR BLD AUTO: 46 % (ref 42–52)
HGB BLD-MCNC: 14.1 G/DL (ref 14–18)
IMM GRANULOCYTES # BLD AUTO: 0.03 K/UL (ref 0–0.11)
IMM GRANULOCYTES NFR BLD AUTO: 0.3 % (ref 0–0.9)
LYMPHOCYTES # BLD AUTO: 2.02 K/UL (ref 1–4.8)
LYMPHOCYTES NFR BLD: 18.8 % (ref 22–41)
MCH RBC QN AUTO: 31.2 PG (ref 27–33)
MCHC RBC AUTO-ENTMCNC: 30.7 G/DL (ref 33.7–35.3)
MCV RBC AUTO: 101.8 FL (ref 81.4–97.8)
MONOCYTES # BLD AUTO: 0.97 K/UL (ref 0–0.85)
MONOCYTES NFR BLD AUTO: 9 % (ref 0–13.4)
NEUTROPHILS # BLD AUTO: 7.44 K/UL (ref 1.82–7.42)
NEUTROPHILS NFR BLD: 69.3 % (ref 44–72)
NRBC # BLD AUTO: 0 K/UL
NRBC BLD AUTO-RTO: 0 /100 WBC
PCO2 BLDA: 70.9 MMHG (ref 26–37)
PH BLDA: 7.35 [PH] (ref 7.4–7.5)
PLATELET # BLD AUTO: 196 K/UL (ref 164–446)
PMV BLD AUTO: 11.2 FL (ref 9–12.9)
PO2 BLDA: 103 MMHG (ref 64–87)
POTASSIUM SERPL-SCNC: 4.3 MMOL/L (ref 3.6–5.5)
RBC # BLD AUTO: 4.52 M/UL (ref 4.7–6.1)
SAO2 % BLDA: 97.3 % (ref 93–99)
SODIUM SERPL-SCNC: 140 MMOL/L (ref 135–145)
WBC # BLD AUTO: 10.7 K/UL (ref 4.8–10.8)

## 2017-03-02 PROCEDURE — 71010 DX-CHEST-PORTABLE (1 VIEW): CPT

## 2017-03-02 PROCEDURE — 700102 HCHG RX REV CODE 250 W/ 637 OVERRIDE(OP): Performed by: INTERNAL MEDICINE

## 2017-03-02 PROCEDURE — 94640 AIRWAY INHALATION TREATMENT: CPT

## 2017-03-02 PROCEDURE — 82803 BLOOD GASES ANY COMBINATION: CPT

## 2017-03-02 PROCEDURE — 700111 HCHG RX REV CODE 636 W/ 250 OVERRIDE (IP): Performed by: INTERNAL MEDICINE

## 2017-03-02 PROCEDURE — A9270 NON-COVERED ITEM OR SERVICE: HCPCS | Performed by: INTERNAL MEDICINE

## 2017-03-02 PROCEDURE — 94668 MNPJ CHEST WALL SBSQ: CPT

## 2017-03-02 PROCEDURE — 770022 HCHG ROOM/CARE - ICU (200)

## 2017-03-02 PROCEDURE — 82962 GLUCOSE BLOOD TEST: CPT

## 2017-03-02 PROCEDURE — 94669 MECHANICAL CHEST WALL OSCILL: CPT

## 2017-03-02 PROCEDURE — A9270 NON-COVERED ITEM OR SERVICE: HCPCS | Performed by: HOSPITALIST

## 2017-03-02 PROCEDURE — 700101 HCHG RX REV CODE 250: Performed by: INTERNAL MEDICINE

## 2017-03-02 PROCEDURE — 700112 HCHG RX REV CODE 229: Performed by: HOSPITALIST

## 2017-03-02 PROCEDURE — 93005 ELECTROCARDIOGRAM TRACING: CPT | Performed by: INTERNAL MEDICINE

## 2017-03-02 PROCEDURE — 99291 CRITICAL CARE FIRST HOUR: CPT | Performed by: INTERNAL MEDICINE

## 2017-03-02 PROCEDURE — 85025 COMPLETE CBC W/AUTO DIFF WBC: CPT

## 2017-03-02 PROCEDURE — 700105 HCHG RX REV CODE 258: Performed by: INTERNAL MEDICINE

## 2017-03-02 PROCEDURE — 80048 BASIC METABOLIC PNL TOTAL CA: CPT

## 2017-03-02 PROCEDURE — 93010 ELECTROCARDIOGRAM REPORT: CPT | Performed by: INTERNAL MEDICINE

## 2017-03-02 PROCEDURE — 36415 COLL VENOUS BLD VENIPUNCTURE: CPT

## 2017-03-02 PROCEDURE — 700102 HCHG RX REV CODE 250 W/ 637 OVERRIDE(OP): Performed by: HOSPITALIST

## 2017-03-02 RX ORDER — ACETAZOLAMIDE 500 MG/5ML
500 INJECTION, POWDER, LYOPHILIZED, FOR SOLUTION INTRAVENOUS ONCE
Status: COMPLETED | OUTPATIENT
Start: 2017-03-02 | End: 2017-03-02

## 2017-03-02 RX ADMIN — Medication 325 MG: at 09:02

## 2017-03-02 RX ADMIN — TAMSULOSIN HYDROCHLORIDE 0.4 MG: 0.4 CAPSULE ORAL at 09:00

## 2017-03-02 RX ADMIN — OXYBUTYNIN CHLORIDE 5 MG: 5 TABLET, FILM COATED, EXTENDED RELEASE ORAL at 09:03

## 2017-03-02 RX ADMIN — ACETYLCYSTEINE 3 ML: 200 SOLUTION ORAL; RESPIRATORY (INHALATION) at 11:08

## 2017-03-02 RX ADMIN — MEROPENEM 500 MG: 500 INJECTION, POWDER, FOR SOLUTION INTRAVENOUS at 05:49

## 2017-03-02 RX ADMIN — LACTOBACILLUS ACIDOPHILUS / LACTOBACILLUS BULGARICUS 1 PACKET: 100 MILLION CFU STRENGTH GRANULES at 09:04

## 2017-03-02 RX ADMIN — MEROPENEM 500 MG: 500 INJECTION, POWDER, FOR SOLUTION INTRAVENOUS at 12:08

## 2017-03-02 RX ADMIN — LACTOBACILLUS ACIDOPHILUS / LACTOBACILLUS BULGARICUS 1 PACKET: 100 MILLION CFU STRENGTH GRANULES at 12:16

## 2017-03-02 RX ADMIN — TIOTROPIUM BROMIDE 1 CAPSULE: 18 CAPSULE ORAL; RESPIRATORY (INHALATION) at 06:46

## 2017-03-02 RX ADMIN — GABAPENTIN 300 MG: 300 CAPSULE ORAL at 09:00

## 2017-03-02 RX ADMIN — SPIRONOLACTONE 25 MG: 25 TABLET, FILM COATED ORAL at 09:02

## 2017-03-02 RX ADMIN — ASPIRIN 81 MG: 81 TABLET ORAL at 09:03

## 2017-03-02 RX ADMIN — FAMOTIDINE 20 MG: 20 TABLET, FILM COATED ORAL at 09:04

## 2017-03-02 RX ADMIN — ACETYLCYSTEINE 3 ML: 200 SOLUTION ORAL; RESPIRATORY (INHALATION) at 06:48

## 2017-03-02 RX ADMIN — SERTRALINE 100 MG: 100 TABLET, FILM COATED ORAL at 09:01

## 2017-03-02 RX ADMIN — FINASTERIDE 5 MG: 5 TABLET, FILM COATED ORAL at 09:03

## 2017-03-02 RX ADMIN — ACETAZOLAMIDE SODIUM 500 MG: 500 INJECTION, POWDER, LYOPHILIZED, FOR SOLUTION INTRAVENOUS at 20:56

## 2017-03-02 RX ADMIN — HEPARIN SODIUM 5000 UNITS: 5000 INJECTION, SOLUTION INTRAVENOUS; SUBCUTANEOUS at 14:44

## 2017-03-02 RX ADMIN — LEVALBUTEROL HYDROCHLORIDE 1.25 MG: 1.25 SOLUTION RESPIRATORY (INHALATION) at 06:48

## 2017-03-02 RX ADMIN — OXYCODONE HYDROCHLORIDE AND ACETAMINOPHEN 500 MG: 500 TABLET ORAL at 09:04

## 2017-03-02 RX ADMIN — LEVALBUTEROL HYDROCHLORIDE 1.25 MG: 1.25 SOLUTION RESPIRATORY (INHALATION) at 11:08

## 2017-03-02 RX ADMIN — LEVETIRACETAM 1500 MG: 500 TABLET, FILM COATED ORAL at 09:03

## 2017-03-02 RX ADMIN — MEROPENEM 500 MG: 500 INJECTION, POWDER, FOR SOLUTION INTRAVENOUS at 20:55

## 2017-03-02 RX ADMIN — LEVALBUTEROL HYDROCHLORIDE 1.25 MG: 1.25 SOLUTION RESPIRATORY (INHALATION) at 15:17

## 2017-03-02 RX ADMIN — DOCUSATE SODIUM 100 MG: 100 CAPSULE ORAL at 09:00

## 2017-03-02 RX ADMIN — BUDESONIDE AND FORMOTEROL FUMARATE DIHYDRATE 2 PUFF: 160; 4.5 AEROSOL RESPIRATORY (INHALATION) at 06:46

## 2017-03-02 RX ADMIN — HEPARIN SODIUM 5000 UNITS: 5000 INJECTION, SOLUTION INTRAVENOUS; SUBCUTANEOUS at 05:50

## 2017-03-02 RX ADMIN — ACETAZOLAMIDE 250 MG: 250 TABLET ORAL at 05:51

## 2017-03-02 RX ADMIN — METOPROLOL TARTRATE 50 MG: 50 TABLET, FILM COATED ORAL at 09:01

## 2017-03-02 RX ADMIN — MEROPENEM 500 MG: 500 INJECTION, POWDER, FOR SOLUTION INTRAVENOUS at 00:04

## 2017-03-02 RX ADMIN — ACETYLCYSTEINE 3 ML: 200 SOLUTION ORAL; RESPIRATORY (INHALATION) at 15:17

## 2017-03-02 RX ADMIN — HEPARIN SODIUM 5000 UNITS: 5000 INJECTION, SOLUTION INTRAVENOUS; SUBCUTANEOUS at 20:56

## 2017-03-02 RX ADMIN — OXYCODONE HYDROCHLORIDE AND ACETAMINOPHEN 1 TABLET: 5; 325 TABLET ORAL at 09:04

## 2017-03-02 ASSESSMENT — PAIN SCALES - GENERAL
PAINLEVEL_OUTOF10: 0
PAINLEVEL_OUTOF10: 5
PAINLEVEL_OUTOF10: 0

## 2017-03-02 ASSESSMENT — ENCOUNTER SYMPTOMS
NAUSEA: 0
HEMOPTYSIS: 0
CONSTIPATION: 0
CLAUDICATION: 0
ABDOMINAL PAIN: 0
BRUISES/BLEEDS EASILY: 0
BACK PAIN: 0
SORE THROAT: 0
VOMITING: 0
EYE PAIN: 0
LOSS OF CONSCIOUSNESS: 0
DIAPHORESIS: 0
PALPITATIONS: 0
SHORTNESS OF BREATH: 1
WEAKNESS: 1
DEPRESSION: 0
WHEEZING: 0
CHILLS: 0
EYE DISCHARGE: 0
SENSORY CHANGE: 0
NECK PAIN: 0
MYALGIAS: 0
HEADACHES: 0
FEVER: 0
COUGH: 1
FOCAL WEAKNESS: 0
DIARRHEA: 0
DIZZINESS: 0
SPUTUM PRODUCTION: 0
SPEECH CHANGE: 0

## 2017-03-02 ASSESSMENT — LIFESTYLE VARIABLES
SUBSTANCE_ABUSE: 0
DO YOU DRINK ALCOHOL: NO

## 2017-03-02 NOTE — CARE PLAN
Problem: Skin Integrity  Goal: Risk for impaired skin integrity will decrease  Outcome: PROGRESSING AS EXPECTED  Patient has red blanchable buttocks. Applied moisture barrier and mepilex. Turning and repositioning off sacrum q2     Problem: Mobility  Goal: Risk for activity intolerance will decrease  Outcome: PROGRESSING AS EXPECTED  Patient min assistx1 with walker  Sat in cahir for about an hour

## 2017-03-02 NOTE — PROGRESS NOTES
Pt resting comfortably in bed. Denies any needs at this time. Bed alarm on. Bed locked and in lowest position. Call light within reach. Will continue to assess and provide care.

## 2017-03-02 NOTE — PROGRESS NOTES
Infectious Disease Progress Note    Author: Odilia Kapoor M.D.    DOS & Time created: 3/2/2017  1:34 PM    Chief Complaint   Patient presents with   • Shortness of Breath     SAT's in 80's at home, given NPPB Tx X 3 with improvement.     Pneumonia and UTI FU    Interval History:  - says he is feeling slightly better. No fevers.   - no fevers. Feels better. On dysphagia 1 diet.   - no fevers. Much more lethargic  - no fevers. The cxr is worse.   AF WBC 8.3 Less dyspnea, +nausea    AF WBC 9.1 denies N/V today. No pain  3/1 AF, WBC 11 denies dyspnea PE unchanged from 2/28  3/2 AF WBC 10.7 more alert today-denies, pain, SOB   Labs Reviewed, Medications Reviewed and Radiology Reviewed.    Review of Systems:  Review of Systems   Constitutional: Negative for fever.   Respiratory: Positive for cough.         Decreased   Cardiovascular: Negative for chest pain and leg swelling.   Gastrointestinal: Negative for nausea, vomiting and abdominal pain.   Genitourinary: Negative for dysuria.   Skin: Negative for rash.   Neurological: Negative for headaches.   All other systems reviewed and are negative.      Hemodynamics:  Temp (24hrs), Av.4 °C (97.6 °F), Min:36.3 °C (97.4 °F), Max:36.6 °C (97.9 °F)  Temperature: 36.3 °C (97.4 °F)  Pulse  Av.7  Min: 44  Max: 98Heart Rate (Monitored): (!) 56  Blood Pressure : 148/71 mmHg       Physical Exam:  Physical Exam   Constitutional: He appears well-developed. No distress.   Elderly and frail male      HENT:   Head: Normocephalic and atraumatic.   Eyes: EOM are normal. Pupils are equal, round, and reactive to light. No scleral icterus.   Neck: Neck supple.   Cardiovascular: Normal rate.    Murmur heard.  Irregular    Pulmonary/Chest: Effort normal. No respiratory distress. He has rales.   Abdominal: Soft. He exhibits no distension. There is no tenderness.   Musculoskeletal: He exhibits no edema.   Neurological: He is alert.   Skin: No erythema.   Nursing  note and vitals reviewed.      Labs:  Recent Labs      02/28/17 0331 03/01/17   0243  03/02/17   0242   WBC  9.1  11.0*  10.7   RBC  4.42*  4.41*  4.52*   HEMOGLOBIN  13.8*  14.0  14.1   HEMATOCRIT  45.0  44.7  46.0   MCV  101.8*  101.4*  101.8*   MCH  31.2  31.7  31.2   RDW  50.3*  50.2*  51.3*   PLATELETCT  207  199  196   MPV  10.7  10.9  11.2   NEUTSPOLYS  71.90  73.70*  69.30   LYMPHOCYTES  17.00*  15.50*  18.80*   MONOCYTES  8.60  8.70  9.00   EOSINOPHILS  1.50  1.40  2.10   BASOPHILS  0.70  0.40  0.50     Recent Labs      02/28/17   0331 03/01/17   0243  03/02/17   0242   SODIUM  141  141  140   POTASSIUM  4.5  4.1  4.3   CHLORIDE  92*  92*  96   CO2  43*  45*  38*   GLUCOSE  108*  134*  116*   BUN  29*  33*  28*     Recent Labs      02/28/17   0331 03/01/17   0243  03/02/17   0242   CREATININE  0.75  0.82  0.66        Imaging:  cxr 2/23  1.  Extensive left pulmonary infiltrates and/or layering effusion. Overall stable.  2.  Cardiomegaly  3.  Atherosclerosis    Medications:  Current Facility-Administered Medications   Medication Dose Frequency Provider Last Rate Last Dose   • acetaZOLAMIDE (DIAMOX) tablet 250 mg  250 mg BID DIURETIC Ajit Burleson M.D.   250 mg at 03/02/17 0551   • influenza vaccine high-dose injection 0.5 mL  0.5 mL Once PRN Daya Ashley M.D.       • oxycodone-acetaminophen (PERCOCET) 5-325 MG per tablet 1 Tab  1 Tab Q4HRS PRN Humphrey Treviño M.D.   1 Tab at 03/02/17 0904   • meropenem (MERREM) 500 mg in  mL IVPB  500 mg Q6HRS Odilia Kapoor M.D. 200 mL/hr at 03/02/17 1208 500 mg at 03/02/17 1208   • levalbuterol (XOPENEX) 1.25 MG/3ML nebulizer solution 1.25 mg  1.25 mg 4X/DAY (RT) Vinny Guan M.D.   1.25 mg at 03/02/17 1108   • metoprolol (LOPRESSOR) tablet 50 mg  50 mg BID Daya Ashley M.D.   50 mg at 03/02/17 0901   • levetiracetam (KEPPRA) tablet 1,500 mg  1,500 mg BID Daya Ashley M.D.   1,500 mg at 03/02/17 0903   • spironolactone (ALDACTONE) tablet 25 mg   25 mg DAILY Daya Ashley M.D.   25 mg at 03/02/17 0902   • ipratropium-albuterol (DUONEB) nebulizer solution 3 mL  3 mL Q4H PRN (RT) Vinny Guan M.D.   3 mL at 02/25/17 2347   • ondansetron (ZOFRAN) syringe/vial injection 4 mg  4 mg Q4HRS PRN Daya Ashley M.D.   4 mg at 02/24/17 1237   • ondansetron (ZOFRAN ODT) dispertab 4 mg  4 mg Q4HRS PRN Daya Ashley M.D.   4 mg at 02/26/17 1442   • lactobacillus granules (LACTINEX/FLORANEX) packet 1 Packet  1 Packet TID WITH MEALS Daya Ashley M.D.   1 Packet at 03/02/17 1216   • ascorbic acid (ascorbic acid) tablet 500 mg  500 mg DAILY Daya Ashley M.D.   500 mg at 03/02/17 0904   • budesonide-formoterol (SYMBICORT) 160-4.5 MCG/ACT inhaler 2 Puff  2 Puff BID Vinny Guan M.D.   Stopped at 03/02/17 0900   • acetylcysteine (MUCOMYST) 20 % solution 3 mL  3 mL 4X/DAY (RT) Vinny Guan M.D.   3 mL at 03/02/17 1108   • insulin lispro (HUMALOG) injection 1-6 Units  1-6 Units 4X/DAY ACHS Daya Ashley M.D.   Stopped at 03/01/17 0700   • glucose 4 g chewable tablet 16 g  16 g Q15 MIN PRN Daya Ashley M.D.        And   • dextrose 50% (D50W) injection 25 mL  25 mL Q15 MIN PRN Daya Ashley M.D.       • docusate sodium (COLACE) capsule 100 mg  100 mg BID Daya Ashley M.D.   100 mg at 03/02/17 0900   • aspirin EC (ECOTRIN) tablet 81 mg  81 mg DAILY Heath Leigh M.D.   81 mg at 03/02/17 0903   • atorvastatin (LIPITOR) tablet 80 mg  80 mg Q EVENING Pluen Ziu, M.D.   80 mg at 03/01/17 2134   • famotidine (PEPCID) tablet 20 mg  20 mg BID Heath Leigh M.D.   20 mg at 03/02/17 0904   • ferrous sulfate tablet 325 mg  325 mg DAILY Heath Leigh M.D.   325 mg at 03/02/17 0902   • finasteride (PROSCAR) tablet 5 mg  5 mg DAILY Heath Leigh M.D.   5 mg at 03/02/17 0903   • gabapentin (NEURONTIN) capsule 300 mg  300 mg DAILY Heath Leigh M.D.   300 mg at 03/02/17 0900   • oxybutynin SR (DITROPAN-XL) tablet 5 mg  5 mg DAILY Heath Leigh M.D.   5 mg at 03/02/17  "0903   • sertraline (ZOLOFT) tablet 100 mg  100 mg DAILY Heath Leigh M.D.   100 mg at 03/02/17 0901   • tamsulosin (FLOMAX) capsule 0.4 mg  0.4 mg AFTER BREAKFAST Heath Leigh M.D.   0.4 mg at 03/02/17 0900   • tiotropium (SPIRIVA) 18 MCG inhalation capsule 1 Cap  1 Cap DAILY Heath Leigh M.D.   Stopped at 03/02/17 0900   • trazodone (DESYREL) tablet 50 mg  50 mg QHS PRN Heath Leigh M.D.   50 mg at 02/23/17 2303   • NS infusion 1,971 mL  30 mL/kg Once PRN Heath Leigh M.D.       • senna-docusate (PERICOLACE or SENOKOT S) 8.6-50 MG per tablet 1 Tab  1 Tab Nightly Heath Leigh M.D.   1 Tab at 03/01/17 2135   • senna-docusate (PERICOLACE or SENOKOT S) 8.6-50 MG per tablet 1 Tab  1 Tab Q24HRS PRN Heath Leigh M.D.       • lactulose 20 GM/30ML solution 30 mL  30 mL Q24HRS PRN Heath Leigh M.D.   30 mL at 02/23/17 0933   • bisacodyl (DULCOLAX) suppository 10 mg  10 mg Q24HRS PRN Heath Leigh M.D.       • fleet enema 133 mL  1 Each Once PRN Heath Leigh M.D.       • Respiratory Care per Protocol   Continuous RT Heath Leigh M.D.       • NS (BOLUS) infusion 500 mL  500 mL Once PRN Heath Leigh M.D.       • heparin injection 5,000 Units  5,000 Units Q8HRS Heath Leigh M.D.   5,000 Units at 03/02/17 0550     Last reviewed on 2/20/2017  3:33 PM by Evelin Saavedra    Micro:  Results     Procedure Component Value Units Date/Time    BLOOD CULTURE x2 [[id number]] Collected:  02/20/17 1400    Order Status:  Completed Specimen Information:  Blood from Peripheral Updated:  02/25/17 1500     Significant Indicator NEG      Source BLD      Site PERIPHERAL      Blood Culture No growth after 5 days of incubation.     Narrative:      Per Hospital Policy: Only change Specimen Src: to \"Line\" if  specified by physician order.    BLOOD CULTURE x2 [[id number]] Collected:  02/20/17 1400    Order Status:  Completed Specimen Information:  Blood from Peripheral Updated:  02/25/17 1500     Significant Indicator NEG      Source BLD      Site PERIPHERAL      Blood Culture No " "growth after 5 days of incubation.     Narrative:      Per Hospital Policy: Only change Specimen Src: to \"Line\" if  specified by physician order.          Assessment:  Active Hospital Problems    Diagnosis   • Respiratory failure (CMS-HCC) [J96.90]   • Debility [R53.81]   • Multiple falls [R29.6]   • MRSA carrier [Z22.322]   • Sepsis (CMS-HCC) [A41.9]   • HCAP (healthcare-associated pneumonia) [J18.9]   • Chronic respiratory failure with hypoxia (CMS-HCC) [J96.11]   • DM type 2, controlled, with complication (CMS-HCC) [E11.8]   • Left hemiparesis (CMS-HCC) [G81.94]   • Dysphagia status post cerebrovascular accident [I69.391]   • COPD (chronic obstructive pulmonary disease) (CMS-HCC) [J44.9]       Plan:  HCAP   ESBL E coli in sputum  Continue Meropenem due to concurrent UTI  Stop date 3/7/2017  Aspiration precautions as below    UTI, compliacted  Urine c/s ESBL E coli also  Stop date same as above    Aspiration  No overt s/s of aspiration noted on swallow eval  On dysphagia 1 diet  Aspiration precautions    DM  Keep BS under 150 to help control current infection    Leukocytosis  Resolved  Repeat cultures if increases  Encourage pulm toilet      Discussed with internal medicine   "

## 2017-03-02 NOTE — PROGRESS NOTES
Hospital Medicine Progress Note, Adult, Complex               Author: Humphrey Treviño Date & Time created: 3/1/2017  4:36 PM     Interval History:  81 year old male with a PMHx of Stroke with left sided hemiparesis, HTN, COPD on 3 L of oxygen, asthma, multiple falls and pelvic fracture presented from [facility] care SNF,presented on 2/20 with shortness of breath and cough and found to have left sided opacification started on vanco and zosyn for HCAP.  H/o aspiration, on dysphagia 1 diet per SLP.      2/21:  Had rapid last night, opens eyes to name only.  Remains full code per family.  CTA showing LLL pneumonia/opacification with hiatal hernia seen.  U/s chest did not show enough pleural effusion for IR to drain.  Nasal swab + MRSA, confused.  2/22:  Improved mentation today, sitting up, conversing appropriately after 1 dose of meropenem IV.  ID consulted for sputum + Ecoli ESBL, left lung white out.  2/23:  Continues to improve on Abx.  Mentation clear, sitting up in chair, appropriate, states does not remember first few days.  2/24:  Lungs improving with increased breath sounds on left.  Mentation remains clear.  States he could not control his urine.  Explained had UTI same as in lung.  2/25:  Worsening NEHEMIAS, JVD noted on exam, increased CO2 38 to 40.  Ordered repeat CXR with pulmonary edema noted.  Restarted Home meds, previously on lasix 40 bid and spironolactone 25 daily.  Prior echo 2014 EF 65% grade 1 dd, ordered repeat echo.  2/26:  Severe SOB since last night, acute respiratory distress with O2 up to 9 LPM VM, clearer mentation after IV lasix,O2 back to 4 LPM NC this a.m..   CXR with worsening left lung complete white out.  D/w Dr. Brunner.  For bronch tomorrow.    2/28 Patient is sitting up in bed in no acute distress. Alert and oriented x3. States his breathing has improved. Currently on 4L of oxygen. Continue RT protocol with PEP therapy, flutter valve, bronchodilators with mucomyst. Bronch has been defered at this  time per Pulm. Continue Abx, stop date 3/7/17 per ID. continue diuresis with strict intake and output.    2/29 Patient states his breathing is improving. Currently on 4 L of oxygen which is near his baseline. He continues to have a weak cough with inability to clear secretions, continue pulmonary hygiene. His CO2 has been increasing up to 45 and has been started on diamox per Pulm, appreciate input.      Review of Systems:  Review of Systems   Constitutional: Positive for malaise/fatigue. Negative for fever, chills and diaphoresis.   HENT: Negative for congestion and sore throat.    Eyes: Negative for pain and discharge.   Respiratory: Positive for cough. Negative for hemoptysis, sputum production, shortness of breath and wheezing.    Cardiovascular: Negative for chest pain, palpitations, claudication and leg swelling.   Gastrointestinal: Negative for nausea, vomiting, abdominal pain, diarrhea, constipation and melena.   Genitourinary: Negative for dysuria, urgency and frequency.   Musculoskeletal: Negative for myalgias, back pain, joint pain and neck pain.   Skin: Negative for itching and rash.   Neurological: Positive for weakness. Negative for dizziness, sensory change, speech change, focal weakness, loss of consciousness and headaches.   Endo/Heme/Allergies: Does not bruise/bleed easily.   Psychiatric/Behavioral: Negative for depression, suicidal ideas and substance abuse.       Physical Exam:  Physical Exam   Constitutional: He is oriented to person, place, and time. He appears well-developed and well-nourished. No distress.   HENT:   Head: Normocephalic and atraumatic.   Mouth/Throat: Oropharynx is clear and moist. No oropharyngeal exudate.   Eyes: Conjunctivae and EOM are normal. Pupils are equal, round, and reactive to light. Right eye exhibits no discharge. Left eye exhibits no discharge. No scleral icterus.   Neck: Normal range of motion. Neck supple. No JVD present. No tracheal deviation present. No  thyromegaly present.   Cardiovascular: Normal rate, regular rhythm and normal heart sounds.  Exam reveals no gallop and no friction rub.    No murmur heard.  Pulmonary/Chest: Effort normal and breath sounds normal. No respiratory distress. He has no wheezes. He exhibits no tenderness.   Coarse crackles bilaterally   Abdominal: Soft. Bowel sounds are normal. He exhibits no distension and no mass. There is no tenderness. There is no rebound and no guarding.   Musculoskeletal: Normal range of motion. He exhibits no edema or tenderness.   Lymphadenopathy:     He has no cervical adenopathy.   Neurological: He is alert and oriented to person, place, and time. No cranial nerve deficit. He exhibits normal muscle tone.   Skin: Skin is warm and dry. No rash noted. He is not diaphoretic. No erythema.   Psychiatric: He has a normal mood and affect. His behavior is normal. Judgment and thought content normal.   Nursing note and vitals reviewed.      Labs:        Invalid input(s): [id number]      Recent Labs      17   0243   SODIUM  141  141  141   POTASSIUM  4.3  4.5  4.1   CHLORIDE  93*  92*  92*   CO2  42*  43*  45*   BUN  28*  29*  33*   CREATININE  0.66  0.75  0.82   CALCIUM  9.2  9.7  9.7     Recent Labs      17   0243   GLUCOSE  99  108*  134*     Recent Labs      17   0243   RBC  4.03*  4.42*  4.41*   HEMOGLOBIN  12.9*  13.8*  14.0   HEMATOCRIT  41.0*  45.0  44.7   PLATELETCT  178  207  199     Recent Labs      17   0243   WBC  8.3  9.1  11.0*   NEUTSPOLYS  70.20  71.90  73.70*   LYMPHOCYTES  19.10*  17.00*  15.50*   MONOCYTES  8.60  8.60  8.70   EOSINOPHILS  1.10  1.50  1.40   BASOPHILS  0.50  0.70  0.40           Hemodynamics:  Temp (24hrs), Av.4 °C (97.6 °F), Min:36.2 °C (97.1 °F), Max:37.1 °C (98.7 °F)  Temperature: 36.4 °C (97.5 °F)  Pulse  Av.8   Min: 47  Max: 98   Blood Pressure : 115/60 mmHg     Respiratory:    Respiration: 18, Pulse Oximetry: 91 %, O2 Daily Delivery Respiratory : Silicone Nasal Cannula     Given By:: Mask, #MDI/DPI Given: MDI/DPI x 2, Given By:: Mouthpiece, Work Of Breathing / Effort: Mild  RUL Breath Sounds: Diminished, RML Breath Sounds: Diminished, RLL Breath Sounds: Diminished, DEBI Breath Sounds: Diminished, LLL Breath Sounds: Diminished  Fluids:    Intake/Output Summary (Last 24 hours) at 03/01/17 1636  Last data filed at 03/01/17 0600   Gross per 24 hour   Intake    740 ml   Output    800 ml   Net    -60 ml     Weight: 58.7 kg (129 lb 6.6 oz)  GI/Nutrition:  Orders Placed This Encounter   Procedures   • DIET ORDER     Standing Status: Standing      Number of Occurrences: 1      Standing Expiration Date:      Order Specific Question:  Diet:     Answer:  Diabetic [3]     Order Specific Question:  Diet:     Answer:  Cardiac [6]     Order Specific Question:  Texture/Fiber modifications:     Answer:  Dysphagia 1(Pureed)specify fluid consistency(question 6) [1]     Order Specific Question:  Consistency/Fluid modifications:     Answer:  Nectar Thick [2]     Medical Decision Making, by Problem:  Active Hospital Problems    Diagnosis   • Acute on chronic hypoxemic respiratory failure (CMS-HCC) [J96.90]  -improving  -on 4L of oxygen, wean as able to titrate PO2 of 92%  -left side opacification likely secondary to mucous plug which resolved with Chest PT  -Sputum culture revealed ESBL E Coli, continue Merrem, stop date 3/ 7  -Antibiotics per ID  -CXR improved  -DC Lasix     • Debility [R53.81]  -PT/OT  -encouraged feeding and supplements   • Multiple falls [R29.6]  -Reported from niece  -H/o pelvic fracture, recently at [facility] care for rehab  -fall precautions   • MRSA carrier [Z22.322]  -MRSA + nasal swab  -Antibiotics per ID   • Sepsis (CMS-HCC) [A41.9]  -2/2 LLL pneumonia HCAP and UTI with ESBL ecoli  -BCs negative x2  -continue Merrem with  stop date 3/27  -antibiotics per ID   • HCAP (healthcare-associated pneumonia) [J18.9]  -As above  -continue breathing treatments, mucomyst, PEP therapy, flutter valve, positional therapy, percussive therapy to help clear any mucous plugs  Review of last admission:  4/2016 s/p bronchoscopy with Dr. Phillips with no mass, thick mucus, cultures negative.   • Chronic respiratory failure with hypoxia (CMS-HCC) [J96.11]  Mild improvement in O2 requirement back to 4 LPM Nc, titrate as able  Baseline 2-3 LPM NC     • Oxygen dependent [Z99.81]  Baseline 2-3 LPM NC 24/7, currently on 4 LPM NC     • DM type 2, controlled, with complication (CMS-HCC) [E11.8]  SSI  accuchecks  Diabetic diet  Dysphagia 1 NTFL   • Left hemiparesis (CMS-HCC) [G81.94]  2/2 prior CVA     • Dysphagia status post cerebrovascular accident [I69.391]  -SLP rec dysphagia 1 NT diet.  -continue ST     • COPD (chronic obstructive pulmonary disease) (CMS-HCC) [J44.9]  -no evidence of acute exacerbation  -continue breathing treatments, RT protocol and inhalers   • GERD (gastroesophageal reflux disease) [K21.9]  -stable  -Continue home pepcid    Abnormal UA:  Wbc 100-150 wbc  UC with ESBL Ecoli  On Merrem days per ID.    Iron deficiency anemia:  -Replace iron daily with vit C.    Acute on chronic Diastolic heart failure:  -improving  -Home meds:  lasix 40  Bid and spironolactone  -CXR with increased vascular congestion on 2/25, now shows improvement  -switch lasix 40 IV to PO now  -Restarted spironolactone 25 daily.  -Repeat echo EF 60% with grade 2 DD severe tricuspid regurgitation.    PAF:  -Episode 2/27 a.m. After albuterol neb treatment  -Gave metoprolol 5mg IV x 1 returned to NSR  -dc'd albuterol neb treatment since no wheezes on exam since admission  -Change to xopenex prn  -Increased metoprolol 25 to 50 bid     FC per niece:  Code reviewed by admitting physician.  PT/OT/SNF orders placed.  Family does not want to go back to [facility].    Disposition:  Pending due to acute respiratory distress, but patient has expressed desire to go to group home if feasible. Will get SW to assist.        Loya catheter: No Loya      DVT Prophylaxis: Heparin    Ulcer prophylaxis: Yes  Antibiotics: Treating active infection/contamination beyond 24 hours perioperative coverage  Assessed for rehab: Patient was assess for and/or received rehabilitation services during this hospitalization

## 2017-03-02 NOTE — DISCHARGE PLANNING
Medical Social Work    Update: After chart review, it looks like pt is on IV Merrem and will be for another week. Pt had wanted to go to group home. We will see if this antibiotic can be given through home infusion with home health at a group home.

## 2017-03-02 NOTE — PROGRESS NOTES
Pt level of consciousness has increased and he is sating at 97% on 4L. Wakes up on verbal que. Fall precautions in place, call light within reach, hourly rounding in progress.

## 2017-03-02 NOTE — FLOWSHEET NOTE
17 1112   Events/Summary/Plan   Events/Summary/Plan SVNx2, Flutter and IPV   Non-Invasive Resp Device Site Inspection Completed Intact   Interdisciplinary Plan of Care-Goals (Indications)   Obstructive Ventilatory Defect or Pulmonary Disease without Obvious Obstruction History / Diagnosis   Bronchopulmonary Hygiene Indications Difficulty with Secretion Clearance   Hyperinflation Protocol Indications Restrictive Lung Disorder / Consolidation   Interdisciplinary Plan of Care-Outcomes    Bronchodilator Outcome Patient at Stable Baseline   Bronchopulmonary Hygiene Outcome Resolution / Improvement in Recent Chest X-Ray   Hyperinflation Protocol Goals/Outcome Stable Vital Capacity x24 hrs and Patient Understands / uses I.S.   Education   Education Yes - Pt. / Family has been Instructed in use of Respiratory Equipment;Yes - Pt. / Family has been Instructed in use of Respiratory Medications and Adverse Reactions   RT Assessment of Delivered Medications   Evaluation of Medication Delivery Daily Yes-- Pt /Family has been Instructed in use of Respiratory Medications and Adverse Reactions   SVN Group   #SVN Performed Yes   Given By: Mask   PEP/CPT Group   PEP/CPT/Airway Clearance Therapy Yes   #PEP/CPT (Manual) Subsequent Subsequent   #CPT (Mechanical) Yes   PEP/CPT Method Positive Airway Pressure Device;Flutter Valve   CPT Settings Yes   Pressure 10   IPV Group   #IPV Performed Yes   Operational Pressure 25 psig ()   Given By: Mouthpiece   All Arrows Up or 12 O'Clock to Start Yes   Frequency 3 O'Clock Position;9 O'Clock Position;12 O'Clock Position;Other interval (see comment)   Inspiratory Flow Adjusted to achieve chest wiggle  (pt is to weak and soar to perfrom IPV for 15, added AccuPEP)   Incentive Spirometry Group   Incentive Spirometry Instruction Yes   Breathing Exercises Yes   Incentive Spirometer Volume 500 mL   Chest Exam   Work Of Breathing / Effort Mild   Respiration 18   Pulse (!) 56   Heart Rate  (Monitored) (!) 56   Breath Sounds   Pre/Post Intervention Pre Intervention Assessment   RUL Breath Sounds Clear   RML Breath Sounds Diminished   RLL Breath Sounds Diminished   DEBI Breath Sounds Clear   LLL Breath Sounds Diminished   Oximetry   Continuous Oximetry Yes   O2 Alarms Set & Reviewed Yes   Oxygen   Pulse Oximetry 93 %   O2 (LPM) 4   O2 Daily Delivery Respiratory  Silicone Nasal Cannula

## 2017-03-02 NOTE — PROGRESS NOTES
Bedside report received from day RN. Assumed pt care. Pt resting comfortably in bed. Denies any needs at this time. Bed alarm on. Bed locked and in lowest position. Call light within reach. Will continue to assess and provide care.

## 2017-03-02 NOTE — PROGRESS NOTES
Report received at bedside from RN . Patient A & O x 3, not orientated to time. Patient resting in bed. No acute distress.   Patient complains of pain 8/10 generalized all over. Medicated (see MAR).   No SOB/dyspnea noted. No cough noted.   Abdomen , nondistended, nontender. + bowel sounds in all quadrants. Patient passing flatus. No S/S bleeding noted.  Condom catheter observed. Skin: Red, excoriated and blanching on sacral area. Barrier cream applied. Activity: Assist of one or more. Isolation maintained: MRSA droplet.   Fall and safety precautions maintained.Hourly rounding in progress. Call light within reach.

## 2017-03-02 NOTE — PROGRESS NOTES
Pt had displaced nasal cannula and O2 sat was 86%. Patient was difficult to arose and would only respond in grunts to sternal rub and loud voice. Placed patient back on O2 and bumped him up to 5 L from his previous 4L. Patient had increase O2 level now sating at 97% and is more easily aroused. Still confused and slightly lethargic. Will monitor closely.

## 2017-03-02 NOTE — FLOWSHEET NOTE
03/02/17 1519   Events/Summary/Plan   Events/Summary/Plan SVNx2, PEP and Flutter.  Pt is to lethargtic to perform IPV properly    Non-Invasive Resp Device Site Inspection Completed Intact   Interdisciplinary Plan of Care-Goals (Indications)   Obstructive Ventilatory Defect or Pulmonary Disease without Obvious Obstruction History / Diagnosis   Bronchopulmonary Hygiene Indications Difficulty with Secretion Clearance   Hyperinflation Protocol Indications Restrictive Lung Disorder / Consolidation   Interdisciplinary Plan of Care-Outcomes    Bronchodilator Outcome Patient at Stable Baseline   Bronchopulmonary Hygiene Outcome Resolution / Improvement in Recent Chest X-Ray   Hyperinflation Protocol Goals/Outcome Stable Vital Capacity x24 hrs and Patient Understands / uses I.S.   Education   Education Yes - Pt. / Family has been Instructed in use of Respiratory Equipment;Yes - Pt. / Family has been Instructed in use of Respiratory Medications and Adverse Reactions   Therapy Not Performed   Type of Therapy Not Performed IPV   RT Assessment of Delivered Medications   Evaluation of Medication Delivery Daily Yes-- Pt /Family has been Instructed in use of Respiratory Medications and Adverse Reactions   SVN Group   #SVN Performed Yes   Given By: Mask   PEP/CPT Group   #PEP/CPT (Manual) Subsequent Subsequent   #CPT (Mechanical) Yes   PEP/CPT Method Positive Airway Pressure Device   CPT Settings Yes   Pressure 10   Date Disposable Equipment Last Changed 02/21/17   Date Disposable Equipment Next Change Due (Q 30 Days) 03/23/17   Incentive Spirometry Group   Incentive Spirometry Instruction Yes   Breathing Exercises Yes   Incentive Spirometer Volume 500 mL   Chest Exam   Work Of Breathing / Effort Mild   Respiration 18   Pulse (!) 54   Heart Rate (Monitored) (!) 54   Breath Sounds   Pre/Post Intervention Pre Intervention Assessment   RUL Breath Sounds Clear   RML Breath Sounds Diminished   RLL Breath Sounds Diminished   DEBI Breath  Sounds Clear   LLL Breath Sounds Diminished   Secretions   Cough Weak;Productive;Swallowed   How Sputum Obtained Spontaneous;Cough on Request   Oximetry   Continuous Oximetry Yes   Oxygen   Pulse Oximetry 97 %   O2 (LPM) 4   O2 Daily Delivery Respiratory  Silicone Nasal Cannula

## 2017-03-02 NOTE — FLOWSHEET NOTE
03/02/17 0651   Events/Summary/Plan   Events/Summary/Plan SVNx2, IPV, Flutter    Non-Invasive Resp Device Site Inspection Completed Intact   Interdisciplinary Plan of Care-Goals (Indications)   Obstructive Ventilatory Defect or Pulmonary Disease without Obvious Obstruction History / Diagnosis   Bronchopulmonary Hygiene Indications Difficulty with Secretion Clearance   Hyperinflation Protocol Indications Restrictive Lung Disorder / Consolidation   Interdisciplinary Plan of Care-Outcomes    Bronchodilator Outcome Patient at Stable Baseline   Bronchopulmonary Hygiene Outcome Resolution / Improvement in Recent Chest X-Ray   Hyperinflation Protocol Goals/Outcome Stable Vital Capacity x24 hrs and Patient Understands / uses I.S.   Education   Education Yes - Pt. / Family has been Instructed in use of Respiratory Equipment;Yes - Pt. / Family has been Instructed in use of Respiratory Medications and Adverse Reactions   RT Assessment of Delivered Medications   Evaluation of Medication Delivery Daily Yes-- Pt /Family has been Instructed in use of Respiratory Medications and Adverse Reactions   SVN Group   #SVN Performed Yes   Given By: Mask   Date SVN Last Changed 02/28/17   Date SVN Next Change Due (Q 7 Days) 03/07/17   IPV Group   #IPV Performed Yes   Operational Pressure 35-40 psig (Adult)   Given By: Mouthpiece   All Arrows Up or 12 O'Clock to Start Yes   Frequency 3 O'Clock Position;9 O'Clock Position;12 O'Clock Position;Other interval (see comment)  (pt could only tolerate about 5 minutes(pain and exhaustion))   Inspiratory Flow Adjusted to achieve chest wiggle   Date Circuit Last Changed 02/28/17   Date Circuit Next Change Due (Q 30 Days) 03/30/17   Incentive Spirometry Group   Incentive Spirometry Instruction Yes   Breathing Exercises Yes   Incentive Spirometer Volume 500 mL   Incentive Spirometer Date Last Changed 02/26/17   Incentive Spirometer Next Change Date (Q 30 Days) 03/28/17   Respiratory WDL   Respiratory  (WDL) X   Chest Exam   Work Of Breathing / Effort Mild   Respiration 20   Pulse (!) 56   Heart Rate (Monitored) (!) 3   Breath Sounds   Pre/Post Intervention Pre Intervention Assessment   RUL Breath Sounds Clear   RML Breath Sounds Diminished   RLL Breath Sounds Diminished   DEBI Breath Sounds Clear   LLL Breath Sounds Diminished   Secretions   Cough Non Productive;Weak   Oximetry   Continuous Oximetry Yes   O2 Alarms Set & Reviewed Yes   Oxygen   Pulse Oximetry 95 %   O2 (LPM) 4   O2 Daily Delivery Respiratory  Silicone Nasal Cannula

## 2017-03-02 NOTE — DISCHARGE PLANNING
In rounds this morning it was discussed that the patient will only need the meropenem through 3/7 and that we will plan on keeping the patient here for the next 4 days, discharge on the 5th day.  Between now and then we need to either send the patient to a SNF or send him to a group home, which has not been determined.

## 2017-03-03 ENCOUNTER — APPOINTMENT (OUTPATIENT)
Dept: RADIOLOGY | Facility: MEDICAL CENTER | Age: 82
DRG: 871 | End: 2017-03-03
Attending: INTERNAL MEDICINE
Payer: MEDICARE

## 2017-03-03 LAB
ANION GAP SERPL CALC-SCNC: 5 MMOL/L (ref 0–11.9)
BASOPHILS # BLD AUTO: 0.5 % (ref 0–1.8)
BASOPHILS # BLD: 0.04 K/UL (ref 0–0.12)
BUN SERPL-MCNC: 24 MG/DL (ref 8–22)
CALCIUM SERPL-MCNC: 9.5 MG/DL (ref 8.5–10.5)
CHLORIDE SERPL-SCNC: 101 MMOL/L (ref 96–112)
CO2 SERPL-SCNC: 34 MMOL/L (ref 20–33)
CREAT SERPL-MCNC: 0.64 MG/DL (ref 0.5–1.4)
EOSINOPHIL # BLD AUTO: 0.18 K/UL (ref 0–0.51)
EOSINOPHIL NFR BLD: 2.2 % (ref 0–6.9)
ERYTHROCYTE [DISTWIDTH] IN BLOOD BY AUTOMATED COUNT: 52.5 FL (ref 35.9–50)
GFR SERPL CREATININE-BSD FRML MDRD: >60 ML/MIN/1.73 M 2
GLUCOSE BLD-MCNC: 105 MG/DL (ref 65–99)
GLUCOSE BLD-MCNC: 112 MG/DL (ref 65–99)
GLUCOSE BLD-MCNC: 123 MG/DL (ref 65–99)
GLUCOSE BLD-MCNC: 141 MG/DL (ref 65–99)
GLUCOSE SERPL-MCNC: 117 MG/DL (ref 65–99)
HCT VFR BLD AUTO: 45.7 % (ref 42–52)
HGB BLD-MCNC: 13.9 G/DL (ref 14–18)
IMM GRANULOCYTES # BLD AUTO: 0.03 K/UL (ref 0–0.11)
IMM GRANULOCYTES NFR BLD AUTO: 0.4 % (ref 0–0.9)
LYMPHOCYTES # BLD AUTO: 1.75 K/UL (ref 1–4.8)
LYMPHOCYTES NFR BLD: 21.5 % (ref 22–41)
MCH RBC QN AUTO: 31.3 PG (ref 27–33)
MCHC RBC AUTO-ENTMCNC: 30.4 G/DL (ref 33.7–35.3)
MCV RBC AUTO: 102.9 FL (ref 81.4–97.8)
MONOCYTES # BLD AUTO: 0.77 K/UL (ref 0–0.85)
MONOCYTES NFR BLD AUTO: 9.4 % (ref 0–13.4)
NEUTROPHILS # BLD AUTO: 5.38 K/UL (ref 1.82–7.42)
NEUTROPHILS NFR BLD: 66 % (ref 44–72)
NRBC # BLD AUTO: 0 K/UL
NRBC BLD AUTO-RTO: 0 /100 WBC
PLATELET # BLD AUTO: 177 K/UL (ref 164–446)
PMV BLD AUTO: 11 FL (ref 9–12.9)
POTASSIUM SERPL-SCNC: 4.3 MMOL/L (ref 3.6–5.5)
RBC # BLD AUTO: 4.44 M/UL (ref 4.7–6.1)
SODIUM SERPL-SCNC: 140 MMOL/L (ref 135–145)
WBC # BLD AUTO: 8.2 K/UL (ref 4.8–10.8)

## 2017-03-03 PROCEDURE — 700111 HCHG RX REV CODE 636 W/ 250 OVERRIDE (IP): Performed by: INTERNAL MEDICINE

## 2017-03-03 PROCEDURE — 700101 HCHG RX REV CODE 250: Performed by: INTERNAL MEDICINE

## 2017-03-03 PROCEDURE — 700102 HCHG RX REV CODE 250 W/ 637 OVERRIDE(OP): Performed by: INTERNAL MEDICINE

## 2017-03-03 PROCEDURE — 99232 SBSQ HOSP IP/OBS MODERATE 35: CPT | Performed by: HOSPITALIST

## 2017-03-03 PROCEDURE — 700105 HCHG RX REV CODE 258: Performed by: INTERNAL MEDICINE

## 2017-03-03 PROCEDURE — 99233 SBSQ HOSP IP/OBS HIGH 50: CPT | Performed by: INTERNAL MEDICINE

## 2017-03-03 PROCEDURE — 94668 MNPJ CHEST WALL SBSQ: CPT

## 2017-03-03 PROCEDURE — A9270 NON-COVERED ITEM OR SERVICE: HCPCS | Performed by: HOSPITALIST

## 2017-03-03 PROCEDURE — 306558 VEST RESTRAINT (MEDIUM): Performed by: HOSPITALIST

## 2017-03-03 PROCEDURE — 71010 DX-CHEST-PORTABLE (1 VIEW): CPT

## 2017-03-03 PROCEDURE — 80048 BASIC METABOLIC PNL TOTAL CA: CPT

## 2017-03-03 PROCEDURE — 92526 ORAL FUNCTION THERAPY: CPT

## 2017-03-03 PROCEDURE — 700112 HCHG RX REV CODE 229: Performed by: HOSPITALIST

## 2017-03-03 PROCEDURE — 85025 COMPLETE CBC W/AUTO DIFF WBC: CPT

## 2017-03-03 PROCEDURE — A9270 NON-COVERED ITEM OR SERVICE: HCPCS | Performed by: INTERNAL MEDICINE

## 2017-03-03 PROCEDURE — 94640 AIRWAY INHALATION TREATMENT: CPT

## 2017-03-03 PROCEDURE — 82962 GLUCOSE BLOOD TEST: CPT

## 2017-03-03 PROCEDURE — 770021 HCHG ROOM/CARE - ISO PRIVATE

## 2017-03-03 PROCEDURE — 700102 HCHG RX REV CODE 250 W/ 637 OVERRIDE(OP): Performed by: HOSPITALIST

## 2017-03-03 RX ADMIN — LEVALBUTEROL HYDROCHLORIDE 1.25 MG: 1.25 SOLUTION RESPIRATORY (INHALATION) at 11:01

## 2017-03-03 RX ADMIN — TAMSULOSIN HYDROCHLORIDE 0.4 MG: 0.4 CAPSULE ORAL at 07:52

## 2017-03-03 RX ADMIN — HEPARIN SODIUM 5000 UNITS: 5000 INJECTION, SOLUTION INTRAVENOUS; SUBCUTANEOUS at 16:13

## 2017-03-03 RX ADMIN — MEROPENEM 500 MG: 500 INJECTION, POWDER, FOR SOLUTION INTRAVENOUS at 01:01

## 2017-03-03 RX ADMIN — MEROPENEM 500 MG: 500 INJECTION, POWDER, FOR SOLUTION INTRAVENOUS at 11:14

## 2017-03-03 RX ADMIN — ACETYLCYSTEINE 3 ML: 200 SOLUTION ORAL; RESPIRATORY (INHALATION) at 00:12

## 2017-03-03 RX ADMIN — Medication 325 MG: at 09:24

## 2017-03-03 RX ADMIN — LEVALBUTEROL HYDROCHLORIDE 1.25 MG: 1.25 SOLUTION RESPIRATORY (INHALATION) at 00:13

## 2017-03-03 RX ADMIN — DOCUSATE SODIUM 100 MG: 100 CAPSULE ORAL at 08:06

## 2017-03-03 RX ADMIN — LEVALBUTEROL HYDROCHLORIDE 1.25 MG: 1.25 SOLUTION RESPIRATORY (INHALATION) at 07:34

## 2017-03-03 RX ADMIN — HEPARIN SODIUM 5000 UNITS: 5000 INJECTION, SOLUTION INTRAVENOUS; SUBCUTANEOUS at 21:06

## 2017-03-03 RX ADMIN — LEVETIRACETAM 1500 MG: 500 TABLET, FILM COATED ORAL at 07:52

## 2017-03-03 RX ADMIN — SPIRONOLACTONE 25 MG: 25 TABLET, FILM COATED ORAL at 08:05

## 2017-03-03 RX ADMIN — STANDARDIZED SENNA CONCENTRATE AND DOCUSATE SODIUM 1 TABLET: 8.6; 5 TABLET, FILM COATED ORAL at 21:07

## 2017-03-03 RX ADMIN — BUDESONIDE AND FORMOTEROL FUMARATE DIHYDRATE 2 PUFF: 160; 4.5 AEROSOL RESPIRATORY (INHALATION) at 09:00

## 2017-03-03 RX ADMIN — FAMOTIDINE 20 MG: 20 TABLET, FILM COATED ORAL at 08:05

## 2017-03-03 RX ADMIN — GABAPENTIN 300 MG: 300 CAPSULE ORAL at 09:57

## 2017-03-03 RX ADMIN — FINASTERIDE 5 MG: 5 TABLET, FILM COATED ORAL at 07:52

## 2017-03-03 RX ADMIN — DOCUSATE SODIUM 100 MG: 100 CAPSULE ORAL at 21:00

## 2017-03-03 RX ADMIN — MEROPENEM 500 MG: 500 INJECTION, POWDER, FOR SOLUTION INTRAVENOUS at 05:41

## 2017-03-03 RX ADMIN — METOPROLOL TARTRATE 50 MG: 50 TABLET, FILM COATED ORAL at 21:08

## 2017-03-03 RX ADMIN — LEVALBUTEROL HYDROCHLORIDE 1.25 MG: 1.25 SOLUTION RESPIRATORY (INHALATION) at 19:00

## 2017-03-03 RX ADMIN — LACTOBACILLUS ACIDOPHILUS / LACTOBACILLUS BULGARICUS 1 PACKET: 100 MILLION CFU STRENGTH GRANULES at 11:13

## 2017-03-03 RX ADMIN — METOPROLOL TARTRATE 50 MG: 50 TABLET, FILM COATED ORAL at 07:52

## 2017-03-03 RX ADMIN — LEVETIRACETAM 1500 MG: 500 TABLET, FILM COATED ORAL at 21:06

## 2017-03-03 RX ADMIN — LEVALBUTEROL HYDROCHLORIDE 1.25 MG: 1.25 SOLUTION RESPIRATORY (INHALATION) at 14:58

## 2017-03-03 RX ADMIN — TIOTROPIUM BROMIDE 1 CAPSULE: 18 CAPSULE ORAL; RESPIRATORY (INHALATION) at 09:57

## 2017-03-03 RX ADMIN — LACTOBACILLUS ACIDOPHILUS / LACTOBACILLUS BULGARICUS 1 PACKET: 100 MILLION CFU STRENGTH GRANULES at 17:30

## 2017-03-03 RX ADMIN — HEPARIN SODIUM 5000 UNITS: 5000 INJECTION, SOLUTION INTRAVENOUS; SUBCUTANEOUS at 05:41

## 2017-03-03 RX ADMIN — MEROPENEM 500 MG: 500 INJECTION, POWDER, FOR SOLUTION INTRAVENOUS at 17:50

## 2017-03-03 RX ADMIN — ATORVASTATIN CALCIUM 80 MG: 80 TABLET, FILM COATED ORAL at 21:06

## 2017-03-03 RX ADMIN — BUDESONIDE AND FORMOTEROL FUMARATE DIHYDRATE 2 PUFF: 160; 4.5 AEROSOL RESPIRATORY (INHALATION) at 21:00

## 2017-03-03 RX ADMIN — MEROPENEM 500 MG: 500 INJECTION, POWDER, FOR SOLUTION INTRAVENOUS at 23:52

## 2017-03-03 RX ADMIN — ASPIRIN 81 MG: 81 TABLET ORAL at 07:52

## 2017-03-03 RX ADMIN — SERTRALINE 100 MG: 100 TABLET, FILM COATED ORAL at 10:06

## 2017-03-03 RX ADMIN — LACTOBACILLUS ACIDOPHILUS / LACTOBACILLUS BULGARICUS 1 PACKET: 100 MILLION CFU STRENGTH GRANULES at 07:52

## 2017-03-03 RX ADMIN — OXYBUTYNIN CHLORIDE 5 MG: 5 TABLET, FILM COATED, EXTENDED RELEASE ORAL at 09:24

## 2017-03-03 RX ADMIN — FAMOTIDINE 20 MG: 20 TABLET, FILM COATED ORAL at 21:00

## 2017-03-03 RX ADMIN — OXYCODONE HYDROCHLORIDE AND ACETAMINOPHEN 500 MG: 500 TABLET ORAL at 08:05

## 2017-03-03 RX ADMIN — ACETYLCYSTEINE 4 ML: 200 SOLUTION ORAL; RESPIRATORY (INHALATION) at 07:35

## 2017-03-03 ASSESSMENT — ENCOUNTER SYMPTOMS
NAUSEA: 0
ABDOMINAL PAIN: 0
COUGH: 1
HEADACHES: 0
VOMITING: 0
FEVER: 0

## 2017-03-03 ASSESSMENT — PAIN SCALES - GENERAL
PAINLEVEL_OUTOF10: 0

## 2017-03-03 NOTE — THERAPY
"Speech Language Therapy dysphagia treatment completed.   Functional Status:  Patient seen for dysphagia therapy on this date. Patient was pleasant and agreeable. Patient consumed 90% of Dysphagia 1/nectars meal tray. Patient presented with prolonged mastication (>15-30 seconds) and delayed initiation of swallow trigger (>15 sec). Lower dentures placed mid-meal by SLP, uppers not at bedside. Patient had oral residue of puree on lingual surface x1, which was cleared with cues to NTL wash and hard swallow. Patient had cough/clear x2 during 2oz NTL and wet vocal quality x2 tsp of puree, which was cleared with directives to cough/clear. Patient followed directives to take small sips/bites, with no further s/sx of aspiration.   Recommendations: Continue Dysphagia 1/Nectars with cues to swallowing strategies. SLP following.  Plan of Care: Will benefit from Speech Therapy 3 times per week.     See \"Rehab Therapy-Acute\" Patient Summary Report for complete documentation.     "

## 2017-03-03 NOTE — PROGRESS NOTES
"2 RN Skin check done on the pt.     Noted scabs on L anterior portion of the leg, scabs on L hand area, dry no drainage.  Hard lump noted on anterior of R leg, pt. States \"I've had that for a long time\"  Red spots/dots noted all over pt. Chest area  Back of the ears are a little red with the oxygen cannula, padded the cannula for the back of the ears.   Sacral area is red, excoriated but blanching.   "

## 2017-03-03 NOTE — THERAPY
SLP following for dysphagia tx prior to transfer to ICU for worsening resp function. Will collaborate with medical team and resume tx as medically appropriate.

## 2017-03-03 NOTE — PROGRESS NOTES
Received pt. From [facility], pt. Is awake with A&Ox2 disoriented to time and place, pt. Is on fall precautions , bed alarm is on. Pt. Has dentures on his mandibular area. Pt. Has intact condom cath. PIV on saline lock. Assisted in feeding. Needs attended.

## 2017-03-03 NOTE — PROGRESS NOTES
Hospital Medicine Progress Note, Adult, Complex               Author: Nigel Blount Date & Time created: 3/3/2017  11:12 AM     Interval History:    81 year old male with a PMHx of Stroke with left sided hemiparesis, HTN, COPD on 3 L of oxygen, asthma, multiple falls and pelvic fracture presented from [facility] care SNF,presented on 2/20 with shortness of breath and cough and found to have left sided opacification started on vanco and zosyn for HCAP.  H/o aspiration, on dysphagia 1 diet per SLP.      2/21:  Had rapid last night, opens eyes to name only.  Remains full code per family.  CTA showing LLL pneumonia/opacification with hiatal hernia seen.  U/s chest did not show enough pleural effusion for IR to drain.  Nasal swab + MRSA, confused.  2/22:  Improved mentation today, sitting up, conversing appropriately after 1 dose of meropenem IV.  ID consulted for sputum + Ecoli ESBL, left lung white out.  2/23:  Continues to improve on Abx.  Mentation clear, sitting up in chair, appropriate, states does not remember first few days.  2/24:  Lungs improving with increased breath sounds on left.  Mentation remains clear.  States he could not control his urine.  Explained had UTI same as in lung.  2/25:  Worsening NEHEMIAS, JVD noted on exam, increased CO2 38 to 40.  Ordered repeat CXR with pulmonary edema noted.  Restarted Home meds, previously on lasix 40 bid and spironolactone 25 daily.  Prior echo 2014 EF 65% grade 1 dd, ordered repeat echo.  2/26:  Severe SOB since last night, acute respiratory distress with O2 up to 9 LPM VM, clearer mentation after IV lasix,O2 back to 4 LPM NC this a.m..   CXR with worsening left lung complete white out.  D/w Dr. Brunner.  For bronch tomorrow.    2/28 Patient is sitting up in bed in no acute distress. Alert and oriented x3. States his breathing has improved. Currently on 4L of oxygen. Continue RT protocol with PEP therapy, flutter valve, bronchodilators with mucomyst. Bronch has been  "defered at this time per Pulm. Continue Abx, stop date 3/7/17 per ID. continue diuresis with strict intake and output.    3/1 Patient states his breathing is improving. Currently on 4 L of oxygen which is near his baseline. He continues to have a weak cough with inability to clear secretions, continue pulmonary hygiene. His CO2 has been increasing up to 45 and has been started on diamox per Pulm, appreciate input.     3/2 Patient appears lethargic. Stat ABG ordered shows pH 7.35, PCO2 70.9 PO2 103. His lethargy his likely due to hypercapnia. He has been saturating 99% on 4L, recommended titrating down oxygen to an oxygen saturation of 92%. I am starting him on Bipap and will transfer him to ICU. I have discussed the case with  from Pulmonology who agrees with the plan.    3/3 pt is awake alert and oriented.  ROS limited as he is unhappy about being in the hospital and will not cooperate.  States he feels \"fine\".  Stable off BiPAP overnigh    Review of Systems:  Review of Systems   Unable to perform ROS: other       Physical Exam:  Physical Exam   Constitutional: He is oriented to person, place, and time. He appears well-developed and well-nourished. No distress.   HENT:   Head: Normocephalic and atraumatic.   Eyes: Conjunctivae are normal.   Neck: No JVD present.   Cardiovascular: Normal rate.  Exam reveals no gallop.    Murmur heard.  Pulmonary/Chest: Effort normal. No stridor. No respiratory distress. He has no wheezes. He has rales.   Abdominal: Soft. There is no tenderness. There is no rebound and no guarding.   Musculoskeletal: He exhibits no edema.   Neurological: He is oriented to person, place, and time.   Skin: Skin is warm and dry. No rash noted. He is not diaphoretic.   Psychiatric: He has a normal mood and affect. Thought content normal.   Nursing note and vitals reviewed.      Labs:  Recent Labs      03/02/17   1634   [id number]  7.35*   [id number]  70.9*   [id number]  103.0*   [id number]  97.3 "   ARTHCO3  38*   ARTBE  9*         Recent Labs      17   0243  17   0242  17   0440   SODIUM  141  140  140   POTASSIUM  4.1  4.3  4.3   CHLORIDE  92*  96  101   CO2  45*  38*  34*   BUN  33*  28*  24*   CREATININE  0.82  0.66  0.64   CALCIUM  9.7  9.7  9.5     Recent Labs      17   0243  03/02/17   0242  17   0440   GLUCOSE  134*  116*  117*     Recent Labs      17   0243  03/02/17   0242  17   0440   RBC  4.41*  4.52*  4.44*   HEMOGLOBIN  14.0  14.1  13.9*   HEMATOCRIT  44.7  46.0  45.7   PLATELETCT  199  196  177     Recent Labs      17   02417   0440   WBC  11.0*  10.7  8.2   NEUTSPOLYS  73.70*  69.30  66.00   LYMPHOCYTES  15.50*  18.80*  21.50*   MONOCYTES  8.70  9.00  9.40   EOSINOPHILS  1.40  2.10  2.20   BASOPHILS  0.40  0.50  0.50           Hemodynamics:  Temp (24hrs), Av.4 °C (97.5 °F), Min:35.8 °C (96.5 °F), Max:36.9 °C (98.5 °F)  Temperature: 36.7 °C (98 °F)  Pulse  Av.8  Min: 44  Max: 98Heart Rate (Monitored): (!) 54  Blood Pressure : 105/53 mmHg     Respiratory:    Respiration: (!) 26, Pulse Oximetry: 95 %, O2 Daily Delivery Respiratory : Silicone Nasal Cannula     Given By:: Mask, PEP/CPT Method: Positive Airway Pressure Device, Work Of Breathing / Effort: Mild  RUL Breath Sounds: Crackles, RML Breath Sounds: Crackles, RLL Breath Sounds: Diminished, DEBI Breath Sounds: Diminished, LLL Breath Sounds: Diminished  Fluids:    Intake/Output Summary (Last 24 hours) at 17 1112  Last data filed at 17 1100   Gross per 24 hour   Intake    900 ml   Output   1350 ml   Net   -450 ml     Weight: 55.8 kg (123 lb 0.3 oz)  GI/Nutrition:  Orders Placed This Encounter   Procedures   • DIET ORDER     Standing Status: Standing      Number of Occurrences: 1      Standing Expiration Date:      Order Specific Question:  Diet:     Answer:  Diabetic [3]     Order Specific Question:  Diet:     Answer:  Cardiac [6]     Order Specific  Question:  Texture/Fiber modifications:     Answer:  Dysphagia 1(Pureed)specify fluid consistency(question 6) [1]     Order Specific Question:  Consistency/Fluid modifications:     Answer:  Nectar Thick [2]     Medical Decision Making, by Problem:  Active Hospital Problems    Diagnosis   • Respiratory failure (CMS-HCC) [J96.90]  Hypoxic/hypercarbic  Pt is likely a chronic retainer  mentating clearly off BiPAP   • Debility [R53.81]  PT consult   • Hypercapnic respiratory failure (CMS-HCC) [J96.92]  Pulmonology following  diamox  Goal sat 88-90%  mentating clearly at this time   • Multiple falls [R29.6]   • MRSA carrier [Z22.322]   • Sepsis (CMS-HCC) [A41.9]  ID following  Meropenem   • HCAP (healthcare-associated pneumonia) [J18.9]  As above   • Chronic respiratory failure with hypoxia (CMS-HCC) [J96.11]   • Oxygen dependent [Z99.81]   • DM type 2, controlled, with complication (CMS-HCC) [E11.8]  SSI  Diabetic diet   • Left hemiparesis (CMS-HCC) [G81.94]  Chronic from recent CVA   • Dysphagia status post cerebrovascular accident [I69.391]   • COPD (chronic obstructive pulmonary disease) (CMS-HCC) [J44.9]   • GERD (gastroesophageal reflux disease) [K21.9]     OK to return to floor    Medications reviewed, EKG reviewed, Labs reviewed and Radiology images reviewed        DVT Prophylaxis: Heparin  DVT prophylaxis - mechanical: SCDs  Ulcer prophylaxis: Yes  Antibiotics: Treating active infection/contamination beyond 24 hours perioperative coverage

## 2017-03-03 NOTE — CONSULTS
DATE OF SERVICE:  03/02/2017    REQUESTING PHYSICIAN:  Dr. Humphrey Treviño    REASON FOR REQUEST:  Increasing lethargy and concern for respiratory failure.    HISTORY OF PRESENT ILLNESS:  This is an 81-year-old gentleman with multiple   comorbidities including COPD, hypertension, pulmonary hypertension, seizure   disorder, gastroesophageal reflux disease, history of cerebrovascular accident   with residual left-sided deficits, depression, dyslipidemia, chronic pain,   recurrent pneumonia and recent pelvic fracture, for which he was then placed   in a skilled nursing facility and just discharged approximately 3 days prior   to presentation here at [facility].  The patient was admitted on   2/20/2017 with increasing cough, shortness of breath and fevers, identified of   having a near whiteout of left lung on chest x-ray and underlying   healthcare-acquired pneumonia.  Patient has been on the telemetry floor   getting antibiotic as well as respiratory and pulmonary toilet.  The patient   was identified as having MRSA in the nares as well as ESBL E. coli in urine as   well as in sputum cultures and has been on management for this.  His   breathing had improved.  He has been on anywhere from 1-4 liters nasal   cannula; however, his underlying mental status has been somewhat somnolent   throughout.  It was noted that today he had increasing lethargy and remained   only on 1-2 liters nasal cannula; however, ABG had shown a pH of 7.35, pCO2 of   70s and the patient was transferred to ICU for higher level of care as well   as consideration of BiPAP therapy.    At present, the patient does follow commands.  He opens eyes to commands.  He   is able to tell me where he is at and what year it is and why he is here.  He   is a little bit somnolent, but easily aroused to verbal stimuli.  He is not in   any respiratory distress.  He is satting 98% on 3 liters.  He denies any   headache, nausea, vomiting, chest pain, shortness of  breath, cough, sputum   production, nausea, vomiting, diarrhea or lower extremity edema at this   present time.  He does feel slightly tired, slightly groggy, but otherwise   near baseline.    ALLERGIES:  No known drug allergies.    MEDICATIONS:  Per MAR and Epic.    FAMILY HISTORY:  Positive for cerebrovascular accident.    SOCIAL HISTORY:  The patient had 120-pack-year history of tobacco use, quit in   2003.  Denies any alcohol or drugs.  Just recently left a skilled nursing   facility approximately 3 days prior to arrival here.    PAST MEDICAL HISTORY:  As mentioned in the HPI.    REVIEW OF SYSTEMS:  Otherwise negative according to AMA/CMS criteria.    PHYSICAL EXAMINATION:  VITAL SIGNS:  Temperature 35.8, pulse rate 55, respiratory rate 18, blood   pressure 112/63, satting 99% on 4 liters.  GENERAL:  No acute distress, mildly somnolent, but arouses quite easily,   speaking full sentences.  HEENT:  Normocephalic, atraumatic.  Temporal wasting.  Dry mucosal membranes.  NECK:  Supple.  CARDIOVASCULAR:  Dylon rate, regular rhythm.  RESPIRATORY:  Clear to auscultation bilaterally.  No wheezes, rales or   rhonchi.  Central airways also sound very clear.  ABDOMEN:  Soft, nontender and nondistended.  Positive bowel sounds.  EXTREMITIES:  Without any edema.  NEUROLOGIC:  Patient is slightly weaker on the left side than the right.    Otherwise, he is alert and oriented, mildly somnolent.  PSYCHIATRIC:  Normal affect and behavior.    RESULTS:  White count only remarkable for an MCV of 101.  Chem panel only   remarkable for a bicarbonate of 38, glucose 116.  ABG at 4:35 showed a pH of   7.35 with a pCO2 of 70, pO2 of 103, bicarbonate 38 with 97% saturation   compared to blood gas on February 20th where the patient had a pH of 7.36,   pCO2 of 63, pO2 of 94, and saturation of 96%, both of which had known   underlying FIO2.  Microbiology shows positive nasal swab for MRSA.  Urine   culture positive for ESBL E. coli and sputum  culture similarly positive for   ESBL E. coli.    IMAGING:  Chest x-ray presently pending.  Echocardiogram dated February 25th   shows EF of 60% with grade II diastolic dysfunction, normal right ventricular   size and function.    ASSESSMENT:  1.  Altered level of consciousness.  The patient is arousable to verbal   stimuli, in no acute distress, has a normal pH with an elevated pCO2 and   received 1 Percocet earlier in the day.  Pupils are slightly constricted;   however, reactive.  Does not show significant respiratory depression and also   has been getting a moderate amount of Lasix therapy and developing a   contraction alkalosis; however, this had been managed with Diamox over the   last day or so and had been trending towards improvement, on less than his   baseline O2 of 4 liters, at present down to 2 liters.  Doubtful for underlying   pulmonary embolism.  Awaiting repeat chest x-ray to identify any repeat   plugging or pneumonia process of the left lung.  2.  Chronic hypoxic respiratory failure.  The patient's baseline home O2 is 4   liters.  He is presently on this or less with minimal work of breathing.    Again, able to speak in full and complete sentences with mild underlying   lethargy.  On appropriate antibiotic therapy for his underlying ESBL E. coli   positive sputum as well as getting aggressive pulmonary toilet and respiratory   therapy.  3.  History of cerebrovascular accident with left-sided deficits, perhaps is   also causing the patient to have recurrent pneumonias with underlying   weakness.  Does not exhibit at present time suggestion of aspiration or   dysphagia; however, will need further evaluation.  4.  Seizure disorder.  The patient is on 1500 b.i.d. of Keppra.  This appears   to be also on his home regimen and he is continuing this here.  No evidence of   seizure activity.  5.  Recent pelvic fracture.  6.  Recurrent pneumonia.  7.  Chronic pain.  8.  Gastroesophageal reflux disease.  9.   Chronic hypertension.    PLAN:  Again, this is an 81-year-old gentleman who was transferred to the ICU   for higher level of care.  He is full code.  He had increasing lethargy noted   by primary physician.  He did have an ABG with hypercarbia.  However, this   appears to be somewhat compensated with pH of 7.35.  He had been on a moderate   amount of Lasix therapy up until approximately 2 days ago with concern for   contraction alkalosis; however, this had been treated with Diamox therapy and   a downtrend in his overall bicarbonate.  The fact that he is not on increased   supplemental oxygenation suggests that it is less likely underlying pulmonary   embolism that is leading to a V/Q mismatch and increasing pCO2 in that setting   as well.  The lungs do appear to be relatively clear.  He is on minimal   oxygen, less than baseline, satting 98%-99%, again, arguing against underlying   pulmonary embolism or worsening infectious process.  He is on a moderate   amount of Keppra, did have a dose of Percocet and may have had other   medication players involved in his decreased level of alertness _____   metabolic encephalopathy.  Renal function at this time does appear to be   intact, suggesting that he should have relatively adequate renal clearance.    Given the patient is in no distress, he is easily aroused, verbal, in no   respiratory distress, we will at this present time hold off on BiPAP therapy.    We will do additional dose of Diamox.  Continue aggressive RT therapy and   pulmonary toilet.  Should there be any deterioration, consideration to BiPAP   or intubation can be made.  We will also follow up a chest x-ray to evaluate   any changes that may have abruptly evolved.    The patient remains quite guarded prognosis.    Total critical care time not including billable procedures 35 minutes.       ____________________________________     MD EFREM Bryant / JAMES    DD:  03/02/2017 18:48:30  DT:   03/02/2017 23:14:31    D#:  [id number]  Job#:  [id number]

## 2017-03-03 NOTE — PROGRESS NOTES
Infectious Disease Progress Note    Author: Jessie Varghese M.D.    DOS & Time created: 3/3/2017  2:09 PM    Chief Complaint   Patient presents with   • Shortness of Breath     SAT's in 80's at home, given NPPB Tx X 3 with improvement.     Pneumonia and UTI FU    Interval History:  - says he is feeling slightly better. No fevers.   - no fevers. Feels better. On dysphagia 1 diet.   - no fevers. Much more lethargic  - no fevers. The cxr is worse.   AF WBC 8.3 Less dyspnea, +nausea    AF WBC 9.1 denies N/V today. No pain  3/1 AF, WBC 11 denies dyspnea PE unchanged from 2/28  3/2 AF WBC 10.7 more alert today-denies, pain, SOB   3/3- no fevers. Events noted. Was transferred to the ICU for hypoxia overnight and now back to floor denies any new issues  Labs Reviewed, Medications Reviewed and Radiology Reviewed.    Review of Systems:  Review of Systems   Constitutional: Negative for fever.   Respiratory: Positive for cough.         Decreased   Cardiovascular: Negative for chest pain and leg swelling.   Gastrointestinal: Negative for nausea, vomiting and abdominal pain.   Genitourinary: Negative for dysuria.   Skin: Negative for rash.   Neurological: Negative for headaches.   All other systems reviewed and are negative.      Hemodynamics:  Temp (24hrs), Av.6 °C (97.8 °F), Min:35.8 °C (96.5 °F), Max:37.4 °C (99.3 °F)  Temperature: 37.4 °C (99.3 °F)  Pulse  Av.8  Min: 44  Max: 98Heart Rate (Monitored): (!) 54  Blood Pressure : 106/51 mmHg       Physical Exam:  Physical Exam   Constitutional: He appears well-developed. No distress.   Elderly and frail male      HENT:   Head: Normocephalic and atraumatic.   Eyes: EOM are normal. Pupils are equal, round, and reactive to light. No scleral icterus.   Neck: Neck supple.   Cardiovascular: Normal rate.    Murmur heard.  Irregular    Pulmonary/Chest: Effort normal. No respiratory distress. He has rales.   Abdominal: Soft. He exhibits no distension.  There is no tenderness.   Musculoskeletal: He exhibits no edema.   Neurological: He is alert.   Skin: No erythema.   Nursing note and vitals reviewed.      Labs:  Recent Labs      03/01/17 0243 03/02/17   0242  03/03/17   0440   WBC  11.0*  10.7  8.2   RBC  4.41*  4.52*  4.44*   HEMOGLOBIN  14.0  14.1  13.9*   HEMATOCRIT  44.7  46.0  45.7   MCV  101.4*  101.8*  102.9*   MCH  31.7  31.2  31.3   RDW  50.2*  51.3*  52.5*   PLATELETCT  199  196  177   MPV  10.9  11.2  11.0   NEUTSPOLYS  73.70*  69.30  66.00   LYMPHOCYTES  15.50*  18.80*  21.50*   MONOCYTES  8.70  9.00  9.40   EOSINOPHILS  1.40  2.10  2.20   BASOPHILS  0.40  0.50  0.50     Recent Labs      03/01/17 0243 03/02/17   0242  03/03/17   0440   SODIUM  141  140  140   POTASSIUM  4.1  4.3  4.3   CHLORIDE  92*  96  101   CO2  45*  38*  34*   GLUCOSE  134*  116*  117*   BUN  33*  28*  24*     Recent Labs      03/01/17 0243 03/02/17   0242  03/03/17   0440   CREATININE  0.82  0.66  0.64        Imaging:  cxr 2/23  1.  Extensive left pulmonary infiltrates and/or layering effusion. Overall stable.  2.  Cardiomegaly  3.  Atherosclerosis    Medications:  Current Facility-Administered Medications   Medication Dose Frequency Provider Last Rate Last Dose   • influenza vaccine high-dose injection 0.5 mL  0.5 mL Once PRN Daya Ashley M.D.       • meropenem (MERREM) 500 mg in  mL IVPB  500 mg Q6HRS Odilia Kapoor M.D.   Stopped at 03/03/17 1144   • levalbuterol (XOPENEX) 1.25 MG/3ML nebulizer solution 1.25 mg  1.25 mg 4X/DAY (RT) Vinny Guan M.D.   1.25 mg at 03/03/17 1101   • metoprolol (LOPRESSOR) tablet 50 mg  50 mg BID Daya Ashley M.D.   50 mg at 03/03/17 0752   • levetiracetam (KEPPRA) tablet 1,500 mg  1,500 mg BID Daya Ashley M.D.   1,500 mg at 03/03/17 0752   • spironolactone (ALDACTONE) tablet 25 mg  25 mg DAILY Daya Ashley M.D.   25 mg at 03/03/17 0805   • ipratropium-albuterol (DUONEB) nebulizer solution 3 mL  3 mL Q4H  PRN (RT) Vinny Guan M.D.   3 mL at 02/25/17 2347   • ondansetron (ZOFRAN) syringe/vial injection 4 mg  4 mg Q4HRS PRN Daya Ashley M.D.   4 mg at 02/24/17 1237   • ondansetron (ZOFRAN ODT) dispertab 4 mg  4 mg Q4HRS PRN Daya Ashley M.D.   4 mg at 02/26/17 1442   • lactobacillus granules (LACTINEX/FLORANEX) packet 1 Packet  1 Packet TID WITH MEALS Daya Ashley M.D.   1 Packet at 03/03/17 1113   • ascorbic acid (ascorbic acid) tablet 500 mg  500 mg DAILY Daya Ashley M.D.   500 mg at 03/03/17 0805   • budesonide-formoterol (SYMBICORT) 160-4.5 MCG/ACT inhaler 2 Puff  2 Puff BID Vinny Guan M.D.   2 Puff at 03/03/17 0900   • insulin lispro (HUMALOG) injection 1-6 Units  1-6 Units 4X/DAY ACHS Daya Ashley M.D.   Stopped at 03/01/17 0700   • glucose 4 g chewable tablet 16 g  16 g Q15 MIN PRN Daya Ashley M.D.        And   • dextrose 50% (D50W) injection 25 mL  25 mL Q15 MIN PRN Daya Ashley M.D.       • docusate sodium (COLACE) capsule 100 mg  100 mg BID Daya Ashley M.D.   100 mg at 03/03/17 0806   • aspirin EC (ECOTRIN) tablet 81 mg  81 mg DAILY Heath Leigh M.D.   81 mg at 03/03/17 0752   • atorvastatin (LIPITOR) tablet 80 mg  80 mg Q EVENING Heath Leigh M.D.   Stopped at 03/02/17 2100   • famotidine (PEPCID) tablet 20 mg  20 mg BID Heath Leigh M.D.   20 mg at 03/03/17 0805   • ferrous sulfate tablet 325 mg  325 mg DAILY Heath Leigh M.D.   325 mg at 03/03/17 0924   • finasteride (PROSCAR) tablet 5 mg  5 mg DAILY Heath Leigh M.D.   5 mg at 03/03/17 0752   • gabapentin (NEURONTIN) capsule 300 mg  300 mg DAILY Heath Leigh M.D.   300 mg at 03/03/17 0957   • oxybutynin SR (DITROPAN-XL) tablet 5 mg  5 mg DAILY Heath Leigh M.D.   5 mg at 03/03/17 0924   • sertraline (ZOLOFT) tablet 100 mg  100 mg DAILY Heath Leigh M.D.   100 mg at 03/03/17 1006   • tamsulosin (FLOMAX) capsule 0.4 mg  0.4 mg AFTER BREAKFAST Heath Leigh M.D.   0.4 mg at 03/03/17 0752   • tiotropium (SPIRIVA) 18 MCG  "inhalation capsule 1 Cap  1 Cap DAILY Heath Leigh M.D.   1 Cap at 03/03/17 0957   • trazodone (DESYREL) tablet 50 mg  50 mg QHS PRN Heath Leigh M.D.   50 mg at 02/23/17 2303   • senna-docusate (PERICOLACE or SENOKOT S) 8.6-50 MG per tablet 1 Tab  1 Tab Nightly Heath Leigh M.D.   Stopped at 03/02/17 2100   • senna-docusate (PERICOLACE or SENOKOT S) 8.6-50 MG per tablet 1 Tab  1 Tab Q24HRS PRN Heath Leigh M.D.       • lactulose 20 GM/30ML solution 30 mL  30 mL Q24HRS PRN Heath Leigh M.D.   30 mL at 02/23/17 0933   • bisacodyl (DULCOLAX) suppository 10 mg  10 mg Q24HRS PRN Heath Leigh M.D.       • fleet enema 133 mL  1 Each Once PRN Heath Leigh M.D.       • Respiratory Care per Protocol   Continuous RT Heath Leigh M.D.       • heparin injection 5,000 Units  5,000 Units Q8HRS Heath Leigh M.D.   5,000 Units at 03/03/17 0541     Last reviewed on 2/20/2017  3:33 PM by Evelin Saavedra    Micro:  Results     Procedure Component Value Units Date/Time    BLOOD CULTURE x2 [[id number]] Collected:  02/20/17 1400    Order Status:  Completed Specimen Information:  Blood from Peripheral Updated:  02/25/17 1500     Significant Indicator NEG      Source BLD      Site PERIPHERAL      Blood Culture No growth after 5 days of incubation.     Narrative:      Per Hospital Policy: Only change Specimen Src: to \"Line\" if  specified by physician order.    BLOOD CULTURE x2 [[id number]] Collected:  02/20/17 1400    Order Status:  Completed Specimen Information:  Blood from Peripheral Updated:  02/25/17 1500     Significant Indicator NEG      Source BLD      Site PERIPHERAL      Blood Culture No growth after 5 days of incubation.     Narrative:      Per Hospital Policy: Only change Specimen Src: to \"Line\" if  specified by physician order.          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]   • Left hemiparesis (CMS-HCC) [G81.94]   • Dysphagia status post cerebrovascular accident [I69.391]   • COPD (chronic obstructive pulmonary disease) (CMS-HCC) [J44.9]       Plan:  HCAP   ESBL E coli in sputum  Continue Meropenem due to concurrent UTI  Stop date 3/7/2017  Aspiration precautions as below  Watch for seizures while on merrem    UTI, compliacted  Urine c/s ESBL E coli also  Stop date same as above    Aspiration  No overt s/s of aspiration noted on swallow eval  On dysphagia 1 diet  Aspiration precautions    DM  Keep BS under 150 to help control current infection    Leukocytosis  Resolved- 8  Repeat cultures if increases  Encourage pulm toilet    ID will sign off. Please call as needed.  Discussed with internal medicine

## 2017-03-03 NOTE — PROGRESS NOTES
Hospital Medicine Progress Note, Adult, Complex               Author: Humphrey Treviño Date & Time created: 3/2/2017  5:16 PM     Interval History:  81 year old male with a PMHx of Stroke with left sided hemiparesis, HTN, COPD on 3 L of oxygen, asthma, multiple falls and pelvic fracture presented from [facility],presented on 2/20 with shortness of breath and cough and found to have left sided opacification started on vanco and zosyn for HCAP.  H/o aspiration, on dysphagia 1 diet per SLP.      2/21:  Had rapid last night, opens eyes to name only.  Remains full code per family.  CTA showing LLL pneumonia/opacification with hiatal hernia seen.  U/s chest did not show enough pleural effusion for IR to drain.  Nasal swab + MRSA, confused.  2/22:  Improved mentation today, sitting up, conversing appropriately after 1 dose of meropenem IV.  ID consulted for sputum + Ecoli ESBL, left lung white out.  2/23:  Continues to improve on Abx.  Mentation clear, sitting up in chair, appropriate, states does not remember first few days.  2/24:  Lungs improving with increased breath sounds on left.  Mentation remains clear.  States he could not control his urine.  Explained had UTI same as in lung.  2/25:  Worsening NEHEMIAS, JVD noted on exam, increased CO2 38 to 40.  Ordered repeat CXR with pulmonary edema noted.  Restarted Home meds, previously on lasix 40 bid and spironolactone 25 daily.  Prior echo 2014 EF 65% grade 1 dd, ordered repeat echo.  2/26:  Severe SOB since last night, acute respiratory distress with O2 up to 9 LPM VM, clearer mentation after IV lasix,O2 back to 4 LPM NC this a.m..   CXR with worsening left lung complete white out.  D/w Dr. rBunner.  For bronch tomorrow.    2/28 Patient is sitting up in bed in no acute distress. Alert and oriented x3. States his breathing has improved. Currently on 4L of oxygen. Continue RT protocol with PEP therapy, flutter valve, bronchodilators with mucomyst. Bronch has been defered at this  time per Pulm. Continue Abx, stop date 3/7/17 per ID. continue diuresis with strict intake and output.    3/1 Patient states his breathing is improving. Currently on 4 L of oxygen which is near his baseline. He continues to have a weak cough with inability to clear secretions, continue pulmonary hygiene. His CO2 has been increasing up to 45 and has been started on diamox per Pulm, appreciate input.     3/2 Patient appears lethargic. Stat ABG ordered shows pH 7.35, PCO2 70.9 PO2 103. His lethargy his likely due to hypercapnia. He has been saturating 99% on 4L, recommended titrating down oxygen to an oxygen saturation of 92%. I am starting him on Bipap and will transfer him to ICU. I have discussed the case with  from Pulmonology who agrees with the plan.     Review of Systems:  Review of Systems   Constitutional: Positive for malaise/fatigue. Negative for fever, chills and diaphoresis.   HENT: Negative for congestion and sore throat.    Eyes: Negative for pain and discharge.   Respiratory: Positive for cough and shortness of breath. Negative for hemoptysis, sputum production and wheezing.    Cardiovascular: Negative for chest pain, palpitations, claudication and leg swelling.   Gastrointestinal: Negative for nausea, vomiting, abdominal pain, diarrhea, constipation and melena.   Genitourinary: Negative for dysuria, urgency and frequency.   Musculoskeletal: Negative for myalgias, back pain, joint pain and neck pain.   Skin: Negative for itching and rash.   Neurological: Positive for weakness. Negative for dizziness, sensory change, speech change, focal weakness, loss of consciousness and headaches.   Endo/Heme/Allergies: Does not bruise/bleed easily.   Psychiatric/Behavioral: Negative for depression, suicidal ideas and substance abuse.       Physical Exam:  Physical Exam   Constitutional: He appears well-developed and well-nourished. No distress.   HENT:   Head: Normocephalic and atraumatic.   Mouth/Throat:  Oropharynx is clear and moist. No oropharyngeal exudate.   Eyes: Conjunctivae and EOM are normal. Pupils are equal, round, and reactive to light. Right eye exhibits no discharge. Left eye exhibits no discharge. No scleral icterus.   Neck: Normal range of motion. Neck supple. No JVD present. No tracheal deviation present. No thyromegaly present.   Cardiovascular: Normal rate, regular rhythm and normal heart sounds.  Exam reveals no gallop and no friction rub.    No murmur heard.  Pulmonary/Chest: He has no wheezes.   Coarse crackles bilaterally  Diminished breath sounds   Abdominal: Soft. Bowel sounds are normal. He exhibits no distension and no mass. There is no tenderness. There is no rebound and no guarding.   Musculoskeletal: Normal range of motion. He exhibits no edema or tenderness.   Lymphadenopathy:     He has no cervical adenopathy.   Neurological: No cranial nerve deficit. He exhibits normal muscle tone.   Lethargic and somnolent  Opens eyes to verbal stimuli     Skin: Skin is warm and dry. No rash noted. He is not diaphoretic. No erythema.   Psychiatric: He has a normal mood and affect. His behavior is normal. Judgment and thought content normal.   Nursing note and vitals reviewed.      Labs:  Recent Labs      03/02/17   1634   [id number]  7.35*   [id number]  70.9*   [id number]  103.0*   [id number]  97.3   ARTHCO3  38*   ARTBE  9*         Recent Labs      02/28/17 0331 03/01/17 0243 03/02/17 0242   SODIUM  141  141  140   POTASSIUM  4.5  4.1  4.3   CHLORIDE  92*  92*  96   CO2  43*  45*  38*   BUN  29*  33*  28*   CREATININE  0.75  0.82  0.66   CALCIUM  9.7  9.7  9.7     Recent Labs      02/28/17   0331 03/01/17 0243 03/02/17 0242   GLUCOSE  108*  134*  116*     Recent Labs      02/28/17 0331 03/01/17 0243 03/02/17 0242   RBC  4.42*  4.41*  4.52*   HEMOGLOBIN  13.8*  14.0  14.1   HEMATOCRIT  45.0  44.7  46.0   PLATELETCT  207  199  196     Recent Labs      02/28/17 0331 03/01/17 0243   17   0242   WBC  9.1  11.0*  10.7   NEUTSPOLYS  71.90  73.70*  69.30   LYMPHOCYTES  17.00*  15.50*  18.80*   MONOCYTES  8.60  8.70  9.00   EOSINOPHILS  1.50  1.40  2.10   BASOPHILS  0.70  0.40  0.50           Hemodynamics:  Temp (24hrs), Av.3 °C (97.4 °F), Min:35.8 °C (96.5 °F), Max:36.6 °C (97.9 °F)  Temperature: 35.8 °C (96.5 °F)  Pulse  Av.5  Min: 44  Max: 98Heart Rate (Monitored): (!) 54  Blood Pressure : 112/63 mmHg     Respiratory:    Respiration: 18, Pulse Oximetry: 99 %, O2 Daily Delivery Respiratory : Silicone Nasal Cannula     Given By:: Mask, PEP/CPT Method: Positive Airway Pressure Device, Given By:: Mouthpiece, Work Of Breathing / Effort: Mild  RUL Breath Sounds: Clear, RML Breath Sounds: Diminished, RLL Breath Sounds: Diminished, DEBI Breath Sounds: Clear, LLL Breath Sounds: Diminished  Fluids:    Intake/Output Summary (Last 24 hours) at 17 1716  Last data filed at 17 1200   Gross per 24 hour   Intake    520 ml   Output   1100 ml   Net   -580 ml     Weight: 58 kg (127 lb 13.9 oz)  GI/Nutrition:  Orders Placed This Encounter   Procedures   • DIET ORDER     Standing Status: Standing      Number of Occurrences: 1      Standing Expiration Date:      Order Specific Question:  Diet:     Answer:  Diabetic [3]     Order Specific Question:  Diet:     Answer:  Cardiac [6]     Order Specific Question:  Texture/Fiber modifications:     Answer:  Dysphagia 1(Pureed)specify fluid consistency(question 6) [1]     Order Specific Question:  Consistency/Fluid modifications:     Answer:  Nectar Thick [2]     Medical Decision Making, by Problem:  Active Hospital Problems    Diagnosis   • Acute on chronic hypoxemic and hypercapnic respiratory failure (CMS-HCC) [J96.90]  -in the setting of COPD exacerbation and HCAP  -Patient very lethargic, ABG reveals hypercapnia of 70  -will start patient on Bipap and transfer to ICU  -on 4L of oxygen, wean as able to titrate PO2 of 92%  -left side opacification  likely secondary to mucous plug which resolved with Chest PT  -Sputum culture revealed ESBL E Coli, continue Merrem, stop date 3/ 7  -Antibiotics per ID  -repeat CXR  -DC Lasix  -DC percocet which may be contributing   • Debility [R53.81]  -PT/OT  -encouraged feeding and supplements   • Multiple falls [R29.6]  -Reported from niece  -H/o pelvic fracture, recently at [facility] for rehab  -fall precautions   • MRSA carrier [Z22.322]  -MRSA + nasal swab  -Antibiotics per ID   • Sepsis (CMS-HCC) [A41.9]  -2/2 LLL pneumonia HCAP and UTI with ESBL ecoli  -BCs negative x2  -continue Merrem with stop date 3/27  -antibiotics per ID   • HCAP (healthcare-associated pneumonia) [J18.9]  -As above  -continue breathing treatments, mucomyst, PEP therapy, flutter valve, positional therapy, percussive therapy to help clear any mucous plugs  Review of last admission:  4/2016 s/p bronchoscopy with Dr. Phillips with no mass, thick mucus, cultures negative.   • Chronic respiratory failure with hypoxia (CMS-HCC) [J96.11]  Mild improvement in O2 requirement back to 4 LPM Nc, titrate as able  Baseline 2-3 LPM NC     • Oxygen dependent [Z99.81]  Baseline 2-3 LPM NC 24/7, currently on 4 LPM NC     • DM type 2, controlled, with complication (CMS-HCC) [E11.8]  SSI  accuchecks  Diabetic diet  Dysphagia 1 NTFL   • Left hemiparesis (CMS-HCC) [G81.94]  2/2 prior CVA     • Dysphagia status post cerebrovascular accident [I69.391]  -SLP rec dysphagia 1 NT diet.  -continue ST     • COPD (chronic obstructive pulmonary disease) (CMS-HCC) [J44.9]  -no evidence of acute exacerbation  -continue breathing treatments, RT protocol and inhalers   • GERD (gastroesophageal reflux disease) [K21.9]  -stable  -Continue home pepcid    Abnormal UA:  Wbc 100-150 wbc  UC with ESBL Ecoli  On Merrem days per ID.    Iron deficiency anemia:  -Replace iron daily with vit C.    Acute on chronic Diastolic heart failure:  -improved  -Home meds:  lasix 40  Bid and  spironolactone  -CXR with increased vascular congestion on 2/25, now shows improvement  -DC lasix and monitor I/O  -Restarted spironolactone 25 daily.  -Repeat echo EF 60% with grade 2 DD severe tricuspid regurgitation.    PAF:  -Episode 2/27 a.m. After albuterol neb treatment  -Gave metoprolol 5mg IV x 1 returned to NSR  -dc'd albuterol neb treatment since no wheezes on exam since admission  -Change to xopenex prn  -Increased metoprolol 25 to 50 bid     FC per niece:  Code reviewed by admitting physician.  PT/OT/SNF orders placed.  Family does not want to go back to [facility].    Disposition: Pending due to acute respiratory distress, but patient has expressed desire to go to group home if feasible. Will get SW to assist.      I spent a total of 45 minutes of critical care time during this clinical encounter of which > 50% was devoted to counseling and coordinating care including review of records, pertinent lab data and studies, as well as discussing diagnostic evaluation and work up, planned therapeutic interventions and future disposition of care. Where indicated, the assessment and plan reflect discussion of patient with consultants, and other healthcare providers. This time includes no procedures or overlap with other providers.        Loya catheter: No Loya      DVT Prophylaxis: Heparin    Ulcer prophylaxis: Yes  Antibiotics: Treating active infection/contamination beyond 24 hours perioperative coverage  Assessed for rehab: Patient was assess for and/or received rehabilitation services during this hospitalization

## 2017-03-03 NOTE — PROGRESS NOTES
MD assessed patient. New orders received. Patient to transfer to ICU to begin BIPAP and all pain medications to be switched to Ultram. Pt titrated to 1 L O2.

## 2017-03-03 NOTE — CARE PLAN
Problem: Safety  Goal: Will remain free from injury  Outcome: PROGRESSING AS EXPECTED  Bed alarm in place. Call light within reach. Treaded socks on.     Problem: Venous Thromboembolism (VTW)/Deep Vein Thrombosis (DVT) Prevention:  Goal: Patient will participate in Venous Thrombosis (VTE)/Deep Vein Thrombosis (DVT)Prevention Measures  Outcome: PROGRESSING AS EXPECTED  With SCD's on.     03/03/17 1357   OTHER   Mechanical Prophylaxis SCDs, Sequentials (Intermittent Pneumatic Compression Devices)         Problem: Urinary Elimination:  Goal: Ability to reestablish a normal urinary elimination pattern will improve  Outcome: PROGRESSING AS EXPECTED  Condom cath in place. Tolerates well.

## 2017-03-03 NOTE — CARE PLAN
Problem: Respiratory:  Goal: Respiratory status will improve  Outcome: PROGRESSING AS EXPECTED  Oxygen titrated between 88-93%, unable to perform IS    Problem: Skin Integrity  Goal: Risk for impaired skin integrity will decrease  Outcome: PROGRESSING SLOWER THAN EXPECTED  Wound consult placed for reevaluation of sacral redness

## 2017-03-03 NOTE — PROGRESS NOTES
Pulmonary Critical Care Progress Note        Chief Complaint: ALOC      ROS:  Respiratory: negative shortness of breath and negative sputum production, Cardiac: negative chest pain, GI: negative nausea and negative vomiting.  All other systems negative.    Interval Events:  24 hour interval history reviewed   - pt did not require bipap overnight   - remains on 1-2L supplemental 02   - very awake and alert at this time       PFSH:  No change.    Respiratory:     Pulse Oximetry: 95 %  Chest Tube Drains:          Exam: diminished breath sounds moderate  ImagingAvailable data reviewed   Recent Labs      03/02/17   1634   [id number]  7.35*   [id number]  70.9*   [id number]  103.0*   [id number]  97.3   ARTHCO3  38*   ARTBE  9*       HemoDynamics:  Pulse: 66, Heart Rate (Monitored): (!) 54  Blood Pressure : 105/53 mmHg       Exam: regular rate and rhythm  Imaging: Available data reviewed        Neuro:  GCS         Exam: follows commands, raises two fingers  Imaging: Available data reviewed    Fluids:  Intake/Output       03/01/17 0700 - 03/02/17 0659 03/02/17 0700 - 03/03/17 0659 03/03/17 0700 - 03/04/17 0659      [id number] Total 0658-1399 8426-7087 Total 6346-7636 9554-6937 Total       Intake    P.O.  730  100 830  220  -- 220  400  -- 400    P.O. 730 100 830 220 -- 220 400 -- 400    I.V.  100  200 300  100  300 400  --  -- --    IV Volume (Meropenum) 100 200 300 100 300 400 -- -- --    Total Intake  320 300 620 400 -- 400       Output    Urine  500  600 1100  700  650 1350  --  -- --    Condom Catheter   -- -- --    Number of Times Incontinent of Urine -- 1 x 1 x -- -- -- -- -- --    Total Output   -- -- --       Net I/O     330 -300 30 -380 -350 -730 400 -- 400        Weight: 55.8 kg (123 lb 0.3 oz)  Recent Labs      03/01/17   0243  03/02/17   0242  03/03/17   0440   SODIUM  141  140  140   POTASSIUM  4.1  4.3  4.3   CHLORIDE  92*  96  101   CO2  45*  38*   34*   BUN  33*  28*  24*   CREATININE  0.82  0.66  0.64   CALCIUM  9.7  9.7  9.5       GI/Nutrition:  Exam: abdomen is soft and non-tender  Imaging: Available data reviewed  NPO  Liver Function  Recent Labs      17   0440   GLUCOSE  134*  116*  117*       Heme:  Recent Labs      17   0440   RBC  4.41*  4.52*  4.44*   HEMOGLOBIN  14.0  14.1  13.9*   HEMATOCRIT  44.7  46.0  45.7   PLATELETCT  199  196  177       Infectious Disease:  Temp  Av.4 °C (97.5 °F)  Min: 35.8 °C (96.5 °F)  Max: 36.9 °C (98.5 °F)  Micro: cultures reviewed  Recent Labs      17   0440   WBC  11.0*  10.7  8.2   NEUTSPOLYS  73.70*  69.30  66.00   LYMPHOCYTES  15.50*  18.80*  21.50*   MONOCYTES  8.70  9.00  9.40   EOSINOPHILS  1.40  2.10  2.20   BASOPHILS  0.40  0.50  0.50     Current Facility-Administered Medications   Medication Dose Frequency Provider Last Rate Last Dose   • influenza vaccine high-dose injection 0.5 mL  0.5 mL Once PRN Daya Ashley M.D.       • meropenem (MERREM) 500 mg in  mL IVPB  500 mg Q6HRS Odilia Kapoor M.D. 200 mL/hr at 17 1114 500 mg at 17 1114   • levalbuterol (XOPENEX) 1.25 MG/3ML nebulizer solution 1.25 mg  1.25 mg 4X/DAY (RT) Vinny Guan M.D.   1.25 mg at 17 1101   • metoprolol (LOPRESSOR) tablet 50 mg  50 mg BID Daya Ashley M.D.   50 mg at 17 0752   • levetiracetam (KEPPRA) tablet 1,500 mg  1,500 mg BID Daya Ashley M.D.   1,500 mg at 17 0752   • spironolactone (ALDACTONE) tablet 25 mg  25 mg DAILY Daya Ashley M.D.   25 mg at 17 0805   • ipratropium-albuterol (DUONEB) nebulizer solution 3 mL  3 mL Q4H PRN (RT) Vinny Guan M.D.   3 mL at 17 1347   • ondansetron (ZOFRAN) syringe/vial injection 4 mg  4 mg Q4HRS PRN Daya Ashley M.D.   4 mg at 17 1237   • ondansetron (ZOFRAN ODT) dispertab 4 mg  4 mg  Q4HRS PRN Daya Ashley M.D.   4 mg at 02/26/17 1442   • lactobacillus granules (LACTINEX/FLORANEX) packet 1 Packet  1 Packet TID WITH MEALS Daya Ashley M.D.   1 Packet at 03/03/17 1113   • ascorbic acid (ascorbic acid) tablet 500 mg  500 mg DAILY Daya Ashley M.D.   500 mg at 03/03/17 0805   • budesonide-formoterol (SYMBICORT) 160-4.5 MCG/ACT inhaler 2 Puff  2 Puff BID Vinny Guan M.D.   2 Puff at 03/03/17 0900   • insulin lispro (HUMALOG) injection 1-6 Units  1-6 Units 4X/DAY GIDEON Ashley M.D.   Stopped at 03/01/17 0700   • glucose 4 g chewable tablet 16 g  16 g Q15 MIN PRN Daya Ashley M.D.        And   • dextrose 50% (D50W) injection 25 mL  25 mL Q15 MIN PRN Daya Ashley M.D.       • docusate sodium (COLACE) capsule 100 mg  100 mg BID Daya Ashley M.D.   100 mg at 03/03/17 0806   • aspirin EC (ECOTRIN) tablet 81 mg  81 mg DAILY Heath Leigh M.D.   81 mg at 03/03/17 0752   • atorvastatin (LIPITOR) tablet 80 mg  80 mg Q EVENING Heath Leigh M.D.   Stopped at 03/02/17 2100   • famotidine (PEPCID) tablet 20 mg  20 mg BID Heath Leigh M.D.   20 mg at 03/03/17 0805   • ferrous sulfate tablet 325 mg  325 mg DAILY Heath Leigh M.D.   325 mg at 03/03/17 0924   • finasteride (PROSCAR) tablet 5 mg  5 mg DAILY Heath Leigh M.D.   5 mg at 03/03/17 0752   • gabapentin (NEURONTIN) capsule 300 mg  300 mg DAILY Heath Leigh M.D.   300 mg at 03/03/17 0957   • oxybutynin SR (DITROPAN-XL) tablet 5 mg  5 mg DAILY Heath Leigh M.D.   5 mg at 03/03/17 0924   • sertraline (ZOLOFT) tablet 100 mg  100 mg DAILY Heath Leigh M.D.   100 mg at 03/03/17 1006   • tamsulosin (FLOMAX) capsule 0.4 mg  0.4 mg AFTER BREAKFAST Heath Leigh M.D.   0.4 mg at 03/03/17 0752   • tiotropium (SPIRIVA) 18 MCG inhalation capsule 1 Cap  1 Cap DAILY Heath Leigh M.D.   1 Cap at 03/03/17 0957   • trazodone (DESYREL) tablet 50 mg  50 mg QHS PRN Heath Leigh M.D.   50 mg at 02/23/17 2303   • senna-docusate (PERICOLACE or SENOKOT S) 8.6-50 MG per  tablet 1 Tab  1 Tab Nightly Heath Leigh M.D.   Stopped at 03/02/17 2100   • senna-docusate (PERICOLACE or SENOKOT S) 8.6-50 MG per tablet 1 Tab  1 Tab Q24HRS PRN Heath Leigh M.D.       • lactulose 20 GM/30ML solution 30 mL  30 mL Q24HRS PRN Heath Leigh M.D.   30 mL at 02/23/17 0933   • bisacodyl (DULCOLAX) suppository 10 mg  10 mg Q24HRS PRN Heath Leigh M.D.       • fleet enema 133 mL  1 Each Once PRN Heath Leigh M.D.       • Respiratory Care per Protocol   Continuous RT Heath Leigh M.D.       • heparin injection 5,000 Units  5,000 Units Q8HRS Heath Leigh M.D.   5,000 Units at 03/03/17 0541     Last reviewed on 2/20/2017  3:33 PM by Mercedes Orona ANNA    Quality  Measures:  Core Measures & Quality Metrics    Assessment/Plan  1.  Altered level of consciousness.     - much improved, fully alert   - minimize any mind altering meds  2.  Chronic hypoxic respiratory failure.     - breathing at baseline or better   - on 4L baseline - presently 2L   - goal 88-92 sats   - monitor volume status   - chronic c02 retainer with baseline in 60s   - prn diamox if ongoing diureses   3.  History of cerebrovascular accident with left-sided deficits, perhaps is    also causing the patient to have recurrent pneumonias with underlying    weakness.  Does not exhibit at present time suggestion of aspiration or    dysphagia  4.  Seizure disorder.  The patient is on 1500 b.i.d. of Keppra.  This appears    to be also on his home regimen and he is continuing this here.  No evidence of   seizure activity.  5.  Recent pelvic fracture.  6.  Recurrent pneumonia.  7.  Chronic pain.  8.  Gastroesophageal reflux disease.  9.  Chronic hypertension.    Discussed patient condition and risk of morbidity and/or mortality with RN, RT and Pharmacy.

## 2017-03-03 NOTE — PROGRESS NOTES
Patient transferred from tele. He is alert and answering questions. Complains of no pain at this time. Dr. Hernandez saw the patient since he has been transferred.

## 2017-03-04 LAB
ANION GAP SERPL CALC-SCNC: 4 MMOL/L (ref 0–11.9)
BASOPHILS # BLD AUTO: 0.6 % (ref 0–1.8)
BASOPHILS # BLD: 0.05 K/UL (ref 0–0.12)
BUN SERPL-MCNC: 29 MG/DL (ref 8–22)
CALCIUM SERPL-MCNC: 9.5 MG/DL (ref 8.5–10.5)
CHLORIDE SERPL-SCNC: 104 MMOL/L (ref 96–112)
CO2 SERPL-SCNC: 35 MMOL/L (ref 20–33)
CREAT SERPL-MCNC: 0.63 MG/DL (ref 0.5–1.4)
EOSINOPHIL # BLD AUTO: 0.21 K/UL (ref 0–0.51)
EOSINOPHIL NFR BLD: 2.7 % (ref 0–6.9)
ERYTHROCYTE [DISTWIDTH] IN BLOOD BY AUTOMATED COUNT: 52.1 FL (ref 35.9–50)
GFR SERPL CREATININE-BSD FRML MDRD: >60 ML/MIN/1.73 M 2
GLUCOSE BLD-MCNC: 114 MG/DL (ref 65–99)
GLUCOSE BLD-MCNC: 117 MG/DL (ref 65–99)
GLUCOSE BLD-MCNC: 123 MG/DL (ref 65–99)
GLUCOSE BLD-MCNC: 164 MG/DL (ref 65–99)
GLUCOSE SERPL-MCNC: 118 MG/DL (ref 65–99)
HCT VFR BLD AUTO: 41.2 % (ref 42–52)
HGB BLD-MCNC: 12.8 G/DL (ref 14–18)
IMM GRANULOCYTES # BLD AUTO: 0.02 K/UL (ref 0–0.11)
IMM GRANULOCYTES NFR BLD AUTO: 0.3 % (ref 0–0.9)
LYMPHOCYTES # BLD AUTO: 1.84 K/UL (ref 1–4.8)
LYMPHOCYTES NFR BLD: 23.7 % (ref 22–41)
MCH RBC QN AUTO: 31.7 PG (ref 27–33)
MCHC RBC AUTO-ENTMCNC: 31.1 G/DL (ref 33.7–35.3)
MCV RBC AUTO: 102 FL (ref 81.4–97.8)
MONOCYTES # BLD AUTO: 0.72 K/UL (ref 0–0.85)
MONOCYTES NFR BLD AUTO: 9.3 % (ref 0–13.4)
NEUTROPHILS # BLD AUTO: 4.92 K/UL (ref 1.82–7.42)
NEUTROPHILS NFR BLD: 63.4 % (ref 44–72)
NRBC # BLD AUTO: 0 K/UL
NRBC BLD AUTO-RTO: 0 /100 WBC
PLATELET # BLD AUTO: 178 K/UL (ref 164–446)
PMV BLD AUTO: 11.5 FL (ref 9–12.9)
POTASSIUM SERPL-SCNC: 3.9 MMOL/L (ref 3.6–5.5)
RBC # BLD AUTO: 4.04 M/UL (ref 4.7–6.1)
SODIUM SERPL-SCNC: 143 MMOL/L (ref 135–145)
WBC # BLD AUTO: 7.8 K/UL (ref 4.8–10.8)

## 2017-03-04 PROCEDURE — 94760 N-INVAS EAR/PLS OXIMETRY 1: CPT

## 2017-03-04 PROCEDURE — 700102 HCHG RX REV CODE 250 W/ 637 OVERRIDE(OP): Performed by: HOSPITALIST

## 2017-03-04 PROCEDURE — 700102 HCHG RX REV CODE 250 W/ 637 OVERRIDE(OP): Performed by: INTERNAL MEDICINE

## 2017-03-04 PROCEDURE — 36415 COLL VENOUS BLD VENIPUNCTURE: CPT

## 2017-03-04 PROCEDURE — 700101 HCHG RX REV CODE 250: Performed by: INTERNAL MEDICINE

## 2017-03-04 PROCEDURE — 82962 GLUCOSE BLOOD TEST: CPT

## 2017-03-04 PROCEDURE — 94668 MNPJ CHEST WALL SBSQ: CPT

## 2017-03-04 PROCEDURE — 770021 HCHG ROOM/CARE - ISO PRIVATE

## 2017-03-04 PROCEDURE — 302112 WASHCLOTH,PERINEAL CARE: Performed by: HOSPITALIST

## 2017-03-04 PROCEDURE — 700112 HCHG RX REV CODE 229: Performed by: HOSPITALIST

## 2017-03-04 PROCEDURE — 700111 HCHG RX REV CODE 636 W/ 250 OVERRIDE (IP): Performed by: INTERNAL MEDICINE

## 2017-03-04 PROCEDURE — 80048 BASIC METABOLIC PNL TOTAL CA: CPT

## 2017-03-04 PROCEDURE — 85025 COMPLETE CBC W/AUTO DIFF WBC: CPT

## 2017-03-04 PROCEDURE — 94640 AIRWAY INHALATION TREATMENT: CPT

## 2017-03-04 PROCEDURE — A9270 NON-COVERED ITEM OR SERVICE: HCPCS | Performed by: HOSPITALIST

## 2017-03-04 PROCEDURE — 99232 SBSQ HOSP IP/OBS MODERATE 35: CPT | Performed by: HOSPITALIST

## 2017-03-04 PROCEDURE — 700105 HCHG RX REV CODE 258: Performed by: INTERNAL MEDICINE

## 2017-03-04 PROCEDURE — A9270 NON-COVERED ITEM OR SERVICE: HCPCS | Performed by: INTERNAL MEDICINE

## 2017-03-04 RX ORDER — MICONAZOLE NITRATE 20 MG/G
CREAM TOPICAL EVERY 6 HOURS
Status: DISCONTINUED | OUTPATIENT
Start: 2017-03-04 | End: 2017-03-24 | Stop reason: HOSPADM

## 2017-03-04 RX ADMIN — LEVALBUTEROL HYDROCHLORIDE 1.25 MG: 1.25 SOLUTION RESPIRATORY (INHALATION) at 10:06

## 2017-03-04 RX ADMIN — TAMSULOSIN HYDROCHLORIDE 0.4 MG: 0.4 CAPSULE ORAL at 09:54

## 2017-03-04 RX ADMIN — MEROPENEM 500 MG: 500 INJECTION, POWDER, FOR SOLUTION INTRAVENOUS at 23:50

## 2017-03-04 RX ADMIN — LEVETIRACETAM 1500 MG: 500 TABLET, FILM COATED ORAL at 09:53

## 2017-03-04 RX ADMIN — OXYBUTYNIN CHLORIDE 5 MG: 5 TABLET, FILM COATED, EXTENDED RELEASE ORAL at 09:00

## 2017-03-04 RX ADMIN — SERTRALINE 100 MG: 100 TABLET, FILM COATED ORAL at 09:54

## 2017-03-04 RX ADMIN — SPIRONOLACTONE 25 MG: 25 TABLET, FILM COATED ORAL at 09:54

## 2017-03-04 RX ADMIN — HEPARIN SODIUM 5000 UNITS: 5000 INJECTION, SOLUTION INTRAVENOUS; SUBCUTANEOUS at 20:58

## 2017-03-04 RX ADMIN — LEVALBUTEROL HYDROCHLORIDE 1.25 MG: 1.25 SOLUTION RESPIRATORY (INHALATION) at 14:50

## 2017-03-04 RX ADMIN — DOCUSATE SODIUM 100 MG: 100 CAPSULE ORAL at 09:00

## 2017-03-04 RX ADMIN — LEVALBUTEROL HYDROCHLORIDE 1.25 MG: 1.25 SOLUTION RESPIRATORY (INHALATION) at 06:21

## 2017-03-04 RX ADMIN — HEPARIN SODIUM 5000 UNITS: 5000 INJECTION, SOLUTION INTRAVENOUS; SUBCUTANEOUS at 04:56

## 2017-03-04 RX ADMIN — FINASTERIDE 5 MG: 5 TABLET, FILM COATED ORAL at 09:55

## 2017-03-04 RX ADMIN — OXYCODONE HYDROCHLORIDE AND ACETAMINOPHEN 500 MG: 500 TABLET ORAL at 09:54

## 2017-03-04 RX ADMIN — LACTOBACILLUS ACIDOPHILUS / LACTOBACILLUS BULGARICUS 1 PACKET: 100 MILLION CFU STRENGTH GRANULES at 17:03

## 2017-03-04 RX ADMIN — LEVALBUTEROL HYDROCHLORIDE 1.25 MG: 1.25 SOLUTION RESPIRATORY (INHALATION) at 20:07

## 2017-03-04 RX ADMIN — BUDESONIDE AND FORMOTEROL FUMARATE DIHYDRATE 2 PUFF: 160; 4.5 AEROSOL RESPIRATORY (INHALATION) at 20:07

## 2017-03-04 RX ADMIN — STANDARDIZED SENNA CONCENTRATE AND DOCUSATE SODIUM 1 TABLET: 8.6; 5 TABLET, FILM COATED ORAL at 20:57

## 2017-03-04 RX ADMIN — GABAPENTIN 300 MG: 300 CAPSULE ORAL at 09:54

## 2017-03-04 RX ADMIN — FAMOTIDINE 20 MG: 20 TABLET, FILM COATED ORAL at 09:54

## 2017-03-04 RX ADMIN — FAMOTIDINE 20 MG: 20 TABLET, FILM COATED ORAL at 20:57

## 2017-03-04 RX ADMIN — Medication 325 MG: at 09:54

## 2017-03-04 RX ADMIN — BUDESONIDE AND FORMOTEROL FUMARATE DIHYDRATE 2 PUFF: 160; 4.5 AEROSOL RESPIRATORY (INHALATION) at 06:21

## 2017-03-04 RX ADMIN — MEROPENEM 500 MG: 500 INJECTION, POWDER, FOR SOLUTION INTRAVENOUS at 17:03

## 2017-03-04 RX ADMIN — MEROPENEM 500 MG: 500 INJECTION, POWDER, FOR SOLUTION INTRAVENOUS at 06:00

## 2017-03-04 RX ADMIN — HEPARIN SODIUM 5000 UNITS: 5000 INJECTION, SOLUTION INTRAVENOUS; SUBCUTANEOUS at 12:52

## 2017-03-04 RX ADMIN — METOPROLOL TARTRATE 50 MG: 50 TABLET, FILM COATED ORAL at 09:53

## 2017-03-04 RX ADMIN — MICONAZOLE NITRATE: 20 CREAM TOPICAL at 13:45

## 2017-03-04 RX ADMIN — LEVETIRACETAM 1500 MG: 500 TABLET, FILM COATED ORAL at 20:57

## 2017-03-04 RX ADMIN — LACTOBACILLUS ACIDOPHILUS / LACTOBACILLUS BULGARICUS 1 PACKET: 100 MILLION CFU STRENGTH GRANULES at 09:54

## 2017-03-04 RX ADMIN — TIOTROPIUM BROMIDE 1 CAPSULE: 18 CAPSULE ORAL; RESPIRATORY (INHALATION) at 06:21

## 2017-03-04 RX ADMIN — ASPIRIN 81 MG: 81 TABLET ORAL at 09:54

## 2017-03-04 RX ADMIN — ATORVASTATIN CALCIUM 80 MG: 80 TABLET, FILM COATED ORAL at 20:57

## 2017-03-04 RX ADMIN — MEROPENEM 500 MG: 500 INJECTION, POWDER, FOR SOLUTION INTRAVENOUS at 13:33

## 2017-03-04 RX ADMIN — MICONAZOLE NITRATE: 20 CREAM TOPICAL at 18:00

## 2017-03-04 RX ADMIN — DOCUSATE SODIUM 100 MG: 100 CAPSULE ORAL at 20:58

## 2017-03-04 RX ADMIN — METOPROLOL TARTRATE 50 MG: 50 TABLET, FILM COATED ORAL at 20:57

## 2017-03-04 RX ADMIN — LACTOBACILLUS ACIDOPHILUS / LACTOBACILLUS BULGARICUS 1 PACKET: 100 MILLION CFU STRENGTH GRANULES at 11:41

## 2017-03-04 ASSESSMENT — PAIN SCALES - GENERAL
PAINLEVEL_OUTOF10: 0
PAINLEVEL_OUTOF10: 0

## 2017-03-04 NOTE — PROGRESS NOTES
Late entry: Paged for hospitalist MD to update that pt. Now has a vest restraints for unsafe mobilization out of bed. Vest applied at 1450 , no call back from MD.    2nd Page to update Dr. Blount regarding application of vest restraint for the pt. Since he is trying to get out of bed by himself. Fall prec in place like bed alarm and side rails. No call back received. Updated Charge RN.

## 2017-03-04 NOTE — PROGRESS NOTES
Assumed care at 1900 from day shift nurse. Bed side report and safety precautions in place. Call light within reach. Updated on plan of care. No pain at this time and resting comfortably. In vest restraints..

## 2017-03-04 NOTE — WOUND TEAM
[facility]  Inpatient Services  Wound and Skin Care Progress Note    HPI, PMH, SH: Reviewed    WOUND TEAM FOLLOW UP: Reassessment of sacrum.  Unit where seen by Wound Team: Haylie Oconnor.      SUBJECTIVE: Patient did not talk with staff, cooperative.     Self Report / Pain Level: No indications of pain.    WOUND TYPE, LOCATION, CHARACTERISTICS:    Location and type of wound: Sacrococcygeal, perineum, scrotum, bilateral groin: Moisture associated dermatitis ( MAD) with indications of yeast infection.        Periwound:     Satellite lesions.  Drainage:     None.  Tissue Type and %:    20% brown, 80% red/pink partially denuded tissue.   Wound Edges:    Attached.   Odor:      None.  Exposed structure(s):  None.  S&S of Infection:    Satellite lesions and partially denuded tissue indicate yeast infection.  Measurements:   03/04/2017  Length:     12.5 cm  Width:      12 cm  Depth:    0 cm.      INTERVENTIONS BY WOUND TEAM: With assistance from MARJORIE Arce, patient turned for assessment. MAD noted to sacrum, coccyx, perineum, bilateral groin and scrotum. Left sacral mepilex off and ordered antifungal cream to be used instead.     Interdisciplinary consultation: Patient, nursing.     EVALUATION AND PROGRESS OF WOUND(S): Satellite lesions and partially denuded tissue indicate yeast infection.  Antifungal cream will treat yeast, the zinc in the cream will protect the affected skin from excess moisture.     Rationale for changes in Plan of Care: Yeast infection.    Factors affecting wound healing: Moisture, limited mobility.  Goals: MAD will decrease by 5% per week.     NURSING PLAN OF CARE:    Dressing changes: Continue previous Dressing Maintenance orders:        See new Dressing Maintenance orders:       Skin care: See Skin Care orders:  X      Rectal tube care: See Rectal Tube Care orders:      Other orders:           WOUND TEAM PLAN OF CARE (X):   NPWT change 3 x week:        Dressing changes:       Follow up as  needed:  X     Other:

## 2017-03-04 NOTE — PROGRESS NOTES
"Hospital Medicine Progress Note, Adult, Complex               Author: Watson GRUPO Renteria Date & Time created: 3/4/2017  11:22 AM     Interval History:  81 year old male with a PMHx of Stroke with left sided hemiparesis, HTN, COPD on 3 L of oxygen, asthma, multiple falls and pelvic fracture presented from [facility] care SNF,presented on 2/20 with shortness of breath and cough and found to have left sided opacification started on vanco and zosyn for HCAP.  H/o aspiration, on dysphagia 1 diet per SLP.  Now on meropenem for ESBL.     3/4 - still very grumpy about being in the hospital, did not want to work with RT on IS; I encouraged him to. Needs to get up for meals. I advised him of this too. Discussed with nursing. Discussed with RT.     Refer to prior notes for further detail  3/3 pt is awake alert and oriented.  ROS limited as he is unhappy about being in the hospital and will not cooperate.  States he feels \"fine\".  Stable off BiPAP overnigh    Review of Systems:  Review of Systems   Unable to perform ROS: other       Physical Exam:  Physical Exam   Constitutional: He is oriented to person, place, and time. He appears well-developed and well-nourished.   HENT:   Head: Normocephalic and atraumatic.   Mouth/Throat: Oropharynx is clear and moist.   Eyes: Conjunctivae and EOM are normal. Pupils are equal, round, and reactive to light. No scleral icterus.   Neck: Normal range of motion. Neck supple. No tracheal deviation present. No thyromegaly present.   Cardiovascular: Normal rate, regular rhythm and intact distal pulses.    Murmur heard.  Pulmonary/Chest: Effort normal. No respiratory distress. He has decreased breath sounds (but relatively clear) in the right lower field and the left lower field. He has no wheezes.   Abdominal: Soft. Bowel sounds are normal. He exhibits no distension. There is no tenderness.   Musculoskeletal: Normal range of motion. He exhibits no edema or tenderness.   Lymphadenopathy:     He has no " cervical adenopathy.        Right: No supraclavicular adenopathy present.        Left: No supraclavicular adenopathy present.   Neurological: He is alert and oriented to person, place, and time. No cranial nerve deficit.   Skin: Skin is warm and dry.   Vitals reviewed.      Labs:  Recent Labs      17   1634   [id number]  7.35*   [id number]  70.9*   [id number]  103.0*   [id number]  97.3   ARTHCO3  38*   ARTBE  9*         Recent Labs      17   013   SODIUM  140  140  143   POTASSIUM  4.3  4.3  3.9   CHLORIDE  96  101  104   CO2  38*  34*  35*   BUN  28*  24*  29*   CREATININE  0.66  0.64  0.63   CALCIUM  9.7  9.5  9.5     Recent Labs      17   013   GLUCOSE  116*  117*  118*     Recent Labs      17   013   RBC  4.52*  4.44*  4.04*   HEMOGLOBIN  14.1  13.9*  12.8*   HEMATOCRIT  46.0  45.7  41.2*   PLATELETCT  196  177  178     Recent Labs      17   013   WBC  10.7  8.2  7.8   NEUTSPOLYS  69.30  66.00  63.40   LYMPHOCYTES  18.80*  21.50*  23.70   MONOCYTES  9.00  9.40  9.30   EOSINOPHILS  2.10  2.20  2.70   BASOPHILS  0.50  0.50  0.60           Hemodynamics:  Temp (24hrs), Av.7 °C (98 °F), Min:36.3 °C (97.4 °F), Max:37.4 °C (99.3 °F)  Temperature: 36.5 °C (97.7 °F)  Pulse  Av.7  Min: 44  Max: 98  Blood Pressure : 103/52 mmHg     Respiratory:    Respiration: 18, Pulse Oximetry: 96 %, O2 Daily Delivery Respiratory : Silicone Nasal Cannula     Given By:: Mask, #MDI/DPI Given: MDI/DPI x 2, PEP/CPT Method: Positive Airway Pressure Device, Work Of Breathing / Effort: Mild  RUL Breath Sounds: Diminished, RML Breath Sounds: Diminished, RLL Breath Sounds: Diminished, DEBI Breath Sounds: Diminished, LLL Breath Sounds: Diminished  Fluids:    Intake/Output Summary (Last 24 hours) at 17 1122  Last data filed at 17 1056   Gross per 24 hour   Intake     787 ml   Output      0 ml   Net    787 ml        GI/Nutrition:  Orders Placed This Encounter   Procedures   • DIET ORDER     Standing Status: Standing      Number of Occurrences: 1      Standing Expiration Date:      Order Specific Question:  Diet:     Answer:  Diabetic [3]     Order Specific Question:  Diet:     Answer:  Cardiac [6]     Order Specific Question:  Texture/Fiber modifications:     Answer:  Dysphagia 1(Pureed)specify fluid consistency(question 6) [1]     Order Specific Question:  Consistency/Fluid modifications:     Answer:  Nectar Thick [2]     Medical Decision Making, by Problem:  Active Hospital Problems    Diagnosis   • Respiratory failure (CMS-[facility]) [J96.90]  Hypoxic/hypercarbic  Much improved, following O2 requirements     • Debility [R53.81]  PT consult     • Hypercapnic respiratory failure (CMS-HCC) [J96.92]  Pulmonology following     • Multiple falls [R29.6]     • MRSA carrier [Z22.322]     • Sepsis (CMS-HCC) [A41.9]  Sepsis resolved, due to HCAP with ESBL  Abx stop date 3/7  Active Orders     Ordered     Ordering Provider       Mon Feb 27, 2017  8:39 AM    02/27/17 0839  meropenem (MERREM) 500 mg in  mL IVPB   EVERY 6 HOURS     Question:  Indication(s) and duration(s) for meropenem  Answer:  Pneumonia, R-GNR HCAP/ HAP/VAP (duration 8 days)    Odilia Kapoor M.D.           • HCAP (healthcare-associated pneumonia) [J18.9]  As above     • Chronic respiratory failure with hypoxia (CMS-HCC) [J96.11]     • Oxygen dependent [Z99.81]     • DM type 2, controlled, with complication (CMS-HCC) [E11.8]  In-hospital FSBG goal less than 180 mg/dL  SSI qAC & qHS when eating, q6 when NPO  GLUCOSE - ACCU-CK   Date/Time Value Ref Range Status   03/04/2017 04:58 * 65 - 99 mg/dL Final   03/03/2017 09:12 * 65 - 99 mg/dL Final   03/03/2017 04:52 * 65 - 99 mg/dL Final   03/03/2017 11:10 * 65 - 99 mg/dL Final      • Left hemiparesis (CMS-HCC) [G81.94]  Chronic from recent CVA     •  Dysphagia status post cerebrovascular accident [I69.391]     • COPD (chronic obstructive pulmonary disease) (CMS-[facility]) [J44.9]     • GERD (gastroesophageal reflux disease) [K21.9]       Dispo: Completing abx here, suspect will need SNF    Medications reviewed and Labs reviewed        DVT Prophylaxis: Heparin  DVT prophylaxis - mechanical: SCDs

## 2017-03-05 LAB
ANION GAP SERPL CALC-SCNC: 3 MMOL/L (ref 0–11.9)
BASOPHILS # BLD AUTO: 0.6 % (ref 0–1.8)
BASOPHILS # BLD: 0.05 K/UL (ref 0–0.12)
BUN SERPL-MCNC: 28 MG/DL (ref 8–22)
CALCIUM SERPL-MCNC: 8.9 MG/DL (ref 8.5–10.5)
CHLORIDE SERPL-SCNC: 105 MMOL/L (ref 96–112)
CO2 SERPL-SCNC: 33 MMOL/L (ref 20–33)
CREAT SERPL-MCNC: 0.61 MG/DL (ref 0.5–1.4)
EOSINOPHIL # BLD AUTO: 0.27 K/UL (ref 0–0.51)
EOSINOPHIL NFR BLD: 3.4 % (ref 0–6.9)
ERYTHROCYTE [DISTWIDTH] IN BLOOD BY AUTOMATED COUNT: 51.8 FL (ref 35.9–50)
GFR SERPL CREATININE-BSD FRML MDRD: >60 ML/MIN/1.73 M 2
GLUCOSE BLD-MCNC: 108 MG/DL (ref 65–99)
GLUCOSE BLD-MCNC: 110 MG/DL (ref 65–99)
GLUCOSE BLD-MCNC: 147 MG/DL (ref 65–99)
GLUCOSE SERPL-MCNC: 129 MG/DL (ref 65–99)
HCT VFR BLD AUTO: 39.9 % (ref 42–52)
HGB BLD-MCNC: 12.3 G/DL (ref 14–18)
IMM GRANULOCYTES # BLD AUTO: 0.03 K/UL (ref 0–0.11)
IMM GRANULOCYTES NFR BLD AUTO: 0.4 % (ref 0–0.9)
LYMPHOCYTES # BLD AUTO: 1.76 K/UL (ref 1–4.8)
LYMPHOCYTES NFR BLD: 22.3 % (ref 22–41)
MCH RBC QN AUTO: 31.5 PG (ref 27–33)
MCHC RBC AUTO-ENTMCNC: 30.8 G/DL (ref 33.7–35.3)
MCV RBC AUTO: 102 FL (ref 81.4–97.8)
MONOCYTES # BLD AUTO: 0.62 K/UL (ref 0–0.85)
MONOCYTES NFR BLD AUTO: 7.9 % (ref 0–13.4)
NEUTROPHILS # BLD AUTO: 5.15 K/UL (ref 1.82–7.42)
NEUTROPHILS NFR BLD: 65.4 % (ref 44–72)
NRBC # BLD AUTO: 0 K/UL
NRBC BLD AUTO-RTO: 0 /100 WBC
PLATELET # BLD AUTO: 182 K/UL (ref 164–446)
PMV BLD AUTO: 11.7 FL (ref 9–12.9)
POTASSIUM SERPL-SCNC: 4.5 MMOL/L (ref 3.6–5.5)
RBC # BLD AUTO: 3.91 M/UL (ref 4.7–6.1)
SODIUM SERPL-SCNC: 141 MMOL/L (ref 135–145)
WBC # BLD AUTO: 7.9 K/UL (ref 4.8–10.8)

## 2017-03-05 PROCEDURE — 700111 HCHG RX REV CODE 636 W/ 250 OVERRIDE (IP): Performed by: INTERNAL MEDICINE

## 2017-03-05 PROCEDURE — 99233 SBSQ HOSP IP/OBS HIGH 50: CPT | Performed by: HOSPITALIST

## 2017-03-05 PROCEDURE — 770021 HCHG ROOM/CARE - ISO PRIVATE

## 2017-03-05 PROCEDURE — 700101 HCHG RX REV CODE 250: Performed by: INTERNAL MEDICINE

## 2017-03-05 PROCEDURE — 85025 COMPLETE CBC W/AUTO DIFF WBC: CPT

## 2017-03-05 PROCEDURE — 80048 BASIC METABOLIC PNL TOTAL CA: CPT

## 2017-03-05 PROCEDURE — A9270 NON-COVERED ITEM OR SERVICE: HCPCS | Performed by: HOSPITALIST

## 2017-03-05 PROCEDURE — 36415 COLL VENOUS BLD VENIPUNCTURE: CPT

## 2017-03-05 PROCEDURE — 94668 MNPJ CHEST WALL SBSQ: CPT

## 2017-03-05 PROCEDURE — 700102 HCHG RX REV CODE 250 W/ 637 OVERRIDE(OP): Performed by: HOSPITALIST

## 2017-03-05 PROCEDURE — 700112 HCHG RX REV CODE 229: Performed by: HOSPITALIST

## 2017-03-05 PROCEDURE — A9270 NON-COVERED ITEM OR SERVICE: HCPCS | Performed by: INTERNAL MEDICINE

## 2017-03-05 PROCEDURE — 82962 GLUCOSE BLOOD TEST: CPT

## 2017-03-05 PROCEDURE — 700102 HCHG RX REV CODE 250 W/ 637 OVERRIDE(OP): Performed by: INTERNAL MEDICINE

## 2017-03-05 PROCEDURE — 94640 AIRWAY INHALATION TREATMENT: CPT

## 2017-03-05 PROCEDURE — 306558 VEST RESTRAINT (MEDIUM): Performed by: HOSPITALIST

## 2017-03-05 PROCEDURE — 94760 N-INVAS EAR/PLS OXIMETRY 1: CPT

## 2017-03-05 PROCEDURE — 700105 HCHG RX REV CODE 258: Performed by: INTERNAL MEDICINE

## 2017-03-05 RX ORDER — POLYETHYLENE GLYCOL 3350 17 G/17G
1 POWDER, FOR SOLUTION ORAL DAILY
Status: DISCONTINUED | OUTPATIENT
Start: 2017-03-05 | End: 2017-03-22

## 2017-03-05 RX ORDER — ACETAMINOPHEN 325 MG/1
650 TABLET ORAL EVERY 6 HOURS PRN
Status: DISCONTINUED | OUTPATIENT
Start: 2017-03-05 | End: 2017-03-22

## 2017-03-05 RX ORDER — OXYCODONE HYDROCHLORIDE 5 MG/1
5 TABLET ORAL EVERY 4 HOURS PRN
Status: DISCONTINUED | OUTPATIENT
Start: 2017-03-05 | End: 2017-03-22

## 2017-03-05 RX ADMIN — TAMSULOSIN HYDROCHLORIDE 0.4 MG: 0.4 CAPSULE ORAL at 09:51

## 2017-03-05 RX ADMIN — MICONAZOLE NITRATE: 20 CREAM TOPICAL at 23:58

## 2017-03-05 RX ADMIN — ACETAMINOPHEN 650 MG: 325 TABLET, FILM COATED ORAL at 18:19

## 2017-03-05 RX ADMIN — MEROPENEM 500 MG: 500 INJECTION, POWDER, FOR SOLUTION INTRAVENOUS at 23:58

## 2017-03-05 RX ADMIN — MICONAZOLE NITRATE: 20 CREAM TOPICAL at 12:00

## 2017-03-05 RX ADMIN — HEPARIN SODIUM 5000 UNITS: 5000 INJECTION, SOLUTION INTRAVENOUS; SUBCUTANEOUS at 05:22

## 2017-03-05 RX ADMIN — ACETAMINOPHEN 650 MG: 325 TABLET, FILM COATED ORAL at 09:45

## 2017-03-05 RX ADMIN — MICONAZOLE NITRATE: 20 CREAM TOPICAL at 18:19

## 2017-03-05 RX ADMIN — LACTULOSE 30 ML: 10 SOLUTION ORAL at 10:43

## 2017-03-05 RX ADMIN — DOCUSATE SODIUM 100 MG: 100 CAPSULE ORAL at 09:00

## 2017-03-05 RX ADMIN — ATORVASTATIN CALCIUM 80 MG: 80 TABLET, FILM COATED ORAL at 20:44

## 2017-03-05 RX ADMIN — FAMOTIDINE 20 MG: 20 TABLET, FILM COATED ORAL at 20:44

## 2017-03-05 RX ADMIN — FAMOTIDINE 20 MG: 20 TABLET, FILM COATED ORAL at 09:51

## 2017-03-05 RX ADMIN — LEVALBUTEROL HYDROCHLORIDE 1.25 MG: 1.25 SOLUTION RESPIRATORY (INHALATION) at 11:52

## 2017-03-05 RX ADMIN — HEPARIN SODIUM 5000 UNITS: 5000 INJECTION, SOLUTION INTRAVENOUS; SUBCUTANEOUS at 14:30

## 2017-03-05 RX ADMIN — MICONAZOLE NITRATE: 20 CREAM TOPICAL at 06:00

## 2017-03-05 RX ADMIN — LACTOBACILLUS ACIDOPHILUS / LACTOBACILLUS BULGARICUS 1 PACKET: 100 MILLION CFU STRENGTH GRANULES at 09:50

## 2017-03-05 RX ADMIN — MEROPENEM 500 MG: 500 INJECTION, POWDER, FOR SOLUTION INTRAVENOUS at 18:19

## 2017-03-05 RX ADMIN — METOPROLOL TARTRATE 50 MG: 50 TABLET, FILM COATED ORAL at 20:44

## 2017-03-05 RX ADMIN — Medication 325 MG: at 09:51

## 2017-03-05 RX ADMIN — LEVALBUTEROL HYDROCHLORIDE 1.25 MG: 1.25 SOLUTION RESPIRATORY (INHALATION) at 07:06

## 2017-03-05 RX ADMIN — FINASTERIDE 5 MG: 5 TABLET, FILM COATED ORAL at 09:50

## 2017-03-05 RX ADMIN — MEROPENEM 500 MG: 500 INJECTION, POWDER, FOR SOLUTION INTRAVENOUS at 12:00

## 2017-03-05 RX ADMIN — POLYETHYLENE GLYCOL 3350 1 PACKET: 17 POWDER, FOR SOLUTION ORAL at 12:00

## 2017-03-05 RX ADMIN — MEROPENEM 500 MG: 500 INJECTION, POWDER, FOR SOLUTION INTRAVENOUS at 09:45

## 2017-03-05 RX ADMIN — TIOTROPIUM BROMIDE 1 CAPSULE: 18 CAPSULE ORAL; RESPIRATORY (INHALATION) at 07:06

## 2017-03-05 RX ADMIN — HEPARIN SODIUM 5000 UNITS: 5000 INJECTION, SOLUTION INTRAVENOUS; SUBCUTANEOUS at 20:45

## 2017-03-05 RX ADMIN — STANDARDIZED SENNA CONCENTRATE AND DOCUSATE SODIUM 1 TABLET: 8.6; 5 TABLET, FILM COATED ORAL at 20:45

## 2017-03-05 RX ADMIN — METOPROLOL TARTRATE 50 MG: 50 TABLET, FILM COATED ORAL at 09:51

## 2017-03-05 RX ADMIN — OXYCODONE HYDROCHLORIDE AND ACETAMINOPHEN 500 MG: 500 TABLET ORAL at 09:51

## 2017-03-05 RX ADMIN — OXYBUTYNIN CHLORIDE 5 MG: 5 TABLET, FILM COATED, EXTENDED RELEASE ORAL at 09:51

## 2017-03-05 RX ADMIN — LEVETIRACETAM 1500 MG: 500 TABLET, FILM COATED ORAL at 09:51

## 2017-03-05 RX ADMIN — OXYCODONE HYDROCHLORIDE 5 MG: 5 TABLET ORAL at 18:19

## 2017-03-05 RX ADMIN — DOCUSATE SODIUM 100 MG: 100 CAPSULE ORAL at 20:44

## 2017-03-05 RX ADMIN — LEVETIRACETAM 1500 MG: 500 TABLET, FILM COATED ORAL at 20:44

## 2017-03-05 RX ADMIN — BUDESONIDE AND FORMOTEROL FUMARATE DIHYDRATE 2 PUFF: 160; 4.5 AEROSOL RESPIRATORY (INHALATION) at 19:19

## 2017-03-05 RX ADMIN — LEVALBUTEROL HYDROCHLORIDE 1.25 MG: 1.25 SOLUTION RESPIRATORY (INHALATION) at 19:19

## 2017-03-05 RX ADMIN — LEVALBUTEROL HYDROCHLORIDE 1.25 MG: 1.25 SOLUTION RESPIRATORY (INHALATION) at 16:33

## 2017-03-05 RX ADMIN — SERTRALINE 100 MG: 100 TABLET, FILM COATED ORAL at 09:51

## 2017-03-05 RX ADMIN — BUDESONIDE AND FORMOTEROL FUMARATE DIHYDRATE 2 PUFF: 160; 4.5 AEROSOL RESPIRATORY (INHALATION) at 07:06

## 2017-03-05 RX ADMIN — SPIRONOLACTONE 25 MG: 25 TABLET, FILM COATED ORAL at 09:50

## 2017-03-05 RX ADMIN — LACTOBACILLUS ACIDOPHILUS / LACTOBACILLUS BULGARICUS 1 PACKET: 100 MILLION CFU STRENGTH GRANULES at 18:19

## 2017-03-05 RX ADMIN — GABAPENTIN 300 MG: 300 CAPSULE ORAL at 10:43

## 2017-03-05 RX ADMIN — ASPIRIN 81 MG: 81 TABLET ORAL at 09:51

## 2017-03-05 RX ADMIN — LACTOBACILLUS ACIDOPHILUS / LACTOBACILLUS BULGARICUS 1 PACKET: 100 MILLION CFU STRENGTH GRANULES at 11:30

## 2017-03-05 ASSESSMENT — ENCOUNTER SYMPTOMS
DIARRHEA: 0
FEVER: 0
SHORTNESS OF BREATH: 0
WHEEZING: 0
VOMITING: 0
CONSTIPATION: 1
NAUSEA: 0
CHILLS: 0
BACK PAIN: 1
ABDOMINAL PAIN: 1
COUGH: 0

## 2017-03-05 ASSESSMENT — PAIN SCALES - GENERAL
PAINLEVEL_OUTOF10: 0
PAINLEVEL_OUTOF10: 4
PAINLEVEL_OUTOF10: 5

## 2017-03-05 NOTE — CARE PLAN
Problem: Urinary Elimination:  Goal: Ability to reestablish a normal urinary elimination pattern will improve  Outcome: PROGRESSING SLOWER THAN EXPECTED  Pt receiving flomax and proscar, incontinent at times, condom catheter in place.

## 2017-03-05 NOTE — PROGRESS NOTES
"Hospital Medicine Progress Note, Adult, Complex               Author: Cayden Villatoro  Date & Time created: 3/5/2017  10:39 AM     Interval History:  81 year old male with a PMHx of Stroke with left sided hemiparesis, HTN, COPD on 3 L of oxygen, asthma, multiple falls and pelvic fracture presented from [facility] SNF,presented on 2/20 with shortness of breath and cough and found to have left sided opacification started on vanco and zosyn for HCAP.  H/o aspiration, on dysphagia 1 diet per SLP.  Now on meropenem for ESBL.     3/5 - Confused overnight and pulled out IV, no longer in restr. Constipation: miralax. Back pain: tylenol/oxy prn. Not oriented to time.     3/4 - still very grumpy about being in the hospital, did not want to work with RT on IS; I encouraged him to. Needs to get up for meals. I advised him of this too. Discussed with nursing. Discussed with RT.     3/3 pt is awake alert and oriented.  ROS limited as he is unhappy about being in the hospital and will not cooperate.  States he feels \"fine\".  Stable off BiPAP overnight    Review of Systems:  Review of Systems   Constitutional: Negative for fever and chills.   Respiratory: Negative for cough, shortness of breath and wheezing.    Cardiovascular: Negative for chest pain.   Gastrointestinal: Positive for abdominal pain and constipation. Negative for nausea, vomiting and diarrhea.   Musculoskeletal: Positive for back pain.     Physical Exam:  Physical Exam   Constitutional: He appears well-developed and well-nourished.   HENT:   Head: Normocephalic and atraumatic.   Mouth/Throat: Oropharynx is clear and moist.   Eyes: Conjunctivae are normal.   Cardiovascular: Normal rate, regular rhythm and intact distal pulses.    Murmur heard.  Pulmonary/Chest: Effort normal. No respiratory distress. He has decreased breath sounds (but relatively clear) in the right lower field and the left lower field. He has no wheezes.   Abdominal: Soft. Bowel sounds are normal. He " exhibits no distension. There is no tenderness.   Musculoskeletal: Normal range of motion. He exhibits no edema or tenderness.   Lymphadenopathy:        Right: No supraclavicular adenopathy present.        Left: No supraclavicular adenopathy present.   Neurological: He is alert.   Somewhat oriented   Skin: Skin is warm.   Vitals reviewed.      Labs:  Recent Labs      17   1634   [id number]  7.35*   [id number]  70.9*   [id number]  103.0*   [id number]  97.3   ARTHCO3  38*   ARTBE  9*         Recent Labs      17   0139  17   0203   SODIUM  140  143  141   POTASSIUM  4.3  3.9  4.5   CHLORIDE  101  104  105   CO2  34*  35*  33   BUN  24*  29*  28*   CREATININE  0.64  0.63  0.61   CALCIUM  9.5  9.5  8.9     Recent Labs      17   01317   0203   GLUCOSE  117*  118*  129*     Recent Labs      17   0203   RBC  4.44*  4.04*  3.91*   HEMOGLOBIN  13.9*  12.8*  12.3*   HEMATOCRIT  45.7  41.2*  39.9*   PLATELETCT  177  178  182     Recent Labs      17   0203   WBC  8.2  7.8  7.9   NEUTSPOLYS  66.00  63.40  65.40   LYMPHOCYTES  21.50*  23.70  22.30   MONOCYTES  9.40  9.30  7.90   EOSINOPHILS  2.20  2.70  3.40   BASOPHILS  0.50  0.60  0.60           Hemodynamics:  Temp (24hrs), Av.1 °C (98.7 °F), Min:36.3 °C (97.3 °F), Max:37.9 °C (100.2 °F)  Temperature: 37.9 °C (100.2 °F)  Pulse  Av.4  Min: 44  Max: 98  Blood Pressure : 117/59 mmHg     Respiratory:    Respiration: 16, Pulse Oximetry: 98 %, O2 Daily Delivery Respiratory : Silicone Nasal Cannula     Given By:: Mask, #MDI/DPI Given: MDI/DPI x 2, PEP/CPT Method: Positive Airway Pressure Device, Work Of Breathing / Effort: Mild  RUL Breath Sounds: Clear;Diminished, RML Breath Sounds: Diminished, RLL Breath Sounds: Diminished, DEBI Breath Sounds: Clear;Diminished, LLL Breath Sounds: Diminished  Fluids:    Intake/Output Summary (Last 24  hours) at 03/05/17 1039  Last data filed at 03/05/17 0400   Gross per 24 hour   Intake    937 ml   Output      0 ml   Net    937 ml        GI/Nutrition:  Orders Placed This Encounter   Procedures   • DIET ORDER     Standing Status: Standing      Number of Occurrences: 1      Standing Expiration Date:      Order Specific Question:  Diet:     Answer:  Diabetic [3]     Order Specific Question:  Diet:     Answer:  Cardiac [6]     Order Specific Question:  Texture/Fiber modifications:     Answer:  Dysphagia 1(Pureed)specify fluid consistency(question 6) [1]     Order Specific Question:  Consistency/Fluid modifications:     Answer:  Nectar Thick [2]     Medical Decision Making, by Problem:  Active Hospital Problems    Diagnosis   • Respiratory failure (CMS-HCC) [J96.90]  Hypoxic/hypercarbic  Much improved, following O2 requirements     • Debility [R53.81]  PT consult     • Hypercapnic respiratory failure (CMS-HCC) [J96.92]  Pulmonology following     • Multiple falls [R29.6]     • MRSA carrier [Z22.322]     • Sepsis (CMS-HCC) [A41.9]  Sepsis resolved, due to HCAP with ESBL  ID Emelia consulted  Abx stop date 3/7         • HCAP (healthcare-associated pneumonia) [J18.9]  As above     • Chronic respiratory failure with hypoxia (CMS-HCC) [J96.11]     • Oxygen dependent [Z99.81]     • DM type 2, controlled, with complication (CMS-HCC) [E11.8]  In-hospital FSBG goal less than 180 mg/dL  SSI qAC & qHS when eating, q6 when NPO  GLUCOSE - ACCU-CK   Date/Time Value Ref Range Status   03/05/2017 05:23 * 65 - 99 mg/dL Final   03/04/2017 08:56 * 65 - 99 mg/dL Final   03/04/2017 04:26 * 65 - 99 mg/dL Final     Comment:     Coverage given   03/04/2017 11:32 * 65 - 99 mg/dL Final     Comment:     Followed Dr orders      • Left hemiparesis (CMS-HCC) [G81.94]  Chronic from recent CVA     • Dysphagia status post cerebrovascular accident [I69.391]     • COPD (chronic obstructive pulmonary disease) (CMS-HCC) [J44.9]      • GERD (gastroesophageal reflux disease) [K21.9]     Constipation: miralax.     Back pain: tylenol/oxy prn. Not oriented to time.     Dispo: Completing abx here, then likely SNF    Medications reviewed and Labs reviewed        DVT Prophylaxis: Heparin  DVT prophylaxis - mechanical: SCDs

## 2017-03-05 NOTE — PROGRESS NOTES
Pt AOx3, disoriented to time. Wrist restraints removed for breakfast and left off, MD aware. Bed alarm on, pt verbally calls out for help, repeated attempts of education for use of call light unsuccessful.     RAC PIV obtained, pt has been leaving  IV alone for now. No unsafe attempts at mobilization at this time.

## 2017-03-05 NOTE — FLOWSHEET NOTE
03/04/17 2018   Events/Summary/Plan   Events/Summary/Plan SVN/MDI/PEP/IS   Non-Invasive Resp Device Site Inspection Completed Intact   Interdisciplinary Plan of Care-Goals (Indications)   Obstructive Ventilatory Defect or Pulmonary Disease without Obvious Obstruction History / Diagnosis   Bronchopulmonary Hygiene Indications Difficulty with Secretion Clearance   Hyperinflation Protocol Indications Atelectasis Documented by Chest X-Ray   Interdisciplinary Plan of Care-Outcomes    Bronchodilator Outcome Patient at Stable Baseline   Hyperinflation Protocol Goals/Outcome Improvement in Previously Absent or Diminished Breath Sounds   Education   Education Yes - Pt. / Family has been Instructed in use of Respiratory Equipment;Yes - Pt. / Family has been Instructed in use of Respiratory Medications and Adverse Reactions   RT Assessment of Delivered Medications   Evaluation of Medication Delivery Daily Yes-- Pt /Family has been Instructed in use of Respiratory Medications and Adverse Reactions   SVN Group   #SVN Performed Yes   Given By: Mask   MDI/DPI Group   #MDI/DPI Given MDI/DPI x 1   PEP/CPT Group   PEP/CPT/Airway Clearance Therapy Yes   #PEP/CPT (Manual) Subsequent Subsequent   PEP/CPT Method Positive Airway Pressure Device   Pressure 10   Incentive Spirometry Group   Incentive Spirometry Instruction Yes   Breathing Exercises Yes   Incentive Spirometer Volume 750 mL   Respiratory WDL   Respiratory (WDL) X   Chest Exam   Work Of Breathing / Effort Mild   Respiration 18   Pulse 72   Breath Sounds   Pre/Post Intervention Pre Intervention Assessment   RUL Breath Sounds Diminished   RML Breath Sounds Diminished   RLL Breath Sounds Diminished   DEBI Breath Sounds Diminished   LLL Breath Sounds Diminished   Secretions   Cough Non Productive   Oximetry   #Pulse Oximetry (Single Determination) Yes   Oxygen   Pulse Oximetry 95 %   O2 (LPM) 2   O2 Daily Delivery Respiratory  Silicone Nasal Cannula

## 2017-03-05 NOTE — CARE PLAN
Problem: Infection  Goal: Will remain free from infection  Outcome: PROGRESSING AS EXPECTED  Patient will remain free from infection during stay. Patient is afebrile at this time. Will continue to monitor.

## 2017-03-05 NOTE — CARE PLAN
Problem: Safety  Goal: Will remain free from falls  Outcome: PROGRESSING AS EXPECTED  Patient will remain free from injury/falls during stay. Call light within reach. Vest restraint in place. Q2 restraint check.

## 2017-03-05 NOTE — CARE PLAN
Problem: Infection  Goal: Will remain free from infection  Outcome: PROGRESSING AS EXPECTED  No s/s of new or worsening infection. Vital signs stable. Abx as ordered.

## 2017-03-06 LAB
ANION GAP SERPL CALC-SCNC: 4 MMOL/L (ref 0–11.9)
BASOPHILS # BLD AUTO: 0.6 % (ref 0–1.8)
BASOPHILS # BLD: 0.04 K/UL (ref 0–0.12)
BUN SERPL-MCNC: 22 MG/DL (ref 8–22)
CALCIUM SERPL-MCNC: 9.5 MG/DL (ref 8.5–10.5)
CHLORIDE SERPL-SCNC: 104 MMOL/L (ref 96–112)
CO2 SERPL-SCNC: 34 MMOL/L (ref 20–33)
CREAT SERPL-MCNC: 0.5 MG/DL (ref 0.5–1.4)
EOSINOPHIL # BLD AUTO: 0.25 K/UL (ref 0–0.51)
EOSINOPHIL NFR BLD: 3.8 % (ref 0–6.9)
ERYTHROCYTE [DISTWIDTH] IN BLOOD BY AUTOMATED COUNT: 50.9 FL (ref 35.9–50)
GFR SERPL CREATININE-BSD FRML MDRD: >60 ML/MIN/1.73 M 2
GLUCOSE BLD-MCNC: 106 MG/DL (ref 65–99)
GLUCOSE BLD-MCNC: 112 MG/DL (ref 65–99)
GLUCOSE BLD-MCNC: 97 MG/DL (ref 65–99)
GLUCOSE SERPL-MCNC: 109 MG/DL (ref 65–99)
HCT VFR BLD AUTO: 38.3 % (ref 42–52)
HGB BLD-MCNC: 12 G/DL (ref 14–18)
IMM GRANULOCYTES # BLD AUTO: 0.03 K/UL (ref 0–0.11)
IMM GRANULOCYTES NFR BLD AUTO: 0.5 % (ref 0–0.9)
LYMPHOCYTES # BLD AUTO: 1.88 K/UL (ref 1–4.8)
LYMPHOCYTES NFR BLD: 28.3 % (ref 22–41)
MCH RBC QN AUTO: 31.7 PG (ref 27–33)
MCHC RBC AUTO-ENTMCNC: 31.3 G/DL (ref 33.7–35.3)
MCV RBC AUTO: 101.3 FL (ref 81.4–97.8)
MONOCYTES # BLD AUTO: 0.62 K/UL (ref 0–0.85)
MONOCYTES NFR BLD AUTO: 9.3 % (ref 0–13.4)
NEUTROPHILS # BLD AUTO: 3.83 K/UL (ref 1.82–7.42)
NEUTROPHILS NFR BLD: 57.5 % (ref 44–72)
NRBC # BLD AUTO: 0 K/UL
NRBC BLD AUTO-RTO: 0 /100 WBC
PLATELET # BLD AUTO: 171 K/UL (ref 164–446)
PMV BLD AUTO: 11.8 FL (ref 9–12.9)
POTASSIUM SERPL-SCNC: 4.5 MMOL/L (ref 3.6–5.5)
RBC # BLD AUTO: 3.78 M/UL (ref 4.7–6.1)
SODIUM SERPL-SCNC: 142 MMOL/L (ref 135–145)
WBC # BLD AUTO: 6.7 K/UL (ref 4.8–10.8)

## 2017-03-06 PROCEDURE — 700111 HCHG RX REV CODE 636 W/ 250 OVERRIDE (IP): Performed by: INTERNAL MEDICINE

## 2017-03-06 PROCEDURE — 94640 AIRWAY INHALATION TREATMENT: CPT

## 2017-03-06 PROCEDURE — 700102 HCHG RX REV CODE 250 W/ 637 OVERRIDE(OP): Performed by: INTERNAL MEDICINE

## 2017-03-06 PROCEDURE — 700102 HCHG RX REV CODE 250 W/ 637 OVERRIDE(OP): Performed by: HOSPITALIST

## 2017-03-06 PROCEDURE — 700105 HCHG RX REV CODE 258: Performed by: INTERNAL MEDICINE

## 2017-03-06 PROCEDURE — 99232 SBSQ HOSP IP/OBS MODERATE 35: CPT | Performed by: HOSPITALIST

## 2017-03-06 PROCEDURE — 94669 MECHANICAL CHEST WALL OSCILL: CPT

## 2017-03-06 PROCEDURE — A9270 NON-COVERED ITEM OR SERVICE: HCPCS | Performed by: HOSPITALIST

## 2017-03-06 PROCEDURE — 80048 BASIC METABOLIC PNL TOTAL CA: CPT

## 2017-03-06 PROCEDURE — 85025 COMPLETE CBC W/AUTO DIFF WBC: CPT

## 2017-03-06 PROCEDURE — 770021 HCHG ROOM/CARE - ISO PRIVATE

## 2017-03-06 PROCEDURE — 700112 HCHG RX REV CODE 229: Performed by: HOSPITALIST

## 2017-03-06 PROCEDURE — 36415 COLL VENOUS BLD VENIPUNCTURE: CPT

## 2017-03-06 PROCEDURE — 94760 N-INVAS EAR/PLS OXIMETRY 1: CPT

## 2017-03-06 PROCEDURE — A9270 NON-COVERED ITEM OR SERVICE: HCPCS | Performed by: INTERNAL MEDICINE

## 2017-03-06 PROCEDURE — 94668 MNPJ CHEST WALL SBSQ: CPT

## 2017-03-06 PROCEDURE — 700101 HCHG RX REV CODE 250: Performed by: INTERNAL MEDICINE

## 2017-03-06 PROCEDURE — 82962 GLUCOSE BLOOD TEST: CPT | Mod: 91

## 2017-03-06 RX ADMIN — HEPARIN SODIUM 5000 UNITS: 5000 INJECTION, SOLUTION INTRAVENOUS; SUBCUTANEOUS at 05:31

## 2017-03-06 RX ADMIN — STANDARDIZED SENNA CONCENTRATE AND DOCUSATE SODIUM 1 TABLET: 8.6; 5 TABLET, FILM COATED ORAL at 20:59

## 2017-03-06 RX ADMIN — MEROPENEM 500 MG: 500 INJECTION, POWDER, FOR SOLUTION INTRAVENOUS at 17:28

## 2017-03-06 RX ADMIN — BUDESONIDE AND FORMOTEROL FUMARATE DIHYDRATE 2 PUFF: 160; 4.5 AEROSOL RESPIRATORY (INHALATION) at 06:40

## 2017-03-06 RX ADMIN — BUDESONIDE AND FORMOTEROL FUMARATE DIHYDRATE 2 PUFF: 160; 4.5 AEROSOL RESPIRATORY (INHALATION) at 20:59

## 2017-03-06 RX ADMIN — SPIRONOLACTONE 25 MG: 25 TABLET, FILM COATED ORAL at 08:11

## 2017-03-06 RX ADMIN — GABAPENTIN 300 MG: 300 CAPSULE ORAL at 08:10

## 2017-03-06 RX ADMIN — MICONAZOLE NITRATE: 20 CREAM TOPICAL at 12:00

## 2017-03-06 RX ADMIN — FAMOTIDINE 20 MG: 20 TABLET, FILM COATED ORAL at 08:10

## 2017-03-06 RX ADMIN — HEPARIN SODIUM 5000 UNITS: 5000 INJECTION, SOLUTION INTRAVENOUS; SUBCUTANEOUS at 14:42

## 2017-03-06 RX ADMIN — METOPROLOL TARTRATE 50 MG: 50 TABLET, FILM COATED ORAL at 20:56

## 2017-03-06 RX ADMIN — LEVETIRACETAM 1500 MG: 500 TABLET, FILM COATED ORAL at 20:57

## 2017-03-06 RX ADMIN — MEROPENEM 500 MG: 500 INJECTION, POWDER, FOR SOLUTION INTRAVENOUS at 23:50

## 2017-03-06 RX ADMIN — LEVETIRACETAM 1500 MG: 500 TABLET, FILM COATED ORAL at 08:07

## 2017-03-06 RX ADMIN — HEPARIN SODIUM 5000 UNITS: 5000 INJECTION, SOLUTION INTRAVENOUS; SUBCUTANEOUS at 20:57

## 2017-03-06 RX ADMIN — TIOTROPIUM BROMIDE 1 CAPSULE: 18 CAPSULE ORAL; RESPIRATORY (INHALATION) at 06:40

## 2017-03-06 RX ADMIN — MICONAZOLE NITRATE: 20 CREAM TOPICAL at 05:32

## 2017-03-06 RX ADMIN — DOCUSATE SODIUM 100 MG: 100 CAPSULE ORAL at 08:10

## 2017-03-06 RX ADMIN — LEVALBUTEROL HYDROCHLORIDE 1.25 MG: 1.25 SOLUTION RESPIRATORY (INHALATION) at 06:39

## 2017-03-06 RX ADMIN — OXYCODONE HYDROCHLORIDE 5 MG: 5 TABLET ORAL at 05:31

## 2017-03-06 RX ADMIN — DOCUSATE SODIUM 100 MG: 100 CAPSULE ORAL at 20:57

## 2017-03-06 RX ADMIN — FAMOTIDINE 20 MG: 20 TABLET, FILM COATED ORAL at 20:57

## 2017-03-06 RX ADMIN — TRAZODONE HYDROCHLORIDE 50 MG: 50 TABLET ORAL at 20:58

## 2017-03-06 RX ADMIN — ATORVASTATIN CALCIUM 80 MG: 80 TABLET, FILM COATED ORAL at 20:57

## 2017-03-06 RX ADMIN — TAMSULOSIN HYDROCHLORIDE 0.4 MG: 0.4 CAPSULE ORAL at 08:10

## 2017-03-06 RX ADMIN — OXYCODONE HYDROCHLORIDE AND ACETAMINOPHEN 500 MG: 500 TABLET ORAL at 08:10

## 2017-03-06 RX ADMIN — OXYBUTYNIN CHLORIDE 5 MG: 5 TABLET, FILM COATED, EXTENDED RELEASE ORAL at 08:11

## 2017-03-06 RX ADMIN — MEROPENEM 500 MG: 500 INJECTION, POWDER, FOR SOLUTION INTRAVENOUS at 05:31

## 2017-03-06 RX ADMIN — FINASTERIDE 5 MG: 5 TABLET, FILM COATED ORAL at 08:07

## 2017-03-06 RX ADMIN — METOPROLOL TARTRATE 50 MG: 50 TABLET, FILM COATED ORAL at 08:10

## 2017-03-06 RX ADMIN — SERTRALINE 100 MG: 100 TABLET, FILM COATED ORAL at 08:10

## 2017-03-06 RX ADMIN — LACTOBACILLUS ACIDOPHILUS / LACTOBACILLUS BULGARICUS 1 PACKET: 100 MILLION CFU STRENGTH GRANULES at 11:55

## 2017-03-06 RX ADMIN — ASPIRIN 81 MG: 81 TABLET ORAL at 08:12

## 2017-03-06 RX ADMIN — Medication 325 MG: at 08:10

## 2017-03-06 RX ADMIN — LACTOBACILLUS ACIDOPHILUS / LACTOBACILLUS BULGARICUS 1 PACKET: 100 MILLION CFU STRENGTH GRANULES at 17:28

## 2017-03-06 RX ADMIN — MICONAZOLE NITRATE: 20 CREAM TOPICAL at 17:29

## 2017-03-06 RX ADMIN — LEVALBUTEROL HYDROCHLORIDE 1.25 MG: 1.25 SOLUTION RESPIRATORY (INHALATION) at 14:26

## 2017-03-06 RX ADMIN — MEROPENEM 500 MG: 500 INJECTION, POWDER, FOR SOLUTION INTRAVENOUS at 11:55

## 2017-03-06 RX ADMIN — POLYETHYLENE GLYCOL 3350 1 PACKET: 17 POWDER, FOR SOLUTION ORAL at 08:07

## 2017-03-06 RX ADMIN — LEVALBUTEROL HYDROCHLORIDE 1.25 MG: 1.25 SOLUTION RESPIRATORY (INHALATION) at 20:14

## 2017-03-06 RX ADMIN — LACTOBACILLUS ACIDOPHILUS / LACTOBACILLUS BULGARICUS 1 PACKET: 100 MILLION CFU STRENGTH GRANULES at 08:10

## 2017-03-06 ASSESSMENT — ENCOUNTER SYMPTOMS
NAUSEA: 0
WEAKNESS: 1
ABDOMINAL PAIN: 1
CHILLS: 0
DIARRHEA: 0
BACK PAIN: 1
CONSTIPATION: 1
FEVER: 0
SHORTNESS OF BREATH: 0
COUGH: 0
VOMITING: 0

## 2017-03-06 ASSESSMENT — PAIN SCALES - GENERAL
PAINLEVEL_OUTOF10: 5
PAINLEVEL_OUTOF10: 0
PAINLEVEL_OUTOF10: 5

## 2017-03-06 ASSESSMENT — PATIENT HEALTH QUESTIONNAIRE - PHQ9
1. LITTLE INTEREST OR PLEASURE IN DOING THINGS: NOT AT ALL
SUM OF ALL RESPONSES TO PHQ9 QUESTIONS 1 AND 2: 0
SUM OF ALL RESPONSES TO PHQ QUESTIONS 1-9: 0
2. FEELING DOWN, DEPRESSED, IRRITABLE, OR HOPELESS: NOT AT ALL

## 2017-03-06 NOTE — DISCHARGE PLANNING
Received voicemail from Bernie Warren with [facility] at 1203. Declining patient doesn't have any Medicare day left. Notified RENITA Zabala via phone.

## 2017-03-06 NOTE — PROGRESS NOTES
"Hospital Medicine Progress Note, Adult, Complex               Author: Cayden OAKLEY Irving  Date & Time created: 3/6/2017  10:52 AM     Interval History:  81 year old male with a PMHx of Stroke with left sided hemiparesis, HTN, COPD on 3 L of oxygen, asthma, multiple falls and pelvic fracture presented from [facility] care SNF,presented on 2/20 with shortness of breath and cough and found to have left sided opacification started on vanco and zosyn for HCAP.  H/o aspiration, on dysphagia 1 diet per SLP.  Now on meropenem for ESBL.     3/6 - Tomorrow will be last day of antibiotics. Then likely SNF, referral sent.   3/5 - Confused overnight and pulled out IV, no longer in restr. Constipation: miralax. Back pain: tylenol/oxy prn. Not oriented to time.   3/4 - still very grumpy about being in the hospital, did not want to work with RT on IS; I encouraged him to. Needs to get up for meals. I advised him of this too. Discussed with nursing. Discussed with RT.   3/3 pt is awake alert and oriented.  ROS limited as he is unhappy about being in the hospital and will not cooperate.  States he feels \"fine\".  Stable off BiPAP overnight    Review of Systems:  Review of Systems   Constitutional: Negative for fever and chills.   Respiratory: Negative for cough and shortness of breath.    Cardiovascular: Negative for chest pain.   Gastrointestinal: Positive for abdominal pain and constipation. Negative for nausea, vomiting and diarrhea.   Musculoskeletal: Positive for back pain.   Neurological: Positive for weakness.     Physical Exam:  Physical Exam   Constitutional: He appears well-developed and well-nourished.   HENT:   Head: Normocephalic and atraumatic.   Eyes: Conjunctivae are normal.   Cardiovascular: Normal rate and regular rhythm.    Murmur heard.  Pulmonary/Chest: Effort normal. No respiratory distress. He has decreased breath sounds (but relatively clear) in the right lower field and the left lower field. He has no wheezes. "   Abdominal: Soft. He exhibits no distension. There is no tenderness.   Musculoskeletal: Normal range of motion. He exhibits no edema.   Lymphadenopathy:        Right: No supraclavicular adenopathy present.        Left: No supraclavicular adenopathy present.   Neurological: He is alert. He exhibits abnormal muscle tone (weak).   Somewhat oriented   Skin: Skin is warm.   Vitals reviewed.      Labs:        Invalid input(s): [id number]      Recent Labs      17   SODIUM  143  141  142   POTASSIUM  3.9  4.5  4.5   CHLORIDE  104  105  104   CO2  35*  33  34*   BUN  29*  28*  22   CREATININE  0.63  0.61  0.50   CALCIUM  9.5  8.9  9.5     Recent Labs      17   GLUCOSE  118*  129*  109*     Recent Labs      17   RBC  4.04*  3.91*  3.78*   HEMOGLOBIN  12.8*  12.3*  12.0*   HEMATOCRIT  41.2*  39.9*  38.3*   PLATELETCT  178  182  171     Recent Labs      17   WBC  7.8  7.9  6.7   NEUTSPOLYS  63.40  65.40  57.50   LYMPHOCYTES  23.70  22.30  28.30   MONOCYTES  9.30  7.90  9.30   EOSINOPHILS  2.70  3.40  3.80   BASOPHILS  0.60  0.60  0.60           Hemodynamics:  Temp (24hrs), Av.8 °C (98.3 °F), Min:36.1 °C (97 °F), Max:37.7 °C (99.9 °F)  Temperature: 36.1 °C (97 °F)  Pulse  Av  Min: 44  Max: 98Heart Rate (Monitored): 66  Blood Pressure : 123/58 mmHg     Respiratory:    Respiration: 18, Pulse Oximetry: 97 %, O2 Daily Delivery Respiratory : Silicone Nasal Cannula     Given By:: Mask, #MDI/DPI Given: MDI/DPI x 2, PEP/CPT Method: Positive Airway Pressure Device, Work Of Breathing / Effort: Mild  RUL Breath Sounds: Diminished, RML Breath Sounds: Diminished, RLL Breath Sounds: Diminished, DEBI Breath Sounds: Diminished, LLL Breath Sounds: Diminished  Fluids:  No intake or output data in the 24 hours ending 17 1052  Weight: 58.6 kg  (129 lb 3 oz)  GI/Nutrition:  Orders Placed This Encounter   Procedures   • DIET ORDER     Standing Status: Standing      Number of Occurrences: 1      Standing Expiration Date:      Order Specific Question:  Diet:     Answer:  Diabetic [3]     Order Specific Question:  Diet:     Answer:  Cardiac [6]     Order Specific Question:  Texture/Fiber modifications:     Answer:  Dysphagia 1(Pureed)specify fluid consistency(question 6) [1]     Order Specific Question:  Consistency/Fluid modifications:     Answer:  Nectar Thick [2]     Medical Decision Making, by Problem:  Active Hospital Problems    Diagnosis   • Respiratory failure (CMS-HCC) [J96.90]  Hypoxic/hypercarbic  Much improved, following O2 requirements     • Debility [R53.81]  PT consult     • Hypercapnic respiratory failure (CMS-HCC) [J96.92]  Pulmonology following     • Multiple falls [R29.6]     • MRSA carrier [Z22.322]     • Sepsis (CMS-HCC) [A41.9]  Sepsis resolved, due to HCAP with ESBL  ID Emelia consulted  Abx stop date 3/7/17     • HCAP (healthcare-associated pneumonia) [J18.9]  As above     • Chronic respiratory failure with hypoxia (CMS-HCC) [J96.11]     • Oxygen dependent [Z99.81]     • DM type 2, controlled, with complication (CMS-HCC) [E11.8]  In-hospital FSBG goal less than 180 mg/dL  SSI qAC & qHS when eating, q6 when NPO  GLUCOSE - ACCU-CK   Date/Time Value Ref Range Status   03/05/2017 08:42 * 65 - 99 mg/dL Final   03/05/2017 11:41 * 65 - 99 mg/dL Final   03/05/2017 05:23 * 65 - 99 mg/dL Final   03/04/2017 08:56 * 65 - 99 mg/dL Final      • Left hemiparesis (CMS-HCC) [G81.94]  Chronic from recent CVA  Agrees she is too weak for group home (also needs assistance feeding)   • Dysphagia status post cerebrovascular accident [I69.391]     • COPD (chronic obstructive pulmonary disease) (CMS-HCC) [J44.9]     • GERD (gastroesophageal reflux disease) [K21.9]     Constipation: miralax.     Back pain:   - tylenol/oxy prn. Not  oriented to time.     Dispo: Completing abx here, then likely SNF. Too weak for group home     Medications reviewed and Labs reviewed        DVT Prophylaxis: Heparin  DVT prophylaxis - mechanical: SCDs

## 2017-03-06 NOTE — FLOWSHEET NOTE
03/05/17 1929   Events/Summary/Plan   Non-Invasive Resp Device Site Inspection Completed Intact   Interdisciplinary Plan of Care-Goals (Indications)   Obstructive Ventilatory Defect or Pulmonary Disease without Obvious Obstruction History / Diagnosis   Hyperinflation Protocol Indications Atelectasis Documented by Chest X-Ray   Interdisciplinary Plan of Care-Outcomes    Bronchodilator Outcome Patient at Stable Baseline   Hyperinflation Protocol Goals/Outcome Improvement in Previously Absent or Diminished Breath Sounds   Education   Education Yes - Pt. / Family has been Instructed in use of Respiratory Equipment;Yes - Pt. / Family has been Instructed in use of Respiratory Medications and Adverse Reactions   RT Assessment of Delivered Medications   Evaluation of Medication Delivery Daily Yes-- Pt /Family has been Instructed in use of Respiratory Medications and Adverse Reactions   SVN Group   #SVN Performed Yes   Given By: Mask   MDI/DPI Group   #MDI/DPI Given MDI/DPI x 1   PEP/CPT Group   PEP/CPT/Airway Clearance Therapy Yes   #PEP/CPT (Manual) Subsequent Subsequent   PEP/CPT Method Positive Airway Pressure Device   Pressure 10   Incentive Spirometry Group   Incentive Spirometry Instruction Yes   Breathing Exercises Yes   Incentive Spirometer Volume 750 mL   Respiratory WDL   Respiratory (WDL) X   Chest Exam   Work Of Breathing / Effort Mild   Respiration 16   Pulse 62   Breath Sounds   Pre/Post Intervention Pre Intervention Assessment   RUL Breath Sounds Rhonchi   RML Breath Sounds Diminished   RLL Breath Sounds Diminished   DEBI Breath Sounds Rhonchi   LLL Breath Sounds Diminished   Secretions   Cough Non Productive   Oximetry   #Pulse Oximetry (Single Determination) Yes   Oxygen   Pulse Oximetry 96 %   O2 (LPM) 2   O2 Daily Delivery Respiratory  Silicone Nasal Cannula

## 2017-03-06 NOTE — DISCHARGE PLANNING
Received choice form from RENITA Zabala at 1042. Referral sent to [facility] at 1040.

## 2017-03-06 NOTE — DISCHARGE PLANNING
Medical Social Work  Patient was open to going to a skilled facility prior to going home.  Said he wanted to go to the one he was at last time, [facility].  Faxed [facility].

## 2017-03-06 NOTE — CARE PLAN
Problem: Safety  Goal: Will remain free from falls  Intervention: Assess risk factors for falls    03/05/17 2253   OTHER   Mobility Status Assessment 2-2 Healthcare Providers Required for Assistance with Ambulation & Transfer   History of fall 2   Continues to have bed alarm.        Problem: Skin Integrity  Goal: Risk for impaired skin integrity will decrease  Intervention: Implement precautions to protect skin integrity in collaboration with the interdisciplinary team    03/05/17 8511   OTHER   Protocols Incontinence Associated Dermatitis Protocol in Place   Moisturizers Antifungal Paste

## 2017-03-06 NOTE — PROGRESS NOTES
Disoriented to time and situation.  Left sided weakness from previous CVA.  Has IV site to right AC.  Applied coban to iv site to prevent from pt to pull it out.  Incontinent of urine.  Redness and IAD noted.   Miconazole cream applied.  Call light within reach.  Continues to have strip bed alarm set at 1 second.

## 2017-03-07 LAB
ANION GAP SERPL CALC-SCNC: 2 MMOL/L (ref 0–11.9)
BASOPHILS # BLD AUTO: 0.6 % (ref 0–1.8)
BASOPHILS # BLD: 0.04 K/UL (ref 0–0.12)
BUN SERPL-MCNC: 22 MG/DL (ref 8–22)
CALCIUM SERPL-MCNC: 9.1 MG/DL (ref 8.5–10.5)
CHLORIDE SERPL-SCNC: 102 MMOL/L (ref 96–112)
CO2 SERPL-SCNC: 36 MMOL/L (ref 20–33)
CREAT SERPL-MCNC: 0.53 MG/DL (ref 0.5–1.4)
EOSINOPHIL # BLD AUTO: 0.25 K/UL (ref 0–0.51)
EOSINOPHIL NFR BLD: 3.7 % (ref 0–6.9)
ERYTHROCYTE [DISTWIDTH] IN BLOOD BY AUTOMATED COUNT: 51.3 FL (ref 35.9–50)
GFR SERPL CREATININE-BSD FRML MDRD: >60 ML/MIN/1.73 M 2
GLUCOSE BLD-MCNC: 103 MG/DL (ref 65–99)
GLUCOSE BLD-MCNC: 107 MG/DL (ref 65–99)
GLUCOSE BLD-MCNC: 122 MG/DL (ref 65–99)
GLUCOSE BLD-MCNC: 98 MG/DL (ref 65–99)
GLUCOSE SERPL-MCNC: 95 MG/DL (ref 65–99)
HCT VFR BLD AUTO: 37.9 % (ref 42–52)
HGB BLD-MCNC: 12 G/DL (ref 14–18)
IMM GRANULOCYTES # BLD AUTO: 0.02 K/UL (ref 0–0.11)
IMM GRANULOCYTES NFR BLD AUTO: 0.3 % (ref 0–0.9)
LYMPHOCYTES # BLD AUTO: 1.83 K/UL (ref 1–4.8)
LYMPHOCYTES NFR BLD: 27 % (ref 22–41)
MCH RBC QN AUTO: 32.3 PG (ref 27–33)
MCHC RBC AUTO-ENTMCNC: 31.7 G/DL (ref 33.7–35.3)
MCV RBC AUTO: 101.9 FL (ref 81.4–97.8)
MONOCYTES # BLD AUTO: 0.6 K/UL (ref 0–0.85)
MONOCYTES NFR BLD AUTO: 8.8 % (ref 0–13.4)
NEUTROPHILS # BLD AUTO: 4.04 K/UL (ref 1.82–7.42)
NEUTROPHILS NFR BLD: 59.6 % (ref 44–72)
NRBC # BLD AUTO: 0 K/UL
NRBC BLD AUTO-RTO: 0 /100 WBC
PLATELET # BLD AUTO: 169 K/UL (ref 164–446)
PMV BLD AUTO: 11.9 FL (ref 9–12.9)
POTASSIUM SERPL-SCNC: 4.5 MMOL/L (ref 3.6–5.5)
RBC # BLD AUTO: 3.72 M/UL (ref 4.7–6.1)
SODIUM SERPL-SCNC: 140 MMOL/L (ref 135–145)
WBC # BLD AUTO: 6.8 K/UL (ref 4.8–10.8)

## 2017-03-07 PROCEDURE — 94668 MNPJ CHEST WALL SBSQ: CPT

## 2017-03-07 PROCEDURE — 700112 HCHG RX REV CODE 229: Performed by: HOSPITALIST

## 2017-03-07 PROCEDURE — 700105 HCHG RX REV CODE 258: Performed by: INTERNAL MEDICINE

## 2017-03-07 PROCEDURE — 82962 GLUCOSE BLOOD TEST: CPT

## 2017-03-07 PROCEDURE — 97535 SELF CARE MNGMENT TRAINING: CPT

## 2017-03-07 PROCEDURE — 36415 COLL VENOUS BLD VENIPUNCTURE: CPT

## 2017-03-07 PROCEDURE — A9270 NON-COVERED ITEM OR SERVICE: HCPCS | Performed by: HOSPITALIST

## 2017-03-07 PROCEDURE — 94760 N-INVAS EAR/PLS OXIMETRY 1: CPT

## 2017-03-07 PROCEDURE — 700111 HCHG RX REV CODE 636 W/ 250 OVERRIDE (IP): Performed by: INTERNAL MEDICINE

## 2017-03-07 PROCEDURE — 770021 HCHG ROOM/CARE - ISO PRIVATE

## 2017-03-07 PROCEDURE — 700102 HCHG RX REV CODE 250 W/ 637 OVERRIDE(OP): Performed by: INTERNAL MEDICINE

## 2017-03-07 PROCEDURE — 700102 HCHG RX REV CODE 250 W/ 637 OVERRIDE(OP): Performed by: HOSPITALIST

## 2017-03-07 PROCEDURE — 700101 HCHG RX REV CODE 250: Performed by: INTERNAL MEDICINE

## 2017-03-07 PROCEDURE — 80048 BASIC METABOLIC PNL TOTAL CA: CPT

## 2017-03-07 PROCEDURE — A9270 NON-COVERED ITEM OR SERVICE: HCPCS | Performed by: INTERNAL MEDICINE

## 2017-03-07 PROCEDURE — 99232 SBSQ HOSP IP/OBS MODERATE 35: CPT | Performed by: INTERNAL MEDICINE

## 2017-03-07 PROCEDURE — 85025 COMPLETE CBC W/AUTO DIFF WBC: CPT

## 2017-03-07 PROCEDURE — 94640 AIRWAY INHALATION TREATMENT: CPT

## 2017-03-07 RX ADMIN — DOCUSATE SODIUM 100 MG: 100 CAPSULE ORAL at 22:32

## 2017-03-07 RX ADMIN — METOPROLOL TARTRATE 50 MG: 50 TABLET, FILM COATED ORAL at 07:33

## 2017-03-07 RX ADMIN — MEROPENEM 500 MG: 500 INJECTION, POWDER, FOR SOLUTION INTRAVENOUS at 05:28

## 2017-03-07 RX ADMIN — LEVETIRACETAM 1500 MG: 500 TABLET, FILM COATED ORAL at 07:32

## 2017-03-07 RX ADMIN — OXYCODONE HYDROCHLORIDE 5 MG: 5 TABLET ORAL at 22:33

## 2017-03-07 RX ADMIN — HEPARIN SODIUM 5000 UNITS: 5000 INJECTION, SOLUTION INTRAVENOUS; SUBCUTANEOUS at 22:32

## 2017-03-07 RX ADMIN — MICONAZOLE NITRATE: 20 CREAM TOPICAL at 00:00

## 2017-03-07 RX ADMIN — MICONAZOLE NITRATE: 20 CREAM TOPICAL at 16:23

## 2017-03-07 RX ADMIN — LEVALBUTEROL HYDROCHLORIDE 1.25 MG: 1.25 SOLUTION RESPIRATORY (INHALATION) at 22:53

## 2017-03-07 RX ADMIN — FAMOTIDINE 20 MG: 20 TABLET, FILM COATED ORAL at 22:33

## 2017-03-07 RX ADMIN — POLYETHYLENE GLYCOL 3350 1 PACKET: 17 POWDER, FOR SOLUTION ORAL at 07:32

## 2017-03-07 RX ADMIN — BUDESONIDE AND FORMOTEROL FUMARATE DIHYDRATE 2 PUFF: 160; 4.5 AEROSOL RESPIRATORY (INHALATION) at 22:33

## 2017-03-07 RX ADMIN — GABAPENTIN 300 MG: 300 CAPSULE ORAL at 07:33

## 2017-03-07 RX ADMIN — OXYCODONE HYDROCHLORIDE 5 MG: 5 TABLET ORAL at 16:21

## 2017-03-07 RX ADMIN — OXYBUTYNIN CHLORIDE 5 MG: 5 TABLET, FILM COATED, EXTENDED RELEASE ORAL at 07:33

## 2017-03-07 RX ADMIN — OXYCODONE HYDROCHLORIDE AND ACETAMINOPHEN 500 MG: 500 TABLET ORAL at 07:33

## 2017-03-07 RX ADMIN — MICONAZOLE NITRATE: 20 CREAM TOPICAL at 23:22

## 2017-03-07 RX ADMIN — LEVALBUTEROL HYDROCHLORIDE 1.25 MG: 1.25 SOLUTION RESPIRATORY (INHALATION) at 08:05

## 2017-03-07 RX ADMIN — LACTOBACILLUS ACIDOPHILUS / LACTOBACILLUS BULGARICUS 1 PACKET: 100 MILLION CFU STRENGTH GRANULES at 12:21

## 2017-03-07 RX ADMIN — STANDARDIZED SENNA CONCENTRATE AND DOCUSATE SODIUM 1 TABLET: 8.6; 5 TABLET, FILM COATED ORAL at 22:33

## 2017-03-07 RX ADMIN — FINASTERIDE 5 MG: 5 TABLET, FILM COATED ORAL at 07:32

## 2017-03-07 RX ADMIN — SERTRALINE 100 MG: 100 TABLET, FILM COATED ORAL at 07:33

## 2017-03-07 RX ADMIN — FAMOTIDINE 20 MG: 20 TABLET, FILM COATED ORAL at 07:33

## 2017-03-07 RX ADMIN — METOPROLOL TARTRATE 50 MG: 50 TABLET, FILM COATED ORAL at 22:33

## 2017-03-07 RX ADMIN — ATORVASTATIN CALCIUM 80 MG: 80 TABLET, FILM COATED ORAL at 22:32

## 2017-03-07 RX ADMIN — LEVALBUTEROL HYDROCHLORIDE 1.25 MG: 1.25 SOLUTION RESPIRATORY (INHALATION) at 16:39

## 2017-03-07 RX ADMIN — LEVETIRACETAM 1500 MG: 500 TABLET, FILM COATED ORAL at 22:32

## 2017-03-07 RX ADMIN — BUDESONIDE AND FORMOTEROL FUMARATE DIHYDRATE 2 PUFF: 160; 4.5 AEROSOL RESPIRATORY (INHALATION) at 08:05

## 2017-03-07 RX ADMIN — ASPIRIN 81 MG: 81 TABLET ORAL at 07:32

## 2017-03-07 RX ADMIN — Medication 325 MG: at 07:33

## 2017-03-07 RX ADMIN — LACTOBACILLUS ACIDOPHILUS / LACTOBACILLUS BULGARICUS 1 PACKET: 100 MILLION CFU STRENGTH GRANULES at 16:23

## 2017-03-07 RX ADMIN — LACTOBACILLUS ACIDOPHILUS / LACTOBACILLUS BULGARICUS 1 PACKET: 100 MILLION CFU STRENGTH GRANULES at 07:32

## 2017-03-07 RX ADMIN — DOCUSATE SODIUM 100 MG: 100 CAPSULE ORAL at 07:33

## 2017-03-07 RX ADMIN — HEPARIN SODIUM 5000 UNITS: 5000 INJECTION, SOLUTION INTRAVENOUS; SUBCUTANEOUS at 14:00

## 2017-03-07 RX ADMIN — SPIRONOLACTONE 25 MG: 25 TABLET, FILM COATED ORAL at 07:33

## 2017-03-07 RX ADMIN — HEPARIN SODIUM 5000 UNITS: 5000 INJECTION, SOLUTION INTRAVENOUS; SUBCUTANEOUS at 05:28

## 2017-03-07 RX ADMIN — MICONAZOLE NITRATE: 20 CREAM TOPICAL at 12:21

## 2017-03-07 RX ADMIN — TAMSULOSIN HYDROCHLORIDE 0.4 MG: 0.4 CAPSULE ORAL at 07:32

## 2017-03-07 RX ADMIN — MICONAZOLE NITRATE: 20 CREAM TOPICAL at 06:00

## 2017-03-07 ASSESSMENT — PATIENT HEALTH QUESTIONNAIRE - PHQ9
SUM OF ALL RESPONSES TO PHQ QUESTIONS 1-9: 0
SUM OF ALL RESPONSES TO PHQ9 QUESTIONS 1 AND 2: 0
2. FEELING DOWN, DEPRESSED, IRRITABLE, OR HOPELESS: NOT AT ALL
1. LITTLE INTEREST OR PLEASURE IN DOING THINGS: NOT AT ALL

## 2017-03-07 ASSESSMENT — ENCOUNTER SYMPTOMS
NERVOUS/ANXIOUS: 0
CONSTIPATION: 0
VOMITING: 0
SPUTUM PRODUCTION: 1
ABDOMINAL PAIN: 0
NAUSEA: 0
CHILLS: 0
DIARRHEA: 0
DIZZINESS: 0
SHORTNESS OF BREATH: 0
FEVER: 0
FOCAL WEAKNESS: 0
WEAKNESS: 1
COUGH: 1
DEPRESSION: 0
BACK PAIN: 1

## 2017-03-07 ASSESSMENT — PAIN SCALES - GENERAL
PAINLEVEL_OUTOF10: 2
PAINLEVEL_OUTOF10: 5
PAINLEVEL_OUTOF10: 0

## 2017-03-07 NOTE — PROGRESS NOTES
Pt is pleasantly confused, denies pain. IV abx administered, no adverse rxn noted. Pt resting quietly in bed. No s/s of dyspnea or respiratory distress. Call light within reach, personal belongings available, bed in lowest position, treaded socks on, and bed alarm on. Hourly rounding in place.

## 2017-03-07 NOTE — THERAPY
"Occupational Therapy Treatment completed with focus on ADLs, ADL transfers and patient education.  Functional Status:  Pt seen for OT tx today.  Pt was pleasant and cooperative throughout the session but continues to be limited by weakness, decreased endurance, and decreased self care.  Pt completed supine to sit with min A, sat EOB to complete gr/hy with CGA, and stand pivot transfer to C with mod A needing extra time.  Pt needs mod A for toileting.  Pt would benefit from continued inpt OT to increase independence with functional transfers, functional endurance, and ADLs however doesn't have anymore SNF days.  Pt would benefit from 24/7 care in home environment for ADLs and IADLs.    Plan of Care: Will benefit from Occupational Therapy 3 times per week  Discharge Recommendations:  Equipment Will Continue to Assess for Equipment Needs. Post-acute therapy recommended before discharged home.    See \"Rehab Therapy-Acute\" Patient Summary Report for complete documentation.   "

## 2017-03-07 NOTE — CARE PLAN
Problem: Safety  Goal: Will remain free from falls  Safety precautions in place. Bed in low position, treaded socks on, personal possessions in reach, call light in reach and pt using it to call for assistance. Bed alarm on.    Problem: Infection  Goal: Will remain free from infection  Pt receiving meropenem iv q6h.

## 2017-03-07 NOTE — CARE PLAN
Problem: Communication  Goal: The ability to communicate needs accurately and effectively will improve  Outcome: PROGRESSING AS EXPECTED  Pt. Aware of plan of care at this time. Able to communicate needs affectively as needed. Questions answered and understanding/learning verified by teach back method. Pt. Is now resting comfortably in bed and understands to use bell to voice concerns/needs as they arise. Will continue to monitor closely.

## 2017-03-07 NOTE — PROGRESS NOTES
Hospital Medicine Progress Note, Adult, Complex               Author: Roseanna Crane Date & Time created: 3/7/2017  7:19 AM       ID/CC: 81 yr M with hx of Stroke with left sided hemiparesis, HTN, COPD on 3 L of oxygen, asthma, multiple falls and pelvic fracture presented from [facility] care SNF,presented on 2/20 with shortness of breath and cough and found to have left sided opacification started on vanco and zosyn for HCAP.  H/o aspiration, on dysphagia 1 diet per SLP.  Now on meropenem for ESBL.     Interval History:  3/7:Patient appears better , PT working with him, he completes iv antibiotics (meropenam ) today.Pt will benefit from SNF however no SNF days left per SW. Pt lives alone, has no family. Continue daily PT/OT while in hospital, need safe discharge plan for patient (ideally SNF or eventually group home with HH when he is safe). Labs reviewed- stable.    Review of Systems:  Review of Systems   Constitutional: Negative for fever and chills.   Respiratory: Positive for cough and sputum production. Negative for shortness of breath.    Cardiovascular: Negative for chest pain and leg swelling.   Gastrointestinal: Negative for nausea, vomiting, abdominal pain, diarrhea and constipation.   Genitourinary: Negative for dysuria and urgency.   Musculoskeletal: Positive for back pain.   Neurological: Positive for weakness. Negative for dizziness and focal weakness.   Psychiatric/Behavioral: Negative for depression. The patient is not nervous/anxious.      Physical Exam:  Physical Exam   Constitutional: No distress.   HENT:   Head: Normocephalic and atraumatic.   Eyes: Conjunctivae are normal. Right eye exhibits no discharge. Left eye exhibits no discharge.   Neck: No JVD present.   Cardiovascular: Normal rate and regular rhythm.    Murmur heard.  Pulmonary/Chest: Effort normal. No stridor. No respiratory distress. He has decreased breath sounds (but relatively clear) in the right lower field and the left lower field. He  has no wheezes. He has rales.   Abdominal: Soft. He exhibits no distension. There is no tenderness.   Musculoskeletal: Normal range of motion. He exhibits no edema or tenderness.   Lymphadenopathy:        Right: No supraclavicular adenopathy present.        Left: No supraclavicular adenopathy present.   Neurological: He is alert. He exhibits abnormal muscle tone (weak).   Oriented X 2    Skin: Skin is warm. He is not diaphoretic.   Vitals reviewed.      Labs:        Invalid input(s): [id number]      Recent Labs      17   SODIUM  141  142  140   POTASSIUM  4.5  4.5  4.5   CHLORIDE  105  104  102   CO2  33  34*  36*   BUN  28*  22  22   CREATININE  0.61  0.50  0.53   CALCIUM  8.9  9.5  9.1     Recent Labs      17   GLUCOSE  129*  109*  95     Recent Labs      17   RBC  3.91*  3.78*  3.72*   HEMOGLOBIN  12.3*  12.0*  12.0*   HEMATOCRIT  39.9*  38.3*  37.9*   PLATELETCT  182  171  169     Recent Labs      17   WBC  7.9  6.7  6.8   NEUTSPOLYS  65.40  57.50  59.60   LYMPHOCYTES  22.30  28.30  27.00   MONOCYTES  7.90  9.30  8.80   EOSINOPHILS  3.40  3.80  3.70   BASOPHILS  0.60  0.60  0.60           Hemodynamics:  Temp (24hrs), Av.7 °C (98 °F), Min:36.1 °C (97 °F), Max:37.2 °C (99 °F)  Temperature: 37.2 °C (99 °F)  Pulse  Av.1  Min: 44  Max: 98   Blood Pressure : 118/57 mmHg     Respiratory:    Respiration: 18, Pulse Oximetry: 97 %, O2 Daily Delivery Respiratory : Silicone Nasal Cannula     Given By:: Mask, PEP/CPT Method: Positive Airway Pressure Device, Work Of Breathing / Effort: Mild  RUL Breath Sounds: Diminished, RML Breath Sounds: Diminished, RLL Breath Sounds: Diminished, DEBI Breath Sounds: Diminished, LLL Breath Sounds: Diminished  Fluids:    Intake/Output Summary (Last 24 hours) at 17 07  Last data filed at  03/06/17 2000   Gross per 24 hour   Intake    720 ml   Output      0 ml   Net    720 ml        GI/Nutrition:  Orders Placed This Encounter   Procedures   • DIET ORDER     Standing Status: Standing      Number of Occurrences: 1      Standing Expiration Date:      Order Specific Question:  Diet:     Answer:  Diabetic [3]     Order Specific Question:  Diet:     Answer:  Cardiac [6]     Order Specific Question:  Texture/Fiber modifications:     Answer:  Dysphagia 1(Pureed)specify fluid consistency(question 6) [1]     Order Specific Question:  Consistency/Fluid modifications:     Answer:  Nectar Thick [2]     Medical Decision Making, by Problem:  Active Hospital Problems    Diagnosis   • Respiratory failure (CMS-[facility]) [J96.90]  Hypoxic/hypercarbic  Much improved, following O2 requirements     • Debility [R53.81]  PT/OT     • Multiple falls [R29.6]  Fall precautions  PT     • MRSA carrier [Z22.322]     • Sepsis (CMS-[facility]) [A41.9]  Sepsis resolved, due to HCAP with ESBL  ID Emelia consulted  Meropenam stop date 3/7/17     • HCAP (healthcare-associated pneumonia) [J18.9]  As above     • Chronic respiratory failure with hypoxia (CMS-[facility]) [J96.11]  On 3 L now  Pulmonary toilet   Resume on lev albuterol, symbicort, spiriva   IS  Stable     • Oxygen dependent [Z99.81]  On 3 L     • DM type 2, controlled, with complication (CMS-HCC) [E11.8]  In-hospital FSBG goal less than 180 mg/dL  SSI qAC & qHS when eating, q6 when NPO  GLUCOSE - ACCU-CK   Date/Time Value Ref Range Status   03/07/2017 05:34 AM 98 65 - 99 mg/dL Final   03/06/2017 09:01 PM 97 65 - 99 mg/dL Final   03/06/2017 04:31 * 65 - 99 mg/dL Final   03/06/2017 11:55 * 65 - 99 mg/dL Final      • Left hemiparesis (CMS-[facility]) [G81.94]  Chronic from recent CVA  On asa and Lipitor  On Keppra for seizure disorder  Need PT/OT/ST SNF placement      • Dysphagia status post cerebrovascular accident [I69.391]  On dysphagia diet per ST     • GERD (gastroesophageal reflux  disease) [K21.9]  On pepcid          Dispo: .Pt needs SNF however no SNF days left per SW. Pt lives alone, has no family. Continue daily PT/OT while in hospital, need safe discharge plan for patient .    Medications reviewed and Labs reviewed        DVT Prophylaxis: Heparin  DVT prophylaxis - mechanical: SCDs

## 2017-03-08 LAB
ANION GAP SERPL CALC-SCNC: 1 MMOL/L (ref 0–11.9)
BASOPHILS # BLD AUTO: 0.4 % (ref 0–1.8)
BASOPHILS # BLD: 0.03 K/UL (ref 0–0.12)
BUN SERPL-MCNC: 24 MG/DL (ref 8–22)
CALCIUM SERPL-MCNC: 8.8 MG/DL (ref 8.5–10.5)
CHLORIDE SERPL-SCNC: 100 MMOL/L (ref 96–112)
CO2 SERPL-SCNC: 37 MMOL/L (ref 20–33)
CREAT SERPL-MCNC: 0.56 MG/DL (ref 0.5–1.4)
EOSINOPHIL # BLD AUTO: 0.22 K/UL (ref 0–0.51)
EOSINOPHIL NFR BLD: 3.2 % (ref 0–6.9)
ERYTHROCYTE [DISTWIDTH] IN BLOOD BY AUTOMATED COUNT: 50.5 FL (ref 35.9–50)
GFR SERPL CREATININE-BSD FRML MDRD: >60 ML/MIN/1.73 M 2
GLUCOSE BLD-MCNC: 116 MG/DL (ref 65–99)
GLUCOSE BLD-MCNC: 131 MG/DL (ref 65–99)
GLUCOSE BLD-MCNC: 97 MG/DL (ref 65–99)
GLUCOSE BLD-MCNC: 98 MG/DL (ref 65–99)
GLUCOSE SERPL-MCNC: 95 MG/DL (ref 65–99)
HCT VFR BLD AUTO: 35.4 % (ref 42–52)
HGB BLD-MCNC: 11.1 G/DL (ref 14–18)
IMM GRANULOCYTES # BLD AUTO: 0.03 K/UL (ref 0–0.11)
IMM GRANULOCYTES NFR BLD AUTO: 0.4 % (ref 0–0.9)
LYMPHOCYTES # BLD AUTO: 2.18 K/UL (ref 1–4.8)
LYMPHOCYTES NFR BLD: 31.5 % (ref 22–41)
MCH RBC QN AUTO: 31.6 PG (ref 27–33)
MCHC RBC AUTO-ENTMCNC: 31.4 G/DL (ref 33.7–35.3)
MCV RBC AUTO: 100.9 FL (ref 81.4–97.8)
MONOCYTES # BLD AUTO: 0.59 K/UL (ref 0–0.85)
MONOCYTES NFR BLD AUTO: 8.5 % (ref 0–13.4)
NEUTROPHILS # BLD AUTO: 3.88 K/UL (ref 1.82–7.42)
NEUTROPHILS NFR BLD: 56 % (ref 44–72)
NRBC # BLD AUTO: 0 K/UL
NRBC BLD AUTO-RTO: 0 /100 WBC
PLATELET # BLD AUTO: 165 K/UL (ref 164–446)
PMV BLD AUTO: 12.2 FL (ref 9–12.9)
POTASSIUM SERPL-SCNC: 4.3 MMOL/L (ref 3.6–5.5)
RBC # BLD AUTO: 3.51 M/UL (ref 4.7–6.1)
SODIUM SERPL-SCNC: 138 MMOL/L (ref 135–145)
WBC # BLD AUTO: 6.9 K/UL (ref 4.8–10.8)

## 2017-03-08 PROCEDURE — A9270 NON-COVERED ITEM OR SERVICE: HCPCS | Performed by: HOSPITALIST

## 2017-03-08 PROCEDURE — 700102 HCHG RX REV CODE 250 W/ 637 OVERRIDE(OP): Performed by: INTERNAL MEDICINE

## 2017-03-08 PROCEDURE — 99232 SBSQ HOSP IP/OBS MODERATE 35: CPT | Performed by: INTERNAL MEDICINE

## 2017-03-08 PROCEDURE — 82962 GLUCOSE BLOOD TEST: CPT

## 2017-03-08 PROCEDURE — 700111 HCHG RX REV CODE 636 W/ 250 OVERRIDE (IP): Performed by: INTERNAL MEDICINE

## 2017-03-08 PROCEDURE — 700111 HCHG RX REV CODE 636 W/ 250 OVERRIDE (IP): Performed by: HOSPITALIST

## 2017-03-08 PROCEDURE — 700112 HCHG RX REV CODE 229: Performed by: HOSPITALIST

## 2017-03-08 PROCEDURE — 85025 COMPLETE CBC W/AUTO DIFF WBC: CPT

## 2017-03-08 PROCEDURE — 80048 BASIC METABOLIC PNL TOTAL CA: CPT

## 2017-03-08 PROCEDURE — 36415 COLL VENOUS BLD VENIPUNCTURE: CPT

## 2017-03-08 PROCEDURE — A9270 NON-COVERED ITEM OR SERVICE: HCPCS | Performed by: INTERNAL MEDICINE

## 2017-03-08 PROCEDURE — 770021 HCHG ROOM/CARE - ISO PRIVATE

## 2017-03-08 PROCEDURE — 94760 N-INVAS EAR/PLS OXIMETRY 1: CPT

## 2017-03-08 PROCEDURE — 700102 HCHG RX REV CODE 250 W/ 637 OVERRIDE(OP): Performed by: HOSPITALIST

## 2017-03-08 RX ADMIN — HEPARIN SODIUM 5000 UNITS: 5000 INJECTION, SOLUTION INTRAVENOUS; SUBCUTANEOUS at 13:43

## 2017-03-08 RX ADMIN — MICONAZOLE NITRATE: 20 CREAM TOPICAL at 17:48

## 2017-03-08 RX ADMIN — POLYETHYLENE GLYCOL 3350 1 PACKET: 17 POWDER, FOR SOLUTION ORAL at 08:06

## 2017-03-08 RX ADMIN — HEPARIN SODIUM 5000 UNITS: 5000 INJECTION, SOLUTION INTRAVENOUS; SUBCUTANEOUS at 06:09

## 2017-03-08 RX ADMIN — GABAPENTIN 300 MG: 300 CAPSULE ORAL at 08:05

## 2017-03-08 RX ADMIN — FAMOTIDINE 20 MG: 20 TABLET, FILM COATED ORAL at 08:05

## 2017-03-08 RX ADMIN — METOPROLOL TARTRATE 50 MG: 50 TABLET, FILM COATED ORAL at 21:43

## 2017-03-08 RX ADMIN — LEVETIRACETAM 1500 MG: 500 TABLET, FILM COATED ORAL at 21:42

## 2017-03-08 RX ADMIN — ONDANSETRON 4 MG: 4 TABLET, ORALLY DISINTEGRATING ORAL at 14:51

## 2017-03-08 RX ADMIN — BUDESONIDE AND FORMOTEROL FUMARATE DIHYDRATE 2 PUFF: 160; 4.5 AEROSOL RESPIRATORY (INHALATION) at 06:40

## 2017-03-08 RX ADMIN — STANDARDIZED SENNA CONCENTRATE AND DOCUSATE SODIUM 1 TABLET: 8.6; 5 TABLET, FILM COATED ORAL at 21:42

## 2017-03-08 RX ADMIN — TAMSULOSIN HYDROCHLORIDE 0.4 MG: 0.4 CAPSULE ORAL at 08:05

## 2017-03-08 RX ADMIN — ATORVASTATIN CALCIUM 80 MG: 80 TABLET, FILM COATED ORAL at 21:42

## 2017-03-08 RX ADMIN — MICONAZOLE NITRATE: 20 CREAM TOPICAL at 06:24

## 2017-03-08 RX ADMIN — TIOTROPIUM BROMIDE 1 CAPSULE: 18 CAPSULE ORAL; RESPIRATORY (INHALATION) at 06:40

## 2017-03-08 RX ADMIN — SPIRONOLACTONE 25 MG: 25 TABLET, FILM COATED ORAL at 08:05

## 2017-03-08 RX ADMIN — LACTOBACILLUS ACIDOPHILUS / LACTOBACILLUS BULGARICUS 1 PACKET: 100 MILLION CFU STRENGTH GRANULES at 12:02

## 2017-03-08 RX ADMIN — TRAZODONE HYDROCHLORIDE 50 MG: 50 TABLET ORAL at 21:43

## 2017-03-08 RX ADMIN — ASPIRIN 81 MG: 81 TABLET ORAL at 08:05

## 2017-03-08 RX ADMIN — TRAZODONE HYDROCHLORIDE 50 MG: 50 TABLET ORAL at 01:11

## 2017-03-08 RX ADMIN — MICONAZOLE NITRATE: 20 CREAM TOPICAL at 12:02

## 2017-03-08 RX ADMIN — LEVETIRACETAM 1500 MG: 500 TABLET, FILM COATED ORAL at 08:06

## 2017-03-08 RX ADMIN — BUDESONIDE AND FORMOTEROL FUMARATE DIHYDRATE 2 PUFF: 160; 4.5 AEROSOL RESPIRATORY (INHALATION) at 21:42

## 2017-03-08 RX ADMIN — DOCUSATE SODIUM 100 MG: 100 CAPSULE ORAL at 08:05

## 2017-03-08 RX ADMIN — FINASTERIDE 5 MG: 5 TABLET, FILM COATED ORAL at 08:05

## 2017-03-08 RX ADMIN — OXYCODONE HYDROCHLORIDE AND ACETAMINOPHEN 500 MG: 500 TABLET ORAL at 08:05

## 2017-03-08 RX ADMIN — METOPROLOL TARTRATE 50 MG: 50 TABLET, FILM COATED ORAL at 08:06

## 2017-03-08 RX ADMIN — LACTOBACILLUS ACIDOPHILUS / LACTOBACILLUS BULGARICUS 1 PACKET: 100 MILLION CFU STRENGTH GRANULES at 17:30

## 2017-03-08 RX ADMIN — Medication 325 MG: at 08:05

## 2017-03-08 RX ADMIN — HEPARIN SODIUM 5000 UNITS: 5000 INJECTION, SOLUTION INTRAVENOUS; SUBCUTANEOUS at 21:43

## 2017-03-08 RX ADMIN — LACTOBACILLUS ACIDOPHILUS / LACTOBACILLUS BULGARICUS 1 PACKET: 100 MILLION CFU STRENGTH GRANULES at 08:06

## 2017-03-08 RX ADMIN — DOCUSATE SODIUM 100 MG: 100 CAPSULE ORAL at 21:42

## 2017-03-08 RX ADMIN — OXYBUTYNIN CHLORIDE 5 MG: 5 TABLET, FILM COATED, EXTENDED RELEASE ORAL at 08:05

## 2017-03-08 RX ADMIN — SERTRALINE 100 MG: 100 TABLET, FILM COATED ORAL at 08:05

## 2017-03-08 RX ADMIN — FAMOTIDINE 20 MG: 20 TABLET, FILM COATED ORAL at 21:42

## 2017-03-08 ASSESSMENT — ENCOUNTER SYMPTOMS
COUGH: 0
NAUSEA: 0
NERVOUS/ANXIOUS: 0
ABDOMINAL PAIN: 0
DIZZINESS: 0
CONSTIPATION: 0
SHORTNESS OF BREATH: 0
WEAKNESS: 1
BACK PAIN: 0
DIARRHEA: 0
VOMITING: 0
CHILLS: 0
FEVER: 0
FOCAL WEAKNESS: 0
DEPRESSION: 0
MEMORY LOSS: 1
SPUTUM PRODUCTION: 0

## 2017-03-08 ASSESSMENT — PAIN SCALES - GENERAL
PAINLEVEL_OUTOF10: 2
PAINLEVEL_OUTOF10: 0

## 2017-03-08 NOTE — PROGRESS NOTES
Assumed care @ 0715. Bedside report form CAMRON Driver. Pt awake, resting in bed and in no distress. Bed in now position, call light w/in reach. Pt calls appropriately for assistance.

## 2017-03-08 NOTE — PROGRESS NOTES
Pt AOx4, medicated for generalized pain. Pt fatigued, resting quietly in bed watching television. Call light within reach, personal belongings available, bed in lowest position, treaded socks on, and bed alarm on. Hourly rounding in place.

## 2017-03-08 NOTE — PROGRESS NOTES
Hospital Medicine Progress Note, Adult, Complex               Author: Roseanna Crane Date & Time created: 3/8/2017  6:58 AM       ID/CC: 81 yr M with hx of Stroke with left sided hemiparesis, HTN, COPD on 3 L of oxygen, asthma, multiple falls and pelvic fracture presented from [facility] care SNF,presented on 2/20 with shortness of breath and cough and found to have HCAP from ESBL.    Interval History:  Pt completed his antibiotics yesterday, appears frail and week. Encouraged OOB in chair with all meals.  Pt again told me today that he has no family anywhere in [place] or outside [place], he stated he has no one. However per SW her has family.      Review of Systems:  Review of Systems   Constitutional: Positive for malaise/fatigue. Negative for fever and chills.   Respiratory: Negative for cough (better ), sputum production and shortness of breath.    Cardiovascular: Negative for chest pain and leg swelling.   Gastrointestinal: Negative for nausea, vomiting, abdominal pain, diarrhea and constipation.   Genitourinary: Negative for dysuria and urgency.   Musculoskeletal: Negative for back pain.   Neurological: Positive for weakness. Negative for dizziness and focal weakness.   Psychiatric/Behavioral: Positive for memory loss. Negative for depression. The patient is not nervous/anxious.      Physical Exam:  Physical Exam   Constitutional: No distress.   HENT:   Head: Normocephalic and atraumatic.   Eyes: Conjunctivae are normal. Right eye exhibits no discharge. Left eye exhibits no discharge.   Neck: No JVD present.   Cardiovascular: Normal rate and regular rhythm.    Murmur heard.  Pulmonary/Chest: Effort normal. No stridor. No respiratory distress. He has decreased breath sounds (but relatively clear) in the right lower field and the left lower field. He has no wheezes. He has rales.   Abdominal: Soft. He exhibits no distension. There is no tenderness.   Musculoskeletal: Normal range of motion. He exhibits no edema or  tenderness.   Lymphadenopathy:        Right: No supraclavicular adenopathy present.        Left: No supraclavicular adenopathy present.   Neurological: He is alert. He exhibits abnormal muscle tone (weak).   Oriented X 2    Skin: Skin is warm. He is not diaphoretic.   Vitals reviewed.      Labs:        Invalid input(s): [id number]      Recent Labs      17   0005   SODIUM  142  140  138   POTASSIUM  4.5  4.5  4.3   CHLORIDE  104  102  100   CO2  34*  36*  37*   BUN  22  22  24*   CREATININE  0.50  0.53  0.56   CALCIUM  9.5  9.1  8.8     Recent Labs      17   0005   GLUCOSE  109*  95  95     Recent Labs      17   0005   RBC  3.78*  3.72*  3.51*   HEMOGLOBIN  12.0*  12.0*  11.1*   HEMATOCRIT  38.3*  37.9*  35.4*   PLATELETCT  171  169  165     Recent Labs      17   0005   WBC  6.7  6.8  6.9   NEUTSPOLYS  57.50  59.60  56.00   LYMPHOCYTES  28.30  27.00  31.50   MONOCYTES  9.30  8.80  8.50   EOSINOPHILS  3.80  3.70  3.20   BASOPHILS  0.60  0.60  0.40           Hemodynamics:  Temp (24hrs), Av.1 °C (98.7 °F), Min:36.7 °C (98.1 °F), Max:37.2 °C (99 °F)  Temperature: 36.7 °C (98.1 °F)  Pulse  Av  Min: 44  Max: 98   Blood Pressure : 117/74 mmHg     Respiratory:    Respiration: 16, Pulse Oximetry: 94 %, O2 Daily Delivery Respiratory : Silicone Nasal Cannula     Given By:: Mouthpiece, #MDI/DPI Given: MDI/DPI x 2, PEP/CPT Method: Positive Airway Pressure Device, Work Of Breathing / Effort: Mild  RUL Breath Sounds: Diminished, RML Breath Sounds: Diminished, RLL Breath Sounds: Diminished, DEBI Breath Sounds: Diminished, LLL Breath Sounds: Diminished  Fluids:    Intake/Output Summary (Last 24 hours) at 17 0658  Last data filed at 17 2300   Gross per 24 hour   Intake    920 ml   Output    800 ml   Net    120 ml        GI/Nutrition:  Orders Placed This  Encounter   Procedures   • DIET ORDER     Standing Status: Standing      Number of Occurrences: 1      Standing Expiration Date:      Order Specific Question:  Diet:     Answer:  Diabetic [3]     Order Specific Question:  Diet:     Answer:  Cardiac [6]     Order Specific Question:  Texture/Fiber modifications:     Answer:  Dysphagia 1(Pureed)specify fluid consistency(question 6) [1]     Order Specific Question:  Consistency/Fluid modifications:     Answer:  Nectar Thick [2]     Medical Decision Making, by Problem:  Active Hospital Problems    Diagnosis   • Respiratory failure (CMS-[facility]) [J96.90]  Hypoxic/hypercarbic  Much improved, following O2 requirements     • Debility [R53.81]  PT/OT     • Multiple falls [R29.6]  Fall precautions  PT     • MRSA carrier [Z22.322]     • Sepsis (CMS-HCC) [A41.9]  Sepsis resolved, due to HCAP with ESBL  ID Emelia consulted  Meropenam stop date 3/7/17     • HCAP (healthcare-associated pneumonia) [J18.9]  As above     • Chronic respiratory failure with hypoxia (CMS-HCC) [J96.11]  On 3 L now  Pulmonary toilet   Resume on lev albuterol, symbicort, spiriva   IS  Stable     • Oxygen dependent [Z99.81]  On 3 L     • DM type 2, controlled, with complication (CMS-HCC) [E11.8]  In-hospital FSBG goal less than 180 mg/dL  SSI qAC & qHS when eating, q6 when NPO  GLUCOSE - ACCU-CK   Date/Time Value Ref Range Status   03/08/2017 06:20 AM 98 65 - 99 mg/dL Final   03/07/2017 10:38 * 65 - 99 mg/dL Final   03/07/2017 04:22 * 65 - 99 mg/dL Final   03/07/2017 11:47 * 65 - 99 mg/dL Final      • Left hemiparesis (CMS-[facility]) [G81.94]  Chronic from recent CVA  On asa and Lipitor  On Keppra for seizure disorder  Need PT/OT/ST SNF placement      • Dysphagia status post cerebrovascular accident [I69.391]  On dysphagia diet per ST     • GERD (gastroesophageal reflux disease) [K21.9]  On pepcid          Dispo: .Pt needs SNF however no SNF days left per SW. Continue daily PT/OT while in hospital,  need safe discharge plan for patient .    Medications reviewed and Labs reviewed        DVT Prophylaxis: Heparin  DVT prophylaxis - mechanical: SCDs

## 2017-03-08 NOTE — CARE PLAN
Problem: Safety  Goal: Will remain free from falls  Safety precautions in place. Bed in low position, treaded socks on, personal possessions in reach, call light in reach and pt using it to call for assistance. Bed alarm on.    Problem: Pain Management  Goal: Pain level will decrease to patient’s comfort goal  Medicated for generalized pain per MAR.

## 2017-03-09 LAB
ANION GAP SERPL CALC-SCNC: 3 MMOL/L (ref 0–11.9)
BASOPHILS # BLD AUTO: 0.6 % (ref 0–1.8)
BASOPHILS # BLD: 0.04 K/UL (ref 0–0.12)
BUN SERPL-MCNC: 21 MG/DL (ref 8–22)
CALCIUM SERPL-MCNC: 9.2 MG/DL (ref 8.5–10.5)
CHLORIDE SERPL-SCNC: 99 MMOL/L (ref 96–112)
CO2 SERPL-SCNC: 39 MMOL/L (ref 20–33)
CREAT SERPL-MCNC: 0.6 MG/DL (ref 0.5–1.4)
EOSINOPHIL # BLD AUTO: 0.18 K/UL (ref 0–0.51)
EOSINOPHIL NFR BLD: 2.9 % (ref 0–6.9)
ERYTHROCYTE [DISTWIDTH] IN BLOOD BY AUTOMATED COUNT: 50.2 FL (ref 35.9–50)
GFR SERPL CREATININE-BSD FRML MDRD: >60 ML/MIN/1.73 M 2
GLUCOSE BLD-MCNC: 100 MG/DL (ref 65–99)
GLUCOSE BLD-MCNC: 110 MG/DL (ref 65–99)
GLUCOSE BLD-MCNC: 85 MG/DL (ref 65–99)
GLUCOSE BLD-MCNC: 91 MG/DL (ref 65–99)
GLUCOSE SERPL-MCNC: 108 MG/DL (ref 65–99)
HCT VFR BLD AUTO: 35.9 % (ref 42–52)
HGB BLD-MCNC: 11.2 G/DL (ref 14–18)
IMM GRANULOCYTES # BLD AUTO: 0.02 K/UL (ref 0–0.11)
IMM GRANULOCYTES NFR BLD AUTO: 0.3 % (ref 0–0.9)
LYMPHOCYTES # BLD AUTO: 1.77 K/UL (ref 1–4.8)
LYMPHOCYTES NFR BLD: 28.2 % (ref 22–41)
MCH RBC QN AUTO: 31.5 PG (ref 27–33)
MCHC RBC AUTO-ENTMCNC: 31.2 G/DL (ref 33.7–35.3)
MCV RBC AUTO: 100.8 FL (ref 81.4–97.8)
MONOCYTES # BLD AUTO: 0.57 K/UL (ref 0–0.85)
MONOCYTES NFR BLD AUTO: 9.1 % (ref 0–13.4)
NEUTROPHILS # BLD AUTO: 3.7 K/UL (ref 1.82–7.42)
NEUTROPHILS NFR BLD: 58.9 % (ref 44–72)
NRBC # BLD AUTO: 0 K/UL
NRBC BLD AUTO-RTO: 0 /100 WBC
PLATELET # BLD AUTO: 167 K/UL (ref 164–446)
PMV BLD AUTO: 12 FL (ref 9–12.9)
POTASSIUM SERPL-SCNC: 4.8 MMOL/L (ref 3.6–5.5)
RBC # BLD AUTO: 3.56 M/UL (ref 4.7–6.1)
SODIUM SERPL-SCNC: 141 MMOL/L (ref 135–145)
WBC # BLD AUTO: 6.3 K/UL (ref 4.8–10.8)

## 2017-03-09 PROCEDURE — 700102 HCHG RX REV CODE 250 W/ 637 OVERRIDE(OP): Performed by: INTERNAL MEDICINE

## 2017-03-09 PROCEDURE — A9270 NON-COVERED ITEM OR SERVICE: HCPCS | Performed by: HOSPITALIST

## 2017-03-09 PROCEDURE — 700112 HCHG RX REV CODE 229: Performed by: HOSPITALIST

## 2017-03-09 PROCEDURE — 80048 BASIC METABOLIC PNL TOTAL CA: CPT

## 2017-03-09 PROCEDURE — A9270 NON-COVERED ITEM OR SERVICE: HCPCS | Performed by: INTERNAL MEDICINE

## 2017-03-09 PROCEDURE — 700102 HCHG RX REV CODE 250 W/ 637 OVERRIDE(OP): Performed by: HOSPITALIST

## 2017-03-09 PROCEDURE — 85025 COMPLETE CBC W/AUTO DIFF WBC: CPT

## 2017-03-09 PROCEDURE — 700111 HCHG RX REV CODE 636 W/ 250 OVERRIDE (IP): Performed by: INTERNAL MEDICINE

## 2017-03-09 PROCEDURE — 770021 HCHG ROOM/CARE - ISO PRIVATE

## 2017-03-09 PROCEDURE — 82962 GLUCOSE BLOOD TEST: CPT | Mod: 91

## 2017-03-09 PROCEDURE — 36415 COLL VENOUS BLD VENIPUNCTURE: CPT

## 2017-03-09 PROCEDURE — 99232 SBSQ HOSP IP/OBS MODERATE 35: CPT | Performed by: INTERNAL MEDICINE

## 2017-03-09 RX ADMIN — METOPROLOL TARTRATE 50 MG: 50 TABLET, FILM COATED ORAL at 20:33

## 2017-03-09 RX ADMIN — STANDARDIZED SENNA CONCENTRATE AND DOCUSATE SODIUM 1 TABLET: 8.6; 5 TABLET, FILM COATED ORAL at 20:34

## 2017-03-09 RX ADMIN — ATORVASTATIN CALCIUM 80 MG: 80 TABLET, FILM COATED ORAL at 20:32

## 2017-03-09 RX ADMIN — MICONAZOLE NITRATE: 20 CREAM TOPICAL at 00:03

## 2017-03-09 RX ADMIN — POLYETHYLENE GLYCOL 3350 1 PACKET: 17 POWDER, FOR SOLUTION ORAL at 08:46

## 2017-03-09 RX ADMIN — HEPARIN SODIUM 5000 UNITS: 5000 INJECTION, SOLUTION INTRAVENOUS; SUBCUTANEOUS at 06:06

## 2017-03-09 RX ADMIN — HEPARIN SODIUM 5000 UNITS: 5000 INJECTION, SOLUTION INTRAVENOUS; SUBCUTANEOUS at 14:25

## 2017-03-09 RX ADMIN — FINASTERIDE 5 MG: 5 TABLET, FILM COATED ORAL at 08:47

## 2017-03-09 RX ADMIN — LACTOBACILLUS ACIDOPHILUS / LACTOBACILLUS BULGARICUS 1 PACKET: 100 MILLION CFU STRENGTH GRANULES at 12:04

## 2017-03-09 RX ADMIN — OXYBUTYNIN CHLORIDE 5 MG: 5 TABLET, FILM COATED, EXTENDED RELEASE ORAL at 08:46

## 2017-03-09 RX ADMIN — DOCUSATE SODIUM 100 MG: 100 CAPSULE ORAL at 20:33

## 2017-03-09 RX ADMIN — TAMSULOSIN HYDROCHLORIDE 0.4 MG: 0.4 CAPSULE ORAL at 08:46

## 2017-03-09 RX ADMIN — BUDESONIDE AND FORMOTEROL FUMARATE DIHYDRATE 2 PUFF: 160; 4.5 AEROSOL RESPIRATORY (INHALATION) at 20:32

## 2017-03-09 RX ADMIN — GABAPENTIN 300 MG: 300 CAPSULE ORAL at 08:47

## 2017-03-09 RX ADMIN — METOPROLOL TARTRATE 50 MG: 50 TABLET, FILM COATED ORAL at 08:46

## 2017-03-09 RX ADMIN — LACTOBACILLUS ACIDOPHILUS / LACTOBACILLUS BULGARICUS 1 PACKET: 100 MILLION CFU STRENGTH GRANULES at 17:03

## 2017-03-09 RX ADMIN — TRAZODONE HYDROCHLORIDE 50 MG: 50 TABLET ORAL at 20:43

## 2017-03-09 RX ADMIN — BUDESONIDE AND FORMOTEROL FUMARATE DIHYDRATE 2 PUFF: 160; 4.5 AEROSOL RESPIRATORY (INHALATION) at 08:54

## 2017-03-09 RX ADMIN — MICONAZOLE NITRATE: 20 CREAM TOPICAL at 06:16

## 2017-03-09 RX ADMIN — LEVETIRACETAM 1500 MG: 500 TABLET, FILM COATED ORAL at 08:46

## 2017-03-09 RX ADMIN — Medication 325 MG: at 08:54

## 2017-03-09 RX ADMIN — HEPARIN SODIUM 5000 UNITS: 5000 INJECTION, SOLUTION INTRAVENOUS; SUBCUTANEOUS at 20:32

## 2017-03-09 RX ADMIN — DOCUSATE SODIUM 100 MG: 100 CAPSULE ORAL at 08:47

## 2017-03-09 RX ADMIN — FAMOTIDINE 20 MG: 20 TABLET, FILM COATED ORAL at 08:46

## 2017-03-09 RX ADMIN — SPIRONOLACTONE 25 MG: 25 TABLET, FILM COATED ORAL at 08:46

## 2017-03-09 RX ADMIN — LEVETIRACETAM 1500 MG: 500 TABLET, FILM COATED ORAL at 20:33

## 2017-03-09 RX ADMIN — ASPIRIN 81 MG: 81 TABLET ORAL at 08:47

## 2017-03-09 RX ADMIN — LACTOBACILLUS ACIDOPHILUS / LACTOBACILLUS BULGARICUS 1 PACKET: 100 MILLION CFU STRENGTH GRANULES at 08:46

## 2017-03-09 RX ADMIN — MICONAZOLE NITRATE: 20 CREAM TOPICAL at 17:03

## 2017-03-09 RX ADMIN — OXYCODONE HYDROCHLORIDE AND ACETAMINOPHEN 500 MG: 500 TABLET ORAL at 08:47

## 2017-03-09 RX ADMIN — FAMOTIDINE 20 MG: 20 TABLET, FILM COATED ORAL at 20:34

## 2017-03-09 RX ADMIN — MICONAZOLE NITRATE: 20 CREAM TOPICAL at 12:05

## 2017-03-09 RX ADMIN — TIOTROPIUM BROMIDE 1 CAPSULE: 18 CAPSULE ORAL; RESPIRATORY (INHALATION) at 08:53

## 2017-03-09 RX ADMIN — SERTRALINE 100 MG: 100 TABLET, FILM COATED ORAL at 08:45

## 2017-03-09 ASSESSMENT — PAIN SCALES - GENERAL
PAINLEVEL_OUTOF10: 0
PAINLEVEL_OUTOF10: 3

## 2017-03-09 ASSESSMENT — ENCOUNTER SYMPTOMS
ABDOMINAL PAIN: 0
SHORTNESS OF BREATH: 0
DIARRHEA: 0
NERVOUS/ANXIOUS: 0
COUGH: 0
DEPRESSION: 0
CONSTIPATION: 0
NAUSEA: 0
CHILLS: 0
FOCAL WEAKNESS: 0
WEAKNESS: 1
MEMORY LOSS: 1
BACK PAIN: 1
VOMITING: 0
SPUTUM PRODUCTION: 0
FEVER: 0
DIZZINESS: 0

## 2017-03-09 NOTE — DISCHARGE PLANNING
TCC order from Dr. Crane      Debility with sepsis ongoing medical management with therapy needs. Discharge plan could be a barrier to IRF may need group home per RENITA note.    Per previous notes pt lives with niece but does not like niece's son. Unsure why pt is telling Dr he has no family at all. Perhaps with pts income pt can live at a group home. Pt has no remaining SNF days if pt cannot ambulate pt will be a hard placement.  Call out to RENITA Zabala to discuss plan. No physiatry consult ordered per protocol at this time, will follow.  Thank you for the opportunity to participate in this patient care as he prepares to transition to post acute services.

## 2017-03-09 NOTE — PROGRESS NOTES
C/O gen weakness, assisted pt to edge of bed w/ min-mod assist. Refused to get oob to chair. Pt reports feeling better w/ condom cath on. Medicated cream applied to affected giuseppe area skin irritation. Denies pain, Sara po's, consuming >75%. Feeds self, no swallowing problems noted. Encr pt to use IS. Turned and repositioned while in bed.

## 2017-03-09 NOTE — PROGRESS NOTES
Pt AOx4 with some confusion, denies pain. Pt fatigued this evening. No s/s of dyspnea or respiratory distress, continues to be on 2L NC. Call light within reach, personal belongings available, bed in lowest position, treaded socks on, and bed alarm on. Hourly rounding in place.

## 2017-03-09 NOTE — PROGRESS NOTES
Hospital Medicine Progress Note, Adult, Complex               Author: Roseanna Crane Date & Time created: 3/9/2017  7:02 AM       ID/CC: 81 yr M with hx of Stroke with left sided hemiparesis, HTN, COPD on 3 L of oxygen, asthma, multiple falls and pelvic fracture presented from [facility] care SNF,presented on 2/20 with shortness of breath and cough and found to have left sided opacification started on vanco and zosyn for HCAP.  H/o aspiration, on dysphagia 1 diet per SLP.  Now on meropenem for ESBL.     Interval History:  Patient appeared weak and more sleepy. He remains afebrile.  Encouraged patient to sit in chair with meals.  Continue PT/OT while in hospital.  Cognitive evaluation ordered.  Difficult discharge    Review of Systems:  Review of Systems   Constitutional: Negative for fever and chills.   Respiratory: Negative for cough, sputum production and shortness of breath.    Cardiovascular: Negative for chest pain and leg swelling.   Gastrointestinal: Negative for nausea, vomiting, abdominal pain, diarrhea and constipation.   Genitourinary: Negative for dysuria and urgency.   Musculoskeletal: Positive for back pain.   Neurological: Positive for weakness. Negative for dizziness and focal weakness.   Psychiatric/Behavioral: Positive for memory loss. Negative for depression. The patient is not nervous/anxious.      Physical Exam:  Physical Exam   Constitutional: No distress.   Thin, frail, appears weak    HENT:   Head: Normocephalic and atraumatic.   Eyes: Conjunctivae are normal. Right eye exhibits no discharge. Left eye exhibits no discharge.   Neck: No JVD present.   Cardiovascular: Normal rate and regular rhythm.    Murmur heard.  Pulmonary/Chest: Effort normal. No stridor. No respiratory distress. He has decreased breath sounds (but relatively clear) in the right lower field and the left lower field. He has no wheezes. He has no rales.   Abdominal: Soft. He exhibits no distension. There is no tenderness.    Musculoskeletal: He exhibits no edema or tenderness.   R hip pain with rom, Normal straight leg testing    Lymphadenopathy:        Right: No supraclavicular adenopathy present.        Left: No supraclavicular adenopathy present.   Neurological: He exhibits abnormal muscle tone (weak). Coordination abnormal.   Oriented X 2 , sleepy    Skin: Skin is warm. He is not diaphoretic.   Vitals reviewed.      Labs:        Invalid input(s): [id number]      Recent Labs      17   0005  17   0250   SODIUM  140  138  141   POTASSIUM  4.5  4.3  4.8   CHLORIDE  102  100  99   CO2  36*  37*  39*   BUN  22  24*  21   CREATININE  0.53  0.56  0.60   CALCIUM  9.1  8.8  9.2     Recent Labs      17   0005  17   0250   GLUCOSE  95  95  108*     Recent Labs      17   0005  17   0250   RBC  3.72*  3.51*  3.56*   HEMOGLOBIN  12.0*  11.1*  11.2*   HEMATOCRIT  37.9*  35.4*  35.9*   PLATELETCT  169  165  167     Recent Labs      17   0005  17   0250   WBC  6.8  6.9  6.3   NEUTSPOLYS  59.60  56.00  58.90   LYMPHOCYTES  27.00  31.50  28.20   MONOCYTES  8.80  8.50  9.10   EOSINOPHILS  3.70  3.20  2.90   BASOPHILS  0.60  0.40  0.60           Hemodynamics:  Temp (24hrs), Av.7 °C (98 °F), Min:36.6 °C (97.9 °F), Max:36.8 °C (98.2 °F)  Temperature: 36.6 °C (97.9 °F)  Pulse  Av.9  Min: 44  Max: 98   Blood Pressure : 112/54 mmHg     Respiratory:    Respiration: 17, Pulse Oximetry: 95 %     Work Of Breathing / Effort: Mild  RUL Breath Sounds: Diminished, RML Breath Sounds: Diminished, RLL Breath Sounds: Diminished, DEBI Breath Sounds: Diminished, LLL Breath Sounds: Diminished  Fluids:    Intake/Output Summary (Last 24 hours) at 17 0702  Last data filed at 17 0600   Gross per 24 hour   Intake    680 ml   Output      0 ml   Net    680 ml        GI/Nutrition:  Orders Placed This Encounter   Procedures   • DIET ORDER      Standing Status: Standing      Number of Occurrences: 1      Standing Expiration Date:      Order Specific Question:  Diet:     Answer:  Diabetic [3]     Order Specific Question:  Diet:     Answer:  Cardiac [6]     Order Specific Question:  Texture/Fiber modifications:     Answer:  Dysphagia 1(Pureed)specify fluid consistency(question 6) [1]     Order Specific Question:  Consistency/Fluid modifications:     Answer:  Nectar Thick [2]     Medical Decision Making, by Problem:  Active Hospital Problems    Diagnosis   • Respiratory failure (CMS-[facility]) [J96.90]  Hypoxic/hypercarbic  Much improved, following O2 requirements     • Debility [R53.81]  PT/OT     • Multiple falls [R29.6]  Fall precautions  PT     • MRSA carrier [Z22.322]     • Sepsis (CMS-HCC) [A41.9]  Sepsis resolved, due to HCAP with ESBL  ID Emelia Dial completed on 3/7/17     • HCAP (healthcare-associated pneumonia) [J18.9]  As above     • Chronic respiratory failure with hypoxia (CMS-[facility]) [J96.11]  On 3 L now  Pulmonary toilet   Resume on lev albuterol, symbicort, spiriva   IS  Stable     • Oxygen dependent [Z99.81]  On 3 L     • DM type 2, controlled, with complication (CMS-HCC) [E11.8]  In-hospital FSBG goal less than 180 mg/dL  SSI qAC & qHS when eating, q6 when NPO  GLUCOSE - ACCU-CK   Date/Time Value Ref Range Status   03/09/2017 06:11 * 65 - 99 mg/dL Final   03/08/2017 09:47 * 65 - 99 mg/dL Final   03/08/2017 04:41 PM 97 65 - 99 mg/dL Final   03/08/2017 11:17 * 65 - 99 mg/dL Final      • Left hemiparesis (CMS-[facility]) [G81.94]  Chronic from recent CVA  On asa and Lipitor  On Keppra for seizure disorder  Need PT/OT/ST SNF placement      • Dysphagia status post cerebrovascular accident [I69.391]  On dysphagia diet per ST      Right Meralgia Parasthetica  Continue PT  Pain control   • GERD (gastroesophageal reflux disease) [K21.9]  On pepcid          Dispo: .Pt needs SNF however no SNF days left per SW. Pt lives alone, has no  family. Continue daily PT/OT while in hospital, need safe discharge plan for patient .    Medications reviewed and Labs reviewed        DVT Prophylaxis: Heparin  DVT prophylaxis - mechanical: SCDs

## 2017-03-09 NOTE — DISCHARGE PLANNING
Attempted to meet with pt at bedside, pt asleep. SW will f/u in the morning.     Per previous notes pt lives with niece but does not like niece's son. Unsure why pt is telling Dr he has no family at all. Perhaps with pts income pt can live at a group home. Pt has no remaining SNF days if pt cannot ambulate pt will be a hard placement.    Please encourage pt to get up for meals to help prevent further physical decline.    Pt may be a rehab candidate? Will discuss in rounds tomorrow 3/9/17.

## 2017-03-09 NOTE — PROGRESS NOTES
Pt A&Ox3, ambulated to chair for breakfast with 1 person assist, resting comfortable, no signs of pn when asked repeatedly. SCDs in place with Q2 turn protocol in effect. Has small, reddened, blanchable are distal to Sacrum. Applied barrier cream, will monitor closely.

## 2017-03-09 NOTE — CARE PLAN
Problem: Safety  Goal: Will remain free from falls  Pt confused at times. Safety precautions in place. Bed in low position, treaded socks on, personal possessions in reach, call light in reach and pt using it to call for assistance. Bed alarm on.    Problem: Urinary Elimination:  Goal: Ability to reestablish a normal urinary elimination pattern will improve  Condom cath in place.

## 2017-03-09 NOTE — PROGRESS NOTES
Assumed care @ 0759 bedside report from CAMRON Mora. Pt awake, resting in bed and in no distress. Bed in low position, moris light w/in reach.

## 2017-03-10 LAB
ANION GAP SERPL CALC-SCNC: 6 MMOL/L (ref 0–11.9)
BASOPHILS # BLD AUTO: 0.8 % (ref 0–1.8)
BASOPHILS # BLD: 0.04 K/UL (ref 0–0.12)
BUN SERPL-MCNC: 23 MG/DL (ref 8–22)
CALCIUM SERPL-MCNC: 9.3 MG/DL (ref 8.5–10.5)
CHLORIDE SERPL-SCNC: 99 MMOL/L (ref 96–112)
CO2 SERPL-SCNC: 39 MMOL/L (ref 20–33)
CREAT SERPL-MCNC: 0.55 MG/DL (ref 0.5–1.4)
EOSINOPHIL # BLD AUTO: 0.17 K/UL (ref 0–0.51)
EOSINOPHIL NFR BLD: 3.2 % (ref 0–6.9)
ERYTHROCYTE [DISTWIDTH] IN BLOOD BY AUTOMATED COUNT: 50.8 FL (ref 35.9–50)
GFR SERPL CREATININE-BSD FRML MDRD: >60 ML/MIN/1.73 M 2
GLUCOSE BLD-MCNC: 102 MG/DL (ref 65–99)
GLUCOSE BLD-MCNC: 106 MG/DL (ref 65–99)
GLUCOSE BLD-MCNC: 128 MG/DL (ref 65–99)
GLUCOSE BLD-MCNC: 98 MG/DL (ref 65–99)
GLUCOSE SERPL-MCNC: 94 MG/DL (ref 65–99)
HCT VFR BLD AUTO: 37 % (ref 42–52)
HGB BLD-MCNC: 11.5 G/DL (ref 14–18)
IMM GRANULOCYTES # BLD AUTO: 0.02 K/UL (ref 0–0.11)
IMM GRANULOCYTES NFR BLD AUTO: 0.4 % (ref 0–0.9)
LYMPHOCYTES # BLD AUTO: 1.68 K/UL (ref 1–4.8)
LYMPHOCYTES NFR BLD: 31.7 % (ref 22–41)
MCH RBC QN AUTO: 31.6 PG (ref 27–33)
MCHC RBC AUTO-ENTMCNC: 31.1 G/DL (ref 33.7–35.3)
MCV RBC AUTO: 101.6 FL (ref 81.4–97.8)
MONOCYTES # BLD AUTO: 0.6 K/UL (ref 0–0.85)
MONOCYTES NFR BLD AUTO: 11.3 % (ref 0–13.4)
NEUTROPHILS # BLD AUTO: 2.79 K/UL (ref 1.82–7.42)
NEUTROPHILS NFR BLD: 52.6 % (ref 44–72)
NRBC # BLD AUTO: 0 K/UL
NRBC BLD AUTO-RTO: 0 /100 WBC
PLATELET # BLD AUTO: 175 K/UL (ref 164–446)
PMV BLD AUTO: 12.5 FL (ref 9–12.9)
POTASSIUM SERPL-SCNC: 4.4 MMOL/L (ref 3.6–5.5)
RBC # BLD AUTO: 3.64 M/UL (ref 4.7–6.1)
SODIUM SERPL-SCNC: 144 MMOL/L (ref 135–145)
WBC # BLD AUTO: 5.3 K/UL (ref 4.8–10.8)

## 2017-03-10 PROCEDURE — A9270 NON-COVERED ITEM OR SERVICE: HCPCS | Performed by: HOSPITALIST

## 2017-03-10 PROCEDURE — 36415 COLL VENOUS BLD VENIPUNCTURE: CPT

## 2017-03-10 PROCEDURE — 700102 HCHG RX REV CODE 250 W/ 637 OVERRIDE(OP): Performed by: HOSPITALIST

## 2017-03-10 PROCEDURE — 85025 COMPLETE CBC W/AUTO DIFF WBC: CPT

## 2017-03-10 PROCEDURE — 80048 BASIC METABOLIC PNL TOTAL CA: CPT

## 2017-03-10 PROCEDURE — 82962 GLUCOSE BLOOD TEST: CPT | Mod: 91

## 2017-03-10 PROCEDURE — 700102 HCHG RX REV CODE 250 W/ 637 OVERRIDE(OP): Performed by: INTERNAL MEDICINE

## 2017-03-10 PROCEDURE — 3E0234Z INTRODUCTION OF SERUM, TOXOID AND VACCINE INTO MUSCLE, PERCUTANEOUS APPROACH: ICD-10-PCS | Performed by: HOSPITALIST

## 2017-03-10 PROCEDURE — 94760 N-INVAS EAR/PLS OXIMETRY 1: CPT

## 2017-03-10 PROCEDURE — 90662 IIV NO PRSV INCREASED AG IM: CPT | Performed by: HOSPITALIST

## 2017-03-10 PROCEDURE — 99232 SBSQ HOSP IP/OBS MODERATE 35: CPT | Performed by: INTERNAL MEDICINE

## 2017-03-10 PROCEDURE — 90471 IMMUNIZATION ADMIN: CPT

## 2017-03-10 PROCEDURE — A9270 NON-COVERED ITEM OR SERVICE: HCPCS | Performed by: INTERNAL MEDICINE

## 2017-03-10 PROCEDURE — 770021 HCHG ROOM/CARE - ISO PRIVATE

## 2017-03-10 PROCEDURE — 92526 ORAL FUNCTION THERAPY: CPT

## 2017-03-10 PROCEDURE — 700111 HCHG RX REV CODE 636 W/ 250 OVERRIDE (IP): Performed by: INTERNAL MEDICINE

## 2017-03-10 PROCEDURE — 700112 HCHG RX REV CODE 229: Performed by: HOSPITALIST

## 2017-03-10 PROCEDURE — 700111 HCHG RX REV CODE 636 W/ 250 OVERRIDE (IP): Performed by: HOSPITALIST

## 2017-03-10 RX ADMIN — DOCUSATE SODIUM 100 MG: 100 CAPSULE ORAL at 08:24

## 2017-03-10 RX ADMIN — LEVETIRACETAM 1500 MG: 500 TABLET, FILM COATED ORAL at 20:13

## 2017-03-10 RX ADMIN — TIOTROPIUM BROMIDE 1 CAPSULE: 18 CAPSULE ORAL; RESPIRATORY (INHALATION) at 08:20

## 2017-03-10 RX ADMIN — OXYBUTYNIN CHLORIDE 5 MG: 5 TABLET, FILM COATED, EXTENDED RELEASE ORAL at 08:25

## 2017-03-10 RX ADMIN — Medication 325 MG: at 08:26

## 2017-03-10 RX ADMIN — MICONAZOLE NITRATE: 20 CREAM TOPICAL at 11:31

## 2017-03-10 RX ADMIN — HEPARIN SODIUM 5000 UNITS: 5000 INJECTION, SOLUTION INTRAVENOUS; SUBCUTANEOUS at 05:52

## 2017-03-10 RX ADMIN — STANDARDIZED SENNA CONCENTRATE AND DOCUSATE SODIUM 1 TABLET: 8.6; 5 TABLET, FILM COATED ORAL at 20:14

## 2017-03-10 RX ADMIN — ASPIRIN 81 MG: 81 TABLET ORAL at 08:24

## 2017-03-10 RX ADMIN — DOCUSATE SODIUM 100 MG: 100 CAPSULE ORAL at 20:13

## 2017-03-10 RX ADMIN — INFLUENZA A VIRUS A/CALIFORNIA/7/2009 X-179A (H1N1) ANTIGEN (FORMALDEHYDE INACTIVATED), INFLUENZA A VIRUS A/HONG KONG/4801/2014 X-263B (H3N2) ANTIGEN (FORMALDEHYDE INACTIVATED), AND INFLUENZA B VIRUS B/BRISBANE/60/2008 ANTIGEN (FORMALDEHYDE INACTIVATED) 0.5 ML: 60; 60; 60 INJECTION, SUSPENSION INTRAMUSCULAR at 06:14

## 2017-03-10 RX ADMIN — HEPARIN SODIUM 5000 UNITS: 5000 INJECTION, SOLUTION INTRAVENOUS; SUBCUTANEOUS at 13:52

## 2017-03-10 RX ADMIN — SPIRONOLACTONE 25 MG: 25 TABLET, FILM COATED ORAL at 08:26

## 2017-03-10 RX ADMIN — LACTULOSE 30 ML: 10 SOLUTION ORAL at 22:32

## 2017-03-10 RX ADMIN — BUDESONIDE AND FORMOTEROL FUMARATE DIHYDRATE 2 PUFF: 160; 4.5 AEROSOL RESPIRATORY (INHALATION) at 08:19

## 2017-03-10 RX ADMIN — FAMOTIDINE 20 MG: 20 TABLET, FILM COATED ORAL at 08:28

## 2017-03-10 RX ADMIN — FAMOTIDINE 20 MG: 20 TABLET, FILM COATED ORAL at 20:13

## 2017-03-10 RX ADMIN — MICONAZOLE NITRATE: 20 CREAM TOPICAL at 05:53

## 2017-03-10 RX ADMIN — LACTOBACILLUS ACIDOPHILUS / LACTOBACILLUS BULGARICUS 1 PACKET: 100 MILLION CFU STRENGTH GRANULES at 11:29

## 2017-03-10 RX ADMIN — LACTOBACILLUS ACIDOPHILUS / LACTOBACILLUS BULGARICUS 1 PACKET: 100 MILLION CFU STRENGTH GRANULES at 17:04

## 2017-03-10 RX ADMIN — TAMSULOSIN HYDROCHLORIDE 0.4 MG: 0.4 CAPSULE ORAL at 08:19

## 2017-03-10 RX ADMIN — ATORVASTATIN CALCIUM 80 MG: 80 TABLET, FILM COATED ORAL at 20:13

## 2017-03-10 RX ADMIN — METOPROLOL TARTRATE 50 MG: 50 TABLET, FILM COATED ORAL at 08:29

## 2017-03-10 RX ADMIN — BUDESONIDE AND FORMOTEROL FUMARATE DIHYDRATE 2 PUFF: 160; 4.5 AEROSOL RESPIRATORY (INHALATION) at 20:14

## 2017-03-10 RX ADMIN — LACTOBACILLUS ACIDOPHILUS / LACTOBACILLUS BULGARICUS 1 PACKET: 100 MILLION CFU STRENGTH GRANULES at 08:09

## 2017-03-10 RX ADMIN — OXYCODONE HYDROCHLORIDE 5 MG: 5 TABLET ORAL at 08:27

## 2017-03-10 RX ADMIN — OXYCODONE HYDROCHLORIDE AND ACETAMINOPHEN 500 MG: 500 TABLET ORAL at 08:28

## 2017-03-10 RX ADMIN — GABAPENTIN 300 MG: 300 CAPSULE ORAL at 08:24

## 2017-03-10 RX ADMIN — SERTRALINE 100 MG: 100 TABLET, FILM COATED ORAL at 08:26

## 2017-03-10 RX ADMIN — POLYETHYLENE GLYCOL 3350 1 PACKET: 17 POWDER, FOR SOLUTION ORAL at 08:22

## 2017-03-10 RX ADMIN — FINASTERIDE 5 MG: 5 TABLET, FILM COATED ORAL at 08:29

## 2017-03-10 RX ADMIN — MICONAZOLE NITRATE: 20 CREAM TOPICAL at 17:05

## 2017-03-10 RX ADMIN — LEVETIRACETAM 1500 MG: 500 TABLET, FILM COATED ORAL at 08:28

## 2017-03-10 RX ADMIN — HEPARIN SODIUM 5000 UNITS: 5000 INJECTION, SOLUTION INTRAVENOUS; SUBCUTANEOUS at 20:14

## 2017-03-10 ASSESSMENT — ENCOUNTER SYMPTOMS
BACK PAIN: 1
NAUSEA: 0
CONSTIPATION: 0
FOCAL WEAKNESS: 0
WEAKNESS: 1
FEVER: 0
MEMORY LOSS: 1
DIZZINESS: 0
DEPRESSION: 0
NERVOUS/ANXIOUS: 0
SPUTUM PRODUCTION: 0
COUGH: 0
ABDOMINAL PAIN: 0
DIARRHEA: 0
VOMITING: 0
CHILLS: 0
SHORTNESS OF BREATH: 0

## 2017-03-10 ASSESSMENT — PAIN SCALES - GENERAL
PAINLEVEL_OUTOF10: 8
PAINLEVEL_OUTOF10: 5
PAINLEVEL_OUTOF10: 0

## 2017-03-10 NOTE — PROGRESS NOTES
Pt is pleasantly confused, is in a good mood, resting quietly in bed watching television. Condom catheter patent, draining clear yellow urine. [id number] in place. Call light within reach, personal belongings available, bed in lowest position, treaded socks on, and bed alarm on. Hourly rounding in place.

## 2017-03-10 NOTE — PROGRESS NOTES
OOB to chair during meals w/ min assist. Sara po's, consuming >90%. Turned routinely while in bed.

## 2017-03-10 NOTE — THERAPY
"Speech Language Therapy dysphagia treatment completed.   Functional Status:  Patient was seen for dysphagia tx this date. Patient consumed breakfast tray of D1/NTL with no difficulities noted per RN. Patient with thin liquids at bedside noted. Intermittent wet/gurgly voice throughout session was present as well. Patient consumed PO trials of thins, nectars, and purees. Dentures were not in place and not at bedside tray during this session. Patient demonstrated increased wet vocal quality with PO trials of thin liquids. No overt s/sx of aspiration were noted with NTL and purees. Pt presented with weak and delayed laryngeal elevation and hyolaryngeal excursion noted upon palpation with excessive tongue pumping and delayed trigger of swallow consistently with all trials. Patient needed direct cues to follow swallow strategies and precautions of small bites/sips, double swallow, cough/clear. At this time recommend continue D1/NTL with 1:1 monitor by staff for all meals, along with assist for meal tray set up. SLP to continue to follow.   Recommendations: Continue w/ D1/NTL-1:1 monitor by staff for all meals, no thin liquids. Assist needed for tray set up.    Plan of Care: Will benefit from Speech Therapy 3 times per week    See \"Rehab Therapy-Acute\" Patient Summary Report for complete documentation.     "

## 2017-03-10 NOTE — PROGRESS NOTES
Pt is AOx3, disoriented to time, reoriented, complained of pain, medicated with PRN pain medications, tolerated all oral medications floated in applesauce, skin and sacral assessment done, SCDs on, on 3.5L of O2 per NC, IS at bedside, able to perform correctly and effectively, 750mL, call light in use, bed alarm on, treaded socks on, bed locked in low position, plan of care discussed, all needs met at this time.

## 2017-03-10 NOTE — PROGRESS NOTES
Hospital Medicine Progress Note, Adult, Complex               Author: Roseanna Crane Date & Time created: 3/10/2017  7:07 AM       ID/CC: 81 yr M with hx of Stroke with left sided hemiparesis, HTN, COPD on 3 L of oxygen, asthma, multiple falls and pelvic fracture presented from [facility] care SNF,presented on 2/20 with shortness of breath and cough and found to have left sided opacification started on vanco and zosyn for HCAP.  H/o aspiration, on dysphagia 1 diet per SLP.  Now on meropenem for ESBL.     Interval History:  No interval events, remains afebrile, vitals stable.  Shower patient today, encouraged patient to sit in chair for all meals.  Daily PT  Discussed with ID team, pt may come off isolation.    Review of Systems:  Review of Systems   Constitutional: Negative for fever and chills.   Respiratory: Negative for cough, sputum production and shortness of breath.    Cardiovascular: Negative for chest pain and leg swelling.   Gastrointestinal: Negative for nausea, vomiting, abdominal pain, diarrhea and constipation.   Genitourinary: Negative for dysuria and urgency.   Musculoskeletal: Positive for back pain.   Neurological: Positive for weakness. Negative for dizziness and focal weakness.   Psychiatric/Behavioral: Positive for memory loss. Negative for depression. The patient is not nervous/anxious.      Physical Exam:  Physical Exam   Constitutional: No distress.   Thin, frail, appears weak    HENT:   Head: Normocephalic and atraumatic.   Eyes: Conjunctivae are normal. Right eye exhibits no discharge. Left eye exhibits no discharge.   Neck: No JVD present.   Cardiovascular: Normal rate and regular rhythm.    Murmur heard.  Pulmonary/Chest: No stridor. No respiratory distress. He has no wheezes. He has no rales.   Abdominal: Soft. He exhibits no distension. There is no tenderness.   Musculoskeletal: He exhibits no edema or tenderness.   Lymphadenopathy:        Right: No supraclavicular adenopathy present.         Left: No supraclavicular adenopathy present.   Neurological: He is alert. Abnormal muscle tone: weak.   Oriented X 2   Skin: Skin is warm. He is not diaphoretic.   Vitals reviewed.      Labs:        Invalid input(s): [id number]      Recent Labs      17   0005  03/09/17   0250  03/10/17   0215   SODIUM  138  141  144   POTASSIUM  4.3  4.8  4.4   CHLORIDE  100  99  99   CO2  37*  39*  39*   BUN  24*  21  23*   CREATININE  0.56  0.60  0.55   CALCIUM  8.8  9.2  9.3     Recent Labs      17   0005  03/09/17   0250  03/10/17   0215   GLUCOSE  95  108*  94     Recent Labs      17   0005  03/09/17   0250  03/10/17   0215   RBC  3.51*  3.56*  3.64*   HEMOGLOBIN  11.1*  11.2*  11.5*   HEMATOCRIT  35.4*  35.9*  37.0*   PLATELETCT  165  167  175     Recent Labs      17   0005  03/09/17   0250  03/10/17   0215   WBC  6.9  6.3  5.3   NEUTSPOLYS  56.00  58.90  52.60   LYMPHOCYTES  31.50  28.20  31.70   MONOCYTES  8.50  9.10  11.30   EOSINOPHILS  3.20  2.90  3.20   BASOPHILS  0.40  0.60  0.80           Hemodynamics:  Temp (24hrs), Av.8 °C (98.2 °F), Min:36.2 °C (97.2 °F), Max:37.1 °C (98.8 °F)  Temperature: 36.2 °C (97.2 °F)  Pulse  Av.7  Min: 44  Max: 98   Blood Pressure : 134/64 mmHg     Respiratory:    Respiration: 16, Pulse Oximetry: 99 %, O2 Daily Delivery Respiratory : Silicone Nasal Cannula     Work Of Breathing / Effort: Mild  RUL Breath Sounds: Diminished, RML Breath Sounds: Diminished, RLL Breath Sounds: Diminished, DEBI Breath Sounds: Diminished, LLL Breath Sounds: Diminished  Fluids:    Intake/Output Summary (Last 24 hours) at 03/10/17 0707  Last data filed at 03/10/17 0600   Gross per 24 hour   Intake    540 ml   Output   2400 ml   Net  -1860 ml        GI/Nutrition:  Orders Placed This Encounter   Procedures   • DIET ORDER     Standing Status: Standing      Number of Occurrences: 1      Standing Expiration Date:      Order Specific Question:  Diet:     Answer:  Diabetic [3]     Order  Specific Question:  Diet:     Answer:  Cardiac [6]     Order Specific Question:  Texture/Fiber modifications:     Answer:  Dysphagia 1(Pureed)specify fluid consistency(question 6) [1]     Order Specific Question:  Consistency/Fluid modifications:     Answer:  Nectar Thick [2]     Medical Decision Making, by Problem:  Active Hospital Problems    Diagnosis   • Respiratory failure (CMS-HCC) [J96.90]  Hypoxic/hypercarbic  Much improved, following O2 requirements     • Debility [R53.81]  PT/OT     • Multiple falls [R29.6]  Fall precautions  PT     • MRSA carrier [Z22.322]     • Sepsis (CMS-HCC) [A41.9]  Sepsis resolved, due to HCAP with ESBL  ID Emelia alfred  Meropenam completed on 3/7/17     • HCAP (healthcare-associated pneumonia) [J18.9]  As above     • Chronic respiratory failure with hypoxia (CMS-HCC) [J96.11]  On 3 L now  Pulmonary toilet   Resume on lev albuterol, symbicort, spiriva   IS  Stable     • Oxygen dependent [Z99.81]  On 3 L     • DM type 2, controlled, with complication (CMS-HCC) [E11.8]  In-hospital FSBG goal less than 180 mg/dL  SSI qAC & qHS when eating, q6 when NPO  GLUCOSE - ACCU-CK   Date/Time Value Ref Range Status   03/10/2017 05:59 AM 98 65 - 99 mg/dL Final   03/09/2017 08:55 PM 91 65 - 99 mg/dL Final   03/09/2017 05:01 * 65 - 99 mg/dL Final   03/09/2017 12:02 PM 85 65 - 99 mg/dL Final      • Left hemiparesis (CMS-HCC) [G81.94]  Chronic from recent CVA  On asa and Lipitor  On Keppra for seizure disorder  Need PT/OT/ST SNF placement      • Dysphagia status post cerebrovascular accident [I69.391]  On dysphagia diet per ST      Right Meralgia Parasthetica  Continue PT  Pain control     • GERD (gastroesophageal reflux disease) [K21.9]  On pepcid          Dispo: .Pt needs SNF however no SNF days left per SW. Continue daily PT/OT while in hospital, need safe discharge plan for patient .    Medications reviewed and Labs reviewed        DVT Prophylaxis: Heparin  DVT prophylaxis - mechanical:  SCDs

## 2017-03-10 NOTE — CARE PLAN
Problem: Safety  Goal: Will remain free from falls  Safety precautions in place. Bed in low position, treaded socks on, personal possessions in reach, call light in reach and pt using it to call for assistance. Bed alarm on.    Problem: Urinary Elimination:  Goal: Ability to reestablish a normal urinary elimination pattern will improve  Pt is incontinent. Condom catheter in place.

## 2017-03-10 NOTE — CARE PLAN
Problem: Safety  Goal: Will remain free from falls  Outcome: PROGRESSING AS EXPECTED  Pt with hx of fall, confused, bed alarm on, call light in use, instructed to call for assistance, bed locked in low position, treaded socks on, room near RN station, pt calls appropriately    Problem: Pain Management  Goal: Pain level will decrease to patient’s comfort goal  Outcome: PROGRESSING AS EXPECTED  Pt complained of pain, medicated with PRN pain medications see MAR, will continue to monitor for pain

## 2017-03-11 LAB
ANION GAP SERPL CALC-SCNC: 2 MMOL/L (ref 0–11.9)
BASOPHILS # BLD AUTO: 0.5 % (ref 0–1.8)
BASOPHILS # BLD: 0.03 K/UL (ref 0–0.12)
BUN SERPL-MCNC: 27 MG/DL (ref 8–22)
CALCIUM SERPL-MCNC: 9.1 MG/DL (ref 8.5–10.5)
CHLORIDE SERPL-SCNC: 100 MMOL/L (ref 96–112)
CO2 SERPL-SCNC: 38 MMOL/L (ref 20–33)
CREAT SERPL-MCNC: 0.49 MG/DL (ref 0.5–1.4)
EOSINOPHIL # BLD AUTO: 0.16 K/UL (ref 0–0.51)
EOSINOPHIL NFR BLD: 2.5 % (ref 0–6.9)
ERYTHROCYTE [DISTWIDTH] IN BLOOD BY AUTOMATED COUNT: 49.3 FL (ref 35.9–50)
GFR SERPL CREATININE-BSD FRML MDRD: >60 ML/MIN/1.73 M 2
GLUCOSE BLD-MCNC: 103 MG/DL (ref 65–99)
GLUCOSE BLD-MCNC: 111 MG/DL (ref 65–99)
GLUCOSE BLD-MCNC: 129 MG/DL (ref 65–99)
GLUCOSE BLD-MCNC: 85 MG/DL (ref 65–99)
GLUCOSE SERPL-MCNC: 140 MG/DL (ref 65–99)
HCT VFR BLD AUTO: 35.6 % (ref 42–52)
HGB BLD-MCNC: 10.9 G/DL (ref 14–18)
IMM GRANULOCYTES # BLD AUTO: 0.02 K/UL (ref 0–0.11)
IMM GRANULOCYTES NFR BLD AUTO: 0.3 % (ref 0–0.9)
LYMPHOCYTES # BLD AUTO: 1.16 K/UL (ref 1–4.8)
LYMPHOCYTES NFR BLD: 18.2 % (ref 22–41)
MCH RBC QN AUTO: 31.1 PG (ref 27–33)
MCHC RBC AUTO-ENTMCNC: 30.6 G/DL (ref 33.7–35.3)
MCV RBC AUTO: 101.7 FL (ref 81.4–97.8)
MONOCYTES # BLD AUTO: 0.64 K/UL (ref 0–0.85)
MONOCYTES NFR BLD AUTO: 10 % (ref 0–13.4)
NEUTROPHILS # BLD AUTO: 4.36 K/UL (ref 1.82–7.42)
NEUTROPHILS NFR BLD: 68.5 % (ref 44–72)
NRBC # BLD AUTO: 0 K/UL
NRBC BLD AUTO-RTO: 0 /100 WBC
PLATELET # BLD AUTO: 166 K/UL (ref 164–446)
PMV BLD AUTO: 12.5 FL (ref 9–12.9)
POTASSIUM SERPL-SCNC: 4.3 MMOL/L (ref 3.6–5.5)
RBC # BLD AUTO: 3.5 M/UL (ref 4.7–6.1)
SODIUM SERPL-SCNC: 140 MMOL/L (ref 135–145)
WBC # BLD AUTO: 6.4 K/UL (ref 4.8–10.8)

## 2017-03-11 PROCEDURE — 700111 HCHG RX REV CODE 636 W/ 250 OVERRIDE (IP): Performed by: INTERNAL MEDICINE

## 2017-03-11 PROCEDURE — 36415 COLL VENOUS BLD VENIPUNCTURE: CPT

## 2017-03-11 PROCEDURE — 700102 HCHG RX REV CODE 250 W/ 637 OVERRIDE(OP): Performed by: INTERNAL MEDICINE

## 2017-03-11 PROCEDURE — A9270 NON-COVERED ITEM OR SERVICE: HCPCS | Performed by: HOSPITALIST

## 2017-03-11 PROCEDURE — 99232 SBSQ HOSP IP/OBS MODERATE 35: CPT | Performed by: INTERNAL MEDICINE

## 2017-03-11 PROCEDURE — 700112 HCHG RX REV CODE 229: Performed by: HOSPITALIST

## 2017-03-11 PROCEDURE — A9270 NON-COVERED ITEM OR SERVICE: HCPCS | Performed by: INTERNAL MEDICINE

## 2017-03-11 PROCEDURE — 770006 HCHG ROOM/CARE - MED/SURG/GYN SEMI*

## 2017-03-11 PROCEDURE — 85025 COMPLETE CBC W/AUTO DIFF WBC: CPT

## 2017-03-11 PROCEDURE — 82962 GLUCOSE BLOOD TEST: CPT

## 2017-03-11 PROCEDURE — 80048 BASIC METABOLIC PNL TOTAL CA: CPT

## 2017-03-11 PROCEDURE — 700102 HCHG RX REV CODE 250 W/ 637 OVERRIDE(OP): Performed by: HOSPITALIST

## 2017-03-11 RX ADMIN — DOCUSATE SODIUM 100 MG: 100 CAPSULE ORAL at 08:00

## 2017-03-11 RX ADMIN — MICONAZOLE NITRATE: 20 CREAM TOPICAL at 05:16

## 2017-03-11 RX ADMIN — Medication 325 MG: at 08:00

## 2017-03-11 RX ADMIN — LACTOBACILLUS ACIDOPHILUS / LACTOBACILLUS BULGARICUS 1 PACKET: 100 MILLION CFU STRENGTH GRANULES at 17:06

## 2017-03-11 RX ADMIN — POLYETHYLENE GLYCOL 3350 1 PACKET: 17 POWDER, FOR SOLUTION ORAL at 08:00

## 2017-03-11 RX ADMIN — LEVETIRACETAM 1500 MG: 500 TABLET, FILM COATED ORAL at 21:04

## 2017-03-11 RX ADMIN — MICONAZOLE NITRATE: 20 CREAM TOPICAL at 11:42

## 2017-03-11 RX ADMIN — LACTOBACILLUS ACIDOPHILUS / LACTOBACILLUS BULGARICUS 1 PACKET: 100 MILLION CFU STRENGTH GRANULES at 11:41

## 2017-03-11 RX ADMIN — OXYCODONE HYDROCHLORIDE AND ACETAMINOPHEN 500 MG: 500 TABLET ORAL at 08:00

## 2017-03-11 RX ADMIN — ASPIRIN 81 MG: 81 TABLET ORAL at 08:00

## 2017-03-11 RX ADMIN — FINASTERIDE 5 MG: 5 TABLET, FILM COATED ORAL at 08:00

## 2017-03-11 RX ADMIN — BUDESONIDE AND FORMOTEROL FUMARATE DIHYDRATE 2 PUFF: 160; 4.5 AEROSOL RESPIRATORY (INHALATION) at 08:00

## 2017-03-11 RX ADMIN — DOCUSATE SODIUM 100 MG: 100 CAPSULE ORAL at 21:04

## 2017-03-11 RX ADMIN — MICONAZOLE NITRATE: 20 CREAM TOPICAL at 00:00

## 2017-03-11 RX ADMIN — MICONAZOLE NITRATE: 20 CREAM TOPICAL at 17:06

## 2017-03-11 RX ADMIN — TIOTROPIUM BROMIDE 1 CAPSULE: 18 CAPSULE ORAL; RESPIRATORY (INHALATION) at 08:06

## 2017-03-11 RX ADMIN — FAMOTIDINE 20 MG: 20 TABLET, FILM COATED ORAL at 08:00

## 2017-03-11 RX ADMIN — FAMOTIDINE 20 MG: 20 TABLET, FILM COATED ORAL at 21:04

## 2017-03-11 RX ADMIN — GABAPENTIN 300 MG: 300 CAPSULE ORAL at 08:00

## 2017-03-11 RX ADMIN — TAMSULOSIN HYDROCHLORIDE 0.4 MG: 0.4 CAPSULE ORAL at 08:00

## 2017-03-11 RX ADMIN — HEPARIN SODIUM 5000 UNITS: 5000 INJECTION, SOLUTION INTRAVENOUS; SUBCUTANEOUS at 21:04

## 2017-03-11 RX ADMIN — SERTRALINE 100 MG: 100 TABLET, FILM COATED ORAL at 08:00

## 2017-03-11 RX ADMIN — HEPARIN SODIUM 5000 UNITS: 5000 INJECTION, SOLUTION INTRAVENOUS; SUBCUTANEOUS at 15:00

## 2017-03-11 RX ADMIN — BUDESONIDE AND FORMOTEROL FUMARATE DIHYDRATE 2 PUFF: 160; 4.5 AEROSOL RESPIRATORY (INHALATION) at 20:58

## 2017-03-11 RX ADMIN — LACTOBACILLUS ACIDOPHILUS / LACTOBACILLUS BULGARICUS 1 PACKET: 100 MILLION CFU STRENGTH GRANULES at 08:00

## 2017-03-11 RX ADMIN — HEPARIN SODIUM 5000 UNITS: 5000 INJECTION, SOLUTION INTRAVENOUS; SUBCUTANEOUS at 05:16

## 2017-03-11 RX ADMIN — STANDARDIZED SENNA CONCENTRATE AND DOCUSATE SODIUM 1 TABLET: 8.6; 5 TABLET, FILM COATED ORAL at 21:04

## 2017-03-11 RX ADMIN — OXYBUTYNIN CHLORIDE 5 MG: 5 TABLET, FILM COATED, EXTENDED RELEASE ORAL at 08:00

## 2017-03-11 RX ADMIN — ATORVASTATIN CALCIUM 80 MG: 80 TABLET, FILM COATED ORAL at 21:04

## 2017-03-11 RX ADMIN — MICONAZOLE NITRATE: 20 CREAM TOPICAL at 22:57

## 2017-03-11 RX ADMIN — LEVETIRACETAM 1500 MG: 500 TABLET, FILM COATED ORAL at 08:00

## 2017-03-11 RX ADMIN — SPIRONOLACTONE 25 MG: 25 TABLET, FILM COATED ORAL at 08:00

## 2017-03-11 ASSESSMENT — ENCOUNTER SYMPTOMS
ABDOMINAL PAIN: 0
MEMORY LOSS: 1
CONSTIPATION: 0
FEVER: 0
NERVOUS/ANXIOUS: 0
FOCAL WEAKNESS: 0
VOMITING: 0
COUGH: 0
DIARRHEA: 0
CHILLS: 0
WEAKNESS: 1
SPUTUM PRODUCTION: 0
DIZZINESS: 0
DEPRESSION: 0
BACK PAIN: 1
NAUSEA: 0
SHORTNESS OF BREATH: 0

## 2017-03-11 ASSESSMENT — PAIN SCALES - GENERAL
PAINLEVEL_OUTOF10: 0

## 2017-03-11 NOTE — CARE PLAN
Problem: Discharge Barriers/Planning  Goal: Patient’s continuum of care needs will be met  Difficult dc    Problem: Skin Integrity  Goal: Risk for impaired skin integrity will decrease  Pt turns self in bed with reinforcement, miguelangel cream applied as ordered. Incontinent, condom cath in place. Linens changed, pt cleaned as needed.

## 2017-03-11 NOTE — CARE PLAN
Problem: Safety  Goal: Will remain free from falls  Outcome: PROGRESSING AS EXPECTED  Pt. With history of fall, forgetful. Bed alarm ON, bed kept low and locked, call light within reach, assisted as necessary.    Problem: Respiratory:  Goal: Respiratory status will improve  Outcome: PROGRESSING AS EXPECTED  Pt. Admitted because of pneumonia, on dysphagia diet. Dysphagia diet in place, IS of 1500, HOB elevated at 30 degrees at all times, set up yankauer suction in use.

## 2017-03-11 NOTE — PROGRESS NOTES
Per Dr. Guadalupe, discontinue isolation precautions, confirmed with ID. See MD note from 3/10. Isolation precautions discontinued, charge RN updated.

## 2017-03-11 NOTE — PROGRESS NOTES
Recd report, assumed care. Pt A&O*3, reoriented to date, forgetful at times. Denies pain, n/v. VSS. Assessment complete. Pt sitting up at EOB eating breakfast with RN present, tolerating well. No family at bedside. All needs met. Fall precautions in place, bed alarm on, call light in reach, white board updated, hourly rounding in place. Will continue to monitor.

## 2017-03-11 NOTE — PROGRESS NOTES
Received report from day shift RN, assumed care. Pt. Is awake, on bed. A&Ox3-4, forgetful at times. x2 assist from bed to commode, pt. denies pain. Due medications given, tolerated well, no s/s of aspiration noted, yankauer suction at bedside, HOB elevated at 30 degrees at all times. Plan of care was safety, comfort and rest. Discussed plan of care. Bed alarm in use, call light and personal belongings within reach, bed kept low, treaded socks on. Assisted as necessary. Kept rested and comfortable at all times.    Noted bulging on LLQ, pt. With history of hernia. Per pt., this is not new. Will continue monitoring.

## 2017-03-11 NOTE — PROGRESS NOTES
Hospital Medicine Progress Note, Adult, Complex               Author: Roseanna Crane Date & Time created: 3/11/2017  6:11 AM       ID/CC: 81 yr M with hx of Stroke with left sided hemiparesis, HTN, COPD on 3 L of oxygen, asthma, multiple falls and pelvic fracture presented from [facility] care SNF,presented on 2/20 with shortness of breath and cough and found to have left sided opacification started on vanco and zosyn for HCAP.  H/o aspiration, on dysphagia 1 diet per SLP.  Now on meropenem for ESBL.     Interval History:  Patient had no complaints, on 4 L O2, encouraged IS, OOB in chair with all meals.  BP lower side in mid 90s, held spironolactone, recheck, he is asymptomatic.  Continue inpatient PT/OT/ST    Review of Systems:  Review of Systems   Constitutional: Negative for fever and chills.   Respiratory: Negative for cough, sputum production and shortness of breath.    Cardiovascular: Negative for chest pain and leg swelling.   Gastrointestinal: Negative for nausea, vomiting, abdominal pain, diarrhea and constipation.   Genitourinary: Negative for dysuria and urgency.   Musculoskeletal: Positive for back pain.   Neurological: Positive for weakness. Negative for dizziness and focal weakness.   Psychiatric/Behavioral: Positive for memory loss. Negative for depression. The patient is not nervous/anxious.      Physical Exam:  Physical Exam   Constitutional: No distress.   Thin, frail, appears weak    HENT:   Head: Normocephalic and atraumatic.   Eyes: Conjunctivae are normal. Right eye exhibits no discharge. Left eye exhibits no discharge.   Neck: No JVD present.   Cardiovascular: Normal rate and regular rhythm.    Murmur heard.  Pulmonary/Chest: No stridor. No respiratory distress. He has no wheezes. He has no rales.   Abdominal: Soft. He exhibits no distension. There is no tenderness.   Musculoskeletal: He exhibits no edema or tenderness.   Lymphadenopathy:        Right: No supraclavicular adenopathy present.         Left: No supraclavicular adenopathy present.   Neurological: He is alert. Abnormal muscle tone: weak.   Oriented X 2   Skin: Skin is warm. He is not diaphoretic.   Vitals reviewed.      Labs:        Invalid input(s): [id number]      Recent Labs      03/09/17   0250  03/10/17   0215  03/11/17   0144   SODIUM  141  144  140   POTASSIUM  4.8  4.4  4.3   CHLORIDE  99  99  100   CO2  39*  39*  38*   BUN  21  23*  27*   CREATININE  0.60  0.55  0.49*   CALCIUM  9.2  9.3  9.1     Recent Labs      03/09/17   0250  03/10/17   0215  03/11/17   014   GLUCOSE  108*  94  140*     Recent Labs      03/09/17   0250  03/10/17   0215  03/11/17   0144   RBC  3.56*  3.64*  3.50*   HEMOGLOBIN  11.2*  11.5*  10.9*   HEMATOCRIT  35.9*  37.0*  35.6*   PLATELETCT  167  175  166     Recent Labs      03/09/17   0250  03/10/17   0215  03/11/17   0144   WBC  6.3  5.3  6.4   NEUTSPOLYS  58.90  52.60  68.50   LYMPHOCYTES  28.20  31.70  18.20*   MONOCYTES  9.10  11.30  10.00   EOSINOPHILS  2.90  3.20  2.50   BASOPHILS  0.60  0.80  0.50           Hemodynamics:  Temp (24hrs), Av.4 °C (97.5 °F), Min:36.2 °C (97.2 °F), Max:36.5 °C (97.7 °F)  Temperature: 36.2 °C (97.2 °F)  Pulse  Av.4  Min: 44  Max: 98   Blood Pressure : 132/54 mmHg     Respiratory:    Respiration: 18, Pulse Oximetry: 99 %, O2 Daily Delivery Respiratory : Silicone Nasal Cannula     Work Of Breathing / Effort: Mild  RUL Breath Sounds: Diminished, RML Breath Sounds: Diminished, RLL Breath Sounds: Diminished, DEBI Breath Sounds: Diminished, LLL Breath Sounds: Diminished  Fluids:    Intake/Output Summary (Last 24 hours) at 17 0611  Last data filed at 17 0400   Gross per 24 hour   Intake    780 ml   Output   1900 ml   Net  -1120 ml        GI/Nutrition:  Orders Placed This Encounter   Procedures   • DIET ORDER     Standing Status: Standing      Number of Occurrences: 1      Standing Expiration Date:      Order Specific Question:  Diet:     Answer:  Diabetic [3]      Order Specific Question:  Diet:     Answer:  Cardiac [6]     Order Specific Question:  Texture/Fiber modifications:     Answer:  Dysphagia 1(Pureed)specify fluid consistency(question 6) [1]     Order Specific Question:  Consistency/Fluid modifications:     Answer:  Nectar Thick [2]     Medical Decision Making, by Problem:  Active Hospital Problems    Diagnosis   • Respiratory failure (CMS-HCC) [J96.90]  Hypoxic/hypercarbic  Much improved, following O2 requirements     • Debility [R53.81]  PT/OT     • Multiple falls [R29.6]  Fall precautions  PT     • MRSA carrier [Z22.322]     • Sepsis (CMS-HCC) [A41.9]  Sepsis resolved, due to HCAP with ESBL  ID Emelia Dial completed on 3/7/17     • HCAP (healthcare-associated pneumonia) [J18.9]  As above     • Chronic respiratory failure with hypoxia (CMS-HCC) [J96.11]  On 3 L now  Pulmonary toilet   Resume on lev albuterol, symbicort, spiriva   IS  Stable     • Oxygen dependent [Z99.81]  On 3 L     • DM type 2, controlled, with complication (CMS-HCC) [E11.8]  In-hospital FSBG goal less than 180 mg/dL  SSI qAC & qHS when eating, q6 when NPO  GLUCOSE - ACCU-CK   Date/Time Value Ref Range Status   03/11/2017 05:15 * 65 - 99 mg/dL Final     Comment:     Followed Dr palacios   03/10/2017 08:17 * 65 - 99 mg/dL Final     Comment:     Followed Dr palacios   03/10/2017 05:04 * 65 - 99 mg/dL Final     Comment:     Followed Dr palacios   03/10/2017 11:31 * 65 - 99 mg/dL Final     Comment:     Followed Dr palacios      • Left hemiparesis (CMS-HCC) [G81.94]  Chronic from recent CVA  On asa and Lipitor  On Keppra for seizure disorder  Need PT/OT/ST SNF placement      • Dysphagia status post cerebrovascular accident [I69.391]  On dysphagia diet per ST      Right Meralgia Parasthetica  Continue PT  Pain control     • GERD (gastroesophageal reflux disease) [K21.9]  On pepcid          Dispo: .Pt needs SNF however no SNF days left per SW.   Continue daily PT/OT while  in hospital, need safe discharge plan for patient .    Medications reviewed and Labs reviewed        DVT Prophylaxis: Heparin  DVT prophylaxis - mechanical: SCDs

## 2017-03-12 LAB
GLUCOSE BLD-MCNC: 105 MG/DL (ref 65–99)
GLUCOSE BLD-MCNC: 131 MG/DL (ref 65–99)
GLUCOSE BLD-MCNC: 88 MG/DL (ref 65–99)
GLUCOSE BLD-MCNC: 97 MG/DL (ref 65–99)

## 2017-03-12 PROCEDURE — A9270 NON-COVERED ITEM OR SERVICE: HCPCS | Performed by: INTERNAL MEDICINE

## 2017-03-12 PROCEDURE — A9270 NON-COVERED ITEM OR SERVICE: HCPCS | Performed by: HOSPITALIST

## 2017-03-12 PROCEDURE — 700102 HCHG RX REV CODE 250 W/ 637 OVERRIDE(OP): Performed by: INTERNAL MEDICINE

## 2017-03-12 PROCEDURE — 99232 SBSQ HOSP IP/OBS MODERATE 35: CPT | Performed by: INTERNAL MEDICINE

## 2017-03-12 PROCEDURE — 770006 HCHG ROOM/CARE - MED/SURG/GYN SEMI*

## 2017-03-12 PROCEDURE — 82962 GLUCOSE BLOOD TEST: CPT | Mod: 91

## 2017-03-12 PROCEDURE — 700102 HCHG RX REV CODE 250 W/ 637 OVERRIDE(OP): Performed by: HOSPITALIST

## 2017-03-12 PROCEDURE — 700111 HCHG RX REV CODE 636 W/ 250 OVERRIDE (IP): Performed by: INTERNAL MEDICINE

## 2017-03-12 PROCEDURE — 700112 HCHG RX REV CODE 229: Performed by: HOSPITALIST

## 2017-03-12 RX ADMIN — BUDESONIDE AND FORMOTEROL FUMARATE DIHYDRATE 2 PUFF: 160; 4.5 AEROSOL RESPIRATORY (INHALATION) at 07:26

## 2017-03-12 RX ADMIN — HEPARIN SODIUM 5000 UNITS: 5000 INJECTION, SOLUTION INTRAVENOUS; SUBCUTANEOUS at 13:09

## 2017-03-12 RX ADMIN — BUDESONIDE AND FORMOTEROL FUMARATE DIHYDRATE 2 PUFF: 160; 4.5 AEROSOL RESPIRATORY (INHALATION) at 21:07

## 2017-03-12 RX ADMIN — STANDARDIZED SENNA CONCENTRATE AND DOCUSATE SODIUM 1 TABLET: 8.6; 5 TABLET, FILM COATED ORAL at 21:09

## 2017-03-12 RX ADMIN — TIOTROPIUM BROMIDE 1 CAPSULE: 18 CAPSULE ORAL; RESPIRATORY (INHALATION) at 07:26

## 2017-03-12 RX ADMIN — LACTOBACILLUS ACIDOPHILUS / LACTOBACILLUS BULGARICUS 1 PACKET: 100 MILLION CFU STRENGTH GRANULES at 10:31

## 2017-03-12 RX ADMIN — OXYCODONE HYDROCHLORIDE AND ACETAMINOPHEN 500 MG: 500 TABLET ORAL at 07:25

## 2017-03-12 RX ADMIN — TAMSULOSIN HYDROCHLORIDE 0.4 MG: 0.4 CAPSULE ORAL at 07:23

## 2017-03-12 RX ADMIN — Medication 325 MG: at 07:23

## 2017-03-12 RX ADMIN — LEVETIRACETAM 1500 MG: 500 TABLET, FILM COATED ORAL at 07:24

## 2017-03-12 RX ADMIN — OXYBUTYNIN CHLORIDE 5 MG: 5 TABLET, FILM COATED, EXTENDED RELEASE ORAL at 07:24

## 2017-03-12 RX ADMIN — ASPIRIN 81 MG: 81 TABLET ORAL at 07:25

## 2017-03-12 RX ADMIN — GABAPENTIN 300 MG: 300 CAPSULE ORAL at 07:24

## 2017-03-12 RX ADMIN — MICONAZOLE NITRATE: 20 CREAM TOPICAL at 05:06

## 2017-03-12 RX ADMIN — DOCUSATE SODIUM 100 MG: 100 CAPSULE ORAL at 21:09

## 2017-03-12 RX ADMIN — ATORVASTATIN CALCIUM 80 MG: 80 TABLET, FILM COATED ORAL at 21:08

## 2017-03-12 RX ADMIN — HEPARIN SODIUM 5000 UNITS: 5000 INJECTION, SOLUTION INTRAVENOUS; SUBCUTANEOUS at 21:09

## 2017-03-12 RX ADMIN — HEPARIN SODIUM 5000 UNITS: 5000 INJECTION, SOLUTION INTRAVENOUS; SUBCUTANEOUS at 05:04

## 2017-03-12 RX ADMIN — POLYETHYLENE GLYCOL 3350 1 PACKET: 17 POWDER, FOR SOLUTION ORAL at 07:25

## 2017-03-12 RX ADMIN — MICONAZOLE NITRATE: 20 CREAM TOPICAL at 11:39

## 2017-03-12 RX ADMIN — FINASTERIDE 5 MG: 5 TABLET, FILM COATED ORAL at 07:25

## 2017-03-12 RX ADMIN — LEVETIRACETAM 1500 MG: 500 TABLET, FILM COATED ORAL at 21:08

## 2017-03-12 RX ADMIN — SERTRALINE 100 MG: 100 TABLET, FILM COATED ORAL at 07:24

## 2017-03-12 RX ADMIN — DOCUSATE SODIUM 100 MG: 100 CAPSULE ORAL at 07:23

## 2017-03-12 RX ADMIN — FAMOTIDINE 20 MG: 20 TABLET, FILM COATED ORAL at 07:25

## 2017-03-12 RX ADMIN — MICONAZOLE NITRATE: 20 CREAM TOPICAL at 16:27

## 2017-03-12 RX ADMIN — MICONAZOLE NITRATE: 20 CREAM TOPICAL at 23:55

## 2017-03-12 RX ADMIN — LACTOBACILLUS ACIDOPHILUS / LACTOBACILLUS BULGARICUS 1 PACKET: 100 MILLION CFU STRENGTH GRANULES at 07:22

## 2017-03-12 RX ADMIN — LACTOBACILLUS ACIDOPHILUS / LACTOBACILLUS BULGARICUS 1 PACKET: 100 MILLION CFU STRENGTH GRANULES at 16:22

## 2017-03-12 RX ADMIN — FAMOTIDINE 20 MG: 20 TABLET, FILM COATED ORAL at 21:08

## 2017-03-12 ASSESSMENT — ENCOUNTER SYMPTOMS
BACK PAIN: 0
CHILLS: 0
NAUSEA: 0
DEPRESSION: 0
COUGH: 0
WEAKNESS: 1
DIZZINESS: 0
FOCAL WEAKNESS: 0
FEVER: 0
VOMITING: 0
NERVOUS/ANXIOUS: 0
ABDOMINAL PAIN: 0
SHORTNESS OF BREATH: 0
DIARRHEA: 0
MEMORY LOSS: 1

## 2017-03-12 ASSESSMENT — PAIN SCALES - GENERAL
PAINLEVEL_OUTOF10: 0
PAINLEVEL_OUTOF10: 0

## 2017-03-12 NOTE — PROGRESS NOTES
Found pt lying on the floor. Fall not witnessed by RN. Fall was assisted by CNA, according to CNA pt was trying to sit on the commode holding on to both rails of the commode when the handle on the left side of the commode broke and pt lost balance and fell on the ground. Pt landed on his left hip, left elbow and left side of his head. Post fall assessment done. Noted a bruise on pt left elbow. No other visible injuries noted. Pt not complaining of any pain this time. No change neurologically from previous assessment. Safety precautions had been in place. Bed placed in low position, bed alarm on, pt calls appropriately, calls appropriately. Informed APRN, Kirsten Romeo, was instructed to continue to monitor and to inform MD for additional tests.

## 2017-03-12 NOTE — CARE PLAN
Problem: Skin Integrity  Goal: Risk for impaired skin integrity will decrease  Outcome: PROGRESSING AS EXPECTED  Pt assisted to mobilize in bed and sat on EOB for meal time, miguelangel cream to be applied as scheduled, condom cath in place to aid in skin recovery    Problem: Pain Management  Goal: Pain level will decrease to patient’s comfort goal  Outcome: PROGRESSING AS EXPECTED  Denies pain upon assessment, will CTM for pain

## 2017-03-12 NOTE — PROGRESS NOTES
Received report from day shift RN. Discussed POC with pt., verbalized understanding, assumed care. A&Ox4. Can be forgetful at times. On 2 liters of O2 via nasal cannula. Tolerating well. Lung sounds diminished. Encouraged use of IS. IS volume 500. No complaints of pain. Had BM. On Dysphagia 1 NT diet. Medications floated on applesauce. Tolerates well. No IV MD okay. FSBS 85, no coverage needed. Had BM, uses commode. Calls appropriately.  Bed alarm on. Safety precautions in place; treaded socks on, call light within reach, personal belongings within reach, upper bed rails up.

## 2017-03-12 NOTE — PROGRESS NOTES
Hospital Medicine Progress Note, Adult, Complex               Author: Roseanna RUDY Crane Date & Time created: 3/12/2017  7:11 AM       ID/CC: 81 yr M with hx of Stroke with left sided hemiparesis, HTN, COPD on 3 L of oxygen, asthma, multiple falls and pelvic fracture presented from [facility] care SNF,presented on 2/20 with shortness of breath and cough and found to have left sided opacification started on vanco and zosyn for HCAP.  H/o aspiration, on dysphagia 1 diet per SLP.  Now on meropenem for ESBL.     Interval History:  Patient is sitting up and eating his breakfast, appears more alert. He had a fall last night, he denied hurting anywhere.  He is eager to sit in chair, oob in chair with meals, ambulate pt with RN.  Fall precautions, aspiration precautions.  Discussed with RN at bedside.    Review of Systems:  Review of Systems   Constitutional: Negative for fever and chills.   Respiratory: Negative for cough and shortness of breath.    Cardiovascular: Negative for chest pain and leg swelling.   Gastrointestinal: Negative for nausea, vomiting, abdominal pain and diarrhea.   Genitourinary: Negative for dysuria.   Musculoskeletal: Negative for back pain.   Neurological: Positive for weakness. Negative for dizziness and focal weakness.   Psychiatric/Behavioral: Positive for memory loss. Negative for depression. The patient is not nervous/anxious.      Physical Exam:  Physical Exam   Constitutional: No distress.   Thin, frail, appears weak    HENT:   Head: Normocephalic and atraumatic.   Eyes: Conjunctivae are normal. Right eye exhibits no discharge. Left eye exhibits no discharge.   Neck: No JVD present.   Cardiovascular: Normal rate and regular rhythm.    Murmur heard.  Pulmonary/Chest: Effort normal and breath sounds normal. No stridor. No respiratory distress. He has no wheezes. He has no rales.   Abdominal: Soft. Bowel sounds are normal. He exhibits no distension. There is no tenderness.   Musculoskeletal: He exhibits  no edema or tenderness.   Lymphadenopathy:        Right: No supraclavicular adenopathy present.        Left: No supraclavicular adenopathy present.   Neurological: He is alert. Abnormal muscle tone: weak.   Oriented X 2   Skin: Skin is warm. He is not diaphoretic.   Vitals reviewed.      Labs:        Invalid input(s): [id number]      Recent Labs      03/10/17   0215  03/11/17   0144   SODIUM  144  140   POTASSIUM  4.4  4.3   CHLORIDE  99  100   CO2  39*  38*   BUN  23*  27*   CREATININE  0.55  0.49*   CALCIUM  9.3  9.1     Recent Labs      03/10/17   0215  03/11/17   014   GLUCOSE  94  140*     Recent Labs      03/10/17   0215  03/11/17   0144   RBC  3.64*  3.50*   HEMOGLOBIN  11.5*  10.9*   HEMATOCRIT  37.0*  35.6*   PLATELETCT  175  166     Recent Labs      03/10/17   0215  03/11/17   014   WBC  5.3  6.4   NEUTSPOLYS  52.60  68.50   LYMPHOCYTES  31.70  18.20*   MONOCYTES  11.30  10.00   EOSINOPHILS  3.20  2.50   BASOPHILS  0.80  0.50           Hemodynamics:  Temp (24hrs), Av.8 °C (98.2 °F), Min:36.5 °C (97.7 °F), Max:37.1 °C (98.8 °F)  Temperature: 36.6 °C (97.8 °F)  Pulse  Av.3  Min: 44  Max: 98   Blood Pressure : 126/51 mmHg     Respiratory:    Respiration: 18, Pulse Oximetry: 90 %     Work Of Breathing / Effort: Mild  RUL Breath Sounds: Diminished, RML Breath Sounds: Diminished, RLL Breath Sounds: Diminished, DEBI Breath Sounds: Diminished, LLL Breath Sounds: Diminished  Fluids:    Intake/Output Summary (Last 24 hours) at 17 0711  Last data filed at 17 0600   Gross per 24 hour   Intake    400 ml   Output   1000 ml   Net   -600 ml        GI/Nutrition:  Orders Placed This Encounter   Procedures   • DIET ORDER     Standing Status: Standing      Number of Occurrences: 1      Standing Expiration Date:      Order Specific Question:  Diet:     Answer:  Diabetic [3]     Order Specific Question:  Diet:     Answer:  Cardiac [6]     Order Specific Question:  Texture/Fiber modifications:      Answer:  Dysphagia 1(Pureed)specify fluid consistency(question 6) [1]     Order Specific Question:  Consistency/Fluid modifications:     Answer:  Nectar Thick [2]     Medical Decision Making, by Problem:  Active Hospital Problems    Diagnosis   • Respiratory failure (CMS-HCC) [J96.90]  Hypoxic/hypercarbic  Much improved, following O2 requirements     • Debility [R53.81]  PT/OT     • Multiple falls [R29.6]  Fall precautions  PT     • MRSA carrier [Z22.322]     • Sepsis (CMS-HCC) [A41.9]  Sepsis resolved, due to HCAP with ESBL  ID Emelia Dial completed on 3/7/17     • HCAP (healthcare-associated pneumonia) [J18.9]  As above     • Chronic respiratory failure with hypoxia (CMS-HCC) [J96.11]  On 3 L now  Pulmonary toilet   Resume on lev albuterol, symbicort, spiriva   IS  Stable     • Oxygen dependent [Z99.81]  On 3 L     • DM type 2, controlled, with complication (CMS-HCC) [E11.8]  In-hospital FSBG goal less than 180 mg/dL  SSI qAC & qHS when eating, q6 when NPO  GLUCOSE - ACCU-CK   Date/Time Value Ref Range Status   03/12/2017 05:04 * 65 - 99 mg/dL Final     Comment:     Followed Dr palacios   03/11/2017 09:01 PM 85 65 - 99 mg/dL Final   03/11/2017 04:41 * 65 - 99 mg/dL Final   03/11/2017 11:39 * 65 - 99 mg/dL Final      • Left hemiparesis (CMS-HCC) [G81.94]  Chronic from recent CVA  On asa and Lipitor  On Keppra for seizure disorder  Need PT/OT/ST SNF placement      • Dysphagia status post cerebrovascular accident [I69.391]  On dysphagia diet per ST      Right Meralgia Parasthetica  Continue PT  Pain control     • GERD (gastroesophageal reflux disease) [K21.9]  On pepcid          Dispo: .Pt needs SNF however no SNF days left per SW.   Continue daily PT/OT while in hospital, need safe discharge plan for patient .    Medications reviewed and Labs reviewed        DVT Prophylaxis: Heparin  DVT prophylaxis - mechanical: SCDs

## 2017-03-12 NOTE — PROGRESS NOTES
Pt is AOx4, no complains of pain, tolerated all oral medications floated in applesauce, skin and sacral assessment done, SCDs on, BLE floated on pillows, weaned off O2, educated to notify RN for any /SOB, IS at bedside, able to perform correctly and effectively, call light in use,  bed alarm on, treaded socks on, bed locked in low position, plan of care discussed- mobilization, all needs met at this time.

## 2017-03-13 LAB
GLUCOSE BLD-MCNC: 106 MG/DL (ref 65–99)
GLUCOSE BLD-MCNC: 124 MG/DL (ref 65–99)
GLUCOSE BLD-MCNC: 96 MG/DL (ref 65–99)
GLUCOSE BLD-MCNC: 98 MG/DL (ref 65–99)

## 2017-03-13 PROCEDURE — 700102 HCHG RX REV CODE 250 W/ 637 OVERRIDE(OP): Performed by: INTERNAL MEDICINE

## 2017-03-13 PROCEDURE — G8997 SWALLOW GOAL STATUS: HCPCS | Mod: CJ

## 2017-03-13 PROCEDURE — 97116 GAIT TRAINING THERAPY: CPT

## 2017-03-13 PROCEDURE — 700102 HCHG RX REV CODE 250 W/ 637 OVERRIDE(OP): Performed by: HOSPITALIST

## 2017-03-13 PROCEDURE — A9270 NON-COVERED ITEM OR SERVICE: HCPCS | Performed by: HOSPITALIST

## 2017-03-13 PROCEDURE — 99232 SBSQ HOSP IP/OBS MODERATE 35: CPT | Performed by: INTERNAL MEDICINE

## 2017-03-13 PROCEDURE — 700111 HCHG RX REV CODE 636 W/ 250 OVERRIDE (IP): Performed by: INTERNAL MEDICINE

## 2017-03-13 PROCEDURE — 700112 HCHG RX REV CODE 229: Performed by: HOSPITALIST

## 2017-03-13 PROCEDURE — 770006 HCHG ROOM/CARE - MED/SURG/GYN SEMI*

## 2017-03-13 PROCEDURE — 92526 ORAL FUNCTION THERAPY: CPT

## 2017-03-13 PROCEDURE — 82962 GLUCOSE BLOOD TEST: CPT

## 2017-03-13 PROCEDURE — 97530 THERAPEUTIC ACTIVITIES: CPT

## 2017-03-13 PROCEDURE — G8996 SWALLOW CURRENT STATUS: HCPCS | Mod: CK

## 2017-03-13 PROCEDURE — A9270 NON-COVERED ITEM OR SERVICE: HCPCS | Performed by: INTERNAL MEDICINE

## 2017-03-13 PROCEDURE — 92523 SPEECH SOUND LANG COMPREHEN: CPT

## 2017-03-13 RX ADMIN — SERTRALINE 100 MG: 100 TABLET, FILM COATED ORAL at 07:15

## 2017-03-13 RX ADMIN — HEPARIN SODIUM 5000 UNITS: 5000 INJECTION, SOLUTION INTRAVENOUS; SUBCUTANEOUS at 14:13

## 2017-03-13 RX ADMIN — BUDESONIDE AND FORMOTEROL FUMARATE DIHYDRATE 2 PUFF: 160; 4.5 AEROSOL RESPIRATORY (INHALATION) at 07:16

## 2017-03-13 RX ADMIN — LACTOBACILLUS ACIDOPHILUS / LACTOBACILLUS BULGARICUS 1 PACKET: 100 MILLION CFU STRENGTH GRANULES at 07:11

## 2017-03-13 RX ADMIN — ATORVASTATIN CALCIUM 80 MG: 80 TABLET, FILM COATED ORAL at 19:27

## 2017-03-13 RX ADMIN — FAMOTIDINE 20 MG: 20 TABLET, FILM COATED ORAL at 07:13

## 2017-03-13 RX ADMIN — DOCUSATE SODIUM 100 MG: 100 CAPSULE ORAL at 07:14

## 2017-03-13 RX ADMIN — TIOTROPIUM BROMIDE 1 CAPSULE: 18 CAPSULE ORAL; RESPIRATORY (INHALATION) at 07:18

## 2017-03-13 RX ADMIN — OXYBUTYNIN CHLORIDE 5 MG: 5 TABLET, FILM COATED, EXTENDED RELEASE ORAL at 07:13

## 2017-03-13 RX ADMIN — FINASTERIDE 5 MG: 5 TABLET, FILM COATED ORAL at 07:12

## 2017-03-13 RX ADMIN — LACTOBACILLUS ACIDOPHILUS / LACTOBACILLUS BULGARICUS 1 PACKET: 100 MILLION CFU STRENGTH GRANULES at 16:59

## 2017-03-13 RX ADMIN — FAMOTIDINE 20 MG: 20 TABLET, FILM COATED ORAL at 19:28

## 2017-03-13 RX ADMIN — LEVETIRACETAM 1500 MG: 500 TABLET, FILM COATED ORAL at 07:13

## 2017-03-13 RX ADMIN — TAMSULOSIN HYDROCHLORIDE 0.4 MG: 0.4 CAPSULE ORAL at 07:14

## 2017-03-13 RX ADMIN — MICONAZOLE NITRATE: 20 CREAM TOPICAL at 05:24

## 2017-03-13 RX ADMIN — Medication 325 MG: at 07:13

## 2017-03-13 RX ADMIN — LACTOBACILLUS ACIDOPHILUS / LACTOBACILLUS BULGARICUS 1 PACKET: 100 MILLION CFU STRENGTH GRANULES at 10:57

## 2017-03-13 RX ADMIN — MICONAZOLE NITRATE: 20 CREAM TOPICAL at 17:00

## 2017-03-13 RX ADMIN — OXYCODONE HYDROCHLORIDE AND ACETAMINOPHEN 500 MG: 500 TABLET ORAL at 07:14

## 2017-03-13 RX ADMIN — BUDESONIDE AND FORMOTEROL FUMARATE DIHYDRATE 2 PUFF: 160; 4.5 AEROSOL RESPIRATORY (INHALATION) at 19:26

## 2017-03-13 RX ADMIN — HEPARIN SODIUM 5000 UNITS: 5000 INJECTION, SOLUTION INTRAVENOUS; SUBCUTANEOUS at 05:23

## 2017-03-13 RX ADMIN — GABAPENTIN 300 MG: 300 CAPSULE ORAL at 07:15

## 2017-03-13 RX ADMIN — STANDARDIZED SENNA CONCENTRATE AND DOCUSATE SODIUM 1 TABLET: 8.6; 5 TABLET, FILM COATED ORAL at 19:27

## 2017-03-13 RX ADMIN — LEVETIRACETAM 1500 MG: 500 TABLET, FILM COATED ORAL at 19:27

## 2017-03-13 RX ADMIN — MICONAZOLE NITRATE: 20 CREAM TOPICAL at 11:01

## 2017-03-13 RX ADMIN — HEPARIN SODIUM 5000 UNITS: 5000 INJECTION, SOLUTION INTRAVENOUS; SUBCUTANEOUS at 20:51

## 2017-03-13 RX ADMIN — ASPIRIN 81 MG: 81 TABLET ORAL at 07:14

## 2017-03-13 RX ADMIN — POLYETHYLENE GLYCOL 3350 1 PACKET: 17 POWDER, FOR SOLUTION ORAL at 07:15

## 2017-03-13 RX ADMIN — DOCUSATE SODIUM 100 MG: 100 CAPSULE ORAL at 19:27

## 2017-03-13 ASSESSMENT — GAIT ASSESSMENTS
GAIT LEVEL OF ASSIST: MINIMAL ASSIST
ASSISTIVE DEVICE: FRONT WHEEL WALKER
DEVIATION: DECREASED BASE OF SUPPORT;DECREASED HEEL STRIKE;DECREASED TOE OFF;SHUFFLED GAIT
DISTANCE (FEET): 10

## 2017-03-13 ASSESSMENT — ENCOUNTER SYMPTOMS
WEAKNESS: 1
ABDOMINAL PAIN: 0
MEMORY LOSS: 1
BACK PAIN: 1

## 2017-03-13 ASSESSMENT — PAIN SCALES - GENERAL: PAINLEVEL_OUTOF10: 0

## 2017-03-13 NOTE — PROGRESS NOTES
Attempted to arrange escort/wc for pt to attend Patient Enrichment Program, but pt was previously engaged with SLP.  Will continue to encourage participation.

## 2017-03-13 NOTE — PROGRESS NOTES
Received report from day shift RN. Discussed POC with pt., verbalized understanding, assumed care @1915. A&Ox4.  On 2.5 liters of O2 via nasal cannula. Tolerating well. Lung sounds are clear. No complaints of pain. Incontinent of bowel and bladder. Redness/excoriation noted on sacral and groin area. Royal cream applied per MAR. Had recent fall. Noted bruising on left elbow. Has multiple red spots on trunk. 1-2 assist to commode. Unsteady. Bed alarm on. Safety precautions in place; treaded socks on, call light within reach, personal belongings within reach, upper bed rails up.

## 2017-03-13 NOTE — PROGRESS NOTES
Hospital Medicine Progress Note, Adult, Complex               Author: Roseanna RUDY Crane Date & Time created: 3/13/2017  6:56 AM       ID/CC: 81 yr M with hx of Stroke with left sided hemiparesis, HTN, COPD on 3 L of oxygen, asthma, multiple falls and pelvic fracture presented from [facility] care SNF,presented on 2/20 with shortness of breath and cough and found to have left sided opacification started on vanco and zosyn for HCAP.  H/o aspiration, on dysphagia 1 diet per SLP. Completed treatment with meropenem for ESBL HCAP.     Interval History:  Patient was very sleepy when I saw him,he appeared weak, he woke up and then fell asleep, per RN he sat up and ate his breakfast.  Plan is to get him up in chair at nursing station for lunch.   Will check on him again later.    Review of Systems:  Review of Systems   Unable to perform ROS  Constitutional:        Pt was very sleepy and didn't talk much, kept falling asleep.    Cardiovascular: Negative for leg swelling.   Gastrointestinal: Negative for abdominal pain.   Musculoskeletal: Positive for back pain.   Neurological: Positive for weakness.   Psychiatric/Behavioral: Positive for memory loss.     Physical Exam:  Physical Exam   Constitutional: No distress.   Thin, frail, appears weak , sleepy    HENT:   Head: Normocephalic and atraumatic.   Eyes: Conjunctivae are normal. Right eye exhibits no discharge. Left eye exhibits no discharge.   Neck: No JVD present.   Cardiovascular: Normal rate and regular rhythm.    Murmur heard.  Pulmonary/Chest: Effort normal. No stridor. No respiratory distress. He has no wheezes. He has no rales.   On 2 L O2    Abdominal: Soft. Bowel sounds are normal. He exhibits no distension. There is no tenderness.   Musculoskeletal: He exhibits no edema or tenderness.   Lymphadenopathy:        Right: No supraclavicular adenopathy present.        Left: No supraclavicular adenopathy present.   Neurological: Abnormal muscle tone: weak.   Sleepy    Skin: Skin is  warm. He is not diaphoretic.   Vitals reviewed.      Labs:        Invalid input(s): [id number]      Recent Labs      17   014   SODIUM  140   POTASSIUM  4.3   CHLORIDE  100   CO2  38*   BUN  27*   CREATININE  0.49*   CALCIUM  9.1     Recent Labs      17   014   GLUCOSE  140*     Recent Labs      17   RBC  3.50*   HEMOGLOBIN  10.9*   HEMATOCRIT  35.6*   PLATELETCT  166     Recent Labs      17   WBC  6.4   NEUTSPOLYS  68.50   LYMPHOCYTES  18.20*   MONOCYTES  10.00   EOSINOPHILS  2.50   BASOPHILS  0.50           Hemodynamics:  Temp (24hrs), Av.8 °C (98.3 °F), Min:36.5 °C (97.7 °F), Max:37.2 °C (99 °F)  Temperature: 36.5 °C (97.7 °F)  Pulse  Av.3  Min: 44  Max: 98   Blood Pressure : 144/58 mmHg     Respiratory:    Respiration: 16, Pulse Oximetry: 96 %        RUL Breath Sounds: Clear, RML Breath Sounds: Clear, RLL Breath Sounds: Clear, DEBI Breath Sounds: Clear, LLL Breath Sounds: Clear  Fluids:    Intake/Output Summary (Last 24 hours) at 17 0656  Last data filed at 17 0600   Gross per 24 hour   Intake    750 ml   Output   1500 ml   Net   -750 ml        GI/Nutrition:  Orders Placed This Encounter   Procedures   • DIET ORDER     Standing Status: Standing      Number of Occurrences: 1      Standing Expiration Date:      Order Specific Question:  Diet:     Answer:  Diabetic [3]     Order Specific Question:  Diet:     Answer:  Cardiac [6]     Order Specific Question:  Texture/Fiber modifications:     Answer:  Dysphagia 1(Pureed)specify fluid consistency(question 6) [1]     Order Specific Question:  Consistency/Fluid modifications:     Answer:  Nectar Thick [2]     Medical Decision Making, by Problem:  Active Hospital Problems    Diagnosis   • Respiratory failure (CMS-HCC) [J96.90]  Hypoxic/hypercarbic  Much improved, following O2 requirements     • Debility [R53.81]  PT/OT     • Multiple falls [R29.6]  Fall precautions  PT     • MRSA carrier [Z22.322]     •  Sepsis (CMS-HCC) [A41.9]  Sepsis resolved, due to HCAP with ESBL  ID Emelia consulted  Meropenam completed on 3/7/17     • HCAP (healthcare-associated pneumonia) [J18.9]  As above     • Chronic respiratory failure with hypoxia (CMS-HCC) [J96.11]  On 3 L now  Pulmonary toilet   Resume on lev albuterol, symbicort, spiriva   IS  Stable     • Oxygen dependent [Z99.81]  On 3 L     • DM type 2, controlled, with complication (CMS-HCC) [E11.8]  In-hospital FSBG goal less than 180 mg/dL  SSI qAC & qHS when eating, q6 when NPO  GLUCOSE - ACCU-CK   Date/Time Value Ref Range Status   03/13/2017 05:23 AM 98 65 - 99 mg/dL Final   03/12/2017 09:06 PM 88 65 - 99 mg/dL Final   03/12/2017 04:21 * 65 - 99 mg/dL Final     Comment:     Followed  orders   03/12/2017 10:33 AM 97 65 - 99 mg/dL Final     Comment:     Followed Dr orders      • Left hemiparesis (CMS-HCC) [G81.94]  Chronic from recent CVA  On asa and Lipitor  On Keppra for seizure disorder  Need PT/OT/ST SNF placement      • Dysphagia status post cerebrovascular accident [I69.391]  On dysphagia diet per ST      Right Meralgia Parasthetica  Continue PT  Pain control     • GERD (gastroesophageal reflux disease) [K21.9]  On pepcid          Dispo: .Pt needs SNF however no SNF days left per SW.   Continue daily PT/OT while in hospital, need safe discharge plan for patient .    Medications reviewed and Labs reviewed        DVT Prophylaxis: Heparin  DVT prophylaxis - mechanical: SCDs

## 2017-03-13 NOTE — THERAPY
"Physical Therapy Treatment completed.   Bed Mobility:  Supine to Sit: Contact Guard Assist  Transfers: Sit to Stand: Contact Guard Assist  Gait: Level Of Assist: Minimal Assist with Front-Wheel Walker       Plan of Care: Will benefit from Physical Therapy 3 times per week  Discharge Recommendations: Equipment: Will Continue to Assess for Equipment Needs. Post-acute therapy recommended before discharged home.    Pt was willing to participate this Tx session.  Pt required CGA for supine to sit at EOB.  Pt was stating that he needed to have a bowel movement. Pt required CGA for sit to stand with FWW.  Once standing Pt required cues for L UE placement on FWW and assist putting hand on walker.  Pt required Phil for gait with FWW ambulating 10' to toilet.  Phil to ModA for navigating in bathroom as it was a tight space.  Pt had a bowel movement on toilet.  Pt require Phil to stand from toilet and then ambulated with Phil to chair.  Phil for stand to sit transition to chair using FWW and cues for hand placement.  Pt will continue to benefit from skilled PT to increase safety and independence with mobiltiy.  Recommend placement upon DC.     See \"Rehab Therapy-Acute\" Patient Summary Report for complete documentation.       "

## 2017-03-13 NOTE — THERAPY
Speech Language Therapy dysphagia treatment completed.   Functional Status:  Came to see patient for cognitive evaluation.  Upon entering the room, patient was eating his lunch and had thin liquids on his tray.  Given the fact that he was taking thin liquids, saw patient for swallow therapy first with trials of dysphagia II and thin liquid trials.  Patient was about 70% consistent with use of a chin tuck/head turn to left.  He reports that he forgets to do this at times.  On thin liquids swallows patient with intermittent wet voice and delayed coughing X1.  Use of chin tuck/head turn to left did not appear to improve or impede swallow function.  On soft solids, patient with prolonged mastication and delayed onset of swallow.  He did have mild oral residue following swallow which he was able to clear with use of tongue sweep.  Again, there was inconsistent wet voice following swallow which can be concerning for possible penetration/aspiration.  On purees and nectars, he did not present with any overt S/Sx of aspiration. Worked with patient on swallowing exercises with focus on base of tongue and laryngeal elevation.  Patient was able to complete all exercises with fair accuracy, but as session progressed, he was noted to fatigue easily as repetitions progressed. Patient was provided with written instructions for swallowing exercises and was advised to complete each exercise 10 times, 3 times a day.  Given history of dysphagia with FEES on 4/25/16 showing penetration and suspected aspiration and current concerns for aspiration, would like to proceed with FEES to further assess oropharyngeal function, r/o aspiration and determine if patient is safe for progression in diet texture. For now, would continue with dysphagia I diet with NTL.  Discussed with Dr. Crane who is in agreement.  Cognitive evaluation also completed. See report.    Recommendations: dysphagia I diet with nectar thick liquids   Plan of Care: Will  "benefit from Speech Therapy 3 times per week    See \"Rehab Therapy-Acute\" Patient Summary Report for complete documentation.     "

## 2017-03-13 NOTE — PROGRESS NOTES
Pt is AOx4, no complains of pain, tolerated all oral medications with applesauce, skin and sacral assessment done, on 2L of O2 per NC, IS at bedside, able to perform correctly and effectively, sitting on the EOB, call light in use, bed alarm on, treaded socks on, bed locked in low position, plan of care discussed, all needs met at this time.

## 2017-03-13 NOTE — CARE PLAN
Problem: Safety  Goal: Will remain free from falls  Outcome: PROGRESSING AS EXPECTED  Had fall yesterday. Safety precautions in place. Bed placed in low position, upper bed rails up, call light within reach, treaded socks on. Room near nursing station. Hourly rounding done.     Problem: Skin Integrity  Goal: Risk for impaired skin integrity will decrease  Outcome: PROGRESSING AS EXPECTED  Incontinent of bowel and bladder at times. Has redness/excoriation on sacrum and groin area. No open wounds noted. Royal cream applied per MAR. Kept skin clean and dry. Will continue to monitor for signs of skin breakdown and intervene accordingly.

## 2017-03-13 NOTE — CARE PLAN
Problem: Safety  Goal: Will remain free from falls  Outcome: PROGRESSING AS EXPECTED  Pt with hx of fall,  bed alarm on, call light in use, instructed to call for assistance, bed locked in low position, treaded socks on, room near RN station, pt calls appropriately    Problem: Skin Integrity  Goal: Risk for impaired skin integrity will decrease  Outcome: PROGRESSING AS EXPECTED  Redness on sacral area noted, KIMBERLEY cream applied as scheduled, will CTM, will encourage scheduled toileting to avoid incontinence that causes skin breakdown

## 2017-03-13 NOTE — THERAPY
"Speech Language Therapy Evaluation completed to address cognition  Functional Status:  Patient seen for cognitive linguistic evaluation this date. Patient oriented to person, place, month, year and episode with confusion to date and recent events. Patient reports he was living with a friend and her son in [place].  He reports that he was doing his own finances, but that he was getting assistance from this friend/\"caregiver\" for cooking, transportation and other ADLs/IADLs. He reports that he does not have any family and that his son  in a motorcycle accident in 2016 and his ex wife  shortly after that. Patient is presenting with mild to moderate cognitive linguistic deficits in the areas of attention, memory, thought organization, problem solving and reasoning.  He had significant difficulty with simple math skills.  Patient is also presenting with some level of inattention/neglect on left side. He did have decreased eye contact when clinician standing on his left, and on clock drawing, he put the numbers 12-5 on the right hand side of the clock and then wrote the number 10 over the number 4 and reji 2 hands pointing to the 2 and the 10.  There was nothing at all written on the left hand side. He was delayed in processing of information at times and even reports that his overall cognitive status and memory have been getting worse lately.  During discussion, he reports that he is not able to take care of himself, and stated that he is not sure if he is too much work for the people who he has been living with.  At present, patient would benefit from 24 hour care.  Discussed referral to group home with MD and .  They will discuss with patient. SLP will follow for cognitive linguistic retraining in addition to his dysphagia therapy.     Recommendations: SLP to follow for dysphagia therapy and cognitive linguistic retraining.  He will need 24 hour care following DC from acute care services " "and would benefit from post acute therapies to address deficits and maximize progression with ADLs/IADLs    Plan of Care: Will benefit from Speech Therapy 3 times per week    See \"Rehab Therapy-Acute\" Patient Summary Report for complete documentation.   "

## 2017-03-14 LAB
GLUCOSE BLD-MCNC: 108 MG/DL (ref 65–99)
GLUCOSE BLD-MCNC: 98 MG/DL (ref 65–99)

## 2017-03-14 PROCEDURE — 700102 HCHG RX REV CODE 250 W/ 637 OVERRIDE(OP): Performed by: HOSPITALIST

## 2017-03-14 PROCEDURE — 92612 ENDOSCOPY SWALLOW (FEES) VID: CPT

## 2017-03-14 PROCEDURE — A9270 NON-COVERED ITEM OR SERVICE: HCPCS | Performed by: HOSPITALIST

## 2017-03-14 PROCEDURE — 700102 HCHG RX REV CODE 250 W/ 637 OVERRIDE(OP): Performed by: INTERNAL MEDICINE

## 2017-03-14 PROCEDURE — 99231 SBSQ HOSP IP/OBS SF/LOW 25: CPT | Performed by: HOSPITALIST

## 2017-03-14 PROCEDURE — 700111 HCHG RX REV CODE 636 W/ 250 OVERRIDE (IP): Performed by: INTERNAL MEDICINE

## 2017-03-14 PROCEDURE — A9270 NON-COVERED ITEM OR SERVICE: HCPCS | Performed by: INTERNAL MEDICINE

## 2017-03-14 PROCEDURE — G8996 SWALLOW CURRENT STATUS: HCPCS | Mod: CK

## 2017-03-14 PROCEDURE — G8997 SWALLOW GOAL STATUS: HCPCS | Mod: CJ

## 2017-03-14 PROCEDURE — 82962 GLUCOSE BLOOD TEST: CPT

## 2017-03-14 PROCEDURE — 770006 HCHG ROOM/CARE - MED/SURG/GYN SEMI*

## 2017-03-14 PROCEDURE — 700112 HCHG RX REV CODE 229: Performed by: HOSPITALIST

## 2017-03-14 RX ORDER — LEVETIRACETAM 100 MG/ML
1500 SOLUTION ORAL EVERY 12 HOURS
Status: DISCONTINUED | OUTPATIENT
Start: 2017-03-14 | End: 2017-03-20

## 2017-03-14 RX ADMIN — LEVETIRACETAM 1500 MG: 500 TABLET, FILM COATED ORAL at 07:52

## 2017-03-14 RX ADMIN — STANDARDIZED SENNA CONCENTRATE AND DOCUSATE SODIUM 1 TABLET: 8.6; 5 TABLET, FILM COATED ORAL at 20:53

## 2017-03-14 RX ADMIN — MICONAZOLE NITRATE: 20 CREAM TOPICAL at 16:42

## 2017-03-14 RX ADMIN — LACTOBACILLUS ACIDOPHILUS / LACTOBACILLUS BULGARICUS 1 PACKET: 100 MILLION CFU STRENGTH GRANULES at 16:24

## 2017-03-14 RX ADMIN — OXYBUTYNIN CHLORIDE 5 MG: 5 TABLET, FILM COATED, EXTENDED RELEASE ORAL at 07:53

## 2017-03-14 RX ADMIN — BUDESONIDE AND FORMOTEROL FUMARATE DIHYDRATE 2 PUFF: 160; 4.5 AEROSOL RESPIRATORY (INHALATION) at 20:53

## 2017-03-14 RX ADMIN — HEPARIN SODIUM 5000 UNITS: 5000 INJECTION, SOLUTION INTRAVENOUS; SUBCUTANEOUS at 05:33

## 2017-03-14 RX ADMIN — MICONAZOLE NITRATE: 20 CREAM TOPICAL at 00:49

## 2017-03-14 RX ADMIN — LACTOBACILLUS ACIDOPHILUS / LACTOBACILLUS BULGARICUS 1 PACKET: 100 MILLION CFU STRENGTH GRANULES at 07:49

## 2017-03-14 RX ADMIN — HEPARIN SODIUM 5000 UNITS: 5000 INJECTION, SOLUTION INTRAVENOUS; SUBCUTANEOUS at 14:06

## 2017-03-14 RX ADMIN — OXYCODONE HYDROCHLORIDE AND ACETAMINOPHEN 500 MG: 500 TABLET ORAL at 07:52

## 2017-03-14 RX ADMIN — FINASTERIDE 5 MG: 5 TABLET, FILM COATED ORAL at 07:53

## 2017-03-14 RX ADMIN — FAMOTIDINE 20 MG: 20 TABLET, FILM COATED ORAL at 07:52

## 2017-03-14 RX ADMIN — FAMOTIDINE 20 MG: 20 TABLET, FILM COATED ORAL at 20:53

## 2017-03-14 RX ADMIN — DOCUSATE SODIUM 100 MG: 100 CAPSULE ORAL at 07:51

## 2017-03-14 RX ADMIN — HEPARIN SODIUM 5000 UNITS: 5000 INJECTION, SOLUTION INTRAVENOUS; SUBCUTANEOUS at 20:53

## 2017-03-14 RX ADMIN — MICONAZOLE NITRATE: 20 CREAM TOPICAL at 05:34

## 2017-03-14 RX ADMIN — LEVETIRACETAM 1500 MG: 100 SOLUTION ORAL at 20:53

## 2017-03-14 RX ADMIN — SERTRALINE 100 MG: 100 TABLET, FILM COATED ORAL at 07:51

## 2017-03-14 RX ADMIN — ASPIRIN 81 MG: 81 TABLET ORAL at 07:51

## 2017-03-14 RX ADMIN — TAMSULOSIN HYDROCHLORIDE 0.4 MG: 0.4 CAPSULE ORAL at 07:50

## 2017-03-14 RX ADMIN — GABAPENTIN 300 MG: 300 CAPSULE ORAL at 07:53

## 2017-03-14 RX ADMIN — TIOTROPIUM BROMIDE 1 CAPSULE: 18 CAPSULE ORAL; RESPIRATORY (INHALATION) at 07:55

## 2017-03-14 RX ADMIN — ATORVASTATIN CALCIUM 80 MG: 80 TABLET, FILM COATED ORAL at 20:53

## 2017-03-14 RX ADMIN — BUDESONIDE AND FORMOTEROL FUMARATE DIHYDRATE 2 PUFF: 160; 4.5 AEROSOL RESPIRATORY (INHALATION) at 07:56

## 2017-03-14 RX ADMIN — POLYETHYLENE GLYCOL 3350 1 PACKET: 17 POWDER, FOR SOLUTION ORAL at 07:50

## 2017-03-14 RX ADMIN — LACTOBACILLUS ACIDOPHILUS / LACTOBACILLUS BULGARICUS 1 PACKET: 100 MILLION CFU STRENGTH GRANULES at 11:42

## 2017-03-14 RX ADMIN — MICONAZOLE NITRATE: 20 CREAM TOPICAL at 11:42

## 2017-03-14 RX ADMIN — Medication 325 MG: at 07:52

## 2017-03-14 ASSESSMENT — PAIN SCALES - GENERAL
PAINLEVEL_OUTOF10: 0
PAINLEVEL_OUTOF10: 0

## 2017-03-14 ASSESSMENT — ENCOUNTER SYMPTOMS
HEADACHES: 0
FOCAL WEAKNESS: 0
MYALGIAS: 0
SHORTNESS OF BREATH: 0
FEVER: 0
CONSTIPATION: 0
DIARRHEA: 0
VOMITING: 0
CHILLS: 0
NAUSEA: 0
PALPITATIONS: 0
WEAKNESS: 1
COUGH: 1
ABDOMINAL PAIN: 0
DIZZINESS: 0

## 2017-03-14 NOTE — THERAPY
Speech Language Therapy FEES completed.  Functional Status: FEES procedure explained to patient and patient agreed to proceed with evaluation. Patient tolerated procedure well. Presentation of PO consisted of ice, nectars, thin liquids, purees and soft solids. Patient with consistent premature spillage to the left pyriform sinus on all consistencies with penetration before the swallow on thin liquids.  There was consistent SILENT penetration with small amounts of SILENT ASPIRATION before, after and suspected during the swallow on thin liquids and soft solids.  There was intermittent penetration after swallow and suspected during the swallow on pudding and larger boluses of nectars, but no aspiration was noted.  Use of a double swallow cleared residue and minimized further aspiration after the swallow.  Episodes of penetration and aspiration were minimized, but not eliminated, with chin tuck/head turn to the left posture.    IN SUMMARY: Patient is presenting with oropharyngeal dysphagia as evidenced in impaired base of tongue retraction, impaired pharyngeal constriction and impaired laryngeal elevation leading to premature spillage to the level of the left pyriform sinuses with delayed onset of swallow.  Patient with some level of SILENT PENETRATION AND ASPIRATION NOTED, especially on thin liquids and soft solids. Would recommend continuation of dysphagia I diet with nectar thick liquids.  NO THIN LIQUIDS DUE TO ASPIRATION RISK.  SLP will follow for dysphagia therapy.     Recommendations - Diet: Dysphagia I diet with Nectar Thick Liquids--NO THIN LIQUIDS                          Strategies: Monitor during meals, Assistance needed for meal tray set-up, No Straws and Head of Bed at 90 Degrees: double swallow; head turn to left with chin tuck                          Medication Administration: Medication Administration : Crush all Medications in Puree  Plan of Care: Will benefit from Speech Therapy 3 times per  "week    See \"Rehab Therapy-Acute\" Patient Summary Report for complete documentation.   "

## 2017-03-14 NOTE — CARE PLAN
Problem: Safety  Goal: Will remain free from falls  Outcome: PROGRESSING AS EXPECTED  Pt. With recent fall history. Bed alarm ON, bed kept low and locked, call light within reach, assisted as necessary.    Problem: Skin Integrity  Goal: Risk for impaired skin integrity will decrease  Outcome: PROGRESSING AS EXPECTED  Redness noted on sacral region, blanching. Barrier wipes in use, miguelangel cream ordered, heels floated with pillow.

## 2017-03-14 NOTE — PROGRESS NOTES
Pt is AOx4, no complains of pain, tolerated all oral medications floated in applesauce, skin and sacral assessment done, SCDs on, sitting on EOB, on 2L of O2 per NC, IS at bedside, able to perform correctly and effectively, call light in use, bed alarm on, treaded socks on, bed locked in low position, plan of care discussed, all needs met at this time.

## 2017-03-14 NOTE — PROGRESS NOTES
Hospital Medicine Progress Note, Adult, Complex               Author: Watson GRUPO Dexter Date & Time created: 3/14/2017  1:19 PM     ID/CC: 81 yr M with hx of Stroke with left sided hemiparesis, HTN, COPD on 3 L of oxygen, asthma, multiple falls and pelvic fracture presented from [facility] care SNF,presented on 2/20 with shortness of breath and cough and found to have left sided opacification started on vanco and zosyn for HCAP.  H/o aspiration, on dysphagia 1 diet per SLP. Completed treatment with meropenem for ESBL HCAP.     Interval History:  3/14 - feels okay; states he is interested in getting up. Advised him to get up for meals. Discussed with nursing. Discussed on IDT rounds. Questions answered.     Review of Systems:  Review of Systems   Constitutional: Positive for malaise/fatigue. Negative for fever and chills.   Respiratory: Positive for cough. Negative for shortness of breath.    Cardiovascular: Negative for chest pain and palpitations.   Gastrointestinal: Negative for nausea, vomiting, abdominal pain, diarrhea and constipation.   Genitourinary: Negative for dysuria.   Musculoskeletal: Negative for myalgias.   Skin: Negative for itching.   Neurological: Positive for weakness. Negative for dizziness, focal weakness and headaches.   All other systems reviewed and are negative.    Physical Exam:  Physical Exam   Constitutional: He is oriented to person, place, and time. He appears well-developed. No distress.   Frail, cachectic   HENT:   Head: Normocephalic and atraumatic.   Mouth/Throat: Oropharynx is clear and moist.   Eyes: Conjunctivae and EOM are normal. Pupils are equal, round, and reactive to light. No scleral icterus.   Neck: Normal range of motion. Neck supple. No tracheal deviation present. No thyromegaly present.   Cardiovascular: Normal rate, regular rhythm and intact distal pulses.    Murmur heard.  Pulmonary/Chest: Effort normal. No respiratory distress. He has decreased breath sounds in the right  lower field and the left lower field. He has no wheezes.   Abdominal: Soft. Bowel sounds are normal. He exhibits no distension. There is no tenderness.   Musculoskeletal: Normal range of motion. He exhibits no edema or tenderness.   Lymphadenopathy:     He has no cervical adenopathy.        Right: No supraclavicular adenopathy present.        Left: No supraclavicular adenopathy present.   Neurological: He is alert and oriented to person, place, and time. No cranial nerve deficit.   Skin: Skin is warm and dry.   Vitals reviewed.      Labs:        Invalid input(s): [id number]      No results for input(s): SODIUM, POTASSIUM, CHLORIDE, CO2, BUN, CREATININE, MAGNESIUM, PHOSPHORUS, CALCIUM in the last 72 hours.  No results for input(s): ALTSGPT, ASTSGOT, ALKPHOSPHAT, TBILIRUBIN, DBILIRUBIN, GAMMAGT, AMYLASE, LIPASE, ALB, PREALBUMIN, GLUCOSE in the last 72 hours.  No results for input(s): RBC, HEMOGLOBIN, HEMATOCRIT, PLATELETCT, PROTHROMBTM, APTT, INR, IRON, FERRITIN, TOTIRONBC in the last 72 hours.            Hemodynamics:  Temp (24hrs), Av.6 °C (97.9 °F), Min:36.4 °C (97.5 °F), Max:36.8 °C (98.2 °F)  Temperature: 36.7 °C (98 °F)  Pulse  Av.2  Min: 44  Max: 98   Blood Pressure : 130/54 mmHg     Respiratory:    Respiration: 16, Pulse Oximetry: 97 %     Work Of Breathing / Effort: Mild  RUL Breath Sounds: Clear, RML Breath Sounds: Clear, RLL Breath Sounds: Clear, DEBI Breath Sounds: Diminished, LLL Breath Sounds: Clear  Fluids:    Intake/Output Summary (Last 24 hours) at 17 1319  Last data filed at 17 0942   Gross per 24 hour   Intake    780 ml   Output   1000 ml   Net   -220 ml     Weight: 60 kg (132 lb 4.4 oz)  GI/Nutrition:  Orders Placed This Encounter   Procedures   • DIET ORDER     Standing Status: Standing      Number of Occurrences: 1      Standing Expiration Date:      Order Specific Question:  Diet:     Answer:  Diabetic [3]     Order Specific Question:  Diet:     Answer:  Cardiac [6]      Order Specific Question:  Texture/Fiber modifications:     Answer:  Dysphagia 1(Pureed)specify fluid consistency(question 6) [1]     Order Specific Question:  Consistency/Fluid modifications:     Answer:  Nectar Thick [2]     Medical Decision Making, by Problem:  Active Hospital Problems    Diagnosis   • Respiratory failure (CMS-HCC) [J96.90]  At baseline now     • Debility [R53.81]  PT/OT     • Multiple falls [R29.6]  Fall precautions  PT     • MRSA carrier [Z22.322]     • Sepsis (CMS-HCC) [A41.9]  Sepsis resolved, due to HCAP with ESBL  ID Emelia Dial completed on 3/7/17     • HCAP (healthcare-associated pneumonia) [J18.9]  As above     • Chronic respiratory failure with hypoxia (CMS-HCC) [J96.11]  Home O2 LPM Flow: 2 LPM   Supplemental O2 PRN with goal SpO2 greater than 90%.  RT protocol.  Current sats: Pulse Oximetry: 97 % on O2 (LPM): 2       • DM type 2, controlled, with complication (CMS-HCC) [E11.8]  In-hospital FSBG goal less than 180 mg/dL  SSI qAC & qHS when eating, q6 when NPO  GLUCOSE - ACCU-CK   Date/Time Value Ref Range Status   03/14/2017 11:44 AM 98 65 - 99 mg/dL Final     Comment:     Followed Dr palacios   03/14/2017 05:33 * 65 - 99 mg/dL Final     Comment:     Followed Dr palacios   03/13/2017 07:30 * 65 - 99 mg/dL Final     Comment:     Followed Dr palacios   03/13/2017 04:55 * 65 - 99 mg/dL Final     Comment:     Followed Dr palacios      • Left hemiparesis (CMS-HCC) [G81.94]  Chronic from recent CVA  On asa and Lipitor  On Keppra for seizure disorder  Need PT/OT/ST SNF placement      • Dysphagia status post cerebrovascular accident [I69.391]  On dysphagia diet per ST      Right Meralgia Parasthetica  Continue PT  Pain control     • GERD (gastroesophageal reflux disease) [K21.9]  pepcid          Dispo: Pt needs SNF however no SNF days left per SW.       Medications reviewed and Labs reviewed        DVT Prophylaxis: Heparin    Ulcer prophylaxis: Not indicated        I  spent a total of 18 minutes during this clinical encounter of which > 50% was devoted to counseling and coordinating care including review of records, pertinent lab data and studies, as well as discussing diagnostic evaluation and work up, planned therapeutic interventions and future disposition of care. Where indicated, the assessment and plan reflect discussion of patient with consultants, other healthcare providers, family members, and additional research needed to obtain further information in formulating the plan of care of this patient.

## 2017-03-14 NOTE — PROGRESS NOTES
Pt attended Patient Enrichment Program (PEP) for [facility] [employer].  Assist x1 to get pt into wc.  Pt expressed enjoyment of the activity, and desire to attend future groups.  Pt put on list to join the PEP [employer]; dietary aware of pt's dysphagia 1 diet and will attempt to accommodate.  At the least, pt will have his ordered diet with the PEP group on Friday at Noon.

## 2017-03-14 NOTE — CARE PLAN
Problem: Discharge Barriers/Planning  Goal: Patient’s continuum of care needs will be met  Outcome: PROGRESSING SLOWER THAN EXPECTED  Pending safe discharge planning, SW on board    Problem: Skin Integrity  Goal: Risk for impaired skin integrity will decrease  Outcome: PROGRESSING AS EXPECTED  Sacral area redness improved, KIMBERLEY cream applied as scheduled    Problem: Mobility  Goal: Risk for activity intolerance will decrease  Outcome: PROGRESSING AS EXPECTED  Pt is agreeable to getting OOB and using commode, discussed with pt plan to ambulate him out of his room, agreeable

## 2017-03-14 NOTE — PROGRESS NOTES
Received report from day shift RN, assumed care. Pt. Is awake, on bed. A&Ox4, forgetful at times. x1-2 assist , pt. denies pain. Due medications, no s/s of aspiration noted. Pt. On 2L O2 via NC, tolerating well. Plan of care was safety, comfort and rest. Discussed plan of care. Bed alarm in use, call light and personal belongings within reach, bed kept low, treaded socks on. Assisted as necessary. Kept rested and comfortable at all times.

## 2017-03-14 NOTE — PROGRESS NOTES
A&O x 4, forgetful at times. Calm and cooperative. Denies pain. Skin generally intact, sacral area red but no open sites noted, condom cath in place, pt up to commode with 2-person assist and FWW. Pt sat on edge of bed with one person assist, tolerated meds with applesauce, call light within reach, instructed to call for assistance, bed alarm, treaded socks on, bed in lowest position. - [facility]

## 2017-03-15 PROCEDURE — 97535 SELF CARE MNGMENT TRAINING: CPT

## 2017-03-15 PROCEDURE — A9270 NON-COVERED ITEM OR SERVICE: HCPCS | Performed by: INTERNAL MEDICINE

## 2017-03-15 PROCEDURE — 700102 HCHG RX REV CODE 250 W/ 637 OVERRIDE(OP): Performed by: HOSPITALIST

## 2017-03-15 PROCEDURE — 700102 HCHG RX REV CODE 250 W/ 637 OVERRIDE(OP): Performed by: INTERNAL MEDICINE

## 2017-03-15 PROCEDURE — 700111 HCHG RX REV CODE 636 W/ 250 OVERRIDE (IP): Performed by: INTERNAL MEDICINE

## 2017-03-15 PROCEDURE — A9270 NON-COVERED ITEM OR SERVICE: HCPCS | Performed by: HOSPITALIST

## 2017-03-15 PROCEDURE — 99232 SBSQ HOSP IP/OBS MODERATE 35: CPT | Performed by: HOSPITALIST

## 2017-03-15 PROCEDURE — 770006 HCHG ROOM/CARE - MED/SURG/GYN SEMI*

## 2017-03-15 PROCEDURE — 700112 HCHG RX REV CODE 229: Performed by: HOSPITALIST

## 2017-03-15 RX ADMIN — LACTOBACILLUS ACIDOPHILUS / LACTOBACILLUS BULGARICUS 1 PACKET: 100 MILLION CFU STRENGTH GRANULES at 11:29

## 2017-03-15 RX ADMIN — STANDARDIZED SENNA CONCENTRATE AND DOCUSATE SODIUM 1 TABLET: 8.6; 5 TABLET, FILM COATED ORAL at 21:22

## 2017-03-15 RX ADMIN — OXYCODONE HYDROCHLORIDE AND ACETAMINOPHEN 500 MG: 500 TABLET ORAL at 08:31

## 2017-03-15 RX ADMIN — MICONAZOLE NITRATE: 20 CREAM TOPICAL at 11:36

## 2017-03-15 RX ADMIN — LEVETIRACETAM 1500 MG: 100 SOLUTION ORAL at 12:15

## 2017-03-15 RX ADMIN — HEPARIN SODIUM 5000 UNITS: 5000 INJECTION, SOLUTION INTRAVENOUS; SUBCUTANEOUS at 04:54

## 2017-03-15 RX ADMIN — TAMSULOSIN HYDROCHLORIDE 0.4 MG: 0.4 CAPSULE ORAL at 08:31

## 2017-03-15 RX ADMIN — LEVETIRACETAM 1500 MG: 100 SOLUTION ORAL at 21:21

## 2017-03-15 RX ADMIN — GABAPENTIN 300 MG: 300 CAPSULE ORAL at 08:31

## 2017-03-15 RX ADMIN — FAMOTIDINE 20 MG: 20 TABLET, FILM COATED ORAL at 08:31

## 2017-03-15 RX ADMIN — MICONAZOLE NITRATE: 20 CREAM TOPICAL at 04:55

## 2017-03-15 RX ADMIN — HEPARIN SODIUM 5000 UNITS: 5000 INJECTION, SOLUTION INTRAVENOUS; SUBCUTANEOUS at 21:22

## 2017-03-15 RX ADMIN — SERTRALINE 100 MG: 100 TABLET, FILM COATED ORAL at 08:31

## 2017-03-15 RX ADMIN — ATORVASTATIN CALCIUM 80 MG: 80 TABLET, FILM COATED ORAL at 21:22

## 2017-03-15 RX ADMIN — FAMOTIDINE 20 MG: 20 TABLET, FILM COATED ORAL at 21:22

## 2017-03-15 RX ADMIN — BUDESONIDE AND FORMOTEROL FUMARATE DIHYDRATE 2 PUFF: 160; 4.5 AEROSOL RESPIRATORY (INHALATION) at 21:21

## 2017-03-15 RX ADMIN — ASPIRIN 81 MG: 81 TABLET ORAL at 08:31

## 2017-03-15 RX ADMIN — FINASTERIDE 5 MG: 5 TABLET, FILM COATED ORAL at 08:31

## 2017-03-15 RX ADMIN — MICONAZOLE NITRATE: 20 CREAM TOPICAL at 00:00

## 2017-03-15 RX ADMIN — DOCUSATE SODIUM 100 MG: 100 CAPSULE ORAL at 21:22

## 2017-03-15 RX ADMIN — POLYETHYLENE GLYCOL 3350 1 PACKET: 17 POWDER, FOR SOLUTION ORAL at 08:31

## 2017-03-15 RX ADMIN — DOCUSATE SODIUM 100 MG: 100 CAPSULE ORAL at 08:31

## 2017-03-15 RX ADMIN — OXYBUTYNIN CHLORIDE 5 MG: 5 TABLET, FILM COATED, EXTENDED RELEASE ORAL at 08:31

## 2017-03-15 RX ADMIN — TIOTROPIUM BROMIDE 1 CAPSULE: 18 CAPSULE ORAL; RESPIRATORY (INHALATION) at 12:15

## 2017-03-15 RX ADMIN — Medication 325 MG: at 08:31

## 2017-03-15 RX ADMIN — MICONAZOLE NITRATE: 20 CREAM TOPICAL at 18:01

## 2017-03-15 RX ADMIN — LACTOBACILLUS ACIDOPHILUS / LACTOBACILLUS BULGARICUS 1 PACKET: 100 MILLION CFU STRENGTH GRANULES at 08:31

## 2017-03-15 RX ADMIN — HEPARIN SODIUM 5000 UNITS: 5000 INJECTION, SOLUTION INTRAVENOUS; SUBCUTANEOUS at 15:01

## 2017-03-15 RX ADMIN — BUDESONIDE AND FORMOTEROL FUMARATE DIHYDRATE 2 PUFF: 160; 4.5 AEROSOL RESPIRATORY (INHALATION) at 12:15

## 2017-03-15 ASSESSMENT — ENCOUNTER SYMPTOMS
WEAKNESS: 1
CONSTIPATION: 0
NAUSEA: 0
VOMITING: 0
FEVER: 0
DIZZINESS: 0
ABDOMINAL PAIN: 0
SHORTNESS OF BREATH: 0
PALPITATIONS: 0
DIARRHEA: 0
CHILLS: 0
FOCAL WEAKNESS: 0
HEADACHES: 0
COUGH: 1
MYALGIAS: 0

## 2017-03-15 ASSESSMENT — PAIN SCALES - GENERAL
PAINLEVEL_OUTOF10: 0

## 2017-03-15 NOTE — PROGRESS NOTES
"Pt. With complaints of choking when medicine administered crushed in apple sauce. Appropriate precautions in use, HOB elevated, slow feeding rate. Per pt.,  \" I like my pills whole. I didn't have problems before with my pills\". RN halted any PO intake. Reassess, lung sounds diminished. Suctioned pt., O2 sat remains 93-94% on 1L O2. Reassured pt., now resting comfortably on bed sitting upright.  "

## 2017-03-15 NOTE — CARE PLAN
Problem: Safety  Goal: Will remain free from falls  Outcome: PROGRESSING SLOWER THAN EXPECTED  Pt. A&OX4, with complaints of weakness. Bed alarm ON, bed kept low and locked, call light within reach, assisted as necessary.    Problem: Respiratory:  Goal: Respiratory status will improve  Outcome: PROGRESSING SLOWER THAN EXPECTED  Pt. On dysphagia diet. SLP following. HOB elevated at all times, siiting upright with any PO intake, yankauer suction in place.

## 2017-03-15 NOTE — PROGRESS NOTES
Received report from day shift RN, assumed care. Pt. Is awake, on bed. A&Ox4, x1 assist , pt. denies pain. Pt. Incontinent, applied new condom cath, penile area free from irritation/skin breakdown. Pt. High risk for aspiration, HOB elevated at 30 degrees at all times, IS of 500-750, dysphagia diet in place, yankauer suction in use. Safety precautions in place. Bed alarm ON, bed kept low and locked, call light within reach, assisted as necessary.

## 2017-03-15 NOTE — THERAPY
"Occupational Therapy Treatment completed with focus on ADLs, ADL transfers and patient education.  Functional Status:  Pt seen for OT tx today.  Pt was pleasant and motivated throughout the session but continues to be limited by weakness, decreased balance, and decreased self care.  Pt completed supine to sit, sit to stand, room ambulation using FWW from bed to toilet with CGA.  Pt needed mod A to complete pericare. Pt would benefit from continued intensive inpt OT to increase independence with functional transfers, functional endurance, and ADLs.  Plan of Care: Will benefit from Occupational Therapy 3 times per week  Discharge Recommendations:  Equipment Will Continue to Assess for Equipment Needs. Post-acute therapy recommended before discharged home.    See \"Rehab Therapy-Acute\" Patient Summary Report for complete documentation.   "

## 2017-03-15 NOTE — PROGRESS NOTES
Hospital Medicine Progress Note, Adult, Complex               Author: Watson GRUPO Dexter Date & Time created: 3/15/2017  2:16 PM     ID/CC: 81 yr M with hx of Stroke with left sided hemiparesis, HTN, COPD on 3 L of oxygen, asthma, multiple falls and pelvic fracture presented from [facility] care SNF,presented on 2/20 with shortness of breath and cough and found to have left sided opacification started on vanco and zosyn for HCAP.  H/o aspiration, on dysphagia 1 diet per SLP. Completed treatment with meropenem for ESBL HCAP.     Interval History:  3/15 - questions answered, advised to get up to eat again today. No concerns. Advised of plan.   3/14 - feels okay; states he is interested in getting up. Advised him to get up for meals. Discussed with nursing. Discussed on IDT rounds. Questions answered.     Review of Systems:  Review of Systems   Constitutional: Positive for malaise/fatigue. Negative for fever and chills.   Respiratory: Positive for cough. Negative for shortness of breath.    Cardiovascular: Negative for chest pain and palpitations.   Gastrointestinal: Negative for nausea, vomiting, abdominal pain, diarrhea and constipation.   Genitourinary: Negative for dysuria.   Musculoskeletal: Negative for myalgias.   Skin: Negative for itching.   Neurological: Positive for weakness. Negative for dizziness, focal weakness and headaches.   All other systems reviewed and are negative.    Physical Exam:  Physical Exam   Constitutional: He is oriented to person, place, and time. He appears well-developed. No distress.   Frail, cachectic   HENT:   Head: Normocephalic and atraumatic.   Mouth/Throat: Oropharynx is clear and moist.   Eyes: Conjunctivae and EOM are normal. Pupils are equal, round, and reactive to light. No scleral icterus.   Neck: Normal range of motion. Neck supple. No tracheal deviation present. No thyromegaly present.   Cardiovascular: Normal rate, regular rhythm and intact distal pulses.    Murmur  heard.  Pulmonary/Chest: Effort normal. No respiratory distress. He has decreased breath sounds in the right lower field and the left lower field. He has no wheezes.   Abdominal: Soft. Bowel sounds are normal. He exhibits no distension. There is no tenderness.   Musculoskeletal: Normal range of motion. He exhibits no edema or tenderness.   Lymphadenopathy:     He has no cervical adenopathy.        Right: No supraclavicular adenopathy present.        Left: No supraclavicular adenopathy present.   Neurological: He is alert and oriented to person, place, and time. No cranial nerve deficit.   Skin: Skin is warm and dry.   Vitals reviewed.      Labs:        Invalid input(s): [id number]      No results for input(s): SODIUM, POTASSIUM, CHLORIDE, CO2, BUN, CREATININE, MAGNESIUM, PHOSPHORUS, CALCIUM in the last 72 hours.  No results for input(s): ALTSGPT, ASTSGOT, ALKPHOSPHAT, TBILIRUBIN, DBILIRUBIN, GAMMAGT, AMYLASE, LIPASE, ALB, PREALBUMIN, GLUCOSE in the last 72 hours.  No results for input(s): RBC, HEMOGLOBIN, HEMATOCRIT, PLATELETCT, PROTHROMBTM, APTT, INR, IRON, FERRITIN, TOTIRONBC in the last 72 hours.            Hemodynamics:  Temp (24hrs), Av.8 °C (98.3 °F), Min:36.6 °C (97.8 °F), Max:36.9 °C (98.5 °F)  Temperature: 36.8 °C (98.3 °F)  Pulse  Av.3  Min: 44  Max: 98   Blood Pressure : 124/53 mmHg     Respiratory:    Respiration: 16, Pulse Oximetry: 97 %     Work Of Breathing / Effort: Mild  RUL Breath Sounds: Clear, RML Breath Sounds: Clear, RLL Breath Sounds: Clear, DEBI Breath Sounds: Diminished, LLL Breath Sounds: Clear  Fluids:    Intake/Output Summary (Last 24 hours) at 03/15/17 1416  Last data filed at 03/15/17 1340   Gross per 24 hour   Intake   1360 ml   Output    350 ml   Net   1010 ml        GI/Nutrition:  Orders Placed This Encounter   Procedures   • DIET ORDER     Standing Status: Standing      Number of Occurrences: 1      Standing Expiration Date:      Order Specific Question:  Diet:      Answer:  Diabetic [3]     Order Specific Question:  Diet:     Answer:  Cardiac [6]     Order Specific Question:  Texture/Fiber modifications:     Answer:  Dysphagia 1(Pureed)specify fluid consistency(question 6) [1]     Order Specific Question:  Consistency/Fluid modifications:     Answer:  Nectar Thick [2]     Medical Decision Making, by Problem:  Active Hospital Problems    Diagnosis   • Respiratory failure (CMS-HCC) [J96.90]  At baseline now     • Debility [R53.81]  PT/OT     • Multiple falls [R29.6]  Fall precautions  PT     • MRSA carrier [Z22.322]     • Sepsis (CMS-HCC) [A41.9]  Sepsis resolved, due to HCAP with ESBL  ID Emelia Dial completed on 3/7/17     • HCAP (healthcare-associated pneumonia) [J18.9]  As above     • Chronic respiratory failure with hypoxia (CMS-HCC) [J96.11]  Home O2 LPM Flow: 2 LPM   Supplemental O2 PRN with goal SpO2 greater than 90%.  RT protocol.  Current sats: Pulse Oximetry: 97 % on O2 (LPM): 2       • DM type 2, controlled, with complication (CMS-HCC) [E11.8]  In-hospital FSBG goal less than 180 mg/dL  SSI qAC & qHS when eating, q6 when NPO  GLUCOSE - ACCU-CK   Date/Time Value Ref Range Status   03/14/2017 11:44 AM 98 65 - 99 mg/dL Final     Comment:     Followed Dr palacios   03/14/2017 05:33 * 65 - 99 mg/dL Final     Comment:     Followed Dr palacios   03/13/2017 07:30 * 65 - 99 mg/dL Final     Comment:     Followed Dr palacios   03/13/2017 04:55 * 65 - 99 mg/dL Final     Comment:     Followed Dr palacios      • Left hemiparesis (CMS-HCC) [G81.94]  Chronic from recent CVA  On asa and Lipitor  On Keppra for seizure disorder  Need PT/OT/ST SNF placement      • Dysphagia status post cerebrovascular accident [I69.391]  On dysphagia diet per ST      Right Meralgia Parasthetica  Continue PT  Pain control     • GERD (gastroesophageal reflux disease) [K21.9]  pepcid          Dispo: Pt needs SNF however no SNF days left per SW.       Medications reviewed and Labs  reviewed        DVT Prophylaxis: Heparin    Ulcer prophylaxis: Not indicated        I spent a total of 25 minutes during this clinical encounter of which > 50% was devoted to counseling and coordinating care including review of records, pertinent lab data and studies, as well as discussing diagnostic evaluation and work up, planned therapeutic interventions and future disposition of care. Where indicated, the assessment and plan reflect discussion of patient with consultants, other healthcare providers, family members, and additional research needed to obtain further information in formulating the plan of care of this patient.

## 2017-03-15 NOTE — PROGRESS NOTES
Received report from off going nurse. Assumed care. Patient is awake; sitting up in bed; A/O x 4; pt denies any pain at this time; call light in reach; bed at lowest position; pt is 2 person assist. Bed alarm is ON but off going nurse states not sure if working appropriately. Will continue to monitor.

## 2017-03-16 PROCEDURE — 700102 HCHG RX REV CODE 250 W/ 637 OVERRIDE(OP): Performed by: HOSPITALIST

## 2017-03-16 PROCEDURE — A9270 NON-COVERED ITEM OR SERVICE: HCPCS | Performed by: HOSPITALIST

## 2017-03-16 PROCEDURE — 770006 HCHG ROOM/CARE - MED/SURG/GYN SEMI*

## 2017-03-16 PROCEDURE — 700112 HCHG RX REV CODE 229: Performed by: HOSPITALIST

## 2017-03-16 PROCEDURE — A9270 NON-COVERED ITEM OR SERVICE: HCPCS | Performed by: INTERNAL MEDICINE

## 2017-03-16 PROCEDURE — 700111 HCHG RX REV CODE 636 W/ 250 OVERRIDE (IP): Performed by: INTERNAL MEDICINE

## 2017-03-16 PROCEDURE — 700102 HCHG RX REV CODE 250 W/ 637 OVERRIDE(OP): Performed by: INTERNAL MEDICINE

## 2017-03-16 PROCEDURE — 99231 SBSQ HOSP IP/OBS SF/LOW 25: CPT | Performed by: HOSPITALIST

## 2017-03-16 RX ADMIN — ATORVASTATIN CALCIUM 80 MG: 80 TABLET, FILM COATED ORAL at 23:19

## 2017-03-16 RX ADMIN — TIOTROPIUM BROMIDE 1 CAPSULE: 18 CAPSULE ORAL; RESPIRATORY (INHALATION) at 08:48

## 2017-03-16 RX ADMIN — DOCUSATE SODIUM 100 MG: 100 CAPSULE ORAL at 23:19

## 2017-03-16 RX ADMIN — FAMOTIDINE 20 MG: 20 TABLET, FILM COATED ORAL at 23:20

## 2017-03-16 RX ADMIN — LACTOBACILLUS ACIDOPHILUS / LACTOBACILLUS BULGARICUS 1 PACKET: 100 MILLION CFU STRENGTH GRANULES at 08:14

## 2017-03-16 RX ADMIN — FAMOTIDINE 20 MG: 20 TABLET, FILM COATED ORAL at 08:14

## 2017-03-16 RX ADMIN — HEPARIN SODIUM 5000 UNITS: 5000 INJECTION, SOLUTION INTRAVENOUS; SUBCUTANEOUS at 23:20

## 2017-03-16 RX ADMIN — LEVETIRACETAM 1500 MG: 100 SOLUTION ORAL at 08:47

## 2017-03-16 RX ADMIN — MICONAZOLE NITRATE: 20 CREAM TOPICAL at 05:00

## 2017-03-16 RX ADMIN — OXYCODONE HYDROCHLORIDE AND ACETAMINOPHEN 500 MG: 500 TABLET ORAL at 08:14

## 2017-03-16 RX ADMIN — OXYBUTYNIN CHLORIDE 5 MG: 5 TABLET, FILM COATED, EXTENDED RELEASE ORAL at 08:13

## 2017-03-16 RX ADMIN — POLYETHYLENE GLYCOL 3350 1 PACKET: 17 POWDER, FOR SOLUTION ORAL at 08:14

## 2017-03-16 RX ADMIN — MICONAZOLE NITRATE: 20 CREAM TOPICAL at 12:20

## 2017-03-16 RX ADMIN — FINASTERIDE 5 MG: 5 TABLET, FILM COATED ORAL at 08:14

## 2017-03-16 RX ADMIN — MICONAZOLE NITRATE: 20 CREAM TOPICAL at 00:59

## 2017-03-16 RX ADMIN — STANDARDIZED SENNA CONCENTRATE AND DOCUSATE SODIUM 1 TABLET: 8.6; 5 TABLET, FILM COATED ORAL at 23:20

## 2017-03-16 RX ADMIN — LACTOBACILLUS ACIDOPHILUS / LACTOBACILLUS BULGARICUS 1 PACKET: 100 MILLION CFU STRENGTH GRANULES at 17:30

## 2017-03-16 RX ADMIN — LACTOBACILLUS ACIDOPHILUS / LACTOBACILLUS BULGARICUS 1 PACKET: 100 MILLION CFU STRENGTH GRANULES at 11:30

## 2017-03-16 RX ADMIN — HEPARIN SODIUM 5000 UNITS: 5000 INJECTION, SOLUTION INTRAVENOUS; SUBCUTANEOUS at 06:00

## 2017-03-16 RX ADMIN — ASPIRIN 81 MG: 81 TABLET ORAL at 08:13

## 2017-03-16 RX ADMIN — SERTRALINE 100 MG: 100 TABLET, FILM COATED ORAL at 08:13

## 2017-03-16 RX ADMIN — Medication 325 MG: at 08:13

## 2017-03-16 RX ADMIN — LEVETIRACETAM 1500 MG: 100 SOLUTION ORAL at 23:21

## 2017-03-16 RX ADMIN — BUDESONIDE AND FORMOTEROL FUMARATE DIHYDRATE 2 PUFF: 160; 4.5 AEROSOL RESPIRATORY (INHALATION) at 08:48

## 2017-03-16 RX ADMIN — BUDESONIDE AND FORMOTEROL FUMARATE DIHYDRATE 2 PUFF: 160; 4.5 AEROSOL RESPIRATORY (INHALATION) at 23:21

## 2017-03-16 RX ADMIN — TAMSULOSIN HYDROCHLORIDE 0.4 MG: 0.4 CAPSULE ORAL at 08:14

## 2017-03-16 RX ADMIN — GABAPENTIN 300 MG: 300 CAPSULE ORAL at 08:13

## 2017-03-16 RX ADMIN — DOCUSATE SODIUM 100 MG: 100 CAPSULE ORAL at 08:14

## 2017-03-16 RX ADMIN — HEPARIN SODIUM 5000 UNITS: 5000 INJECTION, SOLUTION INTRAVENOUS; SUBCUTANEOUS at 14:38

## 2017-03-16 ASSESSMENT — ENCOUNTER SYMPTOMS
SHORTNESS OF BREATH: 0
FOCAL WEAKNESS: 0
DIARRHEA: 0
FEVER: 0
ABDOMINAL PAIN: 0
NAUSEA: 0
MYALGIAS: 0
COUGH: 1
HEADACHES: 0
CONSTIPATION: 0
VOMITING: 0
PALPITATIONS: 0
WEAKNESS: 1
DIZZINESS: 0
CHILLS: 0

## 2017-03-16 ASSESSMENT — PAIN SCALES - GENERAL
PAINLEVEL_OUTOF10: 0
PAINLEVEL_OUTOF10: 0

## 2017-03-16 NOTE — PROGRESS NOTES
Pt politely declined PEP today.  Reminded pt that he will be joining PEP for [employer]; he remains agreeable to attend.  Friday staff should plan to have pt up and ready for luncheon at Noon, which will be served in the lounge.

## 2017-03-16 NOTE — CARE PLAN
Problem: Skin Integrity  Goal: Risk for impaired skin integrity will decrease  Outcome: PROGRESSING AS EXPECTED  Royal cream applied. Perineal care and linens change as needed.

## 2017-03-16 NOTE — PROGRESS NOTES
Assumed care of pt at shift change, report received from day shift RN. Pt A&Ox4, resting in bed. Denies pain, nausea or dizziness at this time, no other s/s of discomfort. Up to the bathroom with one assist and fww. Sits at the edge of bed for scheduled medications, pills floated in apple sauce and administered whole one at the time. Bed in low position, alarm on, call light within reach, hourly rounding in place.

## 2017-03-16 NOTE — CARE PLAN
Problem: Urinary Elimination:  Goal: Ability to reestablish a normal urinary elimination pattern will improve  Outcome: PROGRESSING AS EXPECTED  Incontinent. Condom cath in use.

## 2017-03-17 PROCEDURE — 700102 HCHG RX REV CODE 250 W/ 637 OVERRIDE(OP): Performed by: HOSPITALIST

## 2017-03-17 PROCEDURE — 99231 SBSQ HOSP IP/OBS SF/LOW 25: CPT | Performed by: HOSPITALIST

## 2017-03-17 PROCEDURE — 700111 HCHG RX REV CODE 636 W/ 250 OVERRIDE (IP): Performed by: INTERNAL MEDICINE

## 2017-03-17 PROCEDURE — A9270 NON-COVERED ITEM OR SERVICE: HCPCS | Performed by: INTERNAL MEDICINE

## 2017-03-17 PROCEDURE — A9270 NON-COVERED ITEM OR SERVICE: HCPCS | Performed by: HOSPITALIST

## 2017-03-17 PROCEDURE — 700112 HCHG RX REV CODE 229: Performed by: HOSPITALIST

## 2017-03-17 PROCEDURE — 700102 HCHG RX REV CODE 250 W/ 637 OVERRIDE(OP): Performed by: INTERNAL MEDICINE

## 2017-03-17 PROCEDURE — 770006 HCHG ROOM/CARE - MED/SURG/GYN SEMI*

## 2017-03-17 PROCEDURE — 700111 HCHG RX REV CODE 636 W/ 250 OVERRIDE (IP): Performed by: HOSPITALIST

## 2017-03-17 RX ADMIN — ONDANSETRON 4 MG: 4 TABLET, ORALLY DISINTEGRATING ORAL at 17:01

## 2017-03-17 RX ADMIN — MICONAZOLE NITRATE: 20 CREAM TOPICAL at 16:57

## 2017-03-17 RX ADMIN — HEPARIN SODIUM 5000 UNITS: 5000 INJECTION, SOLUTION INTRAVENOUS; SUBCUTANEOUS at 05:37

## 2017-03-17 RX ADMIN — MICONAZOLE NITRATE: 20 CREAM TOPICAL at 05:38

## 2017-03-17 RX ADMIN — DOCUSATE SODIUM 100 MG: 100 CAPSULE ORAL at 21:34

## 2017-03-17 RX ADMIN — STANDARDIZED SENNA CONCENTRATE AND DOCUSATE SODIUM 1 TABLET: 8.6; 5 TABLET, FILM COATED ORAL at 21:33

## 2017-03-17 RX ADMIN — MICONAZOLE NITRATE: 20 CREAM TOPICAL at 12:02

## 2017-03-17 RX ADMIN — MICONAZOLE NITRATE: 20 CREAM TOPICAL at 21:34

## 2017-03-17 RX ADMIN — HEPARIN SODIUM 5000 UNITS: 5000 INJECTION, SOLUTION INTRAVENOUS; SUBCUTANEOUS at 21:34

## 2017-03-17 RX ADMIN — TIOTROPIUM BROMIDE 1 CAPSULE: 18 CAPSULE ORAL; RESPIRATORY (INHALATION) at 08:28

## 2017-03-17 RX ADMIN — HEPARIN SODIUM 5000 UNITS: 5000 INJECTION, SOLUTION INTRAVENOUS; SUBCUTANEOUS at 16:56

## 2017-03-17 RX ADMIN — BUDESONIDE AND FORMOTEROL FUMARATE DIHYDRATE 2 PUFF: 160; 4.5 AEROSOL RESPIRATORY (INHALATION) at 21:36

## 2017-03-17 RX ADMIN — BUDESONIDE AND FORMOTEROL FUMARATE DIHYDRATE 2 PUFF: 160; 4.5 AEROSOL RESPIRATORY (INHALATION) at 08:28

## 2017-03-17 RX ADMIN — ATORVASTATIN CALCIUM 80 MG: 80 TABLET, FILM COATED ORAL at 21:33

## 2017-03-17 RX ADMIN — DOCUSATE SODIUM 100 MG: 100 CAPSULE ORAL at 07:50

## 2017-03-17 RX ADMIN — LACTOBACILLUS ACIDOPHILUS / LACTOBACILLUS BULGARICUS 1 PACKET: 100 MILLION CFU STRENGTH GRANULES at 16:56

## 2017-03-17 RX ADMIN — LEVETIRACETAM 1500 MG: 100 SOLUTION ORAL at 21:36

## 2017-03-17 RX ADMIN — SERTRALINE 100 MG: 100 TABLET, FILM COATED ORAL at 07:50

## 2017-03-17 RX ADMIN — FAMOTIDINE 20 MG: 20 TABLET, FILM COATED ORAL at 07:50

## 2017-03-17 RX ADMIN — FINASTERIDE 5 MG: 5 TABLET, FILM COATED ORAL at 07:50

## 2017-03-17 RX ADMIN — GABAPENTIN 300 MG: 300 CAPSULE ORAL at 07:50

## 2017-03-17 RX ADMIN — LACTOBACILLUS ACIDOPHILUS / LACTOBACILLUS BULGARICUS 1 PACKET: 100 MILLION CFU STRENGTH GRANULES at 12:02

## 2017-03-17 RX ADMIN — LACTOBACILLUS ACIDOPHILUS / LACTOBACILLUS BULGARICUS 1 PACKET: 100 MILLION CFU STRENGTH GRANULES at 07:50

## 2017-03-17 RX ADMIN — FAMOTIDINE 20 MG: 20 TABLET, FILM COATED ORAL at 21:33

## 2017-03-17 RX ADMIN — OXYCODONE HYDROCHLORIDE AND ACETAMINOPHEN 500 MG: 500 TABLET ORAL at 07:50

## 2017-03-17 RX ADMIN — OXYBUTYNIN CHLORIDE 5 MG: 5 TABLET, FILM COATED, EXTENDED RELEASE ORAL at 07:50

## 2017-03-17 RX ADMIN — POLYETHYLENE GLYCOL 3350 1 PACKET: 17 POWDER, FOR SOLUTION ORAL at 07:50

## 2017-03-17 RX ADMIN — LEVETIRACETAM 1500 MG: 100 SOLUTION ORAL at 08:28

## 2017-03-17 RX ADMIN — ASPIRIN 81 MG: 81 TABLET ORAL at 07:50

## 2017-03-17 RX ADMIN — Medication 325 MG: at 07:50

## 2017-03-17 RX ADMIN — TAMSULOSIN HYDROCHLORIDE 0.4 MG: 0.4 CAPSULE ORAL at 07:50

## 2017-03-17 ASSESSMENT — ENCOUNTER SYMPTOMS
FOCAL WEAKNESS: 0
WEAKNESS: 1
MYALGIAS: 0
FEVER: 0
PALPITATIONS: 0
COUGH: 1
DIZZINESS: 0
VOMITING: 0
CONSTIPATION: 0
DIARRHEA: 0
NAUSEA: 0
ABDOMINAL PAIN: 0
HEADACHES: 0
SHORTNESS OF BREATH: 0
CHILLS: 0

## 2017-03-17 ASSESSMENT — PAIN SCALES - GENERAL
PAINLEVEL_OUTOF10: 0

## 2017-03-17 ASSESSMENT — PATIENT HEALTH QUESTIONNAIRE - PHQ9
2. FEELING DOWN, DEPRESSED, IRRITABLE, OR HOPELESS: NOT AT ALL
SUM OF ALL RESPONSES TO PHQ QUESTIONS 1-9: 0
SUM OF ALL RESPONSES TO PHQ9 QUESTIONS 1 AND 2: 0
1. LITTLE INTEREST OR PLEASURE IN DOING THINGS: NOT AT ALL
SUM OF ALL RESPONSES TO PHQ9 QUESTIONS 1 AND 2: 0
2. FEELING DOWN, DEPRESSED, IRRITABLE, OR HOPELESS: NOT AT ALL
1. LITTLE INTEREST OR PLEASURE IN DOING THINGS: NOT AT ALL
SUM OF ALL RESPONSES TO PHQ QUESTIONS 1-9: 0

## 2017-03-17 ASSESSMENT — LIFESTYLE VARIABLES: DO YOU DRINK ALCOHOL: NO

## 2017-03-17 NOTE — PROGRESS NOTES
Hospital Medicine Progress Note, Adult, Complex               Author: Watson Rentreia Date & Time created: 3/16/2017  5:07 PM     ID/CC: 81 yr M with hx of Stroke with left sided hemiparesis, HTN, COPD on 3 L of oxygen, asthma, multiple falls and pelvic fracture presented from [facility] care SNF,presented on 2/20 with shortness of breath and cough and found to have left sided opacification started on vanco and zosyn for HCAP.  H/o aspiration, on dysphagia 1 diet per SLP. Completed treatment with meropenem for ESBL HCAP.     Interval History:  3/16 - still lying in bed, does not have a lot of motivation, advised to get up again today.   3/15 - questions answered, advised to get up to eat again today. No concerns. Advised of plan.   3/14 - feels okay; states he is interested in getting up. Advised him to get up for meals. Discussed with nursing. Discussed on IDT rounds. Questions answered.     Review of Systems:  Review of Systems   Constitutional: Positive for malaise/fatigue. Negative for fever and chills.   Respiratory: Positive for cough. Negative for shortness of breath.    Cardiovascular: Negative for chest pain and palpitations.   Gastrointestinal: Negative for nausea, vomiting, abdominal pain, diarrhea and constipation.   Genitourinary: Negative for dysuria.   Musculoskeletal: Negative for myalgias.   Skin: Negative for itching.   Neurological: Positive for weakness. Negative for dizziness, focal weakness and headaches.   All other systems reviewed and are negative.    Physical Exam:  Physical Exam   Constitutional: He is oriented to person, place, and time. He appears well-developed. No distress.   Frail, cachectic   HENT:   Head: Normocephalic and atraumatic.   Mouth/Throat: Oropharynx is clear and moist.   Eyes: Conjunctivae and EOM are normal. Pupils are equal, round, and reactive to light. No scleral icterus.   Neck: Normal range of motion. Neck supple. No tracheal deviation present. No thyromegaly present.    Cardiovascular: Normal rate, regular rhythm and intact distal pulses.    Murmur heard.  Pulmonary/Chest: Effort normal. No respiratory distress. He has decreased breath sounds in the right lower field and the left lower field. He has no wheezes.   Abdominal: Soft. Bowel sounds are normal. He exhibits no distension. There is no tenderness.   Musculoskeletal: Normal range of motion. He exhibits no edema or tenderness.   Lymphadenopathy:     He has no cervical adenopathy.        Right: No supraclavicular adenopathy present.        Left: No supraclavicular adenopathy present.   Neurological: He is alert and oriented to person, place, and time. No cranial nerve deficit.   Skin: Skin is warm and dry.   Vitals reviewed.      Labs:        Invalid input(s): [id number]      No results for input(s): SODIUM, POTASSIUM, CHLORIDE, CO2, BUN, CREATININE, MAGNESIUM, PHOSPHORUS, CALCIUM in the last 72 hours.  No results for input(s): ALTSGPT, ASTSGOT, ALKPHOSPHAT, TBILIRUBIN, DBILIRUBIN, GAMMAGT, AMYLASE, LIPASE, ALB, PREALBUMIN, GLUCOSE in the last 72 hours.  No results for input(s): RBC, HEMOGLOBIN, HEMATOCRIT, PLATELETCT, PROTHROMBTM, APTT, INR, IRON, FERRITIN, TOTIRONBC in the last 72 hours.            Hemodynamics:  Temp (24hrs), Av.7 °C (98 °F), Min:36.4 °C (97.6 °F), Max:36.9 °C (98.5 °F)  Temperature: 36.8 °C (98.2 °F)  Pulse  Av.3  Min: 44  Max: 98   Blood Pressure : 125/61 mmHg     Respiratory:    Respiration: 16, Pulse Oximetry: 91 %     Work Of Breathing / Effort: Mild  RUL Breath Sounds: Clear, RML Breath Sounds: Clear, RLL Breath Sounds: Clear, DEBI Breath Sounds: Clear, LLL Breath Sounds: Clear  Fluids:    Intake/Output Summary (Last 24 hours) at 17 1707  Last data filed at 17 1330   Gross per 24 hour   Intake      0 ml   Output   1200 ml   Net  -1200 ml        GI/Nutrition:  Orders Placed This Encounter   Procedures   • DIET ORDER     Standing Status: Standing      Number of Occurrences: 1       Standing Expiration Date:      Order Specific Question:  Diet:     Answer:  Diabetic [3]     Order Specific Question:  Diet:     Answer:  Cardiac [6]     Order Specific Question:  Texture/Fiber modifications:     Answer:  Dysphagia 1(Pureed)specify fluid consistency(question 6) [1]     Order Specific Question:  Consistency/Fluid modifications:     Answer:  Nectar Thick [2]     Medical Decision Making, by Problem:  Active Hospital Problems    Diagnosis   • Respiratory failure (CMS-HCC) [J96.90]  At baseline now     • Debility [R53.81]  PT/OT     • Multiple falls [R29.6]  Fall precautions  PT     • MRSA carrier [Z22.322]     • Sepsis (CMS-HCC) [A41.9]  Sepsis resolved, due to HCAP with ESBL  ID Emelia Dial completed on 3/7/17     • HCAP (healthcare-associated pneumonia) [J18.9]  As above     • Chronic respiratory failure with hypoxia (CMS-HCC) [J96.11]  Home O2 LPM Flow: 2 LPM   Supplemental O2 PRN with goal SpO2 greater than 90%.  RT protocol.  Current sats: Pulse Oximetry: 91 % on O2 (LPM): 2        • DM type 2, controlled, with complication (CMS-HCC) [E11.8]  In-hospital FSBG goal less than 180 mg/dL  SSI qAC & qHS when eating, q6 when NPO  GLUCOSE - ACCU-CK   Date/Time Value Ref Range Status   03/14/2017 11:44 AM 98 65 - 99 mg/dL Final     Comment:     Followed Dr palacios   03/14/2017 05:33 * 65 - 99 mg/dL Final     Comment:     Followed Dr palacios   03/13/2017 07:30 * 65 - 99 mg/dL Final     Comment:     Followed Dr palacios   03/13/2017 04:55 * 65 - 99 mg/dL Final     Comment:     Followed Dr palacios      • Left hemiparesis (CMS-HCC) [G81.94]  Chronic from recent CVA  On asa and Lipitor  On Keppra for seizure disorder  Need PT/OT/ST SNF placement      • Dysphagia status post cerebrovascular accident [I69.391]  On dysphagia diet per ST      Right Meralgia Parasthetica  Continue PT  Pain control     • GERD (gastroesophageal reflux disease) [K21.9]  pepcid          Dispo: Pt needs SNF  however no SNF days left per SW.       Medications reviewed and Labs reviewed        DVT Prophylaxis: Heparin    Ulcer prophylaxis: Not indicated        I spent a total of 19 minutes during this clinical encounter of which > 50% was devoted to counseling and coordinating care including review of records, pertinent lab data and studies, as well as discussing diagnostic evaluation and work up, planned therapeutic interventions and future disposition of care. Where indicated, the assessment and plan reflect discussion of patient with consultants, other healthcare providers, family members, and additional research needed to obtain further information in formulating the plan of care of this patient.

## 2017-03-17 NOTE — PROGRESS NOTES
Assessed pt to be alert and oriented x4 with episodes of forgetfulness. Pleasant and cooperative with care. Pt completed abx treatment, pending placement. Assessed lung sounds to be diminished, minimal effort with breathing. Denies SOB or , denies pain and discomfort. Resting in bed and refuses ambulation tonight. Encouraged to use IS, encouraged to increase oral fluid intake. Tolerated medications with AS. Reinforced aspiration precautions, proper fluid and food consistencies offered. Kept safe and comfortable and provided needs.

## 2017-03-17 NOTE — CARE PLAN
Problem: Safety  Goal: Will remain free from falls  Intervention: Assess risk factors for falls    03/16/17 2010 03/17/17 0234   OTHER   Fall Risk High Risk to Fall - 2 or more points  --    Risk for Injury-Any positive answers results in the pt being at high risk for fall related injury Not Applicable --    Mobility Status Assessment 2-2 Healthcare Providers Required for Assistance with Ambulation & Transfer --    History of fall 2 --    Date of Last Fall 03/12/17 --    Pt Calls for Assistance --  Yes       Intervention: Implement fall precautions    03/12/17 2103 03/14/17 0835 03/16/17 2010   OTHER   Environmental Precautions --  --  Treaded Slipper Socks on Patient   IV Pole on Same Side of Bed as Bathroom --  (No IV.) --    Bedrails --  --  Bedrails Closest to Bathroom Down   Chair/Bed Strip Alarm --  --  Yes - Alarm On   Bed Alarm (Built in - for ICU ONLY) Yes - Alarm On --  --            Problem: Venous Thromboembolism (VTW)/Deep Vein Thrombosis (DVT) Prevention:  Goal: Patient will participate in Venous Thrombosis (VTE)/Deep Vein Thrombosis (DVT)Prevention Measures    03/16/17 2010   OTHER   Risk Assessment Score 5   VTE RISK Very High   Mechanical Prophylaxis SCDs, Sequentials (Intermittent Pneumatic Compression Devices)   Pharmacologic Prophylaxis Used Unfractionated Heparin         Problem: Respiratory:  Goal: Respiratory status will improve  Outcome: PROGRESSING SLOWER THAN EXPECTED  Encouraged to use IS, deep breathing exercises done. Continues on oxygen at 3L/m via NC.

## 2017-03-17 NOTE — PROGRESS NOTES
Hospital Medicine Progress Note, Adult, Complex               Author: Watson Renteria Date & Time created: 3/17/2017  1:58 PM     ID/CC: 81 yr M with hx of Stroke with left sided hemiparesis, HTN, COPD on 3 L of oxygen, asthma, multiple falls and pelvic fracture presented from [facility] care SNF,presented on 2/20 with shortness of breath and cough and found to have left sided opacification started on vanco and zosyn for HCAP.  H/o aspiration, on dysphagia 1 diet per SLP. Completed treatment with meropenem for ESBL HCAP.     Interval History:  3/17 - sleeping, arousable, encouraged to get up, not very interested in chatting today but pleasant.   3/16 - still lying in bed, does not have a lot of motivation, advised to get up again today.   3/15 - questions answered, advised to get up to eat again today. No concerns. Advised of plan.   3/14 - feels okay; states he is interested in getting up. Advised him to get up for meals. Discussed with nursing. Discussed on IDT rounds. Questions answered.     Review of Systems:  Review of Systems   Constitutional: Positive for malaise/fatigue. Negative for fever and chills.   Respiratory: Positive for cough. Negative for shortness of breath.    Cardiovascular: Negative for chest pain and palpitations.   Gastrointestinal: Negative for nausea, vomiting, abdominal pain, diarrhea and constipation.   Genitourinary: Negative for dysuria.   Musculoskeletal: Negative for myalgias.   Skin: Negative for itching.   Neurological: Positive for weakness. Negative for dizziness, focal weakness and headaches.   All other systems reviewed and are negative.    Physical Exam:  Physical Exam   Constitutional: He is oriented to person, place, and time. He appears well-developed. No distress.   Frail, cachectic   HENT:   Head: Normocephalic and atraumatic.   Mouth/Throat: Oropharynx is clear and moist.   Eyes: Conjunctivae and EOM are normal. Pupils are equal, round, and reactive to light. No scleral  icterus.   Neck: Normal range of motion. Neck supple. No tracheal deviation present. No thyromegaly present.   Cardiovascular: Normal rate, regular rhythm and intact distal pulses.    Murmur heard.  Pulmonary/Chest: Effort normal. No respiratory distress. He has decreased breath sounds in the right lower field and the left lower field. He has no wheezes.   Abdominal: Soft. Bowel sounds are normal. He exhibits no distension. There is no tenderness.   Musculoskeletal: Normal range of motion. He exhibits no edema or tenderness.   Lymphadenopathy:     He has no cervical adenopathy.        Right: No supraclavicular adenopathy present.        Left: No supraclavicular adenopathy present.   Neurological: He is alert and oriented to person, place, and time. No cranial nerve deficit.   Skin: Skin is warm and dry.   Vitals reviewed.      Labs:        Invalid input(s): [id number]      No results for input(s): SODIUM, POTASSIUM, CHLORIDE, CO2, BUN, CREATININE, MAGNESIUM, PHOSPHORUS, CALCIUM in the last 72 hours.  No results for input(s): ALTSGPT, ASTSGOT, ALKPHOSPHAT, TBILIRUBIN, DBILIRUBIN, GAMMAGT, AMYLASE, LIPASE, ALB, PREALBUMIN, GLUCOSE in the last 72 hours.  No results for input(s): RBC, HEMOGLOBIN, HEMATOCRIT, PLATELETCT, PROTHROMBTM, APTT, INR, IRON, FERRITIN, TOTIRONBC in the last 72 hours.            Hemodynamics:  Temp (24hrs), Av.7 °C (98.1 °F), Min:36.6 °C (97.8 °F), Max:36.9 °C (98.4 °F)  Temperature: 36.6 °C (97.9 °F)  Pulse  Av.3  Min: 44  Max: 98   Blood Pressure : 143/51 mmHg     Respiratory:    Respiration: 16, Pulse Oximetry: 93 %     Work Of Breathing / Effort: Mild  RUL Breath Sounds: Clear, RML Breath Sounds: Clear, RLL Breath Sounds: Diminished, DEBI Breath Sounds: Clear, LLL Breath Sounds: Diminished  Fluids:    Intake/Output Summary (Last 24 hours) at 17 1358  Last data filed at 17 0500   Gross per 24 hour   Intake    500 ml   Output    435 ml   Net     65 ml         GI/Nutrition:  Orders Placed This Encounter   Procedures   • DIET ORDER     Standing Status: Standing      Number of Occurrences: 1      Standing Expiration Date:      Order Specific Question:  Diet:     Answer:  Diabetic [3]     Order Specific Question:  Diet:     Answer:  Cardiac [6]     Order Specific Question:  Texture/Fiber modifications:     Answer:  Dysphagia 1(Pureed)specify fluid consistency(question 6) [1]     Order Specific Question:  Consistency/Fluid modifications:     Answer:  Nectar Thick [2]     Medical Decision Making, by Problem:  Active Hospital Problems    Diagnosis   • Respiratory failure (CMS-[facility]) [J96.90]  At baseline now     • Debility [R53.81]  PT/OT     • Multiple falls [R29.6]  Fall precautions  PT     • MRSA carrier [Z22.322]     • Sepsis (CMS-HCC) [A41.9]  Sepsis resolved, due to HCAP with ESBL  ID Emelia Dial completed on 3/7/17     • HCAP (healthcare-associated pneumonia) [J18.9]  As above     • Chronic respiratory failure with hypoxia (CMS-HCC) [J96.11]  Home O2 LPM Flow: 2 LPM   Supplemental O2 PRN with goal SpO2 greater than 90%.  RT protocol.  Current sats: Pulse Oximetry: 91 % on O2 (LPM): 2        • DM type 2, controlled, with complication (CMS-[facility]) [E11.8]  Diet controlled     • Left hemiparesis (CMS-HCC) [G81.94]  Chronic from recent CVA  On asa and Lipitor  On Keppra for seizure disorder  Need PT/OT/ST SNF placement      • Dysphagia status post cerebrovascular accident [I69.391]  On dysphagia diet per ST      Right Meralgia Parasthetica  Continue PT  Pain control     • GERD (gastroesophageal reflux disease) [K21.9]  pepcid          Dispo: Pt needs SNF however no SNF days left (per SW).      Medications reviewed and Labs reviewed        DVT Prophylaxis: Heparin    Ulcer prophylaxis: Not indicated        I spent a total of 16 minutes during this clinical encounter of which > 50% was devoted to counseling and coordinating care including review of records,  pertinent lab data and studies, as well as discussing diagnostic evaluation and work up, planned therapeutic interventions and future disposition of care. Where indicated, the assessment and plan reflect discussion of patient with consultants, other healthcare providers, family members, and additional research needed to obtain further information in formulating the plan of care of this patient.

## 2017-03-17 NOTE — PROGRESS NOTES
Pt attended [place]  [employer]cheon with peers.  Pt appropriate and engaged throughout; conversing with other pts and staff.

## 2017-03-18 PROCEDURE — A9270 NON-COVERED ITEM OR SERVICE: HCPCS | Performed by: HOSPITALIST

## 2017-03-18 PROCEDURE — 770006 HCHG ROOM/CARE - MED/SURG/GYN SEMI*

## 2017-03-18 PROCEDURE — 700102 HCHG RX REV CODE 250 W/ 637 OVERRIDE(OP): Performed by: INTERNAL MEDICINE

## 2017-03-18 PROCEDURE — 700102 HCHG RX REV CODE 250 W/ 637 OVERRIDE(OP): Performed by: HOSPITALIST

## 2017-03-18 PROCEDURE — A9270 NON-COVERED ITEM OR SERVICE: HCPCS | Performed by: INTERNAL MEDICINE

## 2017-03-18 PROCEDURE — 700111 HCHG RX REV CODE 636 W/ 250 OVERRIDE (IP): Performed by: INTERNAL MEDICINE

## 2017-03-18 PROCEDURE — 99231 SBSQ HOSP IP/OBS SF/LOW 25: CPT | Performed by: HOSPITALIST

## 2017-03-18 PROCEDURE — 700111 HCHG RX REV CODE 636 W/ 250 OVERRIDE (IP): Performed by: HOSPITALIST

## 2017-03-18 PROCEDURE — 700112 HCHG RX REV CODE 229: Performed by: HOSPITALIST

## 2017-03-18 RX ORDER — POLYVINYL ALCOHOL 14 MG/ML
2 SOLUTION/ DROPS OPHTHALMIC PRN
Status: DISCONTINUED | OUTPATIENT
Start: 2017-03-18 | End: 2017-03-22

## 2017-03-18 RX ADMIN — STANDARDIZED SENNA CONCENTRATE AND DOCUSATE SODIUM 1 TABLET: 8.6; 5 TABLET, FILM COATED ORAL at 21:14

## 2017-03-18 RX ADMIN — LACTOBACILLUS ACIDOPHILUS / LACTOBACILLUS BULGARICUS 1 PACKET: 100 MILLION CFU STRENGTH GRANULES at 10:41

## 2017-03-18 RX ADMIN — HEPARIN SODIUM 5000 UNITS: 5000 INJECTION, SOLUTION INTRAVENOUS; SUBCUTANEOUS at 14:47

## 2017-03-18 RX ADMIN — TIOTROPIUM BROMIDE 1 CAPSULE: 18 CAPSULE ORAL; RESPIRATORY (INHALATION) at 08:29

## 2017-03-18 RX ADMIN — ASPIRIN 81 MG: 81 TABLET ORAL at 08:29

## 2017-03-18 RX ADMIN — MICONAZOLE NITRATE: 20 CREAM TOPICAL at 10:41

## 2017-03-18 RX ADMIN — FAMOTIDINE 20 MG: 20 TABLET, FILM COATED ORAL at 08:29

## 2017-03-18 RX ADMIN — GABAPENTIN 300 MG: 300 CAPSULE ORAL at 08:29

## 2017-03-18 RX ADMIN — HEPARIN SODIUM 5000 UNITS: 5000 INJECTION, SOLUTION INTRAVENOUS; SUBCUTANEOUS at 21:14

## 2017-03-18 RX ADMIN — Medication 325 MG: at 08:29

## 2017-03-18 RX ADMIN — OXYBUTYNIN CHLORIDE 5 MG: 5 TABLET, FILM COATED, EXTENDED RELEASE ORAL at 08:29

## 2017-03-18 RX ADMIN — OXYCODONE HYDROCHLORIDE AND ACETAMINOPHEN 500 MG: 500 TABLET ORAL at 08:29

## 2017-03-18 RX ADMIN — LACTOBACILLUS ACIDOPHILUS / LACTOBACILLUS BULGARICUS 1 PACKET: 100 MILLION CFU STRENGTH GRANULES at 14:47

## 2017-03-18 RX ADMIN — FAMOTIDINE 20 MG: 20 TABLET, FILM COATED ORAL at 21:00

## 2017-03-18 RX ADMIN — SERTRALINE 100 MG: 100 TABLET, FILM COATED ORAL at 08:29

## 2017-03-18 RX ADMIN — DOCUSATE SODIUM 100 MG: 100 CAPSULE ORAL at 21:14

## 2017-03-18 RX ADMIN — DOCUSATE SODIUM 100 MG: 100 CAPSULE ORAL at 08:29

## 2017-03-18 RX ADMIN — LEVETIRACETAM 1500 MG: 100 SOLUTION ORAL at 08:30

## 2017-03-18 RX ADMIN — FINASTERIDE 5 MG: 5 TABLET, FILM COATED ORAL at 08:29

## 2017-03-18 RX ADMIN — MICONAZOLE NITRATE: 20 CREAM TOPICAL at 04:52

## 2017-03-18 RX ADMIN — TAMSULOSIN HYDROCHLORIDE 0.4 MG: 0.4 CAPSULE ORAL at 08:29

## 2017-03-18 RX ADMIN — BUDESONIDE AND FORMOTEROL FUMARATE DIHYDRATE 2 PUFF: 160; 4.5 AEROSOL RESPIRATORY (INHALATION) at 08:30

## 2017-03-18 RX ADMIN — POLYETHYLENE GLYCOL 3350 1 PACKET: 17 POWDER, FOR SOLUTION ORAL at 08:30

## 2017-03-18 RX ADMIN — HEPARIN SODIUM 5000 UNITS: 5000 INJECTION, SOLUTION INTRAVENOUS; SUBCUTANEOUS at 05:05

## 2017-03-18 RX ADMIN — ATORVASTATIN CALCIUM 80 MG: 80 TABLET, FILM COATED ORAL at 21:14

## 2017-03-18 RX ADMIN — ONDANSETRON 4 MG: 4 TABLET, ORALLY DISINTEGRATING ORAL at 21:14

## 2017-03-18 RX ADMIN — LACTOBACILLUS ACIDOPHILUS / LACTOBACILLUS BULGARICUS 1 PACKET: 100 MILLION CFU STRENGTH GRANULES at 08:29

## 2017-03-18 ASSESSMENT — ENCOUNTER SYMPTOMS
SHORTNESS OF BREATH: 0
COUGH: 1
PALPITATIONS: 0
VOMITING: 0
FOCAL WEAKNESS: 0
WEAKNESS: 1
DIARRHEA: 0
CONSTIPATION: 0
DIZZINESS: 0
HEADACHES: 0
ABDOMINAL PAIN: 0
MYALGIAS: 0
CHILLS: 0
NAUSEA: 0
FEVER: 0

## 2017-03-18 ASSESSMENT — COPD QUESTIONNAIRES
HAVE YOU SMOKED AT LEAST 100 CIGARETTES IN YOUR ENTIRE LIFE: YES
DO YOU EVER COUGH UP ANY MUCUS OR PHLEGM?: YES, A FEW DAYS A WEEK OR MONTH
HAVE YOU SMOKED AT LEAST 100 CIGARETTES IN YOUR ENTIRE LIFE: YES
COPD SCREENING SCORE: 8
COPD SCREENING SCORE: 8
DO YOU EVER COUGH UP ANY MUCUS OR PHLEGM?: YES, A FEW DAYS A WEEK OR MONTH
DURING THE PAST 4 WEEKS HOW MUCH DID YOU FEEL SHORT OF BREATH: MOST  OR ALL OF THE TIME
DURING THE PAST 4 WEEKS HOW MUCH DID YOU FEEL SHORT OF BREATH: MOST  OR ALL OF THE TIME

## 2017-03-18 ASSESSMENT — PATIENT HEALTH QUESTIONNAIRE - PHQ9
2. FEELING DOWN, DEPRESSED, IRRITABLE, OR HOPELESS: NOT AT ALL
SUM OF ALL RESPONSES TO PHQ9 QUESTIONS 1 AND 2: 0
1. LITTLE INTEREST OR PLEASURE IN DOING THINGS: NOT AT ALL
SUM OF ALL RESPONSES TO PHQ QUESTIONS 1-9: 0

## 2017-03-18 ASSESSMENT — PAIN SCALES - GENERAL: PAINLEVEL_OUTOF10: 0

## 2017-03-18 NOTE — CARE PLAN
Problem: Safety  Goal: Will remain free from falls  Intervention: Implement fall precautions  Safety and fall precautions in place.  Bed in low position,upper side rails X2, treaded socks applied.  Bed alarm activated.  Call light within reach.      Problem: Respiratory:  Goal: Respiratory status will improve  Intervention: Assess and monitor pulmonary status  Patient encouraged to use incentive spirometer and coughing and deep breathing exercises while awake, slowly progressing.

## 2017-03-18 NOTE — PROGRESS NOTES
Hospital Medicine Progress Note, Adult, Complex               Author: Watson Renteria Date & Time created: 3/18/2017  11:51 AM     ID/CC: 81 yr M with hx of Stroke with left sided hemiparesis, HTN, COPD on 3 L of oxygen, asthma, multiple falls and pelvic fracture presented from [facility] care SNF,presented on 2/20 with shortness of breath and cough and found to have left sided opacification started on vanco and zosyn for HCAP.  H/o aspiration, on dysphagia 1 diet per SLP. Completed treatment with meropenem for ESBL HCAP.     Interval History:  3/18 - pleasant, more awake today, had eaten breakfast. Questions answered. Encouraged to keep getting up and participating.   3/17 - sleeping, arousable, encouraged to get up, not very interested in chatting today but pleasant.   3/16 - still lying in bed, does not have a lot of motivation, advised to get up again today.   3/15 - questions answered, advised to get up to eat again today. No concerns. Advised of plan.   3/14 - feels okay; states he is interested in getting up. Advised him to get up for meals. Discussed with nursing. Discussed on IDT rounds. Questions answered.     Review of Systems:  Review of Systems   Constitutional: Positive for malaise/fatigue. Negative for fever and chills.   Respiratory: Positive for cough. Negative for shortness of breath.    Cardiovascular: Negative for chest pain and palpitations.   Gastrointestinal: Negative for nausea, vomiting, abdominal pain, diarrhea and constipation.   Genitourinary: Negative for dysuria.   Musculoskeletal: Negative for myalgias.   Skin: Negative for itching.   Neurological: Positive for weakness. Negative for dizziness, focal weakness and headaches.   All other systems reviewed and are negative.    Physical Exam:  Physical Exam   Constitutional: He is oriented to person, place, and time. He appears well-developed. No distress.   Frail, cachectic   HENT:   Head: Normocephalic and atraumatic.   Mouth/Throat:  Oropharynx is clear and moist.   Eyes: Conjunctivae and EOM are normal. Pupils are equal, round, and reactive to light. No scleral icterus.   Neck: Normal range of motion. Neck supple. No tracheal deviation present. No thyromegaly present.   Cardiovascular: Normal rate, regular rhythm and intact distal pulses.    Murmur heard.  Pulmonary/Chest: Effort normal. No respiratory distress. He has decreased breath sounds in the right lower field and the left lower field. He has no wheezes.   Abdominal: Soft. Bowel sounds are normal. He exhibits no distension. There is no tenderness.   Musculoskeletal: Normal range of motion. He exhibits no edema or tenderness.   Lymphadenopathy:     He has no cervical adenopathy.        Right: No supraclavicular adenopathy present.        Left: No supraclavicular adenopathy present.   Neurological: He is alert and oriented to person, place, and time. No cranial nerve deficit.   Skin: Skin is warm and dry.   Vitals reviewed.      Labs:        Invalid input(s): [id number]      No results for input(s): SODIUM, POTASSIUM, CHLORIDE, CO2, BUN, CREATININE, MAGNESIUM, PHOSPHORUS, CALCIUM in the last 72 hours.  No results for input(s): ALTSGPT, ASTSGOT, ALKPHOSPHAT, TBILIRUBIN, DBILIRUBIN, GAMMAGT, AMYLASE, LIPASE, ALB, PREALBUMIN, GLUCOSE in the last 72 hours.  No results for input(s): RBC, HEMOGLOBIN, HEMATOCRIT, PLATELETCT, PROTHROMBTM, APTT, INR, IRON, FERRITIN, TOTIRONBC in the last 72 hours.            Hemodynamics:  Temp (24hrs), Av.6 °C (97.8 °F), Min:36.4 °C (97.5 °F), Max:36.8 °C (98.3 °F)  Temperature: 36.4 °C (97.5 °F)  Pulse  Av.3  Min: 44  Max: 98   Blood Pressure : 120/57 mmHg     Respiratory:    Respiration: 17, Pulse Oximetry: 97 %     Work Of Breathing / Effort: Mild  RUL Breath Sounds: Clear, RML Breath Sounds: Clear, RLL Breath Sounds: Diminished, DEBI Breath Sounds: Clear, LLL Breath Sounds: Diminished  Fluids:    Intake/Output Summary (Last 24 hours) at 17  1151  Last data filed at 03/18/17 1000   Gross per 24 hour   Intake      0 ml   Output    800 ml   Net   -800 ml     Weight: 59.1 kg (130 lb 4.7 oz)  GI/Nutrition:  Orders Placed This Encounter   Procedures   • DIET ORDER     Standing Status: Standing      Number of Occurrences: 1      Standing Expiration Date:      Order Specific Question:  Diet:     Answer:  Diabetic [3]     Order Specific Question:  Diet:     Answer:  Cardiac [6]     Order Specific Question:  Texture/Fiber modifications:     Answer:  Dysphagia 1(Pureed)specify fluid consistency(question 6) [1]     Order Specific Question:  Consistency/Fluid modifications:     Answer:  Nectar Thick [2]     Medical Decision Making, by Problem:  Active Hospital Problems    Diagnosis   • Respiratory failure (CMS-[facility]) [J96.90]  At baseline now     • Debility [R53.81]  PT/OT     • Multiple falls [R29.6]  Fall precautions  PT     • MRSA carrier [Z22.322]     • Sepsis (CMS-HCC) [A41.9]  Sepsis resolved, due to HCAP with ESBL  ID Emelia consulted  Meropenem completed on 3/7/17     • HCAP (healthcare-associated pneumonia) [J18.9]  As above     • Chronic respiratory failure with hypoxia (CMS-[facility]) [J96.11]  Home O2 LPM Flow: 2 LPM   Supplemental O2 PRN with goal SpO2 greater than 90%.  RT protocol.  Current sats: Pulse Oximetry: 97 % on O2 (LPM): 2.5      • DM type 2, controlled, with complication (CMS-[facility]) [E11.8]  Diet controlled     • Left hemiparesis (CMS-HCC) [G81.94]  Chronic from recent CVA  On asa and Lipitor  On Keppra for seizure disorder  Need PT/OT/ST SNF placement      • Dysphagia status post cerebrovascular accident [I69.391]  On dysphagia diet per ST      Right Meralgia Parasthetica  Continue PT  Pain control     • GERD (gastroesophageal reflux disease) [K21.9]  pepcid          Dispo: Pt needs SNF however no SNF days left (per SW).      Medications reviewed and Labs reviewed        DVT Prophylaxis: Heparin    Ulcer prophylaxis: Not indicated        I spent a  total of 18 minutes during this clinical encounter of which > 50% was devoted to counseling and coordinating care including review of records, pertinent lab data and studies, as well as discussing diagnostic evaluation and work up, planned therapeutic interventions and future disposition of care. Where indicated, the assessment and plan reflect discussion of patient with consultants, other healthcare providers, family members, and additional research needed to obtain further information in formulating the plan of care of this patient.

## 2017-03-18 NOTE — PROGRESS NOTES
Pt up in wheelchair for lunch with the PEP group. Lunch in the visitor room.tolerating diet well. Only had recent c/o nausea which he was treated for. Vss, callbell within reach. No needs at this time. Will continue to monitor

## 2017-03-19 PROCEDURE — 700112 HCHG RX REV CODE 229: Performed by: HOSPITALIST

## 2017-03-19 PROCEDURE — A9270 NON-COVERED ITEM OR SERVICE: HCPCS | Performed by: HOSPITALIST

## 2017-03-19 PROCEDURE — 700111 HCHG RX REV CODE 636 W/ 250 OVERRIDE (IP): Performed by: INTERNAL MEDICINE

## 2017-03-19 PROCEDURE — 700102 HCHG RX REV CODE 250 W/ 637 OVERRIDE(OP): Performed by: INTERNAL MEDICINE

## 2017-03-19 PROCEDURE — A9270 NON-COVERED ITEM OR SERVICE: HCPCS | Performed by: INTERNAL MEDICINE

## 2017-03-19 PROCEDURE — 770006 HCHG ROOM/CARE - MED/SURG/GYN SEMI*

## 2017-03-19 PROCEDURE — 99231 SBSQ HOSP IP/OBS SF/LOW 25: CPT | Performed by: HOSPITALIST

## 2017-03-19 PROCEDURE — 700102 HCHG RX REV CODE 250 W/ 637 OVERRIDE(OP): Performed by: HOSPITALIST

## 2017-03-19 RX ADMIN — Medication 325 MG: at 09:32

## 2017-03-19 RX ADMIN — STANDARDIZED SENNA CONCENTRATE AND DOCUSATE SODIUM 1 TABLET: 8.6; 5 TABLET, FILM COATED ORAL at 21:27

## 2017-03-19 RX ADMIN — ATORVASTATIN CALCIUM 80 MG: 80 TABLET, FILM COATED ORAL at 21:26

## 2017-03-19 RX ADMIN — DOCUSATE SODIUM 100 MG: 100 CAPSULE ORAL at 09:32

## 2017-03-19 RX ADMIN — HEPARIN SODIUM 5000 UNITS: 5000 INJECTION, SOLUTION INTRAVENOUS; SUBCUTANEOUS at 13:46

## 2017-03-19 RX ADMIN — LACTOBACILLUS ACIDOPHILUS / LACTOBACILLUS BULGARICUS 1 PACKET: 100 MILLION CFU STRENGTH GRANULES at 09:31

## 2017-03-19 RX ADMIN — MICONAZOLE NITRATE: 20 CREAM TOPICAL at 13:20

## 2017-03-19 RX ADMIN — TAMSULOSIN HYDROCHLORIDE 0.4 MG: 0.4 CAPSULE ORAL at 09:32

## 2017-03-19 RX ADMIN — HEPARIN SODIUM 5000 UNITS: 5000 INJECTION, SOLUTION INTRAVENOUS; SUBCUTANEOUS at 05:24

## 2017-03-19 RX ADMIN — LEVETIRACETAM 1500 MG: 100 SOLUTION ORAL at 23:59

## 2017-03-19 RX ADMIN — SERTRALINE 100 MG: 100 TABLET, FILM COATED ORAL at 09:33

## 2017-03-19 RX ADMIN — OXYCODONE HYDROCHLORIDE AND ACETAMINOPHEN 500 MG: 500 TABLET ORAL at 09:32

## 2017-03-19 RX ADMIN — LEVETIRACETAM 1500 MG: 100 SOLUTION ORAL at 00:15

## 2017-03-19 RX ADMIN — LACTOBACILLUS ACIDOPHILUS / LACTOBACILLUS BULGARICUS 1 PACKET: 100 MILLION CFU STRENGTH GRANULES at 13:20

## 2017-03-19 RX ADMIN — OXYBUTYNIN CHLORIDE 5 MG: 5 TABLET, FILM COATED, EXTENDED RELEASE ORAL at 09:32

## 2017-03-19 RX ADMIN — FAMOTIDINE 20 MG: 20 TABLET, FILM COATED ORAL at 21:27

## 2017-03-19 RX ADMIN — HEPARIN SODIUM 5000 UNITS: 5000 INJECTION, SOLUTION INTRAVENOUS; SUBCUTANEOUS at 21:27

## 2017-03-19 RX ADMIN — BUDESONIDE AND FORMOTEROL FUMARATE DIHYDRATE 2 PUFF: 160; 4.5 AEROSOL RESPIRATORY (INHALATION) at 21:25

## 2017-03-19 RX ADMIN — MICONAZOLE NITRATE: 20 CREAM TOPICAL at 05:23

## 2017-03-19 RX ADMIN — DOCUSATE SODIUM 100 MG: 100 CAPSULE ORAL at 21:27

## 2017-03-19 RX ADMIN — GABAPENTIN 300 MG: 300 CAPSULE ORAL at 09:32

## 2017-03-19 RX ADMIN — FINASTERIDE 5 MG: 5 TABLET, FILM COATED ORAL at 09:32

## 2017-03-19 RX ADMIN — ASPIRIN 81 MG: 81 TABLET ORAL at 09:33

## 2017-03-19 RX ADMIN — LACTOBACILLUS ACIDOPHILUS / LACTOBACILLUS BULGARICUS 1 PACKET: 100 MILLION CFU STRENGTH GRANULES at 17:45

## 2017-03-19 RX ADMIN — MICONAZOLE NITRATE: 20 CREAM TOPICAL at 00:09

## 2017-03-19 RX ADMIN — FAMOTIDINE 20 MG: 20 TABLET, FILM COATED ORAL at 09:32

## 2017-03-19 RX ADMIN — POLYETHYLENE GLYCOL 3350 1 PACKET: 17 POWDER, FOR SOLUTION ORAL at 09:33

## 2017-03-19 ASSESSMENT — PAIN SCALES - GENERAL: PAINLEVEL_OUTOF10: 0

## 2017-03-19 ASSESSMENT — ENCOUNTER SYMPTOMS
DIARRHEA: 0
COUGH: 1
DIZZINESS: 0
CHILLS: 0
FEVER: 0
NAUSEA: 0
WEAKNESS: 1
MYALGIAS: 0
HEADACHES: 0
ABDOMINAL PAIN: 0
VOMITING: 0
SHORTNESS OF BREATH: 0
CONSTIPATION: 0
FOCAL WEAKNESS: 0
PALPITATIONS: 0

## 2017-03-19 NOTE — PROGRESS NOTES
Assumed care. Bedside report from CAMRON Carrillo. Pt sleeping and in no distress. Bed in low position, call light w/in reach. Strip bed alarm on.

## 2017-03-19 NOTE — PROGRESS NOTES
Hospital Medicine Progress Note, Adult, Complex               Author: Watson Renteria Date & Time created: 3/19/2017  9:51 AM     ID/CC: 81 yr M with hx of Stroke with left sided hemiparesis, HTN, COPD on 3 L of oxygen, asthma, multiple falls and pelvic fracture presented from [facility] care SNF,presented on 2/20 with shortness of breath and cough and found to have left sided opacification started on vanco and zosyn for HCAP.  H/o aspiration, on dysphagia 1 diet per SLP. Completed treatment with meropenem for ESBL HCAP.     Interval History:  3/19 - pleasant, no significant change. Questions answered.   3/18 - pleasant, more awake today, had eaten breakfast. Questions answered. Encouraged to keep getting up and participating.   3/17 - sleeping, arousable, encouraged to get up, not very interested in chatting today but pleasant.   3/16 - still lying in bed, does not have a lot of motivation, advised to get up again today.     Review of Systems:  Review of Systems   Constitutional: Positive for malaise/fatigue. Negative for fever and chills.   Respiratory: Positive for cough. Negative for shortness of breath.    Cardiovascular: Negative for chest pain and palpitations.   Gastrointestinal: Negative for nausea, vomiting, abdominal pain, diarrhea and constipation.   Genitourinary: Negative for dysuria.   Musculoskeletal: Negative for myalgias.   Skin: Negative for itching.   Neurological: Positive for weakness. Negative for dizziness, focal weakness and headaches.   All other systems reviewed and are negative.    Physical Exam:  Physical Exam   Constitutional: He is oriented to person, place, and time. He appears well-developed. No distress.   Frail, cachectic   HENT:   Head: Normocephalic and atraumatic.   Mouth/Throat: Oropharynx is clear and moist.   Eyes: Conjunctivae and EOM are normal. Pupils are equal, round, and reactive to light. No scleral icterus.   Neck: Normal range of motion. Neck supple. No tracheal deviation  present. No thyromegaly present.   Cardiovascular: Normal rate, regular rhythm and intact distal pulses.    Murmur heard.  Pulmonary/Chest: Effort normal. No respiratory distress. He has decreased breath sounds in the right lower field and the left lower field. He has no wheezes.   Abdominal: Soft. Bowel sounds are normal. He exhibits no distension. There is no tenderness.   Musculoskeletal: Normal range of motion. He exhibits no edema or tenderness.   Lymphadenopathy:     He has no cervical adenopathy.        Right: No supraclavicular adenopathy present.        Left: No supraclavicular adenopathy present.   Neurological: He is alert and oriented to person, place, and time. No cranial nerve deficit.   Skin: Skin is warm and dry.   Vitals reviewed.      Labs:        Invalid input(s): [id number]      No results for input(s): SODIUM, POTASSIUM, CHLORIDE, CO2, BUN, CREATININE, MAGNESIUM, PHOSPHORUS, CALCIUM in the last 72 hours.  No results for input(s): ALTSGPT, ASTSGOT, ALKPHOSPHAT, TBILIRUBIN, DBILIRUBIN, GAMMAGT, AMYLASE, LIPASE, ALB, PREALBUMIN, GLUCOSE in the last 72 hours.  No results for input(s): RBC, HEMOGLOBIN, HEMATOCRIT, PLATELETCT, PROTHROMBTM, APTT, INR, IRON, FERRITIN, TOTIRONBC in the last 72 hours.            Hemodynamics:  Temp (24hrs), Av.7 °C (98 °F), Min:36.6 °C (97.8 °F), Max:36.9 °C (98.4 °F)  Temperature: 36.6 °C (97.9 °F)  Pulse  Av.3  Min: 44  Max: 98   Blood Pressure : 119/61 mmHg     Respiratory:    Respiration: 18, Pulse Oximetry: 94 %     Work Of Breathing / Effort: Mild  RUL Breath Sounds: Clear After Suction, RML Breath Sounds: Clear After Suction, RLL Breath Sounds: Diminished, DEBI Breath Sounds: Clear After Suction, LLL Breath Sounds: Diminished  Fluids:    Intake/Output Summary (Last 24 hours) at 17 0951  Last data filed at 17 0600   Gross per 24 hour   Intake      0 ml   Output    650 ml   Net   -650 ml        GI/Nutrition:  Orders Placed This Encounter    Procedures   • DIET ORDER     Standing Status: Standing      Number of Occurrences: 1      Standing Expiration Date:      Order Specific Question:  Diet:     Answer:  Diabetic [3]     Order Specific Question:  Diet:     Answer:  Cardiac [6]     Order Specific Question:  Texture/Fiber modifications:     Answer:  Dysphagia 1(Pureed)specify fluid consistency(question 6) [1]     Order Specific Question:  Consistency/Fluid modifications:     Answer:  Nectar Thick [2]     Medical Decision Making, by Problem:  Active Hospital Problems    Diagnosis   • Respiratory failure (CMS-HCC) [J96.90]  At baseline now     • Debility [R53.81]  PT/OT     • Multiple falls [R29.6]  Fall precautions  PT     • MRSA carrier [Z22.322]     • Sepsis (CMS-HCC) [A41.9]  Sepsis resolved, due to HCAP with ESBL  ID Emelia consulted  Meropenem completed on 3/7/17     • HCAP (healthcare-associated pneumonia) [J18.9]  As above     • Chronic respiratory failure with hypoxia (CMS-HCC) [J96.11]  Home O2 LPM Flow: 2 LPM   Supplemental O2 PRN with goal SpO2 greater than 90%.  RT protocol.  Current sats: Pulse Oximetry: 94 % on O2 (LPM): 2      • DM type 2, controlled, with complication (CMS-HCC) [E11.8]  Diet controlled     • Left hemiparesis (CMS-HCC) [G81.94]  Chronic from recent CVA  On asa and Lipitor  On Keppra for seizure disorder  Need PT/OT/ST SNF placement      • Dysphagia status post cerebrovascular accident [I69.391]  On dysphagia diet per ST      Right Meralgia Parasthetica  Continue PT  Pain control     • GERD (gastroesophageal reflux disease) [K21.9]  pepcid          Dispo: Pt needs SNF however no SNF days left (per SW).      Medications reviewed and Labs reviewed        DVT Prophylaxis: Heparin    Ulcer prophylaxis: Not indicated        I spent a total of 17 minutes during this clinical encounter of which > 50% was devoted to counseling and coordinating care including review of records, pertinent lab data and studies, as well as discussing  diagnostic evaluation and work up, planned therapeutic interventions and future disposition of care. Where indicated, the assessment and plan reflect discussion of patient with consultants, other healthcare providers, family members, and additional research needed to obtain further information in formulating the plan of care of this patient.

## 2017-03-19 NOTE — PROGRESS NOTES
Pt resting in room, c/o nausea off and on all day, medicated as ordered, no c/o pain,vss,callbell within reach, will continue to monitor

## 2017-03-19 NOTE — CARE PLAN
Problem: Communication  Goal: The ability to communicate needs accurately and effectively will improve  RN instructed patient to use call light for assistance with activities. RN encouraged patient to voice any questions or concerns during hospital stay to reduce anxiety.    Problem: Respiratory:  Goal: Respiratory status will improve  RN educated on coughing and deep breathing exercises, suction at bedside to assist with expelling sputum. Inhaler administered as ordered. 2L NC in place.

## 2017-03-20 PROCEDURE — 700102 HCHG RX REV CODE 250 W/ 637 OVERRIDE(OP): Performed by: HOSPITALIST

## 2017-03-20 PROCEDURE — A9270 NON-COVERED ITEM OR SERVICE: HCPCS | Performed by: HOSPITALIST

## 2017-03-20 PROCEDURE — 99231 SBSQ HOSP IP/OBS SF/LOW 25: CPT | Performed by: HOSPITALIST

## 2017-03-20 PROCEDURE — 770006 HCHG ROOM/CARE - MED/SURG/GYN SEMI*

## 2017-03-20 PROCEDURE — 97535 SELF CARE MNGMENT TRAINING: CPT

## 2017-03-20 PROCEDURE — 700111 HCHG RX REV CODE 636 W/ 250 OVERRIDE (IP): Performed by: INTERNAL MEDICINE

## 2017-03-20 PROCEDURE — A9270 NON-COVERED ITEM OR SERVICE: HCPCS | Performed by: INTERNAL MEDICINE

## 2017-03-20 PROCEDURE — 700102 HCHG RX REV CODE 250 W/ 637 OVERRIDE(OP): Performed by: INTERNAL MEDICINE

## 2017-03-20 RX ORDER — LEVETIRACETAM 500 MG/1
1500 TABLET ORAL EVERY 12 HOURS
Status: DISCONTINUED | OUTPATIENT
Start: 2017-03-20 | End: 2017-03-22

## 2017-03-20 RX ORDER — CALCIUM CARBONATE 500 MG/1
500 TABLET, CHEWABLE ORAL DAILY
Status: DISCONTINUED | OUTPATIENT
Start: 2017-03-20 | End: 2017-03-20

## 2017-03-20 RX ORDER — CALCIUM CARBONATE 500 MG/1
500 TABLET, CHEWABLE ORAL 3 TIMES DAILY PRN
Status: DISCONTINUED | OUTPATIENT
Start: 2017-03-20 | End: 2017-03-22

## 2017-03-20 RX ADMIN — FINASTERIDE 5 MG: 5 TABLET, FILM COATED ORAL at 09:14

## 2017-03-20 RX ADMIN — BUDESONIDE AND FORMOTEROL FUMARATE DIHYDRATE 2 PUFF: 160; 4.5 AEROSOL RESPIRATORY (INHALATION) at 21:58

## 2017-03-20 RX ADMIN — ASPIRIN 81 MG: 81 TABLET ORAL at 09:15

## 2017-03-20 RX ADMIN — ANTACID TABLETS 500 MG: 500 TABLET, CHEWABLE ORAL at 13:07

## 2017-03-20 RX ADMIN — ACETAMINOPHEN 650 MG: 325 TABLET, FILM COATED ORAL at 09:15

## 2017-03-20 RX ADMIN — FAMOTIDINE 20 MG: 20 TABLET, FILM COATED ORAL at 09:15

## 2017-03-20 RX ADMIN — MICONAZOLE NITRATE: 20 CREAM TOPICAL at 00:02

## 2017-03-20 RX ADMIN — GABAPENTIN 300 MG: 300 CAPSULE ORAL at 09:15

## 2017-03-20 RX ADMIN — BUDESONIDE AND FORMOTEROL FUMARATE DIHYDRATE 2 PUFF: 160; 4.5 AEROSOL RESPIRATORY (INHALATION) at 09:16

## 2017-03-20 RX ADMIN — FAMOTIDINE 20 MG: 20 TABLET, FILM COATED ORAL at 21:58

## 2017-03-20 RX ADMIN — TIOTROPIUM BROMIDE 1 CAPSULE: 18 CAPSULE ORAL; RESPIRATORY (INHALATION) at 09:16

## 2017-03-20 RX ADMIN — TAMSULOSIN HYDROCHLORIDE 0.4 MG: 0.4 CAPSULE ORAL at 09:15

## 2017-03-20 RX ADMIN — SERTRALINE 100 MG: 100 TABLET, FILM COATED ORAL at 09:15

## 2017-03-20 RX ADMIN — HEPARIN SODIUM 5000 UNITS: 5000 INJECTION, SOLUTION INTRAVENOUS; SUBCUTANEOUS at 05:34

## 2017-03-20 RX ADMIN — POLYETHYLENE GLYCOL 3350 1 PACKET: 17 POWDER, FOR SOLUTION ORAL at 09:15

## 2017-03-20 RX ADMIN — LEVETIRACETAM 1500 MG: 500 TABLET, FILM COATED ORAL at 21:58

## 2017-03-20 RX ADMIN — LACTOBACILLUS ACIDOPHILUS / LACTOBACILLUS BULGARICUS 1 PACKET: 100 MILLION CFU STRENGTH GRANULES at 13:07

## 2017-03-20 RX ADMIN — OXYCODONE HYDROCHLORIDE AND ACETAMINOPHEN 500 MG: 500 TABLET ORAL at 09:15

## 2017-03-20 RX ADMIN — HEPARIN SODIUM 5000 UNITS: 5000 INJECTION, SOLUTION INTRAVENOUS; SUBCUTANEOUS at 21:58

## 2017-03-20 RX ADMIN — MICONAZOLE NITRATE: 20 CREAM TOPICAL at 05:36

## 2017-03-20 RX ADMIN — OXYBUTYNIN CHLORIDE 5 MG: 5 TABLET, FILM COATED, EXTENDED RELEASE ORAL at 09:14

## 2017-03-20 RX ADMIN — ATORVASTATIN CALCIUM 80 MG: 80 TABLET, FILM COATED ORAL at 21:58

## 2017-03-20 RX ADMIN — LEVETIRACETAM 1500 MG: 500 TABLET, FILM COATED ORAL at 13:07

## 2017-03-20 ASSESSMENT — ENCOUNTER SYMPTOMS
FOCAL WEAKNESS: 0
COUGH: 1
CHILLS: 0
VOMITING: 0
PALPITATIONS: 0
SHORTNESS OF BREATH: 0
CONSTIPATION: 0
DIZZINESS: 0
NAUSEA: 0
DIARRHEA: 0
FEVER: 0
ABDOMINAL PAIN: 0
WEAKNESS: 1
HEADACHES: 0
MYALGIAS: 0

## 2017-03-20 ASSESSMENT — PAIN SCALES - GENERAL
PAINLEVEL_OUTOF10: 0
PAINLEVEL_OUTOF10: 0

## 2017-03-20 NOTE — PROGRESS NOTES
"Pt verbalized his stomach was not feeling well, states \"i need a rolaid or something.\" Tums administered. Pt refused his heparin and miguelangel cream at this time r/t indigestion feelings.     Pt did sit up at edge of bed to eat lunch following administration of tums.   "

## 2017-03-20 NOTE — CARE PLAN
Problem: Respiratory:  Goal: Respiratory status will improve  Outcome: PROGRESSING AS EXPECTED  Pt on 2L O2 via NC. Suction at bedside. Denies sob. JOB up 30 degrees.    Problem: Skin Integrity  Goal: Risk for impaired skin integrity will decrease  Outcome: PROGRESSING AS EXPECTED  Antifungal therapy in use. Put repositions self in bed frequently and sits up to edge of bed.

## 2017-03-20 NOTE — PROGRESS NOTES
Hospital Medicine Progress Note, Adult, Complex               Author: Watson Renteria Date & Time created: 3/20/2017  8:12 AM     ID/CC: 81 yr M with hx of Stroke with left sided hemiparesis, HTN, COPD on 3 L of oxygen, asthma, multiple falls and pelvic fracture presented from [facility] care SNF,presented on 2/20 with shortness of breath and cough and found to have left sided opacification started on vanco and zosyn for HCAP.  H/o aspiration, on dysphagia 1 diet per SLP. Completed treatment with meropenem for ESBL HCAP.     Interval History:  3/20 - unchanged, plan explained. Encouraged to get up for meals, participate in PEP, etc.   3/19 - pleasant, no significant change. Questions answered.   3/18 - pleasant, more awake today, had eaten breakfast. Questions answered. Encouraged to keep getting up and participating.   3/17 - sleeping, arousable, encouraged to get up, not very interested in chatting today but pleasant.   3/16 - still lying in bed, does not have a lot of motivation, advised to get up again today.     Review of Systems:  Review of Systems   Constitutional: Positive for malaise/fatigue. Negative for fever and chills.   Respiratory: Positive for cough. Negative for shortness of breath.    Cardiovascular: Negative for chest pain and palpitations.   Gastrointestinal: Negative for nausea, vomiting, abdominal pain, diarrhea and constipation.   Genitourinary: Negative for dysuria.   Musculoskeletal: Negative for myalgias.   Skin: Negative for itching.   Neurological: Positive for weakness. Negative for dizziness, focal weakness and headaches.   All other systems reviewed and are negative.    Physical Exam:  Physical Exam   Constitutional: He is oriented to person, place, and time. He appears well-developed. No distress.   Frail, cachectic   HENT:   Head: Normocephalic and atraumatic.   Mouth/Throat: Oropharynx is clear and moist.   Eyes: Conjunctivae and EOM are normal. Pupils are equal, round, and reactive to  light. No scleral icterus.   Neck: Normal range of motion. Neck supple. No tracheal deviation present. No thyromegaly present.   Cardiovascular: Normal rate, regular rhythm and intact distal pulses.    Murmur heard.  Pulmonary/Chest: Effort normal. No respiratory distress. He has decreased breath sounds in the right lower field and the left lower field. He has no wheezes.   Abdominal: Soft. Bowel sounds are normal. He exhibits no distension. There is no tenderness.   Musculoskeletal: Normal range of motion. He exhibits no edema or tenderness.   Lymphadenopathy:     He has no cervical adenopathy.        Right: No supraclavicular adenopathy present.        Left: No supraclavicular adenopathy present.   Neurological: He is alert and oriented to person, place, and time. No cranial nerve deficit.   Skin: Skin is warm and dry.   Vitals reviewed.      Labs:        Invalid input(s): [id number]      No results for input(s): SODIUM, POTASSIUM, CHLORIDE, CO2, BUN, CREATININE, MAGNESIUM, PHOSPHORUS, CALCIUM in the last 72 hours.  No results for input(s): ALTSGPT, ASTSGOT, ALKPHOSPHAT, TBILIRUBIN, DBILIRUBIN, GAMMAGT, AMYLASE, LIPASE, ALB, PREALBUMIN, GLUCOSE in the last 72 hours.  No results for input(s): RBC, HEMOGLOBIN, HEMATOCRIT, PLATELETCT, PROTHROMBTM, APTT, INR, IRON, FERRITIN, TOTIRONBC in the last 72 hours.            Hemodynamics:  Temp (24hrs), Av.4 °C (97.5 °F), Min:36 °C (96.8 °F), Max:36.7 °C (98.1 °F)  Temperature: 36.7 °C (98.1 °F)  Pulse  Av.3  Min: 44  Max: 98   Blood Pressure : 156/81 mmHg     Respiratory:    Respiration: 15, Pulse Oximetry: 98 %     Work Of Breathing / Effort: Mild  RUL Breath Sounds: Clear, RML Breath Sounds: Diminished, RLL Breath Sounds: Diminished, DEBI Breath Sounds: Clear, LLL Breath Sounds: Diminished  Fluids:    Intake/Output Summary (Last 24 hours) at 17 0812  Last data filed at 17 0600   Gross per 24 hour   Intake    610 ml   Output   1450 ml   Net   -840 ml         GI/Nutrition:  Orders Placed This Encounter   Procedures   • DIET ORDER     Standing Status: Standing      Number of Occurrences: 1      Standing Expiration Date:      Order Specific Question:  Diet:     Answer:  Diabetic [3]     Order Specific Question:  Diet:     Answer:  Cardiac [6]     Order Specific Question:  Texture/Fiber modifications:     Answer:  Dysphagia 1(Pureed)specify fluid consistency(question 6) [1]     Order Specific Question:  Consistency/Fluid modifications:     Answer:  Nectar Thick [2]     Medical Decision Making, by Problem:  Active Hospital Problems    Diagnosis   • Respiratory failure (CMS-HCC) [J96.90]  At baseline now     • Debility [R53.81]  PT/OT     • Multiple falls [R29.6]  Fall precautions  PT     • MRSA carrier [Z22.322]     • Sepsis (CMS-HCC) [A41.9]  Sepsis resolved, due to HCAP with ESBL  ID Emelia consulted  Meropenem completed on 3/7/17     • HCAP (healthcare-associated pneumonia) [J18.9]  As above     • Chronic respiratory failure with hypoxia (CMS-HCC) [J96.11]  Home O2 LPM Flow: 2 LPM   Supplemental O2 PRN with goal SpO2 greater than 90%.  RT protocol.  Current sats: Pulse Oximetry: 94 % on O2 (LPM): 2      • DM type 2, controlled, with complication (CMS-[facility]) [E11.8]  Diet controlled     • Left hemiparesis (CMS-HCC) [G81.94]  Chronic from recent CVA  On asa and Lipitor  On Keppra for seizure disorder  Need PT/OT/ST SNF placement      • Dysphagia status post cerebrovascular accident [I69.391]  On dysphagia diet per ST      Right Meralgia Parasthetica  Continue PT  Pain control     • GERD (gastroesophageal reflux disease) [K21.9]  pepcid          Dispo: Pt needs SNF however no SNF days left (per SW).      Medications reviewed and Labs reviewed        DVT Prophylaxis: Heparin    Ulcer prophylaxis: Not indicated        I spent a total of 16 minutes during this clinical encounter of which > 50% was devoted to counseling and coordinating care including review of records,  pertinent lab data and studies, as well as discussing diagnostic evaluation and work up, planned therapeutic interventions and future disposition of care. Where indicated, the assessment and plan reflect discussion of patient with consultants, other healthcare providers, family members, and additional research needed to obtain further information in formulating the plan of care of this patient.

## 2017-03-20 NOTE — THERAPY
"Occupational Therapy Treatment completed with focus on ADLs, ADL transfers and patient education.  Functional Status:  Pt seen for OT tx today.  Pt was pleasant and cooperative throughout the session but continues to be limited by weakness, paralysis, and decreased self care.  Pt completed supine to sit, sit to stand, room ambulation using FWW from bed to toilet with CGA.  Toileted with min A for complete pericare.  Pt would benefit from continued inpt OT to increase independence with functional transfers, functional endurance, and ADLs.  Plan of Care: Will benefit from Occupational Therapy 3 times per week  Discharge Recommendations:  Equipment Will Continue to Assess for Equipment Needs. Post-acute therapy Discharge to a transitional care facility for continued skilled therapy services.    See \"Rehab Therapy-Acute\" Patient Summary Report for complete documentation.   "

## 2017-03-20 NOTE — PROGRESS NOTES
Received report and assumed care of pt. Pt A&Ox4, call light within reach, bed alarm on, treaded socks on, bed in low and locked position. Discussed poc with pt. On 2L O2 via NC and denies sob. Pt reports sore buttock. Assisted pt in turning in bed and placing pillow to reposition. Antifungal cream ordered and administered per MAR. Denies any pain. Meds whole in pudding. Hourly rounding.

## 2017-03-21 ENCOUNTER — APPOINTMENT (OUTPATIENT)
Dept: RADIOLOGY | Facility: MEDICAL CENTER | Age: 82
DRG: 871 | End: 2017-03-21
Attending: HOSPITALIST
Payer: MEDICARE

## 2017-03-21 ENCOUNTER — APPOINTMENT (OUTPATIENT)
Dept: RADIOLOGY | Facility: MEDICAL CENTER | Age: 82
DRG: 871 | End: 2017-03-21
Attending: INTERNAL MEDICINE
Payer: MEDICARE

## 2017-03-21 PROCEDURE — 99232 SBSQ HOSP IP/OBS MODERATE 35: CPT | Performed by: HOSPITALIST

## 2017-03-21 PROCEDURE — 97530 THERAPEUTIC ACTIVITIES: CPT

## 2017-03-21 PROCEDURE — A9270 NON-COVERED ITEM OR SERVICE: HCPCS | Performed by: HOSPITALIST

## 2017-03-21 PROCEDURE — 700111 HCHG RX REV CODE 636 W/ 250 OVERRIDE (IP): Performed by: INTERNAL MEDICINE

## 2017-03-21 PROCEDURE — 770006 HCHG ROOM/CARE - MED/SURG/GYN SEMI*

## 2017-03-21 PROCEDURE — 700102 HCHG RX REV CODE 250 W/ 637 OVERRIDE(OP): Performed by: HOSPITALIST

## 2017-03-21 PROCEDURE — 700102 HCHG RX REV CODE 250 W/ 637 OVERRIDE(OP): Performed by: INTERNAL MEDICINE

## 2017-03-21 PROCEDURE — A9270 NON-COVERED ITEM OR SERVICE: HCPCS | Performed by: INTERNAL MEDICINE

## 2017-03-21 PROCEDURE — 71010 DX-CHEST-PORTABLE (1 VIEW): CPT

## 2017-03-21 PROCEDURE — 82803 BLOOD GASES ANY COMBINATION: CPT

## 2017-03-21 PROCEDURE — 92526 ORAL FUNCTION THERAPY: CPT

## 2017-03-21 PROCEDURE — 97116 GAIT TRAINING THERAPY: CPT

## 2017-03-21 PROCEDURE — 700111 HCHG RX REV CODE 636 W/ 250 OVERRIDE (IP): Performed by: HOSPITALIST

## 2017-03-21 RX ORDER — METHYLPREDNISOLONE SODIUM SUCCINATE 125 MG/2ML
125 INJECTION, POWDER, LYOPHILIZED, FOR SOLUTION INTRAMUSCULAR; INTRAVENOUS ONCE
Status: COMPLETED | OUTPATIENT
Start: 2017-03-21 | End: 2017-03-21

## 2017-03-21 RX ADMIN — OXYCODONE HYDROCHLORIDE AND ACETAMINOPHEN 500 MG: 500 TABLET ORAL at 07:48

## 2017-03-21 RX ADMIN — ONDANSETRON 4 MG: 4 TABLET, ORALLY DISINTEGRATING ORAL at 13:15

## 2017-03-21 RX ADMIN — OXYCODONE HYDROCHLORIDE 5 MG: 5 TABLET ORAL at 07:53

## 2017-03-21 RX ADMIN — MICONAZOLE NITRATE: 20 CREAM TOPICAL at 23:26

## 2017-03-21 RX ADMIN — STANDARDIZED SENNA CONCENTRATE AND DOCUSATE SODIUM 1 TABLET: 8.6; 5 TABLET, FILM COATED ORAL at 20:59

## 2017-03-21 RX ADMIN — BUDESONIDE AND FORMOTEROL FUMARATE DIHYDRATE 2 PUFF: 160; 4.5 AEROSOL RESPIRATORY (INHALATION) at 07:42

## 2017-03-21 RX ADMIN — LACTOBACILLUS ACIDOPHILUS / LACTOBACILLUS BULGARICUS 1 PACKET: 100 MILLION CFU STRENGTH GRANULES at 07:50

## 2017-03-21 RX ADMIN — LACTOBACILLUS ACIDOPHILUS / LACTOBACILLUS BULGARICUS 1 PACKET: 100 MILLION CFU STRENGTH GRANULES at 10:45

## 2017-03-21 RX ADMIN — FAMOTIDINE 20 MG: 20 TABLET, FILM COATED ORAL at 07:45

## 2017-03-21 RX ADMIN — ATORVASTATIN CALCIUM 80 MG: 80 TABLET, FILM COATED ORAL at 20:58

## 2017-03-21 RX ADMIN — FAMOTIDINE 20 MG: 20 TABLET, FILM COATED ORAL at 20:59

## 2017-03-21 RX ADMIN — METHYLPREDNISOLONE SODIUM SUCCINATE 125 MG: 125 INJECTION, POWDER, FOR SOLUTION INTRAMUSCULAR; INTRAVENOUS at 23:22

## 2017-03-21 RX ADMIN — LEVETIRACETAM 1500 MG: 500 TABLET, FILM COATED ORAL at 07:45

## 2017-03-21 RX ADMIN — TIOTROPIUM BROMIDE 1 CAPSULE: 18 CAPSULE ORAL; RESPIRATORY (INHALATION) at 07:51

## 2017-03-21 RX ADMIN — LACTOBACILLUS ACIDOPHILUS / LACTOBACILLUS BULGARICUS 1 PACKET: 100 MILLION CFU STRENGTH GRANULES at 16:55

## 2017-03-21 RX ADMIN — ASPIRIN 81 MG: 81 TABLET ORAL at 07:48

## 2017-03-21 RX ADMIN — SERTRALINE 100 MG: 100 TABLET, FILM COATED ORAL at 07:52

## 2017-03-21 RX ADMIN — MICONAZOLE NITRATE: 20 CREAM TOPICAL at 16:57

## 2017-03-21 RX ADMIN — Medication 325 MG: at 07:48

## 2017-03-21 RX ADMIN — HEPARIN SODIUM 5000 UNITS: 5000 INJECTION, SOLUTION INTRAVENOUS; SUBCUTANEOUS at 05:20

## 2017-03-21 RX ADMIN — HEPARIN SODIUM 5000 UNITS: 5000 INJECTION, SOLUTION INTRAVENOUS; SUBCUTANEOUS at 13:15

## 2017-03-21 RX ADMIN — MICONAZOLE NITRATE: 20 CREAM TOPICAL at 12:00

## 2017-03-21 RX ADMIN — MICONAZOLE NITRATE: 20 CREAM TOPICAL at 05:19

## 2017-03-21 RX ADMIN — HEPARIN SODIUM 5000 UNITS: 5000 INJECTION, SOLUTION INTRAVENOUS; SUBCUTANEOUS at 20:59

## 2017-03-21 RX ADMIN — LEVETIRACETAM 1500 MG: 500 TABLET, FILM COATED ORAL at 20:59

## 2017-03-21 RX ADMIN — BUDESONIDE AND FORMOTEROL FUMARATE DIHYDRATE 2 PUFF: 160; 4.5 AEROSOL RESPIRATORY (INHALATION) at 20:58

## 2017-03-21 RX ADMIN — TAMSULOSIN HYDROCHLORIDE 0.4 MG: 0.4 CAPSULE ORAL at 07:49

## 2017-03-21 RX ADMIN — GABAPENTIN 300 MG: 300 CAPSULE ORAL at 07:45

## 2017-03-21 RX ADMIN — OXYBUTYNIN CHLORIDE 5 MG: 5 TABLET, FILM COATED, EXTENDED RELEASE ORAL at 07:50

## 2017-03-21 RX ADMIN — FINASTERIDE 5 MG: 5 TABLET, FILM COATED ORAL at 07:42

## 2017-03-21 ASSESSMENT — ENCOUNTER SYMPTOMS
CONSTIPATION: 0
MYALGIAS: 0
HEADACHES: 0
DIZZINESS: 0
PALPITATIONS: 0
ABDOMINAL PAIN: 0
FEVER: 0
DIARRHEA: 0
CHILLS: 0
SHORTNESS OF BREATH: 0
NAUSEA: 0
FOCAL WEAKNESS: 0
WEAKNESS: 1
VOMITING: 0

## 2017-03-21 ASSESSMENT — GAIT ASSESSMENTS
DISTANCE (FEET): 15
ASSISTIVE DEVICE: FRONT WHEEL WALKER
GAIT LEVEL OF ASSIST: CONTACT GUARD ASSIST
DEVIATION: STEP TO;BRADYKINETIC;SHUFFLED GAIT

## 2017-03-21 ASSESSMENT — PAIN SCALES - GENERAL
PAINLEVEL_OUTOF10: 0
PAINLEVEL_OUTOF10: 8

## 2017-03-21 NOTE — PROGRESS NOTES
Hospital Medicine Progress Note, Adult, Complex               Author: Watson GRUPO Dexter Date & Time created: 3/21/2017  12:58 PM     ID/CC: 81 yr M with hx of Stroke with left sided hemiparesis, HTN, COPD on 3 L of oxygen, asthma, multiple falls and pelvic fracture presented from [facility] care SNF,presented on 2/20 with shortness of breath and cough and found to have left sided opacification started on vanco and zosyn for HCAP.  H/o aspiration, on dysphagia 1 diet per SLP. Completed treatment with meropenem for ESBL HCAP.     Interval History:  3/21 - sleeping, comfortable, no significant changes, now wants to go home with home health. Questions answered. Discussed with SW, nursing, IDT.   3/20 - unchanged, plan explained. Encouraged to get up for meals, participate in PEP, etc.   3/19 - pleasant, no significant change. Questions answered.   3/18 - pleasant, more awake today, had eaten breakfast. Questions answered. Encouraged to keep getting up and participating.   3/17 - sleeping, arousable, encouraged to get up, not very interested in chatting today but pleasant.   3/16 - still lying in bed, does not have a lot of motivation, advised to get up again today.     Review of Systems:  Review of Systems   Constitutional: Positive for malaise/fatigue. Negative for fever and chills.   Respiratory: Negative for shortness of breath.    Cardiovascular: Negative for chest pain and palpitations.   Gastrointestinal: Negative for nausea, vomiting, abdominal pain, diarrhea and constipation.   Genitourinary: Negative for dysuria.   Musculoskeletal: Negative for myalgias.   Skin: Negative for itching.   Neurological: Positive for weakness. Negative for dizziness, focal weakness and headaches.   All other systems reviewed and are negative.    Physical Exam:  Physical Exam   Constitutional: He is oriented to person, place, and time. He appears well-developed. No distress.   Frail, cachectic   HENT:   Head: Normocephalic and atraumatic.    Mouth/Throat: Oropharynx is clear and moist.   Eyes: Conjunctivae and EOM are normal. Pupils are equal, round, and reactive to light. No scleral icterus.   Neck: Normal range of motion. Neck supple. No tracheal deviation present. No thyromegaly present.   Cardiovascular: Normal rate, regular rhythm and intact distal pulses.    Murmur heard.  Pulmonary/Chest: Effort normal. No respiratory distress. He has decreased breath sounds in the right lower field and the left lower field. He has no wheezes.   Abdominal: Soft. Bowel sounds are normal. He exhibits no distension. There is no tenderness.   Musculoskeletal: Normal range of motion. He exhibits no edema or tenderness.   Lymphadenopathy:     He has no cervical adenopathy.        Right: No supraclavicular adenopathy present.        Left: No supraclavicular adenopathy present.   Neurological: He is alert and oriented to person, place, and time. No cranial nerve deficit.   Skin: Skin is warm and dry.   Vitals reviewed.      Labs:        Invalid input(s): [id number]      No results for input(s): SODIUM, POTASSIUM, CHLORIDE, CO2, BUN, CREATININE, MAGNESIUM, PHOSPHORUS, CALCIUM in the last 72 hours.  No results for input(s): ALTSGPT, ASTSGOT, ALKPHOSPHAT, TBILIRUBIN, DBILIRUBIN, GAMMAGT, AMYLASE, LIPASE, ALB, PREALBUMIN, GLUCOSE in the last 72 hours.  No results for input(s): RBC, HEMOGLOBIN, HEMATOCRIT, PLATELETCT, PROTHROMBTM, APTT, INR, IRON, FERRITIN, TOTIRONBC in the last 72 hours.            Hemodynamics:  Temp (24hrs), Av.6 °C (97.9 °F), Min:36.3 °C (97.4 °F), Max:37 °C (98.6 °F)  Temperature: 37 °C (98.6 °F)  Pulse  Av.6  Min: 44  Max: 98   Blood Pressure : 142/70 mmHg     Respiratory:    Respiration: 16, Pulse Oximetry: 96 %     Work Of Breathing / Effort: Mild  RUL Breath Sounds: Clear, RML Breath Sounds: Clear, RLL Breath Sounds: Clear, DEBI Breath Sounds: Clear, LLL Breath Sounds: Clear  Fluids:    Intake/Output Summary (Last 24 hours) at 17  1258  Last data filed at 03/21/17 0400   Gross per 24 hour   Intake      0 ml   Output    600 ml   Net   -600 ml        GI/Nutrition:  Orders Placed This Encounter   Procedures   • DIET ORDER     Standing Status: Standing      Number of Occurrences: 1      Standing Expiration Date:      Order Specific Question:  Diet:     Answer:  Diabetic [3]     Order Specific Question:  Diet:     Answer:  Cardiac [6]     Order Specific Question:  Texture/Fiber modifications:     Answer:  Dysphagia 1(Pureed)specify fluid consistency(question 6) [1]     Order Specific Question:  Consistency/Fluid modifications:     Answer:  Nectar Thick [2]     Medical Decision Making, by Problem:  Active Hospital Problems    Diagnosis   • Respiratory failure (CMS-[facility]) [J96.90]  At baseline now     • Debility [R53.81]  PT/OT     • Multiple falls [R29.6]  Fall precautions  PT     • MRSA carrier [Z22.322]     • Sepsis (CMS-[facility]) [A41.9]  Sepsis resolved, due to HCAP with ESBL  ID Emelia consulted  Meropenem completed on 3/7/17     • HCAP (healthcare-associated pneumonia) [J18.9]  As above     • Chronic respiratory failure with hypoxia (CMS-[facility]) [J96.11]  Home O2 LPM Flow: 2 LPM   Supplemental O2 PRN with goal SpO2 greater than 90%.  RT protocol.  Current sats: Pulse Oximetry: 96 % on O2 (LPM): 2      • DM type 2, controlled, with complication (CMS-[facility]) [E11.8]  Diet controlled     • Left hemiparesis (CMS-HCC) [G81.94]  Chronic from recent CVA  On asa and Lipitor  On Keppra for seizure disorder  Need PT/OT/ST SNF placement      • Dysphagia status post cerebrovascular accident [I69.391]  On dysphagia diet per ST      Right Meralgia Parasthetica  Continue PT  Pain control     • GERD (gastroesophageal reflux disease) [K21.9]  pepcid          Dispo: maybe home with home health? Will have PT/OT re-eval      Medications reviewed and Labs reviewed        DVT Prophylaxis: Heparin    Ulcer prophylaxis: Not indicated        I spent a total of 16 minutes during  this clinical encounter of which > 50% was devoted to counseling and coordinating care including review of records, pertinent lab data and studies, as well as discussing diagnostic evaluation and work up, planned therapeutic interventions and future disposition of care. Where indicated, the assessment and plan reflect discussion of patient with consultants, other healthcare providers, family members, and additional research needed to obtain further information in formulating the plan of care of this patient.

## 2017-03-21 NOTE — CARE PLAN
Problem: Safety  Goal: Will remain free from falls  Outcome: PROGRESSING AS EXPECTED  Pt with hx of fall, bed alarm on, call light in use, instructed to call for assistance, bed locked in low position, treaded socks on, pt calls appropriately    Problem: Pain Management  Goal: Pain level will decrease to patient’s comfort goal  Outcome: PROGRESSING AS EXPECTED  Pt complained of pain, medicated with PRN pain medications see MAR, will continue to monitor for pain

## 2017-03-21 NOTE — PROGRESS NOTES
Pt is AOx4, complains of pain, medicated with PRN pain medications, tolerated all oral medications, sitting on EOB, skin and sacral assessment done, on 2L of O2 per NC, IS at bedside, able to perform correctly and effectively, Yankauer suction at bedside, call light in use, bed alarm on, treaded socks on, bed locked in low position, plan of care discussed, all needs met at this time.

## 2017-03-21 NOTE — CARE PLAN
Problem: Bowel/Gastric:  Goal: Normal bowel function is maintained or improved  Intervention: Collaborate with Interdisciplinary Team for optimal positioning for bowel evacuation  Bowel protocol in place; patient given stool softeners to prevent constipation. RN encourages patient to ambulate as tolerated and drink fluids. Patient has had several loose stools today, RN held stool softeners and will monitor bowel function.      Problem: Respiratory:  Goal: Respiratory status will improve  Intervention: Educate and encourage coughing and deep breathing  Patient on 2L NC, SpO2 94%. Lungs clear with diminished bases. RN educated on deep breathing and coughing exercises, suction at bedside; patient is independent with suctioning after coughing.

## 2017-03-21 NOTE — CARE PLAN
Problem: Safety  Goal: Will remain free from injury  Outcome: PROGRESSING AS EXPECTED  Pt calls for help before attempting ambulation, however does not call for help when standing at bedside to use urinal. Bed alarm is on, non skid socks in place, bed low.     Problem: Psychosocial Needs:  Goal: Level of anxiety will decrease  Outcome: PROGRESSING AS EXPECTED  RN encouraged pt to verbalize anxieties, fears, concerns. Cares and procedures explained. Pt questions addressed. Pt denies concerns at this time, verbalized understanding of plan of care.

## 2017-03-21 NOTE — DISCHARGE PLANNING
Medical Social Work  PC to Elke: happy that I called as she is wanting information on patient.  Elke is open to having patient come home with her and her son. Elke is a long time friend of patient, meet him through their children.  Patient has lived with her for about 4 years.  Told me patient's grandson placed him in [facility] and after he was there for some time he came home with her.  Elke was unaware patient did not want to come home or let along he had issues with her son.  Elke wants patient home if he is wanting to come home.    Talked to patient about d/c, going to a group home or going back to home.  Patient stated he would rather go home,and asked me when I can get him a ride home. I asked patient about his previous statements about not wanting to go home.  Patient told me he wants to go home, I asked him if he feels safe there.  Patient said yes and he likes it there.

## 2017-03-22 ENCOUNTER — HOSPITAL ENCOUNTER (INPATIENT)
Facility: REHABILITATION | Age: 82
End: 2017-03-22
Attending: PHYSICAL MEDICINE & REHABILITATION | Admitting: PHYSICAL MEDICINE & REHABILITATION
Payer: MEDICARE

## 2017-03-22 LAB
BASE EXCESS BLDA CALC-SCNC: 8 MMOL/L (ref -4–3)
BODY TEMPERATURE: ABNORMAL CENTIGRADE
HCO3 BLDA-SCNC: 34 MMOL/L (ref 17–25)
PCO2 BLDA: 53.5 MMHG (ref 26–37)
PH BLDA: 7.42 [PH] (ref 7.4–7.5)
PO2 BLDA: 80.6 MMHG (ref 64–87)
SAO2 % BLDA: 94.8 % (ref 93–99)

## 2017-03-22 PROCEDURE — A9270 NON-COVERED ITEM OR SERVICE: HCPCS | Performed by: INTERNAL MEDICINE

## 2017-03-22 PROCEDURE — 700112 HCHG RX REV CODE 229: Performed by: HOSPITALIST

## 2017-03-22 PROCEDURE — A9270 NON-COVERED ITEM OR SERVICE: HCPCS | Performed by: HOSPITALIST

## 2017-03-22 PROCEDURE — 700111 HCHG RX REV CODE 636 W/ 250 OVERRIDE (IP): Performed by: INTERNAL MEDICINE

## 2017-03-22 PROCEDURE — 700102 HCHG RX REV CODE 250 W/ 637 OVERRIDE(OP): Performed by: INTERNAL MEDICINE

## 2017-03-22 PROCEDURE — 770006 HCHG ROOM/CARE - MED/SURG/GYN SEMI*

## 2017-03-22 PROCEDURE — 700102 HCHG RX REV CODE 250 W/ 637 OVERRIDE(OP): Performed by: HOSPITALIST

## 2017-03-22 PROCEDURE — 92526 ORAL FUNCTION THERAPY: CPT

## 2017-03-22 PROCEDURE — 99232 SBSQ HOSP IP/OBS MODERATE 35: CPT | Performed by: HOSPITALIST

## 2017-03-22 RX ORDER — ONDANSETRON 2 MG/ML
4 INJECTION INTRAMUSCULAR; INTRAVENOUS EVERY 8 HOURS PRN
Status: DISCONTINUED | OUTPATIENT
Start: 2017-03-22 | End: 2017-03-24 | Stop reason: HOSPADM

## 2017-03-22 RX ORDER — ONDANSETRON 2 MG/ML
8 INJECTION INTRAMUSCULAR; INTRAVENOUS EVERY 8 HOURS PRN
Status: DISCONTINUED | OUTPATIENT
Start: 2017-03-22 | End: 2017-03-24 | Stop reason: HOSPADM

## 2017-03-22 RX ORDER — LORAZEPAM 2 MG/ML
5 INJECTION INTRAMUSCULAR EVERY 4 HOURS PRN
Status: DISCONTINUED | OUTPATIENT
Start: 2017-03-22 | End: 2017-03-24 | Stop reason: HOSPADM

## 2017-03-22 RX ORDER — LORAZEPAM 2 MG/ML
1-2 INJECTION INTRAMUSCULAR EVERY 4 HOURS PRN
Status: DISCONTINUED | OUTPATIENT
Start: 2017-03-22 | End: 2017-03-24 | Stop reason: HOSPADM

## 2017-03-22 RX ORDER — LORAZEPAM 2 MG/ML
3-4 INJECTION INTRAMUSCULAR EVERY 4 HOURS PRN
Status: DISCONTINUED | OUTPATIENT
Start: 2017-03-22 | End: 2017-03-24 | Stop reason: HOSPADM

## 2017-03-22 RX ORDER — POLYVINYL ALCOHOL 14 MG/ML
2 SOLUTION/ DROPS OPHTHALMIC EVERY 6 HOURS PRN
Status: DISCONTINUED | OUTPATIENT
Start: 2017-03-22 | End: 2017-03-24 | Stop reason: HOSPADM

## 2017-03-22 RX ORDER — ONDANSETRON 4 MG/1
4 TABLET, ORALLY DISINTEGRATING ORAL EVERY 8 HOURS PRN
Status: DISCONTINUED | OUTPATIENT
Start: 2017-03-22 | End: 2017-03-24 | Stop reason: HOSPADM

## 2017-03-22 RX ORDER — GLYCOPYRROLATE 0.2 MG/ML
0.2 INJECTION INTRAMUSCULAR; INTRAVENOUS EVERY 4 HOURS PRN
Status: DISCONTINUED | OUTPATIENT
Start: 2017-03-22 | End: 2017-03-24 | Stop reason: HOSPADM

## 2017-03-22 RX ORDER — SCOLOPAMINE TRANSDERMAL SYSTEM 1 MG/1
1 PATCH, EXTENDED RELEASE TRANSDERMAL
Status: DISCONTINUED | OUTPATIENT
Start: 2017-03-22 | End: 2017-03-24 | Stop reason: HOSPADM

## 2017-03-22 RX ORDER — DOCUSATE SODIUM 100 MG/1
100 CAPSULE, LIQUID FILLED ORAL EVERY 12 HOURS
Status: DISCONTINUED | OUTPATIENT
Start: 2017-03-22 | End: 2017-03-24 | Stop reason: HOSPADM

## 2017-03-22 RX ORDER — ONDANSETRON 4 MG/1
8 TABLET, ORALLY DISINTEGRATING ORAL EVERY 8 HOURS PRN
Status: DISCONTINUED | OUTPATIENT
Start: 2017-03-22 | End: 2017-03-24 | Stop reason: HOSPADM

## 2017-03-22 RX ORDER — MORPHINE SULFATE 100 MG/5ML
10 SOLUTION ORAL
Status: DISCONTINUED | OUTPATIENT
Start: 2017-03-22 | End: 2017-03-24 | Stop reason: HOSPADM

## 2017-03-22 RX ADMIN — MICONAZOLE NITRATE: 20 CREAM TOPICAL at 05:37

## 2017-03-22 RX ADMIN — LACTOBACILLUS ACIDOPHILUS / LACTOBACILLUS BULGARICUS 1 PACKET: 100 MILLION CFU STRENGTH GRANULES at 11:25

## 2017-03-22 RX ADMIN — HEPARIN SODIUM 5000 UNITS: 5000 INJECTION, SOLUTION INTRAVENOUS; SUBCUTANEOUS at 05:37

## 2017-03-22 RX ADMIN — FAMOTIDINE 20 MG: 20 TABLET, FILM COATED ORAL at 08:40

## 2017-03-22 RX ADMIN — DOCUSATE SODIUM 100 MG: 100 CAPSULE ORAL at 14:36

## 2017-03-22 RX ADMIN — MICONAZOLE NITRATE: 20 CREAM TOPICAL at 11:26

## 2017-03-22 RX ADMIN — POLYETHYLENE GLYCOL 3350 1 PACKET: 17 POWDER, FOR SOLUTION ORAL at 08:39

## 2017-03-22 RX ADMIN — ASPIRIN 81 MG: 81 TABLET ORAL at 08:41

## 2017-03-22 RX ADMIN — Medication 325 MG: at 08:42

## 2017-03-22 RX ADMIN — TAMSULOSIN HYDROCHLORIDE 0.4 MG: 0.4 CAPSULE ORAL at 08:41

## 2017-03-22 RX ADMIN — BUDESONIDE AND FORMOTEROL FUMARATE DIHYDRATE 2 PUFF: 160; 4.5 AEROSOL RESPIRATORY (INHALATION) at 08:45

## 2017-03-22 RX ADMIN — GABAPENTIN 300 MG: 300 CAPSULE ORAL at 08:43

## 2017-03-22 RX ADMIN — DOCUSATE SODIUM 100 MG: 100 CAPSULE ORAL at 20:41

## 2017-03-22 RX ADMIN — MICONAZOLE NITRATE: 20 CREAM TOPICAL at 23:26

## 2017-03-22 RX ADMIN — SCOPALAMINE 1 PATCH: 1 PATCH, EXTENDED RELEASE TRANSDERMAL at 14:30

## 2017-03-22 RX ADMIN — SERTRALINE 100 MG: 100 TABLET, FILM COATED ORAL at 08:41

## 2017-03-22 RX ADMIN — FINASTERIDE 5 MG: 5 TABLET, FILM COATED ORAL at 08:42

## 2017-03-22 RX ADMIN — OXYCODONE HYDROCHLORIDE AND ACETAMINOPHEN 500 MG: 500 TABLET ORAL at 08:43

## 2017-03-22 RX ADMIN — DOCUSATE SODIUM 100 MG: 100 CAPSULE ORAL at 08:42

## 2017-03-22 RX ADMIN — MICONAZOLE NITRATE: 20 CREAM TOPICAL at 17:07

## 2017-03-22 RX ADMIN — LEVETIRACETAM 1500 MG: 500 TABLET, FILM COATED ORAL at 08:43

## 2017-03-22 RX ADMIN — TIOTROPIUM BROMIDE 1 CAPSULE: 18 CAPSULE ORAL; RESPIRATORY (INHALATION) at 08:46

## 2017-03-22 RX ADMIN — OXYBUTYNIN CHLORIDE 5 MG: 5 TABLET, FILM COATED, EXTENDED RELEASE ORAL at 08:43

## 2017-03-22 RX ADMIN — LACTOBACILLUS ACIDOPHILUS / LACTOBACILLUS BULGARICUS 1 PACKET: 100 MILLION CFU STRENGTH GRANULES at 08:39

## 2017-03-22 ASSESSMENT — ENCOUNTER SYMPTOMS
PALPITATIONS: 0
CONSTIPATION: 0
MYALGIAS: 0
WEAKNESS: 1
CHILLS: 0
FOCAL WEAKNESS: 0
SHORTNESS OF BREATH: 0
ABDOMINAL PAIN: 0
DIZZINESS: 0
HEADACHES: 0
NAUSEA: 0
FEVER: 0
VOMITING: 0
DIARRHEA: 0

## 2017-03-22 NOTE — PROGRESS NOTES
"Pt. On 2L O2 via oxymask with O2 sat of 83-84%. Pt. Lung sounds rattled, suctioned with minimal thickish secretion. Encouraged pt. To cough. O2 saturation remained at 83-84%. Pt. Complaining of SOB and feeling \"sick\". Increased oxygen to 5L with oxymask, O2 sat now at 87-88%. Called Round team CIC RN, explained pt. Situation and this RN was advised to call RRT. RN called RRT.     Paged hospitalist and received call back from Dr. Felder. Reported pt's increasing O2 demand, Hx of COPD and Pneumonia on admission, latest Xray and RN called RRT for pt. Received order for one time dose of Solu-medrol 125mg IV. Order read back to Dr. Felder.    [facility] RNs, RT, Xray and Lab at bedside.   "

## 2017-03-22 NOTE — PROGRESS NOTES
Melissa from Lab called with critical result of Arterial CO2 at 2334. Critical lab result read back to Melissa. Dr. Mas notified of critical lab result at 2339.  Critical lab result read back by Dr. Felder. No new orders received.    RN discussed latest Xray reading with MD. To continue monitor per MD.

## 2017-03-22 NOTE — DISCHARGE PLANNING
Received Hospice choice from Hina([facility]) at 1302.  Hospice referral sent to [facility] at 1322.

## 2017-03-22 NOTE — THERAPY
"Speech Language Therapy dysphagia treatment completed.   Functional Status:  Patient reported he felt more weak today than yesterday but he also worked with physical therapy. He completed 10 reps of high pitched ee for laryngeal elevation/closure with poor accuracy. He was frustrated that he wasn't stronger. Inspiratory spirometer given and he required moderate verbal cueing to complete it correctly. 5 reps of Silva were completed as well as oral motor lingual exercises. He consumed nectar thick liquid via cup and puree using head turn to the left with chin down. His vocal quality remained clear and he had no immediate throat clearing or coughing. However, his chest sounds are rattled and he pointed to the left side of his chest saying \"it's all in here.\" RN reporting patient has been sitting up at EOB for all his meals but has noticed an increase in congestion. Thickened tub of water at bedside had  with thin at the top. Discussed with RN about ordering premade thickener from the kitchen. Discussed with patient who reported he hasn't had any thin water. Patient encouraged to continue working on his dysphagia exercises and use swallow strategies as posted.     Recommendations: Continue Dysphagia 1, nectar thick liquid diet using head turn to the left and chin down position. Consider getting an updated CxR     Plan of Care: Will benefit from Speech Therapy 3 times per week  Post-Acute Therapy: Discharge to a transitional care facility for continued skilled therapy services.    See \"Rehab Therapy-Acute\" Patient Summary Report for complete documentation.     "

## 2017-03-22 NOTE — DISCHARGE PLANNING
Medical Social Work  Palliative talked to patient, is wanting to go home on Hospice, and requested we contact his friend Elke who selected [facility].    Faxed Choice to [facility].

## 2017-03-22 NOTE — PROGRESS NOTES
Pt is AOx4, complains of pain, tolerated all oral medications in applesauce, skin and sacral assessment done, on 4L of O2 per NC, IS at bedside, able to perform correctly and effectively, titrated his O2 needs down to 3L and educated pt to notify RN if there is any SOB/, on  sats >90%, Yankaeur suction at bedside, call light in use, bed alarm on, treaded socks on, bed locked in low position, plan of care discussed, all needs met at this time.

## 2017-03-22 NOTE — PROGRESS NOTES
Hospital Medicine Progress Note, Adult, Complex               Author: Watson Renteria Date & Time created: 3/22/2017  3:48 PM     ID/CC: 81 yr M with hx of Stroke with left sided hemiparesis, HTN, COPD on 3 L of oxygen, asthma, multiple falls and pelvic fracture presented from [facility],presented on 2/20 with shortness of breath and cough and found to have left sided opacification started on vanco and zosyn for HCAP.  H/o aspiration, on dysphagia 1 diet per SLP. Completed treatment with meropenem for ESBL HCAP.     Interval History:  3/22 - dicussed with patient and palliative care at length; he would like to be comfort care as he is aspirating and he would really like to eat. Hospice referral placed. Questions answered.   3/21 - sleeping, comfortable, no significant changes, now wants to go home with home health. Questions answered. Discussed with SW, nursing, IDT.   3/20 - unchanged, plan explained. Encouraged to get up for meals, participate in PEP, etc.   3/19 - pleasant, no significant change. Questions answered.   3/18 - pleasant, more awake today, had eaten breakfast. Questions answered. Encouraged to keep getting up and participating.   3/17 - sleeping, arousable, encouraged to get up, not very interested in chatting today but pleasant.   3/16 - still lying in bed, does not have a lot of motivation, advised to get up again today.     Review of Systems:  Review of Systems   Constitutional: Positive for malaise/fatigue. Negative for fever and chills.   Respiratory: Negative for shortness of breath.    Cardiovascular: Negative for chest pain and palpitations.   Gastrointestinal: Negative for nausea, vomiting, abdominal pain, diarrhea and constipation.   Genitourinary: Negative for dysuria.   Musculoskeletal: Negative for myalgias.   Skin: Negative for itching.   Neurological: Positive for weakness. Negative for dizziness, focal weakness and headaches.   All other systems reviewed and are  negative.    Physical Exam:  Physical Exam   Constitutional: He is oriented to person, place, and time. He appears well-developed. No distress.   Frail, cachectic   HENT:   Head: Normocephalic and atraumatic.   Mouth/Throat: Oropharynx is clear and moist.   Eyes: Conjunctivae and EOM are normal. Pupils are equal, round, and reactive to light. No scleral icterus.   Neck: Normal range of motion. Neck supple. No tracheal deviation present. No thyromegaly present.   Cardiovascular: Normal rate, regular rhythm and intact distal pulses.    Murmur heard.  Pulmonary/Chest: Effort normal. No respiratory distress. He has decreased breath sounds in the right lower field and the left lower field. He has no wheezes.   Abdominal: Soft. Bowel sounds are normal. He exhibits no distension. There is no tenderness.   Musculoskeletal: Normal range of motion. He exhibits no edema or tenderness.   Lymphadenopathy:     He has no cervical adenopathy.        Right: No supraclavicular adenopathy present.        Left: No supraclavicular adenopathy present.   Neurological: He is alert and oriented to person, place, and time. No cranial nerve deficit.   Skin: Skin is warm and dry.   Vitals reviewed.      Labs:  Recent Labs      17   2308   [id number]  7.42   [id number]  53.5*   [id number]  80.6   ACLU2SCO  94.8   ARTHCO3  34*   ARTBE  8*         No results for input(s): SODIUM, POTASSIUM, CHLORIDE, CO2, BUN, CREATININE, MAGNESIUM, PHOSPHORUS, CALCIUM in the last 72 hours.  No results for input(s): ALTSGPT, ASTSGOT, ALKPHOSPHAT, TBILIRUBIN, DBILIRUBIN, GAMMAGT, AMYLASE, LIPASE, ALB, PREALBUMIN, GLUCOSE in the last 72 hours.  No results for input(s): RBC, HEMOGLOBIN, HEMATOCRIT, PLATELETCT, PROTHROMBTM, APTT, INR, IRON, FERRITIN, TOTIRONBC in the last 72 hours.            Hemodynamics:  Temp (24hrs), Av.3 °C (86.6 °F), Min:4.3 °C (39.8 °F), Max:37.1 °C (98.7 °F)  Temperature: 36.9 °C (98.5 °F)  Pulse  Av  Min: 44  Max: 98   Blood  Pressure : 137/71 mmHg     Respiratory:    Respiration: 16, Pulse Oximetry: 93 %     Work Of Breathing / Effort: Mild  RUL Breath Sounds: Clear, RML Breath Sounds: Clear, RLL Breath Sounds: Expiratory Wheezes (given scheduled inhalers), DEBI Breath Sounds: Clear, LLL Breath Sounds: Crackles  Fluids:  No intake or output data in the 24 hours ending 03/22/17 1548     GI/Nutrition:  Orders Placed This Encounter   Procedures   • DIET ORDER     Standing Status: Standing      Number of Occurrences: 1      Standing Expiration Date:      Order Specific Question:  Diet:     Answer:  Diabetic [3]     Order Specific Question:  Diet:     Answer:  Cardiac [6]     Order Specific Question:  Texture/Fiber modifications:     Answer:  Dysphagia 1(Pureed)specify fluid consistency(question 6) [1]     Order Specific Question:  Consistency/Fluid modifications:     Answer:  Nectar Thick [2]     Medical Decision Making, by Problem:  Active Hospital Problems    Diagnosis   • Respiratory failure (CMS-[facility]) [J96.90]  At baseline now     • Debility [R53.81]  PT/OT     • Multiple falls [R29.6]  Fall precautions  PT     • MRSA carrier [Z22.322]     • Sepsis (CMS-[facility]) [A41.9]  Sepsis resolved, due to HCAP with ESBL  ID Emelia consulted  Meropenem completed on 3/7/17     • HCAP (healthcare-associated pneumonia) [J18.9]  As above     • Chronic respiratory failure with hypoxia (CMS-[facility]) [J96.11]  Home O2 LPM Flow: 2 LPM   Supplemental O2 PRN with goal SpO2 greater than 90%.  RT protocol.  Current sats: Pulse Oximetry: 96 % on O2 (LPM): 2      • DM type 2, controlled, with complication (CMS-[facility]) [E11.8]  Diet controlled     • Left hemiparesis (CMS-[facility]) [G81.94]  Chronic from recent CVA  On asa and Lipitor  On Keppra for seizure disorder  Need PT/OT/ST SNF placement      • Dysphagia status post cerebrovascular accident [I69.391]  On dysphagia diet per ST      Right Meralgia Parasthetica  Continue PT  Pain control     • GERD (gastroesophageal reflux  disease) [K21.9]  pepcid          Dispo: hospice soon      Medications reviewed and Labs reviewed        DVT Prophylaxis: Heparin    Ulcer prophylaxis: Not indicated        I spent a total of 28 minutes during this clinical encounter of which > 50% was devoted to counseling and coordinating care including review of records, pertinent lab data and studies, as well as discussing diagnostic evaluation and work up, planned therapeutic interventions and future disposition of care. Where indicated, the assessment and plan reflect discussion of patient with consultants, other healthcare providers, family members, and additional research needed to obtain further information in formulating the plan of care of this patient.

## 2017-03-22 NOTE — DISCHARGE PLANNING
Medical Social Work  Patient has been accepted by [facility].  [facility] representative will be talking to patient and his provider to see what is needed for him to d/c tomorrow.

## 2017-03-22 NOTE — CARE PLAN
Problem: Safety  Goal: Will remain free from falls  Outcome: PROGRESSING AS EXPECTED  Pt with hx of fall, bed alarm on, call light in use, instructed to call for assistance, bed locked in low position, treaded socks on, pt calls appropriately    Problem: Respiratory:  Goal: Respiratory status will improve  Outcome: PROGRESSING AS EXPECTED  On 3L of O2 currently, goal is to have pt back on his baseline,  sats at >90%, no SOB/ noted, encouraged IS, Yankaeur suction at bedside

## 2017-03-22 NOTE — CONSULTS
"Reason for PC Consult: Advance Care Planning    Consulted by: Dr. Renteria    Assessment:  General: 81 year old male transferred from [facility] on 2/20/17 for shortness of breath and pneumonia with sepsis. Completed treatment with meropenem for ESBL HCAP. Rapid response was called last (3/21/17) night due to low oxygen levels. Aspiration suspected. Per speech therapy, patient does not want a feeding tube and feels he does not \"have much time left.\" PMH of CVA with left hemiplegia, HTN, COPD, asthma, multiple falls with pelvic fracture. Awake, alert, and oriented. Sitting up in bed watching TV and eating mashed potatoes with gravy.       Dyspnea: Denies at rest; present on exertion. 93% on 3 LPM via nasal cannula.   Last BM: 03/21/17.    Pain: Denies.   Depression: Denies.      Spiritual:  Is Latter day or spirituality important for coping with this illness? No; declined spiritual visit.   Has a  or spiritual provider visit been requested? No    Palliative Performance Scale: 50%    Advance Directive: None on file. Offered to complete with patient. Declined at this time as he would like to discuss with his friend Elke who he lives with.   DPOA: None on file. Patient expressed wanting Elkekoko Isabel to be his DPOA- [phone]. Offered to complete AD with patient but he would like to talk with her first.   POLST: None on file. Assisted patient with completion. Scanned into EPIC and returned to patient in plastic sleeve. Patient want's comfort measures only.     Code Status: DNR      Outcome:  Introduced myself and discussed role of palliative care. Discussed goals of care. Patient expressed that he feels he is at the end of his life. He enjoys eating and does not want a feeding tube, \"Even if I got it, I would probably die soon anyway.\" He laughed. Explained hospice care and he reports he would be very interested in that. He expressed he is okay returning to his friend Elke's house, \"Accept her son is there " "and I don't think he likes me being there.\" I explained other options such as a group home. Patient's monthly income is $2,500 between his work pension and social security. Explained the hospice referral process and explained that he could chose a group home over Elke's house and could explore that when meeting with a hospice agency. He did not have a preference for choice of hospice agency and requested I reach out to Elke to discuss all of this with her. Reviewed POLST and left with him.    Call placed to Elke. Introduced myself and discussed role of palliative care. All questions answered. Elke would like patient to return to her house. Discussed patient's expression of concern for being a burden and a second option of a GH. Elke expressed she would be happy to have patient back home and would appreciate the support of hospice. Reviewed choice form with Elke. She reports she has a friend who works for, \"[employer]. I hear they are really good.\" Explained referral process. Provided Elke with palliative care phone number and encouraged her to call with any questions, needs, or concerns.     Returned to patient and obtained choice form for [facility] and assisted with completion of POLST. Reviewed with Dr. Renteria who completed. Scanned POLST into EPIC and returned to patient in plastic sleeve along with palliative care brochure and business card. Encouraged patient to call with any questions, needs or concerns.     Updated: Bedside RN, Dr. Renteria, and RENITA Zabala. Provided choice form to [facility].     Plan: Discharge with [facility] once accepted.     Thank you for allowing Palliative Care to participate in this patient's care. Please feel free to call [phone] with any questions or concerns.  "

## 2017-03-22 NOTE — THERAPY
"Speech Language Therapy dysphagia treatment completed.   Functional Status:  Patient seen for dysphagia therapy and follow up session due to concerns for aspiration yesterday.  Chest x-ray from last night showing \"Persistent hypoinflation with worsening pneumonia involving LEFT mid and lower lung.  Associated pleural fluid is not excluded.\"  Furthermore, patient did have critical arterial CO2 last night. Patient awake, alert and agreeable to therapy.  Audible upper airway congestion and crackles were noted and patient reporting that he feels the congestion in his left chest.  Presentation of PO consisted of nectars and purees.  Patient consistent with use of chin tuck and head turn to left 85% of the time.  Initially, he was able to generate a second swallow within 10 seconds, but as the session progressed and he fatigued, executing the second swallow was very difficult for him.   He had intermittent wet voice and throat clearing following swallow and on occasion had delayed coughing which can be consistent with possible penetration/aspiration. Worked with patient on swallowing exercises that focused on base of tongue and laryngeal elevation.  He had difficulty with the Silva maneuver (1/5), so was able to do 5 reps of 25 tongue pulls with fair strength noted. Patient able to achieve falsetto for ~3-5 seconds on average.  Fatigue again played a role in his ability to complete multiple reps of any exercise.  An Aerobika was provided to the patient to assist with secretion mobilization and clearance.  He was able to demonstrate adequate use of this apparatus.  Lengthy discussion with patient regarding concerns for worsening chest xray and increased risk for aspiration.      IMPRESSION:  For now, when patient uses swallow strategies, he appears to be able to tolerate current dysphagia I with NTL with only intermittent S/Sx of aspiration; however, if he continues to weaken and swallow worsens, asked patient if he would " "ever consider a feeding tube (nasal or gastric) and he indicated that he felt he would not.  Explained that if he continues to get weaker and has increased aspiration, this could be very detrimental to his lungs and overall respiratory status. He indicated that he feels he does not \"have much time left.\"  Discussed possibility of having palliative care come and speak with him regarding his overall POC and he agreed.  Spoke with Dr. Renteria who is in agreement with Palliative Care consult.  SLP will continue to follow as appropriate.      Recommendations: Dysphagia I with NTL with close monitoring for all PO intake.  Encourage chin tuck with head turn to the left and double swallow  Plan of Care: Will benefit from Speech Therapy 3 times per week  Post-Acute Therapy: Discharge to a transitional care facility for continued skilled therapy services.    See \"Rehab Therapy-Acute\" Patient Summary Report for complete documentation.     "

## 2017-03-22 NOTE — THERAPY
"Physical Therapy Treatment completed.   Bed Mobility:  Supine to Sit: Stand by Assist  Transfers: Sit to Stand: Contact Guard Assist  Gait: Level Of Assist: Contact Guard Assist with Front-Wheel Walker       Plan of Care: Will benefit from Physical Therapy 3 times per week  Discharge Recommendations: Equipment: Will Continue to Assess for Equipment Needs. Post-acute therapy Discharge to a transitional care facility for continued skilled therapy services.     See \"Rehab Therapy-Acute\" Patient Summary Report for complete documentation.       "

## 2017-03-23 ENCOUNTER — TELEPHONE (OUTPATIENT)
Dept: MEDICAL GROUP | Facility: PHYSICIAN GROUP | Age: 82
End: 2017-03-23

## 2017-03-23 PROCEDURE — 99232 SBSQ HOSP IP/OBS MODERATE 35: CPT | Performed by: HOSPITALIST

## 2017-03-23 PROCEDURE — 700102 HCHG RX REV CODE 250 W/ 637 OVERRIDE(OP): Performed by: HOSPITALIST

## 2017-03-23 PROCEDURE — A9270 NON-COVERED ITEM OR SERVICE: HCPCS | Performed by: HOSPITALIST

## 2017-03-23 PROCEDURE — 700112 HCHG RX REV CODE 229: Performed by: HOSPITALIST

## 2017-03-23 PROCEDURE — 770006 HCHG ROOM/CARE - MED/SURG/GYN SEMI*

## 2017-03-23 RX ADMIN — MORPHINE SULFATE 10 MG: 100 SOLUTION ORAL at 01:12

## 2017-03-23 RX ADMIN — DOCUSATE SODIUM 100 MG: 100 CAPSULE ORAL at 10:24

## 2017-03-23 RX ADMIN — MICONAZOLE NITRATE: 20 CREAM TOPICAL at 06:24

## 2017-03-23 RX ADMIN — MICONAZOLE NITRATE: 20 CREAM TOPICAL at 13:06

## 2017-03-23 RX ADMIN — MICONAZOLE NITRATE: 20 CREAM TOPICAL at 18:41

## 2017-03-23 RX ADMIN — DOCUSATE SODIUM 100 MG: 100 CAPSULE ORAL at 21:17

## 2017-03-23 ASSESSMENT — ENCOUNTER SYMPTOMS
CHILLS: 0
VOMITING: 0
CONSTIPATION: 0
DIZZINESS: 0
NAUSEA: 0
SHORTNESS OF BREATH: 0
WEAKNESS: 1
HEADACHES: 0
PALPITATIONS: 0
ABDOMINAL PAIN: 0
FEVER: 0
MYALGIAS: 0
FOCAL WEAKNESS: 0
DIARRHEA: 0

## 2017-03-23 ASSESSMENT — PAIN SCALES - GENERAL
PAINLEVEL_OUTOF10: 0
PAINLEVEL_OUTOF10: 0

## 2017-03-23 NOTE — PROGRESS NOTES
"Spent 1:1 time with pt, who has been feeling to poorly to attend Patient Enrichment Program (PEP).  Pt reported that he has decided to go with hospice.  He relayed to me his acceptance of this decision, and his inevitable death.  He stated he has lived a good, long life; \"I'm 81 years old, I know my time is coming.\"  Therapeutic listening provided for pt going through major life decision.  Pt quiet, calm.  Extended the invitation to attend PEP tomorrow to get out of his room and interact with peers.  He smiled a small smile, \"it couldn't hurt, \" he said.  Staff should plan to facilitate pt attending PEP tomorrow if he feels up to it.  Informed him we could transport via WC PRN.  "

## 2017-03-23 NOTE — CARE PLAN
Problem: Discharge Barriers/Planning  Goal: Patient’s continuum of care needs will be met  Outcome: PROGRESSING AS EXPECTED  Pt discharging on hospice tomorrow. Educated pt that he is being discharged tomorrow.    Problem: Respiratory:  Goal: Respiratory status will improve  Outcome: PROGRESSING AS EXPECTED  Educated pt that use of oxygen is available if needed. Pt on comfort care.

## 2017-03-23 NOTE — TELEPHONE ENCOUNTER
Received orders for Don regarding treatment and evaluation for [facility]. Orders signed by Dr Blanchard, faxed and scanned in media

## 2017-03-23 NOTE — DISCHARGE PLANNING
Medical Social Work  Faxed transport communication form to [facility] for a tentative transport time of 1:30 to home on 3/24/2017

## 2017-03-23 NOTE — PROGRESS NOTES
Assumed care of patient at 1915, received report from otis RN. Communication board updated at that time and reviewed with pt. Pt is currently on comfort care. Pt denies pain at this time. Bed alarm on.  Assessment complete. Call light and personal belongings within reach. No other needs at this time. Will continue to monitor.

## 2017-03-23 NOTE — PROGRESS NOTES
Assumed care of pt at 0715. A/Ox4, discussed plan of care. Pt on 2L of oxygen for comfort care, tolerating nectar thick diet, pt has signs of dysphagia, one person assist with unsteady gait. Pt has plans for discharge for Friday on hospice care. All needs met at this time. Bed in lowest position, treaded socks on, personal belongings and call light within reach, instructed to call for any assistance.

## 2017-03-24 VITALS
BODY MASS INDEX: 19.75 KG/M2 | HEART RATE: 92 BPM | SYSTOLIC BLOOD PRESSURE: 143 MMHG | DIASTOLIC BLOOD PRESSURE: 67 MMHG | TEMPERATURE: 98.1 F | HEIGHT: 68 IN | OXYGEN SATURATION: 99 % | RESPIRATION RATE: 16 BRPM | WEIGHT: 130.29 LBS

## 2017-03-24 PROCEDURE — 700112 HCHG RX REV CODE 229: Performed by: HOSPITALIST

## 2017-03-24 PROCEDURE — 99239 HOSP IP/OBS DSCHRG MGMT >30: CPT | Performed by: HOSPITALIST

## 2017-03-24 PROCEDURE — A9270 NON-COVERED ITEM OR SERVICE: HCPCS | Performed by: HOSPITALIST

## 2017-03-24 PROCEDURE — 97535 SELF CARE MNGMENT TRAINING: CPT

## 2017-03-24 RX ADMIN — MICONAZOLE NITRATE: 20 CREAM TOPICAL at 12:35

## 2017-03-24 RX ADMIN — MICONAZOLE NITRATE: 20 CREAM TOPICAL at 05:26

## 2017-03-24 RX ADMIN — DOCUSATE SODIUM 100 MG: 100 CAPSULE ORAL at 09:02

## 2017-03-24 RX ADMIN — MICONAZOLE NITRATE: 20 CREAM TOPICAL at 00:00

## 2017-03-24 ASSESSMENT — PAIN SCALES - GENERAL: PAINLEVEL_OUTOF10: 0

## 2017-03-24 NOTE — PROGRESS NOTES
Received report and assumed care of the patient. Patient is alert and oriented x4. O2 at 4L NC. Yankaeur suction at bedside. Call light and belongings within reach. Bed in locked and low position. Treaded socks on. Plan of care discussed with the patient and questions answered. Communication board updated. Hourly rounding in place.

## 2017-03-24 NOTE — CARE PLAN
Problem: Safety  Goal: Will remain free from injury  Outcome: PROGRESSING AS EXPECTED  Pt calls for asisstance when needed. Pt educated about safety precautions when ambulating, such as using walker.     Problem: Respiratory:  Goal: Respiratory status will improve  Outcome: PROGRESSING AS EXPECTED  Pt educated about oxygen use if needed. Pt demonstrated understanding and oxygen is available next to pt if needed.

## 2017-03-24 NOTE — PROGRESS NOTES
Assumed care of pt at 0715. A/Ox4, discussed plan of care. Pt on 2L of oxygen for comfort, up with stand by assist with walker. Pt up to restroom, denied use of condom cath. All needs met at this time. Bed in lowest position, treaded socks on, personal belongings and call light within reach, instructed to call for any assistance.

## 2017-03-24 NOTE — THERAPY
"Occupational Therapy Treatment completed with focus on ADLs, ADL transfers and patient education.  Functional Status:  Pt seen for OT tx today.  Pt was pleasant and cooperative throughout the session but continues to be limited by weakness and endurance.  Pt completed supine to sit, sit to stand, room ambulation using FWW from bed to toilet with CGA.  Pt would benefit from continued inpt OT to increase independence with functional transfers, functional endurance, and ADLs.  Plan of Care: Will benefit from Occupational Therapy 3 times per week  Discharge Recommendations:  Equipment No Equipment Needed. Post-acute therapy Discharge to home with outpatient or home health for additional skilled therapy services.    See \"Rehab Therapy-Acute\" Patient Summary Report for complete documentation.   "

## 2017-03-24 NOTE — DISCHARGE INSTRUCTIONS
Discharge Instructions    Discharged to home by [facility] with escort. Discharged via wheelchair, hospital escort: Yes.  Special equipment needed: Oxygen    Be sure to schedule a follow-up appointment with your primary care doctor or any specialists as instructed.     Discharge Plan:   Influenza Vaccine Indication: Not indicated: Previously immunized this influenza season and > 8 years of age    I understand that a diet low in cholesterol, fat, and sodium is recommended for good health. Unless I have been given specific instructions below for another diet, I accept this instruction as my diet prescription.   Other diet: Nectar thick    Special Instructions: Sepsis, Adult  Sepsis is a serious infection of your blood or tissues that affects your whole body. The infection that causes sepsis may be bacterial, viral, fungal, or parasitic. Sepsis may be life threatening. Sepsis can cause your blood pressure to drop. This may result in shock. Shock causes your central nervous system and your organs to stop working correctly.   RISK FACTORS  Sepsis can happen in anyone, but it is more likely to happen in people who have weakened immune systems.  SIGNS AND SYMPTOMS   Symptoms of sepsis can include:  · Fever or low body temperature (hypothermia).  · Rapid breathing (hyperventilation).  · Chills.  · Rapid heartbeat (tachycardia).  · Confusion or light-headedness.  · Trouble breathing.  · Urinating much less than usual.  · Cool, clammy skin or red, flushed skin.  · Other problems with the heart, kidneys, or brain.  DIAGNOSIS   Your health care provider will likely do tests to look for an infection, to see if the infection has spread to your blood, and to see how serious your condition is. Tests can include:  · Blood tests, including cultures of your blood.  · Cultures of other fluids from your body, such as:  · Urine.  · Pus from wounds.  · Mucus coughed up from your lungs.  · Urine tests other than cultures.  · X-ray exams or  other imaging tests.  TREATMENT   Treatment will begin with elimination of the source of infection. If your sepsis is likely caused by a bacterial or fungal infection, you will be given antibiotic or antifungal medicines.  You may also receive:  · Oxygen.  · Fluids through an IV tube.  · Medicines to increase your blood pressure.  · A machine to clean your blood (dialysis) if your kidneys fail.  · A machine to help you breathe if your lungs fail.  SEEK IMMEDIATE MEDICAL CARE IF:  You get an infection or develop any of the signs and symptoms of sepsis after surgery or a hospitalization.     This information is not intended to replace advice given to you by your health care provider. Make sure you discuss any questions you have with your health care provider.     Document Released: 09/15/2004 Document Revised: 05/03/2016 Document Reviewed: 08/25/2014  [email or web address] Interactive Patient Education ©2016 [email or web address] Inc.      · Is patient discharged on Warfarin / Coumadin?   No     · Is patient Post Blood Transfusion?  No    Depression / Suicide Risk    As you are discharged from this [facility] facility, it is important to learn how to keep safe from harming yourself.    Recognize the warning signs:  · Abrupt changes in personality, positive or negative- including increase in energy   · Giving away possessions  · Change in eating patterns- significant weight changes-  positive or negative  · Change in sleeping patterns- unable to sleep or sleeping all the time   · Unwillingness or inability to communicate  · Depression  · Unusual sadness, discouragement and loneliness  · Talk of wanting to die  · Neglect of personal appearance   · Rebelliousness- reckless behavior  · Withdrawal from people/activities they love  · Confusion- inability to concentrate     If you or a loved one observes any of these behaviors or has concerns about self-harm, here's what you can do:  · Talk about it- your feelings and reasons for harming  yourself  · Remove any means that you might use to hurt yourself (examples: pills, rope, extension cords, firearm)  · Get professional help from the community (Mental Health, Substance Abuse, psychological counseling)  · Do not be alone:Call your Safe Contact- someone whom you trust who will be there for you.  · Call your local CRISIS HOTLINE 097-6071 or 744-773-2352  · Call your local Children's Mobile Crisis Response Team [place] (219) 851-7994 or KidAdmit  · Call the toll free National Suicide Prevention Hotlines   · National Suicide Prevention Lifeline 614-180-MIPS (4285)  · [place] Hope Line Network 800-SUICIDE (130-6986)    Thickening Liquids for Dysphagia Diet  If you are on the dysphagia diet, you may need to thicken drinks, soups, foods that melt at room temperature, and other liquids before you drink or eat them. Thickening liquids makes them easier to swallow. It also reduces the risk of liquid traveling to your lungs.  To make a thickened liquid you will need to add a commercial thickening product or a soft food to the liquid until it reaches the consistency it needs to be. Your health care provider or dietitian will explain to you the consistency you need to aim for. Liquid consistencies include:  · Thin. Thin liquids include most drinks (such as water, milk, tea, soda, juice, carbonated drinks), as well as ice cream, sherbet, sorbet, ice pops, and broth-based soups.  · Nectar-like. Nectar-like liquids include maple syrup and creamy soup.  · Honey-like. Honey-like liquids are made to be runny but are thick like honey. They cannot be sipped through a straw.  · Spoon-thick. Spoon-thick liquids are thick, like pudding.  MY PLAN  I should thicken my liquids to a nectar consistency.  DIET GUIDELINES  · Thicken liquids to the consistency your health care provider recommends.  · Follow your dietitian's or health care provider's recommendation on how to thicken your liquids.  · See your  dietitian or health care provider regularly for help with your dietary changes.  HOW CAN I THICKEN MY LIQUIDS?  Liquids can be thickened with a commercial food and beverage thickener or with a soft food.  Commercial Food and Beverage Thickeners  A food and beverage thickener is a powder or gel that makes a food or beverage thicker. Thickeners are sold at pharmacies, medical supply stores, some grocery stores, and online. They can be added to both hot and cold liquids and do not change the taste of the liquid. Ask your health care provider or dietitian for a complete list of commercial thickeners.  Each thickening product is different. Some need to be blended into a liquid with a  while others can be stirred into a liquid with a fork or spoon. Follow the instructions on the product label.  Soft Foods  Some foods such as soups, casseroles, and gravies can be thickened with soft foods. Soft foods include:  · Baby cereal.  · Gravy powder.  · Mashed potato.  · Pureed baby food.  · Instant potato flakes.  · Powdered sauce mixes (such as cheese mixes).  · Flour.  To use one of these soft food items, stir or mix them into the thin liquid until it reaches the desired thickness. Start with a small amount and adjust soft food and liquid as necessary.  Note: Flour works best with warm liquids, such as broth. To thicken a liquid with flour, make a paste out of flour and water. Cook or warm your liquid and add the paste to it. Stir until the mixture thickens.  WHAT ARE SOME TIPS TO MAKE THICKENING LIQUIDS EASIER?  · Take thickeners with you when eating out or traveling.  · If a liquid gets too thick, add more of the thinner liquid until the desired consistency is reached.  · Consider purchasing pre-made thickened drinks.  · Consider using a thickening product to make your own frozen desserts.     This information is not intended to replace advice given to you by your health care provider. Make sure you discuss any  questions you have with your health care provider.     Document Released: 06/18/2013 Document Revised: 12/23/2014 Document Reviewed: 12/01/2014  [employer] Interactive Patient Education ©2016 Elsevier Inc.      Hospice  Hospice is a service that is designed to provide people who are terminally ill and their families with medical, spiritual, and psychological support. Its aim is to improve your quality of life by keeping you as alert and comfortable as possible. Hospice is performed by a team of health care professionals and volunteers who:  · Help keep you comfortable. Hospice can be provided in your home or in a homelike setting. The hospice staff works with your family and friends to help meet your needs. You will enjoy the support of loved ones by receiving much of your basic care from family and friends.  · Provide pain relief and manage your symptoms. The staff supply all necessary medicines and equipment.  · Provide companionship when you are alone.  · Allow you and your family to rest. They may do light housekeeping, prepare meals, and run errands.  · Provide counseling. They will make sure your emotional, spiritual, and social needs and those of your family are being met.  · Provide spiritual care. Spiritual care is individualized to meet your needs and your family's needs. It may involve helping you look at what death means to you, say goodbye, or perform a specific [employer]ology ceremony or ritual.  Hospice teams often include:  · A nurse.  · A doctor.  · Social workers.  · Rastafarian leaders (such as a ).  · Trained volunteers.  WHEN SHOULD HOSPICE CARE BEGIN?  Most people who use hospice are believed to have fewer than 6 months to live. Your family and health care providers can help you decide when hospice services should begin. If your condition improves, you may discontinue the program.  WHAT SHOULD I CONSIDER BEFORE SELECTING A PROGRAM?  Most hospice programs are run by nonprofit, independent  organizations. Some are affiliated with hospitals, nursing homes, or home health care agencies. Hospice programs can take place in the home or at a hospice center, hospital, or skilled nursing facility. When choosing a hospice program, ask the following questions:  · What services are available to me?  · What services are offered to my loved ones?  · How involved are my loved ones?  · How involved is my health care provider?  · Who makes up the hospice care team? How are they trained or screened?  · How will my pain and symptoms be managed?  · If my circumstances change, can the services be provided in a different setting, such as my home or in the hospital?  · Is the program reviewed and licensed by the state or certified in some other way?  WHERE CAN I LEARN MORE ABOUT HOSPICE?  You can learn about existing hospice programs in your area from your health care providers. You can also read more about hospice online. The websites of the following organizations contain helpful information:  · The National Hospice and Palliative Care Organization (NHPCO).  · The Hospice Association of [place] (HAA).  · The Hospice Education [place].  · The American Cancer Society (ACS).  · Hospice Net.     This information is not intended to replace advice given to you by your health care provider. Make sure you discuss any questions you have with your health care provider.     Document Released: 04/05/2005 Document Revised: 12/23/2014 Document Reviewed: 10/28/2014  [employer] Interactive Patient Education ©2016 [employer].    Pneumonia, Adult  Pneumonia is an infection of the lungs.   CAUSES  Pneumonia may be caused by bacteria or a virus. Usually, the infection is caused by breathing in droplets from an infected person's cough or sneeze.   SYMPTOMS   Symptoms of pneumonia include:  · Cough.  · Fever.  · Chest pain.  · Rapid breathing.  · Shortness of breath.  · Shaking chills.  · Mucus production.  DIAGNOSIS   If you have the common  symptoms of pneumonia, often your health care provider will confirm the diagnosis with a chest X-ray. The X-ray will show an abnormality in the lung if you have pneumonia. Other tests may be done on your blood, urine, or mucus (sputum) to find the specific cause of your pneumonia. A blood gas test or pulse oximetry test may be needed to check how well your lungs are working.  TREATMENT   Your treatment will depend on whether your pneumonia is caused by bacteria or a virus.   · Bacterial pneumonia is treated with antibiotic medicine.  · Pneumonia that is caused by the influenza virus may be treated with an antiviral medicine.  · Pneumonia that is caused by a virus other than influenza will not respond to antibiotic medicine. This type of pneumonia will have to run its course.   HOME CARE INSTRUCTIONS   · Cough suppressants may be used if you are losing too much rest from coughing at night. However, you should try to avoid taking cough suppresants. This is because coughing helps to remove mucus from your lungs.  · Sleep in a semi-upright position at night. Try sleeping in a reclining chair, or place a few pillows under your head.  · Try using a cold steam vaporizer or humidifier in your home or bedroom. This may help loosen your mucus.  · If you were prescribed an antibiotic medicine, finish all of it even if you start to feel better.  · If you were prescribed an expectorant, take it as directed by your health care provider. This medicine loosens the mucus so you can cough it up.  · Take medicines only as directed by your health care provider.  · Do not smoke. If you are a smoker and continue to smoke, your cough may last several weeks after your pneumonia has cleared.  · Get rest when you feel tired, or as needed.  PREVENTION  A pneumococcal shot (vaccine) is available to prevent a common bacterial cause of pneumonia. This is usually suggested for:  · People over 65 years old.  · People on chemotherapy.  · People  with chronic lung problems, such as bronchitis or emphysema.  · People with immune system problems.  If you are over 65 years old or have a high risk condition, you may receive the pneumococcal vaccine if you have not received it before. In some countries, a routine influenza vaccine is also recommended. This vaccine can help prevent some cases of pneumonia. You may be offered the influenza vaccine as part of your care.  If you are a smoker, it is time to quit in order to prevent pneumonia in the future. You may receive instructions on how to stop smoking. Your health care provider can provide medicines and counseling to help you quit.  SEEK MEDICAL CARE IF:  · You have a fever.  · You cannot control your cough with suppressants at night, and you keep losing sleep.  SEEK IMMEDIATE MEDICAL CARE IF:   · You have worsening shortness of breath.  · You have increased chest pain.  · Your sickness becomes worse, especially if you are an older adult or have a weakened immune system.  · You cough up blood.  · You have pain that is getting worse or is not controlled with medicines.  · Your symptoms are getting worse rather than better.     This information is not intended to replace advice given to you by your health care provider. Make sure you discuss any questions you have with your health care provider.     Document Released: 12/18/2006 Document Revised: 01/08/2016 Document Reviewed: 04/13/2016  [employer] Interactive Patient Education ©2016 Elsevier Inc.

## 2017-03-24 NOTE — PROGRESS NOTES
Hospital Medicine Progress Note, Adult, Complex               Author: Watson Renteria Date & Time created: 3/23/2017  5:30 PM     ID/CC: 81 yr M with hx of Stroke with left sided hemiparesis, HTN, COPD on 3 L of oxygen, asthma, multiple falls and pelvic fracture presented from [facility] care SNF,presented on 2/20 with shortness of breath and cough and found to have left sided opacification started on vanco and zosyn for HCAP.  H/o aspiration, on dysphagia 1 diet per SLP. Completed treatment with meropenem for ESBL HCAP.     Interval History:  3/23 - discussed with SW; arranging hospice supplies, etc, since he will be going to a friend's place. Questions answered. Advised of plan.   3/22 - dicussed with patient and palliative care at length; he would like to be comfort care as he is aspirating and he would really like to eat. Hospice referral placed. Questions answered.   3/21 - sleeping, comfortable, no significant changes, now wants to go home with home health. Questions answered. Discussed with SW, nursing, IDT.   3/20 - unchanged, plan explained. Encouraged to get up for meals, participate in PEP, etc.   3/19 - pleasant, no significant change. Questions answered.   3/18 - pleasant, more awake today, had eaten breakfast. Questions answered. Encouraged to keep getting up and participating.   3/17 - sleeping, arousable, encouraged to get up, not very interested in chatting today but pleasant.   3/16 - still lying in bed, does not have a lot of motivation, advised to get up again today.     Review of Systems:  Review of Systems   Constitutional: Positive for malaise/fatigue. Negative for fever and chills.   Respiratory: Negative for shortness of breath.    Cardiovascular: Negative for chest pain and palpitations.   Gastrointestinal: Negative for nausea, vomiting, abdominal pain, diarrhea and constipation.   Genitourinary: Negative for dysuria.   Musculoskeletal: Negative for myalgias.   Skin: Negative for itching.    Neurological: Positive for weakness. Negative for dizziness, focal weakness and headaches.   All other systems reviewed and are negative.    Physical Exam:  Physical Exam   Constitutional: He is oriented to person, place, and time. He appears well-developed. No distress.   Frail, cachectic   HENT:   Head: Normocephalic and atraumatic.   Mouth/Throat: Oropharynx is clear and moist.   Eyes: Conjunctivae and EOM are normal. Pupils are equal, round, and reactive to light. No scleral icterus.   Neck: Normal range of motion. Neck supple. No tracheal deviation present. No thyromegaly present.   Cardiovascular: Normal rate, regular rhythm and intact distal pulses.    Murmur heard.  Pulmonary/Chest: Effort normal. No respiratory distress. He has decreased breath sounds in the right lower field and the left lower field. He has no wheezes.   Abdominal: Soft. Bowel sounds are normal. He exhibits no distension. There is no tenderness.   Musculoskeletal: Normal range of motion. He exhibits no edema or tenderness.   Lymphadenopathy:     He has no cervical adenopathy.        Right: No supraclavicular adenopathy present.        Left: No supraclavicular adenopathy present.   Neurological: He is alert and oriented to person, place, and time. No cranial nerve deficit.   Skin: Skin is warm and dry.   Vitals reviewed.      Labs:  Recent Labs      17   2308   [id number]  7.42   [id number]  53.5*   [id number]  80.6   [id number]  94.8   ARTHCO3  34*   ARTBE  8*         No results for input(s): SODIUM, POTASSIUM, CHLORIDE, CO2, BUN, CREATININE, MAGNESIUM, PHOSPHORUS, CALCIUM in the last 72 hours.  No results for input(s): ALTSGPT, ASTSGOT, ALKPHOSPHAT, TBILIRUBIN, DBILIRUBIN, GAMMAGT, AMYLASE, LIPASE, ALB, PREALBUMIN, GLUCOSE in the last 72 hours.  No results for input(s): RBC, HEMOGLOBIN, HEMATOCRIT, PLATELETCT, PROTHROMBTM, APTT, INR, IRON, FERRITIN, TOTIRONBC in the last 72 hours.            Hemodynamics:  Temp (24hrs), Av.9 °C  (98.4 °F), Min:36.8 °C (98.2 °F), Max:37 °C (98.6 °F)  Temperature:  (pt )  Pulse  Av.2  Min: 44  Max: 100   Blood Pressure : 112/65 mmHg     Respiratory:    Respiration: 16, Pulse Oximetry: 95 %     Work Of Breathing / Effort: Mild  RUL Breath Sounds: Diminished, RML Breath Sounds: Diminished, RLL Breath Sounds: Diminished, DEBI Breath Sounds: Diminished, LLL Breath Sounds: Diminished  Fluids:  No intake or output data in the 24 hours ending 17 1730     GI/Nutrition:  Orders Placed This Encounter   Procedures   • DIET ORDER     Standing Status: Standing      Number of Occurrences: 1      Standing Expiration Date:      Order Specific Question:  Diet:     Answer:  Diabetic [3]     Order Specific Question:  Diet:     Answer:  Cardiac [6]     Order Specific Question:  Texture/Fiber modifications:     Answer:  Dysphagia 1(Pureed)specify fluid consistency(question 6) [1]     Order Specific Question:  Consistency/Fluid modifications:     Answer:  Nectar Thick [2]     Medical Decision Making, by Problem:  Active Hospital Problems    Diagnosis   • Respiratory failure (CMS-[facility]) [J96.90]  At baseline now     • Debility [R53.81]  PT/OT     • Multiple falls [R29.6]  Fall precautions  PT     • MRSA carrier [Z22.322]     • Sepsis (CMS-[facility]) [A41.9]  Sepsis resolved, due to HCAP with ESBL  ID Emelia consulted  Meropenem completed on 3/7/17     • HCAP (healthcare-associated pneumonia) [J18.9]  As above     • Chronic respiratory failure with hypoxia (CMS-[facility]) [J96.11]  Home O2 LPM Flow: 2 LPM   Supplemental O2 PRN with goal SpO2 greater than 90%.  RT protocol.  Current sats: Pulse Oximetry: 95 % on O2 (LPM): 0      • DM type 2, controlled, with complication (CMS-[facility]) [E11.8]  Diet controlled     • Left hemiparesis (CMS-[facility]) [G81.94]  Chronic from recent CVA  On asa and Lipitor  On Keppra for seizure disorder  Need PT/OT/ST SNF placement      • Dysphagia status post cerebrovascular accident [I69.391]  On dysphagia diet per  ST      Right Meralgia Parasthetica  Continue PT  Pain control     • GERD (gastroesophageal reflux disease) [K21.9]  pepcid          Dispo: hospice tomorrow      Medications reviewed and Labs reviewed        DVT Prophylaxis: Heparin    Ulcer prophylaxis: Not indicated        I spent a total of 26 minutes during this clinical encounter of which > 50% was devoted to counseling and coordinating care including review of records, pertinent lab data and studies, as well as discussing diagnostic evaluation and work up, planned therapeutic interventions and future disposition of care. Where indicated, the assessment and plan reflect discussion of patient with consultants, other healthcare providers, family members, and additional research needed to obtain further information in formulating the plan of care of this patient.

## 2017-03-24 NOTE — DISCHARGE PLANNING
Arranged patient's transport home via [facility] at 1330.  Vj([facility]) notified.

## 2017-03-24 NOTE — PROGRESS NOTES
Received discharge orders. Removed IV without complications. Went over discharge instructions with pt. Gave a copy of discharge instructions to hospist nurse, nurse stated following [facility] to pts home. All questions answered, patient feels safe to discharge. Patient went in [facility] with [facility]. All belongings gathered and patient dressed.

## 2017-03-25 ENCOUNTER — PATIENT OUTREACH (OUTPATIENT)
Dept: HEALTH INFORMATION MANAGEMENT | Facility: OTHER | Age: 82
End: 2017-03-25

## 2017-03-25 NOTE — DISCHARGE SUMMARY
ADMITTING DIAGNOSES:  Sepsis secondary to pneumonia, chronic obstructive   pulmonary disease, seizure, benign prostate hyperplasia.    DISCHARGE DIAGNOSES:  Sepsis, resolved secondary to pneumonia, senile debility   with physical deconditioning secondary to prolonged hospital stay, acute   respiratory failure, resolved, chronic respiratory failure requiring 2 L of   oxygen 24 hours a day, diabetes mellitus type 2, left hemiparesis due to prior   CVA with residual deficit, dysphagia secondary to prior CVA with residual   deficit, esophageal reflux.    HISTORY OF PRESENT ILLNESS AND HOSPITAL COURSE:  This is an 81-year-old   gentleman who came in with community-acquired pneumonia.  He was admitted and   treated appropriately.  His pneumonia was resolved.  His sepsis resolved.  He   was quite debilitated and had a prior CVA that resulted in left hemiparesis   and aphasia.  In short during his hospitalization he had no skilled nursing   benefit available to him and was planning to rehab in hospital; however, after   palliative care consult the patient, he was more interested in discharging to   home with home hospice.  This being a reasonable case given his advanced age   and prior CVA and deficits were including dysphagia.  This was arranged for   him and he was able to be transitioned to home hospice with discharge on   03/24/2017.    DISCHARGE ACTIVITY:  As tolerated.    DISCHARGE DIET:  As tolerated and desired.    FOLLOWUP:  Follow up as needed.    DISCHARGE MEDICATIONS:  He is to stop taking all of his medications except the   following:  Baclofen 10 mg twice daily, Colace 100 mg daily, Proscar 5 mg   daily, Advair 250/50 one puff every 12 hours, Neurontin 300 mg daily, DuoNeb   nebulizers 4 times daily, Keppra 750 mg 2 tabs twice daily, Flomax 0.4 mg   one-half hour after breakfast, and Spiriva 18 mcg daily.    Total time of discharge, 33 minutes.       ____________________________________     Watson Renteria  MD BRYAN / JAMES    DD:  03/24/2017 14:40:40  DT:  03/24/2017 19:02:47    D#:  [id number]  Job#:  [id number]

## 2017-03-27 NOTE — DOCUMENTATION QUERY
DOCUMENTATION QUERY    PROVIDERS: Please select “Cosign w/ note”to reply to query.    To better represent the severity of illness of your patient, please review the following information and exercise your independent professional judgment in responding to this query.    Patient presents with sepsis secondary to possible aspiration/HCAP. Patient had  Acute on chronic diastolic failure, Acute on Chronic Respiratory failure and metabolic encephalopathy.    Please clarify     1. Acute on chronic diastolic CHF related to sepsis (severe sepsis)  2. Acute on chronic diastolic CHF unrelated to sepsis (sepsis)  3. Other explanation of clinical presentation (please document)  4. Unable to determine    1. Acute on chronic respiratory failure related to sepsis (severe sepsis)  2. Acute on chronic respiratory failure unrelated to sepsis (sepsis)  3. Other explanation of clinical presentation (please document)  4. Unable to determine    1. Metabolic encephalopathy related to sepsis (severe sepsis)  2. Metabolic encephalopathy unrelated to sepsis (sepsis)  3. Other explanation of clinical presentation (please document)  4. Unable to determine    The medical record reflects the following:   Clinical Findings  Sepsis, HCAP/aspiration, acute on chronic respiratory failure, metabolic encepahlopathy, acute on chronic diastolic CHF   Treatment  IV antibiotics, Respiratory protocol, diuretics, IV fluids    Risk Factors     Location within medical record  History and Physical, Progress Notes, Discharge Summary and ED Report     Thank you,   Yudy Marie

## 2017-04-06 ENCOUNTER — TELEPHONE (OUTPATIENT)
Dept: MEDICAL GROUP | Facility: PHYSICIAN GROUP | Age: 82
End: 2017-04-06

## 2017-04-06 NOTE — TELEPHONE ENCOUNTER
Received notification from [facility] and [facility], Mr Mancini has passed 4/1/2017

## 2023-06-30 NOTE — CARE PLAN
Problem: Safety  Goal: Will remain free from injury  Intervention: Provide assistance with mobility  Pt mobility assessed at beginning of shift, pt 2 assist.  Fall precautions in place, non-slip socks on, bed in lowest, locked position and call light is within reach.  Pt educated to call for assistance and verbalizes understanding. Suction and pulse ox at bedside.       Problem: Skin Integrity  Goal: Risk for impaired skin integrity will decrease  Intervention: Assess risk factors for impaired skin integrity and/or pressure ulcers  Q2 turns, moisture and barrier cream in use.  Floating heels.             Patient/Transcribed from patient self report